# Patient Record
Sex: MALE | Race: BLACK OR AFRICAN AMERICAN | NOT HISPANIC OR LATINO | Employment: OTHER | URBAN - METROPOLITAN AREA
[De-identification: names, ages, dates, MRNs, and addresses within clinical notes are randomized per-mention and may not be internally consistent; named-entity substitution may affect disease eponyms.]

---

## 2017-01-18 DIAGNOSIS — E11.9 TYPE 2 DIABETES MELLITUS WITHOUT COMPLICATION: ICD-10-CM

## 2017-02-22 ENCOUNTER — TELEPHONE (OUTPATIENT)
Dept: PODIATRY | Facility: CLINIC | Age: 82
End: 2017-02-22

## 2017-02-22 NOTE — TELEPHONE ENCOUNTER
----- Message from Josh Garcia sent at 2/22/2017  9:05 AM CST -----  Contact: patient  Pt called to see if there is any way he can get in sooner with Dr. Frausto due to a toenail fungus.        Daughter Said that she will talk to her father (patient) and give us a call back to let us know if patient will take appointment  In [place].

## 2017-03-02 ENCOUNTER — OFFICE VISIT (OUTPATIENT)
Dept: PODIATRY | Facility: CLINIC | Age: 82
End: 2017-03-02
Payer: MEDICARE

## 2017-03-02 DIAGNOSIS — M20.10 HAV (HALLUX ABDUCTO VALGUS), UNSPECIFIED LATERALITY: ICD-10-CM

## 2017-03-02 DIAGNOSIS — B35.1 DERMATOPHYTOSIS OF NAIL: ICD-10-CM

## 2017-03-02 DIAGNOSIS — E11.49 TYPE II DIABETES MELLITUS WITH NEUROLOGICAL MANIFESTATIONS: Primary | ICD-10-CM

## 2017-03-02 PROCEDURE — 11721 DEBRIDE NAIL 6 OR MORE: CPT | Mod: Q9,S$GLB,, | Performed by: PODIATRIST

## 2017-03-02 PROCEDURE — 1159F MED LIST DOCD IN RCRD: CPT | Mod: S$GLB,,, | Performed by: PODIATRIST

## 2017-03-02 PROCEDURE — 99213 OFFICE O/P EST LOW 20 MIN: CPT | Mod: 25,S$GLB,, | Performed by: PODIATRIST

## 2017-03-02 PROCEDURE — 1160F RVW MEDS BY RX/DR IN RCRD: CPT | Mod: S$GLB,,, | Performed by: PODIATRIST

## 2017-03-02 PROCEDURE — 1157F ADVNC CARE PLAN IN RCRD: CPT | Mod: S$GLB,,, | Performed by: PODIATRIST

## 2017-03-02 PROCEDURE — 99999 PR PBB SHADOW E&M-EST. PATIENT-LVL II: CPT | Mod: PBBFAC,,, | Performed by: PODIATRIST

## 2017-03-02 PROCEDURE — 99499 UNLISTED E&M SERVICE: CPT | Mod: S$GLB,,, | Performed by: PODIATRIST

## 2017-03-02 NOTE — PROGRESS NOTES
Subjective:     Zbigniew Forman is a 89 y.o. male male who presents to the clinic for evaluation and treatment of high risk feet. Zbigniew has a past medical history of Hypertension associated with diabetes; Diabetes mellitus type II, uncontrolled; Glaucoma suspect, high risk (11/19/2013); and Senile cataract, unspecified (11/19/2013). The patient's chief complaint is long toenails. This patient has documented high risk feet requiring routine maintenance secondary to peripheral neuropathy. Experiences occasional numbness at the digits.No trauma reported.  He reports that he usually wears slippers         PCP: Blas Morgan Ii, MD  Date Last Seen by PCP:   10/11/16  HEMOGLOBIN A1C   Date Value Ref Range Status   07/07/2016 7.0 (H) 4.5 - 6.2 % Final     Comment:   05/28/2016 7.0 (H) 4.5 - 6.2 % Final   01/12/2016 7.1 (H) 4.5 - 6.2 % Final             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    Chronic kidney disease, stage III (moderate) 9/29/2015    Diabetes mellitus type II, uncontrolled     Glaucoma suspect, high risk 11/19/2013    Hypertension associated with diabetes     S/P evacuation of subdural hematoma 3/17/2015    Senile cataract, unspecified 11/19/2013             Current Outpatient Prescriptions on File Prior to Visit   Medication Sig Dispense Refill    amlodipine (NORVASC) 5 MG tablet Take 1 tablet (5 mg total) by mouth once daily. 90 tablet 3    latanoprost 0.005 % ophthalmic solution Place 1 drop into both eyes every evening. 2.5 mL 12    lisinopril (PRINIVIL,ZESTRIL) 40 MG tablet Take 1 tablet (40 mg total) by mouth once daily. 90 tablet 3     No current facility-administered medications on file prior to visit.          Review of patient's allergies indicates:   Allergen Reactions    Metformin Diarrhea    Sulfa (sulfonamide antibiotics)     Tradjenta [linagliptin]      Diarrhea               Review of Systems   Constitution: Positive for  weight gain. Negative for chills and fever.   Cardiovascular: Negative for chest pain, claudication and leg swelling.   Respiratory: Negative for cough and shortness of breath.    Skin: Positive for nail changes and dry skin.   Musculoskeletal: Positive for stiffness.   Gastrointestinal: Negative for nausea and vomiting.   Neurological: Positive for numbness. Negative for paresthesias.   Psychiatric/Behavioral: Negative for altered mental status.           Objective:     There were no vitals filed for this visit.        Physical Exam   Constitutional: He appears well-developed and well-nourished.   HENT:   Head: Normocephalic.   Cardiovascular: Intact distal pulses.    Pulses:       Dorsalis pedis pulses are 2+ on the right side, and 2+ on the left side.        Posterior tibial pulses are 1+ on the right side, and 1+ on the left side.   CRT < 3 sec to tips of toes. Mild 1+ edema noted to b/l LE. Mild spider veins and vericosities noted to b/l LEs.      Musculoskeletal:   Equinus noted b/l ankles with < 10 deg DF noted. MMT 5/5 in DF/PF/Inv/Ev resistance with no reproduction of pain in any direction. Passive range of motion of ankle and pedal joints is painless. Hammertoes noted to digits 2-4 b/l, semi reducible. HAV deformity noted b/l with non trackbound joint, hypermobile 1st ray b/l.      Neurological: He has normal strength. No sensory deficit.   Light touch, proprioception, and sharp/dull sensation are all intact bilaterally. Protective threshold with the [place]-Wienstein monofilament is intact bilaterally. Vibratory sensation diminished distal foot b/l.      Skin: Skin is warm, dry and intact.   No open lesions, lacerations or wounds noted. Nails are elongated by 8-10 mm, thickened by 3-4mm with yellow brown discoloration, subungual debris to toes of R 1-5 and L 1-5. Interdigital spaces clean, dry and intact b/l. No erythema noted to b/l foot.             Assessment / Plan :         I counseled the patient on  his conditions, their implications and medical management.    Type II diabetes mellitus with neurological manifestations  -     DIABETIC SHOES FOR HOME USE  -     Foot Care    HAV (hallux abducto valgus), unspecified laterality  -     DIABETIC SHOES FOR HOME USE    Dermatophytosis of nail  -     Foot Care          Return in about 3 months (around 6/2/2017) for foot care, or sooner if concerned.

## 2017-03-02 NOTE — MR AVS SNAPSHOT
[facility] [facility]   [facility]  [place] [address]  Phone: [phone]                  Zbigniew Forman   3/2/2017 1:45 PM   Office Visit    Description:  Male : 1919   Provider:  Greta Frausto DPM   Department:  [facility]           Reason for Visit     Diabetes Mellitus     Diabetic Foot Exam     Routine Foot Care           Diagnoses this Visit        Comments    Type II diabetes mellitus with neurological manifestations    -  Primary     HAV (hallux abducto valgus), unspecified laterality         Dermatophytosis of nail                To Do List           Future Appointments        Provider Department Dept Phone    3/20/2017 2:15 PM Lary Daly MD [facility]hthalmology [phone]    2017 1:30 PM Greta Frausto DPM [facility] [phone]      Goals (5 Years of Data)     None      Follow-Up and Disposition     Return in about 3 months (around 2017) for foot care, or sooner if concerned.    Follow-up and Disposition History      [facility]     [facility] On Call Nurse Care Line -  Assistance  Registered nurses in the [facility] On Call Center provide clinical advisement, health education, appointment booking, and other advisory services.  Call for this free service at [phone].             Medications           Message regarding Medications     Verify the changes and/or additions to your medication regime listed below are the same as discussed with your clinician today.  If any of these changes or additions are incorrect, please notify your healthcare provider.             Verify that the below list of medications is an accurate representation of the medications you are currently taking.  If none reported, the list may be blank. If incorrect, please contact your healthcare provider. Carry this list with you in case of emergency.           Current Medications     amlodipine (NORVASC) 5 MG tablet Take 1 tablet (5 mg total) by mouth  once daily.    latanoprost 0.005 % ophthalmic solution Place 1 drop into both eyes every evening.    lisinopril (PRINIVIL,ZESTRIL) 40 MG tablet Take 1 tablet (40 mg total) by mouth once daily.           Clinical Reference Information           Allergies as of 3/2/2017     Metformin    Sulfa (Sulfonamide Antibiotics)    Tradjenta [Linagliptin]      Immunizations Administered on Date of Encounter - 3/2/2017     None      Orders Placed During Today's Visit      Normal Orders This Visit    DIABETIC SHOES FOR HOME USE     Foot Care       [facility] Sign-Up     Activating your [facility] account is as easy as 1-2-3!     1) Visit [email or web address], select Sign Up Now, enter this activation code and your date of birth, then select Next.  [email or web address]  Expires: 4/16/2017  2:22 PM      2) Create a username and password to use when you visit [facility] in the future and select a security question in case you lose your password and select Next.    3) Enter your e-mail address and click Sign Up!    Additional Information  If you have questions, please e-mail [email or web address] or call [phone] to talk to our [facility] staff. Remember, [facility] is NOT to be used for urgent needs. For medical emergencies, dial 911.         Instructions    Your A1c:    Hemoglobin A1C   Date Value Ref Range Status   07/07/2016 7.0 (H) 4.5 - 6.2 % Final     Comment:     According to ADA guidelines, hemoglobin A1C <7.0% represents  optimal control in non-pregnant diabetic patients.  Different  metrics may apply to specific populations.   Standards of Medical Care in Diabetes - 2016.  For the purpose of screening for the presence of diabetes:  <5.7%     Consistent with the absence of diabetes  5.7-6.4%  Consistent with increasing risk for diabetes   (prediabetes)  >or=6.5%  Consistent with diabetes  Currently no consensus exists for use of hemoglobin A1C  for diagnosis of diabetes for children.     05/28/2016 7.0 (H) 4.5 - 6.2 % Final    01/12/2016 7.1 (H) 4.5 - 6.2 % Final       How to Check Your Feet    Below are tips to help you look for foot problems. Try to check your feet at the same time each day, such as when you get out of bed in the morning.    · Check the top of each foot. The tops of toes, back of the heel, and outer edge of the foot can get a lot of rubbing from poor-fitting shoes.    · Check the bottom of each foot. Daily wear and tear often leads to problems at pressure spots.    · Check the toes and nails. Fungal infections often occur between toes. Toenail problems can also be a sign of fungal infections or lead to breaks in the skin.    · Check your shoes, too. Loose objects inside a shoe can injure the foot. Use your hand to feel inside your shoes for things like yeison, loose stitching, or rough areas that could irritate your skin.        Diabetic Foot Care    Diabetes can lead to a number of different foot complications. Fortunately, most of these complications can be prevented with a little extra foot care. If diabetes is not well controlled, the high blood sugar can cause damage to blood vessels and result in poor circulation to the foot. When the skin does not get enough blood flow, it becomes prone to pressure sores and ulcers, which heal slowly.  High blood sugar can also damage nerves, interfering with the ability to feel pain and pressure. When you cant feel your foot normally, it is easy to injure your skin, bones and joints without knowing it. For these reasons diabetes increases the risk of fungal infections, bunions and ulcers. Deep ulcers can lead to bone infection. Gangrene is the most serious foot complication of diabetes. It usually occurs on the tips of the toes as blacked areas of skin. The black area is dead tissue. In severe cases, gangrene spreads to involve the entire toe, other toes and the entire foot. Foot or toe amputation may be required. Good foot care and blood sugar control can prevent  this.    Home Care  1. Wear comfortable, proper fitting shoes.  2. Wash your feet daily with warm water and mild soap.  3. After drying, apply a moisturizing cream or lotion.  4. Check your feet daily for skin breaks, blisters, swelling, or redness. Look between your toes also.  5. Wear cotton socks and change them every day.  6. Trim toe nails carefully and do not cut your cuticles.  7. Strive to keep your blood sugar under control with a combination of medicines, diet and activity.  8. If you smoke and have diabetes, it is very important that you stop. Smoking reduces blood flow to your foot.  9. Avoid activities that increase your risk of foot injury:  · Do not walk barefoot.  · Do not use heating pads or hot water bottles on your feet.  · Do not put your foot in a hot tub without first checking the temperature with your hand.  10) Schedule yearly foot exams.    Follow Up  with your doctor or as advised by our staff. Report any cut, puncture, scrape, other injury, blister, ingrown toenail or ulcer on your foot.    Get Prompt Medical Attention  if any of the following occur:  -- Open ulcer with pus draining from the wound  -- Increasing foot or leg pain  -- New areas of redness or swelling or tender areas of the foot    © 9231-7296 [employer]. [address] [place] [address]. All rights reserved. This information is not intended as a substitute for professional medical care. Always follow your healthcare professional's instructions.           Language Assistance Services     ATTENTION: Language assistance services are available, free of charge. Please call [phone].      ATENCIÓN: Si habla nicholasañol, tiene a santoyo disposición servicios gratuitos de asistencia lingüística. Llame al [phone].     [facility] Ý: N?u b?n nói Ti?ng Vi?t, có các d?ch v? h? tr? ngôn ng? mi?n phí dành cho b?n. G?i s? [phone].         [facility] complies with applicable Federal civil rights laws and  does not discriminate on the basis of race, color, national origin, age, disability, or sex.

## 2017-03-02 NOTE — PROCEDURES
Routine Foot Care  Date/Time: 3/2/2017 2:19 PM  Performed by: MAO RODRIGUEZ  Authorized by: MAO RODRIGUEZ     Consent Done?:  Yes (Verbal)    Nail Care Type:  Debride  Location(s): All  (Left 1st Toe, Left 3rd Toe, Left 2nd Toe, Left 4th Toe, Left 5th Toe, Right 1st Toe, Right 2nd Toe, Right 3rd Toe, Right 4th Toe and Right 5th Toe)  Patient tolerance:  Patient tolerated the procedure well with no immediate complications

## 2017-03-06 DIAGNOSIS — I10 ESSENTIAL HYPERTENSION: ICD-10-CM

## 2017-03-06 RX ORDER — AMLODIPINE BESYLATE 5 MG/1
5 TABLET ORAL DAILY
Qty: 90 TABLET | Refills: 3 | Status: SHIPPED | OUTPATIENT
Start: 2017-03-06 | End: 2017-08-03 | Stop reason: SDUPTHER

## 2017-03-06 NOTE — TELEPHONE ENCOUNTER
----- Message from Emily Richey sent at 3/6/2017  3:11 PM CST -----  Contact: Anne/ Daughter/ [phone]   Type: Rx     Name of medication(s): amlodipine (NORVASC) 5 MG tablet     Is this a refill? New rx? Refill      Who prescribed medication? Dr. Morgan      Pharmacy Name, Phone, & Location: [facility]      Comments: pt is calling to have a refill on the medication above,she states the pt is out of the medicine

## 2017-03-06 NOTE — TELEPHONE ENCOUNTER
----- Message from Ashley Arora sent at 3/6/2017  8:53 AM CST -----  Contact: Anne/ Daughter/ [phone]   Type: Rx    Name of medication(s): amlodipine (NORVASC) 5 MG tablet    Is this a refill? New rx? Refill     Who prescribed medication? Dr. Morgan     Pharmacy Name, Phone, & Location: [facility]     Comments: pt is calling to have a refill on the medication above. Please call and advise     Thank you

## 2017-06-27 ENCOUNTER — OFFICE VISIT (OUTPATIENT)
Dept: PODIATRY | Facility: CLINIC | Age: 82
End: 2017-06-27
Payer: MEDICARE

## 2017-06-27 VITALS
WEIGHT: 168 LBS | HEART RATE: 86 BPM | HEIGHT: 68 IN | DIASTOLIC BLOOD PRESSURE: 80 MMHG | SYSTOLIC BLOOD PRESSURE: 158 MMHG | BODY MASS INDEX: 25.46 KG/M2

## 2017-06-27 DIAGNOSIS — E11.49 TYPE II DIABETES MELLITUS WITH NEUROLOGICAL MANIFESTATIONS: ICD-10-CM

## 2017-06-27 DIAGNOSIS — E11.9 ENCOUNTER FOR DIABETIC FOOT EXAM: ICD-10-CM

## 2017-06-27 DIAGNOSIS — B35.1 DERMATOPHYTOSIS OF NAIL: Primary | ICD-10-CM

## 2017-06-27 PROCEDURE — 1126F AMNT PAIN NOTED NONE PRSNT: CPT | Mod: S$GLB,,, | Performed by: PODIATRIST

## 2017-06-27 PROCEDURE — 1159F MED LIST DOCD IN RCRD: CPT | Mod: S$GLB,,, | Performed by: PODIATRIST

## 2017-06-27 PROCEDURE — 11721 DEBRIDE NAIL 6 OR MORE: CPT | Mod: Q9,S$GLB,, | Performed by: PODIATRIST

## 2017-06-27 PROCEDURE — 99213 OFFICE O/P EST LOW 20 MIN: CPT | Mod: 25,S$GLB,, | Performed by: PODIATRIST

## 2017-06-27 PROCEDURE — 99499 UNLISTED E&M SERVICE: CPT | Mod: S$GLB,,, | Performed by: PODIATRIST

## 2017-06-27 PROCEDURE — 99999 PR PBB SHADOW E&M-EST. PATIENT-LVL III: CPT | Mod: PBBFAC,,, | Performed by: PODIATRIST

## 2017-06-27 NOTE — PROGRESS NOTES
Subjective:     Zbigniew Forman is a 89 y.o. male male who presents to the clinic for evaluation and treatment of high risk feet. Zbigniew has a past medical history of Hypertension associated with diabetes; Diabetes mellitus type II, uncontrolled; Glaucoma suspect, high risk (11/19/2013); and Senile cataract, unspecified (11/19/2013). The patient's chief complaint is long toenails. This patient has documented high risk feet requiring routine maintenance secondary to peripheral neuropathy. Experiences occasional numbness at the digits.No trauma reported.  He reports that he usually wears slippers         PCP: Blas Morgan Ii, MD  Date Last Seen by PCP:   10/11/16; upcoming appointment 8/3/2017            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    Chronic kidney disease, stage III (moderate) 9/29/2015    Diabetes mellitus type II, uncontrolled     Glaucoma suspect, high risk 11/19/2013    Hypertension associated with diabetes     S/P evacuation of subdural hematoma 3/17/2015    Senile cataract, unspecified 11/19/2013             Current Outpatient Prescriptions on File Prior to Visit   Medication Sig Dispense Refill    amlodipine (NORVASC) 5 MG tablet Take 1 tablet (5 mg total) by mouth once daily. 90 tablet 3    latanoprost 0.005 % ophthalmic solution Place 1 drop into both eyes every evening. 2.5 mL 12    lisinopril (PRINIVIL,ZESTRIL) 40 MG tablet Take 1 tablet (40 mg total) by mouth once daily. 90 tablet 3     No current facility-administered medications on file prior to visit.          Review of patient's allergies indicates:   Allergen Reactions    Metformin Diarrhea    Sulfa (sulfonamide antibiotics)     Tradjenta [linagliptin]      Diarrhea               Review of Systems   Constitution: Positive for weight gain. Negative for chills and fever.   Cardiovascular: Negative for chest pain, claudication and leg swelling.   Respiratory: Negative for  "cough and shortness of breath.    Skin: Positive for nail changes and dry skin.   Musculoskeletal: Positive for stiffness.   Gastrointestinal: Negative for nausea and vomiting.   Neurological: Positive for numbness. Negative for paresthesias.   Psychiatric/Behavioral: Negative for altered mental status.           Objective:     Vitals:    06/27/17 1420   BP: (!) 158/80   Pulse: 86   Weight: 76.2 kg (168 lb)   Height: 5' 8" (1.727 m)           Physical Exam   Constitutional: He appears well-developed and well-nourished.   HENT:   Head: Normocephalic.   Cardiovascular: Intact distal pulses.    Pulses:       Dorsalis pedis pulses are 2+ on the right side, and 2+ on the left side.        Posterior tibial pulses are 1+ on the right side, and 1+ on the left side.   CRT < 3 sec to tips of toes. Mild 1+ edema noted to b/l LE. Mild spider veins and vericosities noted to b/l LEs.      Musculoskeletal:   Equinus noted b/l ankles with < 10 deg DF noted. MMT 5/5 in DF/PF/Inv/Ev resistance with no reproduction of pain in any direction. Passive range of motion of ankle and pedal joints is painless. Hammertoes noted to digits 2-4 b/l, semi reducible. HAV deformity noted b/l with non trackbound joint, hypermobile 1st ray b/l.      Neurological: He has normal strength. No sensory deficit.   Light touch, proprioception, and sharp/dull sensation are all intact bilaterally. Protective threshold with the [place]-Wienstein monofilament is intact bilaterally. Vibratory sensation diminished distal foot b/l.      Skin: Skin is warm, dry and intact.   No open lesions, lacerations or wounds noted. Nails are elongated by 8-10 mm, thickened by 3-4mm with yellow brown discoloration, subungual debris to toes of R 1-5 and L 1-5. Interdigital spaces clean, dry and intact b/l. No erythema noted to b/l foot.       Hemoglobin A1C   Date Value Ref Range Status   07/07/2016 7.0 (H) 4.5 - 6.2 % Final     Comment:     According to ADA guidelines, hemoglobin " A1C <7.0% represents  optimal control in non-pregnant diabetic patients.  Different  metrics may apply to specific populations.   Standards of Medical Care in Diabetes - 2016.  For the purpose of screening for the presence of diabetes:  <5.7%     Consistent with the absence of diabetes  5.7-6.4%  Consistent with increasing risk for diabetes   (prediabetes)  >or=6.5%  Consistent with diabetes  Currently no consensus exists for use of hemoglobin A1C  for diagnosis of diabetes for children.     05/28/2016 7.0 (H) 4.5 - 6.2 % Final   01/12/2016 7.1 (H) 4.5 - 6.2 % Final             Assessment / Plan :         I counseled the patient on his conditions, their implications and medical management.    Dermatophytosis of nail    Type II diabetes mellitus with neurological manifestations  -     DIABETIC SHOES FOR HOME USE    Encounter for diabetic foot exam    Other orders  -     Foot Care      Routine Foot Care  Date/Time: 6/27/2017 2:38 PM  Performed by: MAO RODRIGUEZ  Authorized by: MAO RODRIGUEZ     Consent Done?:  Yes (Verbal)    Nail Care Type:  Debride  Location(s): All  (Left 1st Toe, Left 3rd Toe, Left 2nd Toe, Left 4th Toe, Left 5th Toe, Right 1st Toe, Right 2nd Toe, Right 3rd Toe, Right 4th Toe and Right 5th Toe)  Patient tolerance:  Patient tolerated the procedure well with no immediate complications     With patient's permission, the toenails mentioned above were aggressively reduced and debrided using a nail nipper, removing all offending nail and debris. The patient will continue to monitor the areas daily, inspect the feet, wear protective shoe gear when ambulatory, and moisturizer to maintain skin integrity.           Return in about 3 months (around 9/27/2017) for foot care, or sooner if concerned.

## 2017-08-03 ENCOUNTER — OFFICE VISIT (OUTPATIENT)
Dept: INTERNAL MEDICINE | Facility: CLINIC | Age: 82
End: 2017-08-03
Payer: MEDICARE

## 2017-08-03 ENCOUNTER — LAB VISIT (OUTPATIENT)
Dept: LAB | Facility: HOSPITAL | Age: 82
End: 2017-08-03
Attending: INTERNAL MEDICINE
Payer: MEDICARE

## 2017-08-03 VITALS
SYSTOLIC BLOOD PRESSURE: 154 MMHG | BODY MASS INDEX: 25.23 KG/M2 | DIASTOLIC BLOOD PRESSURE: 82 MMHG | WEIGHT: 166.44 LBS | HEIGHT: 68 IN | HEART RATE: 89 BPM

## 2017-08-03 DIAGNOSIS — I15.2 HYPERTENSION ASSOCIATED WITH DIABETES: ICD-10-CM

## 2017-08-03 DIAGNOSIS — E11.9 CONTROLLED TYPE 2 DIABETES MELLITUS WITHOUT COMPLICATION, WITHOUT LONG-TERM CURRENT USE OF INSULIN: Primary | ICD-10-CM

## 2017-08-03 DIAGNOSIS — R35.0 URINARY FREQUENCY: ICD-10-CM

## 2017-08-03 DIAGNOSIS — H61.20 IMPACTED CERUMEN, UNSPECIFIED LATERALITY: ICD-10-CM

## 2017-08-03 DIAGNOSIS — H93.19 TINNITUS, UNSPECIFIED LATERALITY: ICD-10-CM

## 2017-08-03 DIAGNOSIS — E11.59 HYPERTENSION ASSOCIATED WITH DIABETES: ICD-10-CM

## 2017-08-03 DIAGNOSIS — E11.9 CONTROLLED TYPE 2 DIABETES MELLITUS WITHOUT COMPLICATION, WITHOUT LONG-TERM CURRENT USE OF INSULIN: ICD-10-CM

## 2017-08-03 DIAGNOSIS — G47.9 SLEEP DISTURBANCE: ICD-10-CM

## 2017-08-03 DIAGNOSIS — R53.81 DEBILITY: ICD-10-CM

## 2017-08-03 LAB
ALBUMIN SERPL BCP-MCNC: 3.5 G/DL
ALP SERPL-CCNC: 59 U/L
ALT SERPL W/O P-5'-P-CCNC: 7 U/L
ANION GAP SERPL CALC-SCNC: 13 MMOL/L
AST SERPL-CCNC: 12 U/L
BILIRUB SERPL-MCNC: 0.4 MG/DL
BUN SERPL-MCNC: 23 MG/DL
CALCIUM SERPL-MCNC: 8.8 MG/DL
CHLORIDE SERPL-SCNC: 106 MMOL/L
CHOLEST/HDLC SERPL: 3.5 {RATIO}
CO2 SERPL-SCNC: 23 MMOL/L
CREAT SERPL-MCNC: 1.2 MG/DL
CREAT UR-MCNC: 157 MG/DL
EST. GFR  (AFRICAN AMERICAN): 58 ML/MIN/1.73 M^2
EST. GFR  (NON AFRICAN AMERICAN): 50 ML/MIN/1.73 M^2
GLUCOSE SERPL-MCNC: 105 MG/DL
HDL/CHOLESTEROL RATIO: 28.6 %
HDLC SERPL-MCNC: 210 MG/DL
HDLC SERPL-MCNC: 60 MG/DL
LDLC SERPL CALC-MCNC: 117.2 MG/DL
MICROALBUMIN UR DL<=1MG/L-MCNC: 15 UG/ML
MICROALBUMIN/CREATININE RATIO: 9.6 UG/MG
NONHDLC SERPL-MCNC: 150 MG/DL
POTASSIUM SERPL-SCNC: 4.7 MMOL/L
PROT SERPL-MCNC: 6.9 G/DL
SODIUM SERPL-SCNC: 142 MMOL/L
TRIGL SERPL-MCNC: 164 MG/DL

## 2017-08-03 PROCEDURE — 1159F MED LIST DOCD IN RCRD: CPT | Mod: S$GLB,,, | Performed by: INTERNAL MEDICINE

## 2017-08-03 PROCEDURE — 99499 UNLISTED E&M SERVICE: CPT | Mod: S$GLB,,, | Performed by: INTERNAL MEDICINE

## 2017-08-03 PROCEDURE — 99999 PR PBB SHADOW E&M-EST. PATIENT-LVL III: CPT | Mod: PBBFAC,,, | Performed by: INTERNAL MEDICINE

## 2017-08-03 PROCEDURE — 99214 OFFICE O/P EST MOD 30 MIN: CPT | Mod: S$GLB,,, | Performed by: INTERNAL MEDICINE

## 2017-08-03 PROCEDURE — 1126F AMNT PAIN NOTED NONE PRSNT: CPT | Mod: S$GLB,,, | Performed by: INTERNAL MEDICINE

## 2017-08-03 PROCEDURE — 83036 HEMOGLOBIN GLYCOSYLATED A1C: CPT

## 2017-08-03 PROCEDURE — 80061 LIPID PANEL: CPT

## 2017-08-03 PROCEDURE — 80053 COMPREHEN METABOLIC PANEL: CPT

## 2017-08-03 PROCEDURE — 3008F BODY MASS INDEX DOCD: CPT | Mod: S$GLB,,, | Performed by: INTERNAL MEDICINE

## 2017-08-03 PROCEDURE — 36415 COLL VENOUS BLD VENIPUNCTURE: CPT

## 2017-08-03 RX ORDER — AMLODIPINE BESYLATE 10 MG/1
10 TABLET ORAL DAILY
Qty: 90 TABLET | Refills: 3 | Status: SHIPPED | OUTPATIENT
Start: 2017-08-03 | End: 2017-10-31 | Stop reason: SDUPTHER

## 2017-08-03 RX ORDER — ALFUZOSIN HYDROCHLORIDE 10 MG/1
10 TABLET, EXTENDED RELEASE ORAL NIGHTLY
Qty: 90 TABLET | Refills: 3 | Status: SHIPPED | OUTPATIENT
Start: 2017-08-03 | End: 2018-02-08 | Stop reason: SDUPTHER

## 2017-08-03 NOTE — PROGRESS NOTES
Subjective:       Patient ID: Zbigniew Forman is a 89 y.o. male.    Chief Complaint: Hypertension and Diabetes    HPI - several issues today.  Mr. Forman lost his wife of 74 years in April, and the whole family seems to be grieving still.  He asked if his heart was healthy.  His hearing is muffled, and he hears a roaring sound; wonders does he need wax removed.    Has some itching across stomach and back where he has a few skin tags.  His circadian rhythm is off, going to sleep around 3am and not awakening until 1pm or later.  Frequently urinating throughout the day, including at least 3-4 when trying to sleep at night.  Due for a good bit of diabetic health maintenance.  He's not a smoker.  Accompanied by daughter and son today.    PMH:  HTN, elevated today  DM2, dietary management  S/p SDH evacuation  CKD3  Debility  Prostatism      Meds:  Reviewed and reconciled in EPIC with patient during visit today.    Review of Systems   Constitutional: Negative for fever.   HENT: Positive for hearing loss.    Respiratory: Negative for shortness of breath.    Cardiovascular: Negative for chest pain.   Gastrointestinal: Negative for abdominal pain.   Genitourinary: Positive for frequency.   Musculoskeletal: Positive for gait problem.   Skin: Negative for rash.   Neurological: Positive for weakness.   Psychiatric/Behavioral: Positive for dysphoric mood and sleep disturbance.       Objective:      Physical Exam   Constitutional: He is oriented to person, place, and time. He appears well-developed and well-nourished. No distress.   Frail, elderly man in a good mood today.   HENT:   Head: Normocephalic and atraumatic.   Cardiovascular: Normal rate, regular rhythm and normal heart sounds.  Exam reveals no gallop and no friction rub.    No murmur heard.  Pulmonary/Chest: No respiratory distress. He has no wheezes. He has no rales. He exhibits no tenderness.   Neurological: He is alert and oriented to person, place, and time.    Skin: Skin is warm. He is not diaphoretic. No erythema.   Skin tags in groin, lower back.   Psychiatric: He has a normal mood and affect. Thought content normal.   Nursing note and vitals reviewed.      Assessment:       1. Controlled type 2 diabetes mellitus without complication, without long-term current use of insulin    2. Hypertension associated with diabetes    3. Tinnitus, unspecified laterality    4. Sleep disturbance    5. Urinary frequency    6. Impacted cerumen, unspecified laterality    7. Essential hypertension    8. Debility        Plan:       Zbigniew was seen today for hypertension and diabetes.    Diagnoses and all orders for this visit:    Controlled type 2 diabetes mellitus without complication, without long-term current use of insulin - stable in the past, but no a1c in past year!  Will draw lab work today.  Plan to stay on diet treatment if at all possible given his age  -     Microalbumin/creatinine urine ratio  -     Lipid panel; Future  -     Hemoglobin A1c; Future  -     Comprehensive metabolic panel; Future    Hypertension associated with diabetes - not at goal, think he would benefit from higher dose amlodipine  -     amlodipine (NORVASC) 10 MG tablet; Take 1 tablet (10 mg total) by mouth once daily.  -     Comprehensive metabolic panel; Future    Tinnitus, unspecified laterality - cerumen impaction bilaterally; washed out by nursing staff.    Sleep disturbance - discussed slowing modifying awakening times to try to turn him around.  This can be difficult    Urinary frequency - worsening; trial of uroxatral  -     alfuzosin (UROXATRAL) 10 mg Tb24; Take 1 tablet (10 mg total) by mouth every evening.    Impacted cerumen, unspecified laterality    Debility - given age, he's not so bad; however, he would definitely benefit from physical therapy for strengthening and gait training  -     Ambulatory consult to Physical Therapy    rtc 6 months    JANELL Morgan MD MPH  Staff Internist

## 2017-08-04 LAB
ESTIMATED AVG GLUCOSE: 143 MG/DL
HBA1C MFR BLD HPLC: 6.6 %

## 2017-08-07 ENCOUNTER — TELEPHONE (OUTPATIENT)
Dept: INTERNAL MEDICINE | Facility: CLINIC | Age: 82
End: 2017-08-07

## 2017-08-07 NOTE — TELEPHONE ENCOUNTER
----- Message from Beni Ferrer sent at 8/7/2017  9:53 AM CDT -----  Contact: Anne [phone] or [phone]  Patient's sister would like a call back from the office to discuss test result . Please advise , Thanks !

## 2017-08-07 NOTE — TELEPHONE ENCOUNTER
Spoke to patients sister she would like to know patients lab results also that when he was here in the office he had an ear wash and hasn't been able to hear clearly. Wants to know if this is normal or what they should do. Please advise

## 2017-08-08 NOTE — TELEPHONE ENCOUNTER
Please call her back.  First, I sent a letter to the patient on 8/4 with all the lab results.  We cannot discuss his lab work with anyone but him; she should talk to him about the letter he received from us.    Second, hearing should have improved after the ear washing/wax removal.  If it's worse, he should come for an urgent care appointment to be evaluated again.

## 2017-09-24 DIAGNOSIS — I15.2 HYPERTENSION ASSOCIATED WITH DIABETES: ICD-10-CM

## 2017-09-24 DIAGNOSIS — E11.59 HYPERTENSION ASSOCIATED WITH DIABETES: ICD-10-CM

## 2017-09-25 RX ORDER — LATANOPROST 50 UG/ML
SOLUTION/ DROPS OPHTHALMIC
Qty: 2.5 ML | Refills: 12 | Status: SHIPPED | OUTPATIENT
Start: 2017-09-25 | End: 2019-11-11 | Stop reason: SDUPTHER

## 2017-09-25 RX ORDER — LATANOPROST 50 UG/ML
SOLUTION/ DROPS OPHTHALMIC
Qty: 2.5 ML | Refills: 12 | Status: SHIPPED | OUTPATIENT
Start: 2017-09-25 | End: 2017-10-27

## 2017-09-25 RX ORDER — LISINOPRIL 40 MG/1
TABLET ORAL
Qty: 90 TABLET | Refills: 3 | Status: SHIPPED | OUTPATIENT
Start: 2017-09-25 | End: 2017-10-30

## 2017-10-18 ENCOUNTER — TELEPHONE (OUTPATIENT)
Dept: INTERNAL MEDICINE | Facility: CLINIC | Age: 82
End: 2017-10-18

## 2017-10-18 NOTE — TELEPHONE ENCOUNTER
----- Message from Trish Crawford sent at 10/17/2017  4:30 PM CDT -----  Contact: Pt daughter Anne can be reached at [phone]  Anne is calling to get a sooner appt for her father who has a boil on the inner thigh and rash        Please contact daughter would like a  appt      Thank you!

## 2017-10-18 NOTE — TELEPHONE ENCOUNTER
Spoke to daughter they requests 11 am but sooner than the 27th of October. I advised Anne the daughter that Dr. Morgan doesn't have any 11 o'clock appointments open and that I could scheduled them with another provider. They declined and will keep already scheduled appointment.

## 2017-10-27 ENCOUNTER — OFFICE VISIT (OUTPATIENT)
Dept: INTERNAL MEDICINE | Facility: CLINIC | Age: 82
End: 2017-10-27
Payer: MEDICARE

## 2017-10-27 ENCOUNTER — IMMUNIZATION (OUTPATIENT)
Dept: INTERNAL MEDICINE | Facility: CLINIC | Age: 82
End: 2017-10-27
Payer: MEDICARE

## 2017-10-27 VITALS
HEART RATE: 88 BPM | DIASTOLIC BLOOD PRESSURE: 76 MMHG | WEIGHT: 164.69 LBS | SYSTOLIC BLOOD PRESSURE: 122 MMHG | BODY MASS INDEX: 24.96 KG/M2 | HEIGHT: 68 IN

## 2017-10-27 DIAGNOSIS — E11.59 HYPERTENSION ASSOCIATED WITH DIABETES: ICD-10-CM

## 2017-10-27 DIAGNOSIS — I15.2 HYPERTENSION ASSOCIATED WITH DIABETES: ICD-10-CM

## 2017-10-27 DIAGNOSIS — B02.9 HERPES ZOSTER WITHOUT COMPLICATION: Primary | ICD-10-CM

## 2017-10-27 DIAGNOSIS — E11.9 CONTROLLED TYPE 2 DIABETES MELLITUS WITHOUT COMPLICATION, WITHOUT LONG-TERM CURRENT USE OF INSULIN: ICD-10-CM

## 2017-10-27 PROCEDURE — 90662 IIV NO PRSV INCREASED AG IM: CPT | Mod: S$GLB,,, | Performed by: INTERNAL MEDICINE

## 2017-10-27 PROCEDURE — G0008 ADMIN INFLUENZA VIRUS VAC: HCPCS | Mod: S$GLB,,, | Performed by: INTERNAL MEDICINE

## 2017-10-27 PROCEDURE — 99499 UNLISTED E&M SERVICE: CPT | Mod: S$GLB,,, | Performed by: INTERNAL MEDICINE

## 2017-10-27 PROCEDURE — 99214 OFFICE O/P EST MOD 30 MIN: CPT | Mod: S$GLB,,, | Performed by: INTERNAL MEDICINE

## 2017-10-27 PROCEDURE — 99999 PR PBB SHADOW E&M-EST. PATIENT-LVL III: CPT | Mod: PBBFAC,,, | Performed by: INTERNAL MEDICINE

## 2017-10-27 NOTE — PROGRESS NOTES
Subjective:       Patient ID: Zbigniew Forman is a 89 y.o. male.    Chief Complaint: Recurrent Skin Infections; Abdominal Pain; and Hernia    HPI - Mr. Forman came with son and daughter to evaluate a rash on his left thigh.  Rash has been present for 3 weeks, getting better.  Also with left flank tenderness near the rash.  Taking all medications as prescribed.  Asked me to [facility] to see if he can get some help with home care d/t advanced age and debility.      PMH:  HTN, at goal  DM2, dietary management  S/p SDH evacuation  CKD3  Debility  Prostatism      Meds:  Reviewed and reconciled in EPIC with patient during visit today.     Review of Systems   Constitutional: Negative for fever.   HENT: Negative for congestion.    Respiratory: Negative for shortness of breath.    Cardiovascular: Negative for chest pain.   Gastrointestinal: Negative for abdominal pain.   Genitourinary: Positive for flank pain.   Musculoskeletal: Negative for arthralgias.   Skin: Positive for rash.   Neurological: Positive for weakness. Negative for dizziness.   Psychiatric/Behavioral: Negative for sleep disturbance.       Objective:      Physical Exam   Constitutional: He is oriented to person, place, and time. He appears well-developed and well-nourished. No distress.   HENT:   Head: Normocephalic and atraumatic.   Cardiovascular: Normal rate, regular rhythm and normal heart sounds.  Exam reveals no gallop and no friction rub.    No murmur heard.  Pulmonary/Chest: No respiratory distress. He has no wheezes. He has no rales. He exhibits no tenderness.   Neurological: He is alert and oriented to person, place, and time.   Skin: Skin is warm. He is not diaphoretic. No erythema.   Typical rash of shingles across left hip/flank.  Lesions are healing.  Most have lost crusting and are epithelialized.   Psychiatric: He has a normal mood and affect. Thought content normal.   Nursing note and vitals reviewed.       Assessment:       1. Herpes zoster without complication    2. Hypertension associated with diabetes    3. Controlled type 2 diabetes mellitus without complication, without long-term current use of insulin        Plan:       Zbigniew was seen today for recurrent skin infections, abdominal pain and hernia.    Diagnoses and all orders for this visit:    Herpes zoster without complication - new complaint.  Provided reassurance and support.  Medications will not help this late in his course (3 weeks in)    Hypertension associated with diabetes - at goal, stay the course    Controlled type 2 diabetes mellitus without complication, without long-term current use of insulin - at goal, stay the course    rtc prn, or 3 months    G Zack Morgan MD MPH  Staff Internist

## 2017-10-30 DIAGNOSIS — I10 ESSENTIAL HYPERTENSION: ICD-10-CM

## 2017-10-30 RX ORDER — LISINOPRIL 40 MG/1
TABLET ORAL
Qty: 90 TABLET | Refills: 3 | Status: SHIPPED | OUTPATIENT
Start: 2017-10-30 | End: 2018-02-08 | Stop reason: SDUPTHER

## 2017-10-31 DIAGNOSIS — I15.2 HYPERTENSION ASSOCIATED WITH DIABETES: ICD-10-CM

## 2017-10-31 DIAGNOSIS — E11.59 HYPERTENSION ASSOCIATED WITH DIABETES: ICD-10-CM

## 2017-10-31 RX ORDER — AMLODIPINE BESYLATE 10 MG/1
10 TABLET ORAL DAILY
Qty: 90 TABLET | Refills: 3 | Status: SHIPPED | OUTPATIENT
Start: 2017-10-31 | End: 2018-02-08 | Stop reason: SDUPTHER

## 2017-10-31 NOTE — TELEPHONE ENCOUNTER
----- Message from Geraldine Moffett sent at 10/31/2017 12:45 PM CDT -----  Contact: self [phone]  Type: Rx    Name of medication(s): amlodipine (NORVASC) 10 MG tablet    Is this a refill? New rx? refill    Who prescribed medication? [place]    Pharmacy Name, Phone, & Location: [facility] [address] [facility]     Comments: patient needs refills    thanks

## 2017-11-16 ENCOUNTER — TELEPHONE (OUTPATIENT)
Dept: INTERNAL MEDICINE | Facility: CLINIC | Age: 82
End: 2017-11-16

## 2017-11-16 NOTE — TELEPHONE ENCOUNTER
Yes, he can take multivitamins.  I don't think he needs one, but if they want to do it, they can.    Please inform the family.    D

## 2017-11-16 NOTE — TELEPHONE ENCOUNTER
----- Message from Elizabeth Stringer sent at 11/16/2017  9:45 AM CST -----  Contact: Anne Daughter [phone]  Requesting a call back in regards to centrum adult vitamins. Would like to know if its ok to take. Please advise

## 2018-02-05 ENCOUNTER — TELEPHONE (OUTPATIENT)
Dept: INTERNAL MEDICINE | Facility: CLINIC | Age: 83
End: 2018-02-05

## 2018-02-05 NOTE — TELEPHONE ENCOUNTER
Spoke with pt's daughter, she is asking for dr Morgan to see her father on Thursday. Dr Morgan, can I double book patient?

## 2018-02-05 NOTE — TELEPHONE ENCOUNTER
----- Message from Trish Crawford sent at 2/3/2018  2:16 PM CST -----  Contact: Pt daughter Anne can be reached at [phone]  Anne is calling to speak with the nurse, calling to schedule an appt this week for dizzness after Wednesday. Please      No appt available       Thank you!

## 2018-02-08 ENCOUNTER — OFFICE VISIT (OUTPATIENT)
Dept: PODIATRY | Facility: CLINIC | Age: 83
End: 2018-02-08
Payer: MEDICARE

## 2018-02-08 ENCOUNTER — OFFICE VISIT (OUTPATIENT)
Dept: INTERNAL MEDICINE | Facility: CLINIC | Age: 83
End: 2018-02-08
Payer: MEDICARE

## 2018-02-08 VITALS
HEART RATE: 85 BPM | HEIGHT: 68 IN | WEIGHT: 172.63 LBS | DIASTOLIC BLOOD PRESSURE: 66 MMHG | SYSTOLIC BLOOD PRESSURE: 132 MMHG | BODY MASS INDEX: 26.16 KG/M2

## 2018-02-08 VITALS
HEART RATE: 86 BPM | BODY MASS INDEX: 26.07 KG/M2 | DIASTOLIC BLOOD PRESSURE: 66 MMHG | WEIGHT: 172 LBS | SYSTOLIC BLOOD PRESSURE: 136 MMHG | HEIGHT: 68 IN

## 2018-02-08 DIAGNOSIS — E11.59 HYPERTENSION ASSOCIATED WITH DIABETES: ICD-10-CM

## 2018-02-08 DIAGNOSIS — R35.0 URINARY FREQUENCY: ICD-10-CM

## 2018-02-08 DIAGNOSIS — R42 DIZZINESS: ICD-10-CM

## 2018-02-08 DIAGNOSIS — E11.49 TYPE II DIABETES MELLITUS WITH NEUROLOGICAL MANIFESTATIONS: Primary | ICD-10-CM

## 2018-02-08 DIAGNOSIS — E11.9 CONTROLLED TYPE 2 DIABETES MELLITUS WITHOUT COMPLICATION, WITHOUT LONG-TERM CURRENT USE OF INSULIN: Primary | ICD-10-CM

## 2018-02-08 DIAGNOSIS — I15.2 HYPERTENSION ASSOCIATED WITH DIABETES: ICD-10-CM

## 2018-02-08 DIAGNOSIS — B35.1 DERMATOPHYTOSIS OF NAIL: ICD-10-CM

## 2018-02-08 PROCEDURE — 99213 OFFICE O/P EST LOW 20 MIN: CPT | Performed by: INTERNAL MEDICINE

## 2018-02-08 PROCEDURE — 1126F AMNT PAIN NOTED NONE PRSNT: CPT | Mod: S$GLB,,, | Performed by: INTERNAL MEDICINE

## 2018-02-08 PROCEDURE — 99499 UNLISTED E&M SERVICE: CPT | Mod: S$GLB,,, | Performed by: PODIATRIST

## 2018-02-08 PROCEDURE — 99214 OFFICE O/P EST MOD 30 MIN: CPT | Mod: S$GLB,,, | Performed by: INTERNAL MEDICINE

## 2018-02-08 PROCEDURE — 1159F MED LIST DOCD IN RCRD: CPT | Mod: S$GLB,,, | Performed by: INTERNAL MEDICINE

## 2018-02-08 PROCEDURE — 99213 OFFICE O/P EST LOW 20 MIN: CPT | Mod: 27,PO | Performed by: PODIATRIST

## 2018-02-08 PROCEDURE — 3008F BODY MASS INDEX DOCD: CPT | Mod: S$GLB,,, | Performed by: INTERNAL MEDICINE

## 2018-02-08 PROCEDURE — 99999 PR PBB SHADOW E&M-EST. PATIENT-LVL III: CPT | Mod: PBBFAC,,, | Performed by: INTERNAL MEDICINE

## 2018-02-08 PROCEDURE — 99999 PR PBB SHADOW E&M-EST. PATIENT-LVL III: CPT | Mod: PBBFAC,,, | Performed by: PODIATRIST

## 2018-02-08 RX ORDER — AMLODIPINE BESYLATE 10 MG/1
10 TABLET ORAL DAILY
Qty: 90 TABLET | Refills: 3 | Status: SHIPPED | OUTPATIENT
Start: 2018-02-08 | End: 2018-08-24 | Stop reason: SDUPTHER

## 2018-02-08 RX ORDER — LISINOPRIL 40 MG/1
TABLET ORAL
Qty: 90 TABLET | Refills: 3 | Status: SHIPPED | OUTPATIENT
Start: 2018-02-08 | End: 2018-08-24 | Stop reason: SDUPTHER

## 2018-02-08 RX ORDER — ALFUZOSIN HYDROCHLORIDE 10 MG/1
10 TABLET, EXTENDED RELEASE ORAL NIGHTLY
Qty: 90 TABLET | Refills: 3 | Status: SHIPPED | OUTPATIENT
Start: 2018-02-08 | End: 2018-08-24

## 2018-02-08 NOTE — PROGRESS NOTES
Subjective:       Patient ID: Zbigniew Forman is a 89 y.o. male.    Chief Complaint: Dizziness    HPI - Mr. Forman came in a wheelchair with his wife and son today.  He has been having dizziness (dysequilibrium-type) for the past month.  Worse when he rises from the bed in the am.  Not feeling faint.  No room spinning.  Just feels like he's going to fall.  Feels better when he uses the walker.  He's due for some diabetic health maintenance.  Taking all meds as prescribed.  He's urinating a little more frequently at night.  He doesn't smoke cigarettes.    PMH:  HTN, at goal  DM2, dietary management  S/p SDH evacuation  CKD3  Debility  Prostatism      Meds:  Reviewed and reconciled in EPIC with patient during visit today.     Review of Systems   Constitutional: Negative for fever.   HENT: Negative for congestion.    Respiratory: Negative for shortness of breath.    Cardiovascular: Negative for chest pain.   Gastrointestinal: Negative for abdominal pain.   Genitourinary: Positive for frequency.   Musculoskeletal: Negative for arthralgias.   Skin: Negative for rash.   Neurological: Positive for dizziness.   Psychiatric/Behavioral: Negative for sleep disturbance.       Objective:      Physical Exam   Constitutional: He is oriented to person, place, and time. He appears well-developed and well-nourished. No distress.   Frail, elderly man in NAD.  Examined in wheelchair   HENT:   Head: Normocephalic and atraumatic.   Cerumen impaction bilaterally, left >>right   Cardiovascular: Normal rate, regular rhythm and normal heart sounds.  Exam reveals no gallop and no friction rub.    No murmur heard.  Pulmonary/Chest: No respiratory distress. He has no wheezes. He has no rales. He exhibits no tenderness.   Neurological: He is alert and oriented to person, place, and time.   Skin: Skin is warm. He is not diaphoretic. No erythema.   Psychiatric: He has a normal mood and affect. Thought content normal.   Nursing note and vitals  reviewed.      Assessment:       1. Controlled type 2 diabetes mellitus without complication, without long-term current use of insulin    2. Hypertension associated with diabetes    3. Dizziness    4. Urinary frequency        Plan:       Zbigniew was seen today for dizziness.    Diagnoses and all orders for this visit:    Controlled type 2 diabetes mellitus without complication, without long-term current use of insulin - at goal on dietary therapy.  Repeat a1c; eye photo  -     Diabetic Eye Screening Photo; Future  -     Hemoglobin A1c; Future    Hypertension associated with diabetes - at goal, stay the course  -     amLODIPine (NORVASC) 10 MG tablet; Take 1 tablet (10 mg total) by mouth once daily.  -     lisinopril (PRINIVIL,ZESTRIL) 40 MG tablet; TAKE 1 TABLET(40 MG) BY MOUTH EVERY DAY  -     Comprehensive metabolic panel; Future    Dizziness - likely multifactorial.  Cleaned ear wax today.  CMP.  Use walker more frequently  -     Comprehensive metabolic panel; Future    Urinary frequency - a little worse.  Refilling uroxatral  -     alfuzosin (UROXATRAL) 10 mg Tb24; Take 1 tablet (10 mg total) by mouth every evening.    rtc prn, or 3 months    JANELL Morgan MD MPH  Staff Internist

## 2018-02-08 NOTE — PROGRESS NOTES
Subjective:     Zbigniew Forman is a 89 y.o. male male who presents to the clinic for evaluation and treatment of high risk feet. Zbigniew has a past medical history of Hypertension associated with diabetes; Diabetes mellitus type II, uncontrolled; Glaucoma suspect, high risk (11/19/2013); and Senile cataract, unspecified (11/19/2013). The patient's chief complaint is long toenails. This patient has documented high risk feet requiring routine maintenance secondary to peripheral neuropathy. Experiences occasional numbness at the digits.No trauma reported.  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 90 tablet 3    amLODIPine (NORVASC) 10 MG tablet Take 1 tablet (10 mg total) by mouth once daily. 90 tablet 3    latanoprost 0.005 % ophthalmic solution INSTILL 1 DROP IN BOTH EYES EVERY EVENING 2.5 mL 12    lisinopril (PRINIVIL,ZESTRIL) 40 MG tablet TAKE 1 TABLET(40 MG) BY MOUTH EVERY DAY 90 tablet 3     No current facility-administered medications on file prior to visit.          Review of patient's allergies indicates:   Allergen Reactions    Metformin Diarrhea    Sulfa (sulfonamide antibiotics)     Tradjenta [linagliptin]      Diarrhea           Social History     Social History     "Marital status:      Spouse name: N/A    Number of children: N/A    Years of education: N/A     Occupational History    Not on file.     Social History Main Topics    Smoking status: Never Smoker    Smokeless tobacco: Never Used    Alcohol use No    Drug use: Unknown    Sexual activity: Not on file     Other Topics Concern    Not on file     Social History Narrative    No narrative on file               Review of Systems   Constitution: Positive for weight gain. Negative for chills and fever.   Cardiovascular: Negative for chest pain, claudication and leg swelling.   Respiratory: Negative for cough and shortness of breath.    Skin: Positive for nail changes and dry skin.   Musculoskeletal: Positive for stiffness.   Gastrointestinal: Negative for nausea and vomiting.   Neurological: Positive for numbness. Negative for paresthesias.   Psychiatric/Behavioral: Negative for altered mental status.               Objective:     Vitals:    02/08/18 1522   BP: 136/66   Pulse: 86   Weight: 78 kg (172 lb)   Height: 5' 8" (1.727 m)           Physical Exam   Constitutional: He appears well-developed and well-nourished.   HENT:   Head: Normocephalic.   Cardiovascular: Intact distal pulses.    Pulses:       Dorsalis pedis pulses are 2+ on the right side, and 2+ on the left side.        Posterior tibial pulses are 1+ on the right side, and 1+ on the left side.   CRT < 3 sec to tips of toes. Mild 1+ edema noted to b/l LE. Mild spider veins and vericosities noted to b/l LEs.      Musculoskeletal:   Equinus noted b/l ankles with < 10 deg DF noted. MMT 5/5 in DF/PF/Inv/Ev resistance with no reproduction of pain in any direction. Passive range of motion of ankle and pedal joints is painless. Hammertoes noted to digits 2-4 b/l, semi reducible. HAV deformity noted b/l with non trackbound joint, hypermobile 1st ray b/l.      Neurological: He has normal strength. No sensory deficit.   Light touch, proprioception, and " sharp/dull sensation are all intact bilaterally. Protective threshold with the [place]ienstein monofilament is intact bilaterally. Vibratory sensation diminished distal foot b/l.      Skin: Skin is warm, dry and intact.   No open lesions, lacerations or wounds noted. Nails are elongated by 8-10 mm, thickened by 3-4mm with yellow brown discoloration, subungual debris to toes of R 1-5 and L 1-5. Interdigital spaces clean, dry and intact b/l. No erythema noted to b/l foot.                 Assessment / Plan :         I counseled the patient on his conditions, their implications and medical management.    Type II diabetes mellitus with neurological manifestations    Dermatophytosis of nail    Other orders  -     Foot Care      Routine Foot Care  Date/Time: 2/9/2018 9:31 AM  Performed by: MAO RODRIGUEZ  Authorized by: MAO RODRIGUEZ     Consent Done?:  Yes (Verbal)    Nail Care Type:  Debride  Location(s): All  (Left 1st Toe, Left 3rd Toe, Left 2nd Toe, Left 4th Toe, Left 5th Toe, Right 1st Toe, Right 2nd Toe, Right 3rd Toe, Right 4th Toe and Right 5th Toe)  Patient tolerance:  Patient tolerated the procedure well with no immediate complications     With patient's permission, the toenails mentioned above were aggressively reduced and debrided using a nail nipper, removing all offending nail and debris. The patient will continue to monitor the areas daily, inspect the feet, wear protective shoe gear when ambulatory, and moisturizer to maintain skin integrity.

## 2018-02-09 PROCEDURE — 11721 DEBRIDE NAIL 6 OR MORE: CPT | Mod: Q9,S$GLB,, | Performed by: PODIATRIST

## 2018-02-18 ENCOUNTER — NURSE TRIAGE (OUTPATIENT)
Dept: ADMINISTRATIVE | Facility: CLINIC | Age: 83
End: 2018-02-18

## 2018-02-18 NOTE — TELEPHONE ENCOUNTER
Daughter called re eye exam appt Javieres. Office message sent to have optometry office call family re details of eye exam.     Reason for Disposition   Question about upcoming scheduled test, no triage required and triager able to answer question    Protocols used: ST INFORMATION ONLY CALL-A-AH

## 2018-02-20 ENCOUNTER — CLINICAL SUPPORT (OUTPATIENT)
Dept: OPTOMETRY | Facility: CLINIC | Age: 83
End: 2018-02-20
Attending: INTERNAL MEDICINE
Payer: MEDICARE

## 2018-02-20 DIAGNOSIS — E11.9 CONTROLLED TYPE 2 DIABETES MELLITUS WITHOUT COMPLICATION, WITHOUT LONG-TERM CURRENT USE OF INSULIN: ICD-10-CM

## 2018-02-20 PROCEDURE — 99999 PR PBB SHADOW E&M-EST. PATIENT-LVL II: CPT | Mod: PBBFAC,,,

## 2018-02-20 PROCEDURE — 92250 FUNDUS PHOTOGRAPHY W/I&R: CPT | Mod: S$GLB,,, | Performed by: OPHTHALMOLOGY

## 2018-02-20 NOTE — PROGRESS NOTES
Assessment /Plan     For exam results, see Encounter Report.    Controlled type 2 diabetes mellitus without complication, without long-term current use of insulin  -     Diabetic Eye Screening Photo

## 2018-02-21 ENCOUNTER — TELEPHONE (OUTPATIENT)
Dept: OPHTHALMOLOGY | Facility: CLINIC | Age: 83
End: 2018-02-21

## 2018-05-19 ENCOUNTER — HOSPITAL ENCOUNTER (EMERGENCY)
Facility: HOSPITAL | Age: 83
Discharge: HOME | End: 2018-05-19
Attending: EMERGENCY MEDICINE
Payer: MEDICARE

## 2018-05-19 ENCOUNTER — NURSE TRIAGE (OUTPATIENT)
Dept: ADMINISTRATIVE | Facility: CLINIC | Age: 83
End: 2018-05-19

## 2018-05-19 VITALS
DIASTOLIC BLOOD PRESSURE: 65 MMHG | HEART RATE: 81 BPM | HEIGHT: 68 IN | RESPIRATION RATE: 20 BRPM | TEMPERATURE: 98 F | OXYGEN SATURATION: 96 % | SYSTOLIC BLOOD PRESSURE: 136 MMHG | WEIGHT: 160 LBS | BODY MASS INDEX: 24.25 KG/M2

## 2018-05-19 DIAGNOSIS — R00.2 PALPITATION: Primary | ICD-10-CM

## 2018-05-19 DIAGNOSIS — R79.89 ELEVATED TROPONIN: ICD-10-CM

## 2018-05-19 LAB
ALBUMIN SERPL BCP-MCNC: 3.6 G/DL
ALP SERPL-CCNC: 61 U/L
ALT SERPL W/O P-5'-P-CCNC: 8 U/L
ANION GAP SERPL CALC-SCNC: 12 MMOL/L
AST SERPL-CCNC: 15 U/L
BASOPHILS # BLD AUTO: 0.06 K/UL
BASOPHILS NFR BLD: 0.6 %
BILIRUB SERPL-MCNC: 0.4 MG/DL
BUN SERPL-MCNC: 28 MG/DL
CALCIUM SERPL-MCNC: 8.9 MG/DL
CHLORIDE SERPL-SCNC: 107 MMOL/L
CO2 SERPL-SCNC: 22 MMOL/L
CREAT SERPL-MCNC: 1.5 MG/DL
DIFFERENTIAL METHOD: ABNORMAL
EOSINOPHIL # BLD AUTO: 0.1 K/UL
EOSINOPHIL NFR BLD: 1 %
ERYTHROCYTE [DISTWIDTH] IN BLOOD BY AUTOMATED COUNT: 13.7 %
EST. GFR  (AFRICAN AMERICAN): 44.2 ML/MIN/1.73 M^2
EST. GFR  (NON AFRICAN AMERICAN): 38.2 ML/MIN/1.73 M^2
GLUCOSE SERPL-MCNC: 170 MG/DL
HCT VFR BLD AUTO: 40.9 %
HGB BLD-MCNC: 13.2 G/DL
IMM GRANULOCYTES # BLD AUTO: 0.03 K/UL
IMM GRANULOCYTES NFR BLD AUTO: 0.3 %
LYMPHOCYTES # BLD AUTO: 3.9 K/UL
LYMPHOCYTES NFR BLD: 36.8 %
MCH RBC QN AUTO: 27.6 PG
MCHC RBC AUTO-ENTMCNC: 32.3 G/DL
MCV RBC AUTO: 86 FL
MONOCYTES # BLD AUTO: 0.5 K/UL
MONOCYTES NFR BLD: 4.3 %
NEUTROPHILS # BLD AUTO: 6.1 K/UL
NEUTROPHILS NFR BLD: 57 %
NRBC BLD-RTO: 0 /100 WBC
PLATELET # BLD AUTO: 289 K/UL
PMV BLD AUTO: 9.9 FL
POTASSIUM SERPL-SCNC: 5 MMOL/L
PROT SERPL-MCNC: 6.9 G/DL
RBC # BLD AUTO: 4.78 M/UL
SODIUM SERPL-SCNC: 141 MMOL/L
TROPONIN I SERPL DL<=0.01 NG/ML-MCNC: 0.05 NG/ML
WBC # BLD AUTO: 10.63 K/UL

## 2018-05-19 PROCEDURE — 93005 ELECTROCARDIOGRAM TRACING: CPT

## 2018-05-19 PROCEDURE — 85025 COMPLETE CBC W/AUTO DIFF WBC: CPT

## 2018-05-19 PROCEDURE — 99284 EMERGENCY DEPT VISIT MOD MDM: CPT | Mod: 25

## 2018-05-19 PROCEDURE — 80053 COMPREHEN METABOLIC PANEL: CPT

## 2018-05-19 PROCEDURE — 93010 ELECTROCARDIOGRAM REPORT: CPT | Mod: ,,, | Performed by: INTERNAL MEDICINE

## 2018-05-19 PROCEDURE — 84484 ASSAY OF TROPONIN QUANT: CPT

## 2018-05-19 NOTE — ED PROVIDER NOTES
"Encounter Date: 5/19/2018    SCRIBE #1 NOTE: I, Elenita Orozco, am scribing for, and in the presence of,  Dr. Tesfaye. I have scribed the following portions of the note - the EKG reading and the APC attestation.       History     Chief Complaint   Patient presents with    Palpitations     Pt reporting a "vibrating" sensation in the left side of his chest x 4 days; denies pain or SOB.     89-year-old white male with history of hypertension, diabetes, glaucoma presents to the ED complaining of palpitations for the past 4 days.  He describes vibrations in his left upper chest that will occur intermittently and last for 1-2 minutes at a time.  This is nonexertional is not associated with chest pain, shortness of breath, diaphoresis, lightheadedness.  He has bilateral lower extremity edema which he reports is chronic and unchanged from baseline.  He denies fever, chills, abdominal pain, nausea, vomiting, URI symptoms, headache, numbness.      The history is provided by the patient.     Review of patient's allergies indicates:   Allergen Reactions    Metformin Diarrhea    Sulfa (sulfonamide antibiotics)     Tradjenta [linagliptin]      Diarrhea       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    Chronic kidney disease, stage III (moderate) 9/29/2015    Diabetes mellitus type II, uncontrolled     Glaucoma suspect, high risk 11/19/2013    Hypertension associated with diabetes     S/P evacuation of subdural hematoma 3/17/2015    Senile cataract, unspecified 11/19/2013     Past Surgical History:   Procedure Laterality Date    BRAIN SURGERY  8/19/14    Left canyd holes X 2 for evacuation of chronic SDH    HERNIA REPAIR       Family History   Problem Relation Age of Onset    Other Father         peritonitis    Cerebral aneurysm Mother     Kidney failure Sister 96    Amblyopia Neg Hx     Blindness Neg Hx     Cancer Neg Hx     Cataracts Neg Hx     Diabetes " Neg Hx     Glaucoma Neg Hx     Hypertension Neg Hx     Macular degeneration Neg Hx     Retinal detachment Neg Hx     Strabismus Neg Hx     Stroke Neg Hx     Thyroid disease Neg Hx      Social History   Substance Use Topics    Smoking status: Never Smoker    Smokeless tobacco: Never Used    Alcohol use No     Review of Systems   Constitutional: Negative for chills, diaphoresis, fatigue and fever.   HENT: Negative for congestion, rhinorrhea and sore throat.    Eyes: Negative for photophobia and visual disturbance.   Respiratory: Negative for cough and shortness of breath.    Cardiovascular: Positive for palpitations and leg swelling (chronic and unchanged). Negative for chest pain.   Gastrointestinal: Negative for abdominal pain, constipation, diarrhea, nausea and vomiting.   Genitourinary: Negative for dysuria and hematuria.   Musculoskeletal: Negative for neck pain and neck stiffness.   Skin: Negative for rash and wound.   Neurological: Positive for dizziness (intermittent, currently asymptomatic). Negative for weakness, light-headedness, numbness and headaches.   Psychiatric/Behavioral: Negative for confusion.       Physical Exam     Initial Vitals [05/19/18 1637]   BP Pulse Resp Temp SpO2   (!) 135/92 90 16 98 °F (36.7 °C) 97 %      MAP       106.33         Physical Exam    Nursing note and vitals reviewed.  Constitutional: He appears well-developed and well-nourished. He is not diaphoretic. No distress.   HENT:   Head: Normocephalic and atraumatic.   Neck: Normal range of motion. Neck supple.   Cardiovascular: Normal rate, regular rhythm, normal heart sounds and intact distal pulses. Exam reveals no gallop and no friction rub.    No murmur heard.  2+ bilateral LE edema (patient reports this is chronic and unchanged)   Pulmonary/Chest: Breath sounds normal. He has no wheezes. He has no rhonchi. He has no rales.   Abdominal: Soft. Bowel sounds are normal. There is no tenderness. There is no rebound and no  guarding.   Musculoskeletal: Normal range of motion.   Neurological: He is alert and oriented to person, place, and time.   Skin: Skin is warm and dry. No rash noted. No erythema.   Psychiatric: He has a normal mood and affect.         ED Course   Procedures  Labs Reviewed   CBC W/ AUTO DIFFERENTIAL - Abnormal; Notable for the following:        Result Value    Hemoglobin 13.2 (*)     All other components within normal limits   COMPREHENSIVE METABOLIC PANEL - Abnormal; Notable for the following:     CO2 22 (*)     Glucose 170 (*)     Creatinine 1.5 (*)     ALT 8 (*)     eGFR if  44.2 (*)     eGFR if non  38.2 (*)     All other components within normal limits   TROPONIN I - Abnormal; Notable for the following:     Troponin I 0.051 (*)     All other components within normal limits     Imaging Results          X-Ray Chest PA And Lateral (Final result)  Result time 05/19/18 17:54:39    Final result by Lion Holm MD (05/19/18 17:54:39)                 Impression:      1. No acute cardiopulmonary process.  2. Compression deformity of likely T12 anteriorly, no previous exams are available for comparison, correlation with any focal tenderness.      Electronically signed by: Lion Holm MD  Date:    05/19/2018  Time:    17:54             Narrative:    EXAMINATION:  XR CHEST PA AND LATERAL    CLINICAL HISTORY:  palpitations;    TECHNIQUE:  PA and lateral views of the chest were performed.    COMPARISON:  08/16/2014    FINDINGS:  The cardiomediastinal silhouette is mildly prominent, noting calcification of the aortic arch..  There is no pleural effusion.  The trachea is midline.  The lungs are symmetrically expanded bilaterally with mildly coarse interstitial attenuation, similar to the previous exam..  No large focal consolidation seen.  There is no pneumothorax.  The osseous structures are remarkable for degenerative changes.  There is compression deformity of a lower thoracic  vertebral body, possibly T12, correlation with any focal tenderness recommended, no previous exams are available for meaningful direct comparison..                                EKG Readings: (Independently Interpreted)   Heart rate 98. NSR. No STEMI.           Medical Decision Making:   History:   Old Medical Records: I decided to obtain old medical records.  Independently Interpreted Test(s):   I have ordered and independently interpreted EKG Reading(s) - see prior notes  Clinical Tests:   Lab Tests: Ordered and Reviewed  Radiological Study: Ordered and Reviewed  Medical Tests: Ordered and Reviewed       APC / Resident Notes:   89-year-old white male with history of hypertension, diabetes, glaucoma presents to the ED complaining of palpitations for the past 4 days.  Vital signs stable. Regular rate and rhythm. Lungs are clear.  Abdomen is soft and nontender.  No chest wall tenderness to palpation. 2+ bilateral lower extremity edema the patient reports is chronic and unchanged.  Differential diagnosis includes but is not limited to anxiety, ACS, pneumonia, anemia, cardiac arrhythmia.  Will obtain labs, chest x-ray, EKG.    H/H 13.2/40.9. CMP with no acute abnormalities. Troponin elevated at 0.051.    CXR shows compression deformity at T12 - patient denies any back pain.    Recommended observation for ACS rule out due to palpitations and elevated troponin. Patient does not wish to be admitted and would like to go home. All risks discussed with the patient and his family. He understands and wants to sign out AMA. He is alert and oriented and able to make all decisions.            Scribe Attestation:   Scribe #1: I performed the above scribed service and the documentation accurately describes the services I performed. I attest to the accuracy of the note.    Attending Attestation:     Physician Attestation Statement for NP/PA:   I discussed this assessment and plan of this patient with the NP/PA, but I did not  personally examine the patient. The face to face encounter was performed by the NP/PA.                     Clinical Impression:   The primary encounter diagnosis was Palpitation. A diagnosis of Elevated troponin was also pertinent to this visit.    Disposition:   Disposition: KEIRY Clark PA-C  05/19/18 0153       Jacinto Tesfaye MD  05/26/18 6246

## 2018-05-19 NOTE — TELEPHONE ENCOUNTER
Reason for Disposition   History of heart disease  (i.e., heart attack, bypass surgery, angina, angioplasty, CHF) (Exception: brief heart beat symptoms that went away and now feels well)    Protocols used: ST HEART RATE AND HEARTBEAT [id number]    Patient started to complain of vibrations/ flutters in his chest on yesterday. He ate breakfast this morning and appears to be well otherwise. I spoke to his daughter Anne and after a review of the symptoms and provided that no recent bp was taken at home, Anne was advised to bring her father to the ED for evaluation. She verbalized understanding.

## 2018-05-19 NOTE — ED NOTES
LOC: The patient is awake, alert and aware of environment with an appropriate affect, the patient is oriented x 3 and speaking appropriately. Pt has difficulty hearing.   APPEARANCE: Patient resting comfortably and in no acute distress, patient is clean and well groomed.  SKIN: The skin is warm and dry, patient has normal skin turgor and moist mucus membranes, skin intact, no breakdown or brusing noted.  MUSKULOSKELETAL: Patient moving all extremities well, no obvious swelling or deformities noted. Pt uses a cane to ambulate and arrived in a wheelchair. Pt reports an intermittent buzzing in his left upper chest. Pt denies chest pain at this time.   RESPIRATORY: Airway is open and patent, respirations are spontaneous, patient has a normal effort and rate. Breath sounds are clear and equal bilaterally.  CARDIAC: Normal heart sounds. Bilateral lower extremity peripheral edema noted.   ABDOMEN: Soft and non tender to palpation, no distention noted. Bowel sounds present.  NEURO: No neuro deficits, hand grasp equal, no drift noted, no facial droop noted. Speech is clear.

## 2018-05-19 NOTE — ED TRIAGE NOTES
"Pt arrived to ED with CC of "vibrating" sensation in the left side of his chest x 4 days, intermittent. Denies CP fever nausea or SOB. Denies any pain. Pt reports intermittent dizziness, denies at this time, denies HA or vision changes. No other complaints at this time.    Patient identifiers verified and correct for Zbigniew Forman.    LOC: The patient is awake, alert and oriented x 4. Pt is speaking appropriately, no slurred speech.  APPEARANCE: Patient resting and in no acute distress. Pt is clean and well groomed. No JVD visible. Pt reports pain level of 0.  SKIN: Skin is warm dry and intact, and color is consistent with ethnicity. No tenting observed and capillary refill <3 seconds. No clubbing noted to nail beds. No breakdown or brusing visible and mucus membranes moist and acyanotic.  MUSCULOSKELETAL: Full range of motion present in all extremities. Hand  equal and leg strength strong +5 bilaterally. Pt ambulatory with cane.  RESPIRATORY: Airway is open and patent. Respirations-unlabored, regular rate, equal bilaterally on inspiration and expiration. No accessory muscle use noted. Lungs clear to auscultation in all fields bilaterally anterior and posterior.   CARDIAC: Patient has regular heart rate and rhythm. Patient has no c/o chest pain. +2  Pitting edema noted to BLE, pedal pulses present equal and strong.  ABDOMEN: Soft and non-tender to palpation with no distention noted. Normoactive bowel sounds X4 quadrants. Pt has no complaints of abnormal bowel movements. Pt reports normal appetite.   NEUROLOGIC: Eyes open spontaneously and facial expression symmetrical. Pt behavior appropriate to situation, and pt follows commands.  Pt reports sensation present in all extremities when touched with a finger. PERRLA  : No complaints of frequency, burning, urgency or blood in the urine.     "

## 2018-05-21 ENCOUNTER — TELEPHONE (OUTPATIENT)
Dept: INTERNAL MEDICINE | Facility: CLINIC | Age: 83
End: 2018-05-21

## 2018-05-21 NOTE — TELEPHONE ENCOUNTER
"----- Message from Augustina Lyons sent at 5/19/2018 11:54 AM CDT -----  Contact: PT  PT is stating that he needs to be seen today.  PT has a strange "vibration feeling" above the heart area on his chest.     Callback: [phone]  "

## 2018-05-22 ENCOUNTER — OFFICE VISIT (OUTPATIENT)
Dept: INTERNAL MEDICINE | Facility: CLINIC | Age: 83
End: 2018-05-22
Payer: MEDICARE

## 2018-05-22 VITALS — SYSTOLIC BLOOD PRESSURE: 125 MMHG | DIASTOLIC BLOOD PRESSURE: 62 MMHG | OXYGEN SATURATION: 99 % | HEART RATE: 85 BPM

## 2018-05-22 DIAGNOSIS — I15.2 HYPERTENSION ASSOCIATED WITH DIABETES: ICD-10-CM

## 2018-05-22 DIAGNOSIS — E11.9 CONTROLLED TYPE 2 DIABETES MELLITUS WITHOUT COMPLICATION, WITHOUT LONG-TERM CURRENT USE OF INSULIN: ICD-10-CM

## 2018-05-22 DIAGNOSIS — R00.2 PALPITATIONS: Primary | ICD-10-CM

## 2018-05-22 DIAGNOSIS — E11.59 HYPERTENSION ASSOCIATED WITH DIABETES: ICD-10-CM

## 2018-05-22 PROCEDURE — 99999 PR PBB SHADOW E&M-EST. PATIENT-LVL III: CPT | Mod: PBBFAC,,, | Performed by: INTERNAL MEDICINE

## 2018-05-22 PROCEDURE — 99214 OFFICE O/P EST MOD 30 MIN: CPT | Mod: S$GLB,,, | Performed by: INTERNAL MEDICINE

## 2018-05-22 NOTE — PROGRESS NOTES
"Subjective:       Patient ID: Zbigniew Forman is a 89 y.o. male.    Chief Complaint: Hospital Follow Up    \[facility] [place] [facility]\"" - Mr. Forman is here with his daughter and son today.  He feels OK.  He was in the ER on the 19th for "vibrations" in his chest, interpreted as palpitations.  While troponin was slightly elevated, he left AMA before being observed for any length of time.  He's here for hospital follow up.  He has no chest discomfort or dyspnea with this, and it's not exertional in nature.  No other symptoms.  He's not a smoker, and his diabetes is diet controlled.  HTN well controlled historically and again today.    PMH:  HTN, at goal  DM2, dietary management  S/p SDH evacuation  CKD3  Debility  Prostatism      Meds:  Reviewed and reconciled in EPIC with patient during visit today.    Review of Systems   Constitutional: Negative for fever.   HENT: Negative for congestion.    Respiratory: Negative for shortness of breath.    Cardiovascular: Positive for palpitations. Negative for chest pain.   Gastrointestinal: Negative for abdominal pain.   Genitourinary: Negative for difficulty urinating.   Musculoskeletal: Negative for arthralgias.   Skin: Negative for rash.   Neurological: Negative for headaches.   Psychiatric/Behavioral: Negative for sleep disturbance.       Objective:      Physical Exam   Constitutional: He is oriented to person, place, and time. He appears well-developed and well-nourished. No distress.   Well-appearing elderly man seated in wheelchair   HENT:   Head: Normocephalic and atraumatic.   Cardiovascular: Normal rate, regular rhythm and normal heart sounds.  Exam reveals no gallop and no friction rub.    No murmur heard.  Pulmonary/Chest: No respiratory distress. He has no wheezes. He has no rales. He exhibits no tenderness.   Neurological: He is alert and oriented to person, place, and time.   Skin: Skin is warm. He is not diaphoretic. No erythema.   Psychiatric: He has a normal mood and affect. Thought " content normal.   Nursing note and vitals reviewed.      Assessment:       1. Palpitations    2. Hypertension associated with diabetes    3. Controlled type 2 diabetes mellitus without complication, without long-term current use of insulin        Plan:       Zbigniew was seen today for hospital follow up.    Diagnoses and all orders for this visit:    Palpitations - new complaint for me. Negative w/u per ER is reassuring.  Will ask cardiology opinion  -     Ambulatory consult to Cardiology    Hypertension associated with diabetes - stable at goal. s adolfo the course    Controlled type 2 diabetes mellitus without complication, without long-term current use of insulin - stable at goal, stay the course    rtc prn, or in 3 months    G Zack Morgan MD MPH  Staff Internist

## 2018-05-28 ENCOUNTER — TELEPHONE (OUTPATIENT)
Dept: CARDIOLOGY | Facility: CLINIC | Age: 83
End: 2018-05-28

## 2018-05-28 NOTE — TELEPHONE ENCOUNTER
----- Message from Clemente Grace sent at 5/26/2018  2:49 PM CDT -----  Contact: Pt's Daughter Anne Forman [phone]  Pt's Daughter Anne Forman would like to be called back regarding scheduling an earlier appt. Daughter would like to discuss further.    Daughter can be reached at [phone].    Thanks

## 2018-05-29 ENCOUNTER — OFFICE VISIT (OUTPATIENT)
Dept: PODIATRY | Facility: CLINIC | Age: 83
End: 2018-05-29
Payer: MEDICARE

## 2018-05-29 VITALS — BODY MASS INDEX: 24.25 KG/M2 | HEIGHT: 68 IN | WEIGHT: 160 LBS

## 2018-05-29 DIAGNOSIS — B35.1 DERMATOPHYTOSIS OF NAIL: ICD-10-CM

## 2018-05-29 DIAGNOSIS — L84 CORNS AND CALLUS: ICD-10-CM

## 2018-05-29 DIAGNOSIS — E11.49 TYPE II DIABETES MELLITUS WITH NEUROLOGICAL MANIFESTATIONS: Primary | ICD-10-CM

## 2018-05-29 PROCEDURE — 11056 PARNG/CUTG B9 HYPRKR LES 2-4: CPT | Mod: Q9,S$GLB,, | Performed by: PODIATRIST

## 2018-05-29 PROCEDURE — 11721 DEBRIDE NAIL 6 OR MORE: CPT | Mod: 59,Q9,S$GLB, | Performed by: PODIATRIST

## 2018-05-29 PROCEDURE — 99499 UNLISTED E&M SERVICE: CPT | Mod: S$GLB,,, | Performed by: PODIATRIST

## 2018-05-29 PROCEDURE — 99999 PR PBB SHADOW E&M-EST. PATIENT-LVL II: CPT | Mod: PBBFAC,,, | Performed by: PODIATRIST

## 2018-05-29 NOTE — PROGRESS NOTES
Subjective:     Zbigniew Forman is a 89 y.o. male male who presents to the clinic for evaluation and treatment of high risk feet. Zbigniew has a past medical history of Hypertension associated with diabetes; Diabetes mellitus type II, uncontrolled; Glaucoma suspect, high risk (11/19/2013); and Senile cataract, unspecified (11/19/2013). The patient's chief complaint is long toenails. This patient has documented high risk feet requiring routine maintenance secondary to peripheral neuropathy. Experiences occasional numbness at the digits.No trauma reported.  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 90 tablet 3    amLODIPine (NORVASC) 10 MG tablet Take 1 tablet (10 mg total) by mouth once daily. 90 tablet 3    latanoprost 0.005 % ophthalmic solution INSTILL 1 DROP IN BOTH EYES EVERY EVENING 2.5 mL 12    lisinopril (PRINIVIL,ZESTRIL) 40 MG tablet TAKE 1 TABLET(40 MG) BY MOUTH EVERY DAY 90 tablet 3     No current facility-administered medications on file prior to visit.          Review of patient's allergies indicates:   Allergen Reactions    Metformin Diarrhea    Sulfa (sulfonamide antibiotics)     Tradjenta [linagliptin]      Diarrhea           Social History     Social  "History    Marital status:      Spouse name: N/A    Number of children: N/A    Years of education: N/A     Occupational History    Not on file.     Social History Main Topics    Smoking status: Never Smoker    Smokeless tobacco: Never Used    Alcohol use No    Drug use: Unknown    Sexual activity: Not on file     Other Topics Concern    Not on file     Social History Narrative    No narrative on file               Review of Systems   Constitution: Positive for weight gain. Negative for chills and fever.   Cardiovascular: Negative for chest pain, claudication and leg swelling.   Respiratory: Negative for cough and shortness of breath.    Skin: Positive for nail changes and dry skin.   Musculoskeletal: Positive for stiffness.   Gastrointestinal: Negative for nausea and vomiting.   Neurological: Positive for numbness. Negative for paresthesias.   Psychiatric/Behavioral: Negative for altered mental status.               Objective:     Vitals:    05/29/18 1550   Weight: 72.6 kg (160 lb)   Height: 5' 8" (1.727 m)           Physical Exam   Constitutional: He appears well-developed and well-nourished.   HENT:   Head: Normocephalic.   Cardiovascular: Intact distal pulses.    Pulses:       Dorsalis pedis pulses are 2+ on the right side, and 2+ on the left side.        Posterior tibial pulses are 1+ on the right side, and 1+ on the left side.   CRT < 3 sec to tips of toes. Mild 1+ edema noted to b/l LE. Mild spider veins and vericosities noted to b/l LEs.      Musculoskeletal:   Equinus noted b/l ankles with < 10 deg DF noted. MMT 5/5 in DF/PF/Inv/Ev resistance with no reproduction of pain in any direction. Passive range of motion of ankle and pedal joints is painless. Hammertoes noted to digits 2-4 b/l, semi reducible. HAV deformity noted b/l with non trackbound joint, hypermobile 1st ray b/l.      Neurological: He has normal strength. No sensory deficit.   Light touch, proprioception, and sharp/dull " sensation are all intact bilaterally. Protective threshold with the [place]-Wienstein monofilament is intact bilaterally. Vibratory sensation diminished distal foot b/l.      Skin: Skin is warm, dry and intact.   No open lesions, lacerations or wounds noted. Nails are elongated by 8-10 mm, thickened by 3-4mm with yellow brown discoloration, subungual debris to toes of R 1-5 and L 1-5. Interdigital spaces clean, dry and intact b/l. No erythema noted to b/l foot.                 Assessment / Plan :         I counseled the patient on his conditions, their implications and medical management.    Type II diabetes mellitus with neurological manifestations  -     DIABETIC SHOES FOR HOME USE    Dermatophytosis of nail    Corns and callus  -     DIABETIC SHOES FOR HOME USE    Other orders  -     Foot Care      Routine Foot Care  Date/Time: 5/29/2018 4:14 PM  Performed by: MAO RODRIGUEZ  Authorized by: MAO RODRIGUEZ     Consent Done?:  Yes (Verbal)  Hyperkeratotic Skin Lesions?: Yes    Number of trimmed lesions:  2  Location(s):  Left 3rd Toe and Right 3rd Toe    Nail Care Type:  Debride  Location(s): All  (Left 1st Toe, Left 3rd Toe, Left 2nd Toe, Left 4th Toe, Left 5th Toe, Right 1st Toe, Right 2nd Toe, Right 3rd Toe, Right 4th Toe and Right 5th Toe)  Patient tolerance:  Patient tolerated the procedure well with no immediate complications     With patient's permission, the toenails mentioned above were aggressively reduced and debrided using a nail nipper, removing all offending nail and debris. Utilizing a #15 scalpel, I trimmed the corns and calluses at the above mentioned location.      The patient will continue to monitor the areas daily, inspect the feet, wear protective shoe gear when ambulatory, and moisturizer to maintain skin integrity.

## 2018-06-13 ENCOUNTER — TELEPHONE (OUTPATIENT)
Dept: PODIATRY | Facility: CLINIC | Age: 83
End: 2018-06-13

## 2018-08-24 ENCOUNTER — LAB VISIT (OUTPATIENT)
Dept: LAB | Facility: HOSPITAL | Age: 83
End: 2018-08-24
Attending: INTERNAL MEDICINE
Payer: MEDICARE

## 2018-08-24 ENCOUNTER — OFFICE VISIT (OUTPATIENT)
Dept: INTERNAL MEDICINE | Facility: CLINIC | Age: 83
End: 2018-08-24
Payer: MEDICARE

## 2018-08-24 VITALS
OXYGEN SATURATION: 96 % | SYSTOLIC BLOOD PRESSURE: 130 MMHG | HEART RATE: 86 BPM | DIASTOLIC BLOOD PRESSURE: 68 MMHG | WEIGHT: 169 LBS | HEIGHT: 68 IN | BODY MASS INDEX: 25.61 KG/M2

## 2018-08-24 DIAGNOSIS — L29.9 ITCH: ICD-10-CM

## 2018-08-24 DIAGNOSIS — E11.9 CONTROLLED TYPE 2 DIABETES MELLITUS WITHOUT COMPLICATION, WITHOUT LONG-TERM CURRENT USE OF INSULIN: ICD-10-CM

## 2018-08-24 DIAGNOSIS — I15.2 HYPERTENSION ASSOCIATED WITH DIABETES: ICD-10-CM

## 2018-08-24 DIAGNOSIS — I15.2 HYPERTENSION ASSOCIATED WITH DIABETES: Primary | ICD-10-CM

## 2018-08-24 DIAGNOSIS — E11.59 HYPERTENSION ASSOCIATED WITH DIABETES: Primary | ICD-10-CM

## 2018-08-24 DIAGNOSIS — R53.81 DEBILITY: ICD-10-CM

## 2018-08-24 DIAGNOSIS — E11.59 HYPERTENSION ASSOCIATED WITH DIABETES: ICD-10-CM

## 2018-08-24 LAB
ALBUMIN SERPL BCP-MCNC: 3.6 G/DL
ALP SERPL-CCNC: 58 U/L
ALT SERPL W/O P-5'-P-CCNC: 12 U/L
ANION GAP SERPL CALC-SCNC: 10 MMOL/L
AST SERPL-CCNC: 15 U/L
BILIRUB SERPL-MCNC: 0.4 MG/DL
BUN SERPL-MCNC: 31 MG/DL
CALCIUM SERPL-MCNC: 8.8 MG/DL
CHLORIDE SERPL-SCNC: 108 MMOL/L
CO2 SERPL-SCNC: 24 MMOL/L
CREAT SERPL-MCNC: 1.4 MG/DL
EST. GFR  (AFRICAN AMERICAN): 48 ML/MIN/1.73 M^2
EST. GFR  (NON AFRICAN AMERICAN): 41 ML/MIN/1.73 M^2
ESTIMATED AVG GLUCOSE: 140 MG/DL
GLUCOSE SERPL-MCNC: 113 MG/DL
HBA1C MFR BLD HPLC: 6.5 %
POTASSIUM SERPL-SCNC: 5 MMOL/L
PROT SERPL-MCNC: 6.7 G/DL
SODIUM SERPL-SCNC: 142 MMOL/L

## 2018-08-24 PROCEDURE — 83036 HEMOGLOBIN GLYCOSYLATED A1C: CPT

## 2018-08-24 PROCEDURE — 99999 PR PBB SHADOW E&M-EST. PATIENT-LVL III: CPT | Mod: PBBFAC,,, | Performed by: INTERNAL MEDICINE

## 2018-08-24 PROCEDURE — 99214 OFFICE O/P EST MOD 30 MIN: CPT | Mod: S$GLB,,, | Performed by: INTERNAL MEDICINE

## 2018-08-24 PROCEDURE — 36415 COLL VENOUS BLD VENIPUNCTURE: CPT

## 2018-08-24 PROCEDURE — 80053 COMPREHEN METABOLIC PANEL: CPT

## 2018-08-24 RX ORDER — LISINOPRIL 40 MG/1
TABLET ORAL
Qty: 90 TABLET | Refills: 3 | Status: ON HOLD | OUTPATIENT
Start: 2018-08-24 | End: 2018-11-19 | Stop reason: SDUPTHER

## 2018-08-24 RX ORDER — AMLODIPINE BESYLATE 10 MG/1
10 TABLET ORAL DAILY
Qty: 90 TABLET | Refills: 3 | Status: ON HOLD | OUTPATIENT
Start: 2018-08-24 | End: 2018-12-10 | Stop reason: SDUPTHER

## 2018-08-24 NOTE — PROGRESS NOTES
Subjective:       Patient ID: Zbigniew Forman is a 89 y.o. male.    Chief Complaint: Follow-up    HPI - Mr. Forman came with two daughters and his son today.  Other than some itching along his upper abdomen, he felt well.  No falls at home.  No other issues.  He's due for some diabetic health maintenance.  He does not smoke cigarettes.    PMH:  HTN, at goal  DM2, dietary management  S/p SDH evacuation  CKD3  Debility  Prostatism      Meds:  Reviewed and reconciled in EPIC with patient during visit today.    Review of Systems   Constitutional: Negative for fever.   HENT: Negative for congestion.    Respiratory: Negative for shortness of breath.    Cardiovascular: Negative for chest pain.   Gastrointestinal: Negative for abdominal pain.   Genitourinary: Negative for dysuria.   Musculoskeletal: Negative for arthralgias.   Skin: Negative for rash.   Neurological: Negative for headaches.   Psychiatric/Behavioral: Negative for sleep disturbance.       Objective:      Physical Exam   Constitutional: He is oriented to person, place, and time. He appears well-developed and well-nourished. No distress.   HENT:   Head: Normocephalic and atraumatic.   Cardiovascular: Normal rate, regular rhythm and normal heart sounds. Exam reveals no gallop and no friction rub.   No murmur heard.  Pulses:       Dorsalis pedis pulses are 1+ on the right side, and 2+ on the left side.   Pulmonary/Chest: No respiratory distress. He has no wheezes. He has no rales. He exhibits no tenderness.   Musculoskeletal:        Right foot: There is no deformity.   Feet:   Right Foot:   Protective Sensation: 4 sites tested. 1 site sensed.  Skin Integrity: Negative for ulcer, blister or skin breakdown.   Left Foot:   Protective Sensation: 4 sites tested. 1 site sensed.  Skin Integrity: Negative for ulcer, blister or skin breakdown.   Neurological: He is alert and oriented to person, place, and time.   Skin: Skin is warm. He is not diaphoretic. No erythema.    Psychiatric: He has a normal mood and affect. Thought content normal.   Nursing note and vitals reviewed.      Assessment:       1. Hypertension associated with diabetes    2. Controlled type 2 diabetes mellitus without complication, without long-term current use of insulin    3. Itch    4. Debility        Plan:       Zbigniew was seen today for follow-up.    Diagnoses and all orders for this visit:    Hypertension associated with diabetes - at goal, stay the course.    Controlled type 2 diabetes mellitus without complication, without long-term current use of insulin - at goal, updating labwork today  -     Hemoglobin A1c; Future  -     Comprehensive metabolic panel; Future    Itch - discussed use of cetaphil or other lotions OTC for itch    Debility    rtc prn, or in 6 months    JANELL Morgan MD MPH  Staff Internist

## 2018-09-10 ENCOUNTER — TELEPHONE (OUTPATIENT)
Dept: INTERNAL MEDICINE | Facility: CLINIC | Age: 83
End: 2018-09-10

## 2018-09-10 ENCOUNTER — NURSE TRIAGE (OUTPATIENT)
Dept: ADMINISTRATIVE | Facility: CLINIC | Age: 83
End: 2018-09-10

## 2018-09-10 NOTE — TELEPHONE ENCOUNTER
Reason for Disposition   Patient wants to be seen    Protocols used:  DIZZINESS-A-OH  Spoke to Mr. Forman daughter Anne. She states Mr. Forman has been experiencing dizziness for the past 2 days. She states he fell once. Patient is requesting to see Dr. Morgan today or tomorrow.

## 2018-09-10 NOTE — TELEPHONE ENCOUNTER
PT daughter stated that her father has felt and been complaining of dizziness for the past 3 days. She expressed her urgencies for her farther to be seen by you and only you. I gave the first available which is Thursday 9/13/2018 at 11:20 am . She want to know is there anything she can do for him in till then?

## 2018-09-10 NOTE — TELEPHONE ENCOUNTER
PT daughter say that her dad is doing fine and he can wait till Thursday , he feels dizzy when he is laying down so they set him up.

## 2018-09-13 ENCOUNTER — HOSPITAL ENCOUNTER (OUTPATIENT)
Dept: RADIOLOGY | Facility: HOSPITAL | Age: 83
Discharge: HOME | End: 2018-09-13
Attending: INTERNAL MEDICINE
Payer: MEDICARE

## 2018-09-13 ENCOUNTER — OFFICE VISIT (OUTPATIENT)
Dept: INTERNAL MEDICINE | Facility: CLINIC | Age: 83
End: 2018-09-13
Payer: MEDICARE

## 2018-09-13 VITALS
OXYGEN SATURATION: 90 % | HEIGHT: 68 IN | DIASTOLIC BLOOD PRESSURE: 70 MMHG | BODY MASS INDEX: 25.7 KG/M2 | HEART RATE: 95 BPM | SYSTOLIC BLOOD PRESSURE: 134 MMHG

## 2018-09-13 DIAGNOSIS — R42 DIZZINESS: ICD-10-CM

## 2018-09-13 DIAGNOSIS — E11.59 HYPERTENSION ASSOCIATED WITH DIABETES: ICD-10-CM

## 2018-09-13 DIAGNOSIS — R06.2 WHEEZE: ICD-10-CM

## 2018-09-13 DIAGNOSIS — I15.2 HYPERTENSION ASSOCIATED WITH DIABETES: ICD-10-CM
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
## 2018-09-13 DIAGNOSIS — S50.11XA CONTUSION OF RIGHT FOREARM, INITIAL ENCOUNTER: ICD-10-CM

## 2018-09-13 DIAGNOSIS — S01.01XA LACERATION OF SCALP, INITIAL ENCOUNTER: ICD-10-CM
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
## 2018-09-13 LAB
BILIRUB UR QL STRIP: NEGATIVE
CLARITY UR REFRACT.AUTO: CLEAR
COLOR UR AUTO: YELLOW
GLUCOSE UR QL STRIP: NEGATIVE
HGB UR QL STRIP: NEGATIVE
KETONES UR QL STRIP: NEGATIVE
LEUKOCYTE ESTERASE UR QL STRIP: NEGATIVE
MICROSCOPIC COMMENT: NORMAL
NITRITE UR QL STRIP: NEGATIVE
PH UR STRIP: 6 [PH] (ref 5–8)
PROT UR QL STRIP: NEGATIVE
RBC #/AREA URNS AUTO: 0 /HPF (ref 0–4)
SP GR UR STRIP: 1.02 (ref 1–1.03)
URN SPEC COLLECT METH UR: NORMAL
UROBILINOGEN UR STRIP-ACNC: NEGATIVE EU/DL
WBC #/AREA URNS AUTO: 0 /HPF (ref 0–5)

## 2018-09-13 PROCEDURE — 71046 X-RAY EXAM CHEST 2 VIEWS: CPT | Mod: TC

## 2018-09-13 PROCEDURE — 99213 OFFICE O/P EST LOW 20 MIN: CPT | Mod: PBBFAC,25 | Performed by: INTERNAL MEDICINE

## 2018-09-13 PROCEDURE — 1101F PT FALLS ASSESS-DOCD LE1/YR: CPT | Mod: CPTII,,, | Performed by: INTERNAL MEDICINE

## 2018-09-13 PROCEDURE — 70450 CT HEAD/BRAIN W/O DYE: CPT | Mod: TC

## 2018-09-13 PROCEDURE — 99999 PR PBB SHADOW E&M-EST. PATIENT-LVL III: CPT | Mod: PBBFAC,,, | Performed by: INTERNAL MEDICINE

## 2018-09-13 PROCEDURE — 81001 URINALYSIS AUTO W/SCOPE: CPT

## 2018-09-13 PROCEDURE — 71046 X-RAY EXAM CHEST 2 VIEWS: CPT | Mod: 26,,, | Performed by: RADIOLOGY

## 2018-09-13 PROCEDURE — 70450 CT HEAD/BRAIN W/O DYE: CPT | Mod: 26,,, | Performed by: RADIOLOGY

## 2018-09-13 PROCEDURE — 99214 OFFICE O/P EST MOD 30 MIN: CPT | Mod: S$PBB,,, | Performed by: INTERNAL MEDICINE

## 2018-09-13 NOTE — PROGRESS NOTES
Subjective:       Patient ID: Zbigniew Forman is a 89 y.o. male.    Chief Complaint: Dizziness    HPI - Mr. Forman has fallen twice in the past six days.  Last night's fall was worse, as he hit his head, bruised hs left forearm and had pain to his right elbow.  He has long been frail, but falls are a new thing for him.  He's taking all meds as prescribed.  He has been feeling somewhat dizzy (for him, this means dysequilibrium).  No pointing symptoms    PMH:  HTN, at goal  DM2, dietary management  S/p SDH evacuation  CKD3  Debility  Prostatism      Meds:  Reviewed and reconciled in EPIC with patient during visit today    Review of Systems   Constitutional: Negative for fever.   HENT: Negative for congestion.    Respiratory: Negative for shortness of breath.    Cardiovascular: Negative for chest pain.   Gastrointestinal: Negative for abdominal pain, constipation and diarrhea.   Genitourinary: Negative for difficulty urinating.   Musculoskeletal: Positive for arthralgias.   Skin: Positive for wound. Negative for rash.   Neurological: Negative for headaches.   Psychiatric/Behavioral: Negative for sleep disturbance.       Objective:      Physical Exam   Constitutional: He is oriented to person, place, and time. He appears well-developed and well-nourished. No distress.   HENT:   Head: Normocephalic and atraumatic.   Cardiovascular: Normal rate, regular rhythm and normal heart sounds. Exam reveals no gallop and no friction rub.   No murmur heard.  Pulmonary/Chest: No respiratory distress. He has wheezes (left lung fields; partially clears with cough). He has no rales. He exhibits no tenderness.   Neurological: He is alert and oriented to person, place, and time.   Skin: Skin is warm. He is not diaphoretic. No erythema.   Abrasion right forehead, bruising left forearm   Psychiatric: He has a normal mood and affect. Thought content normal.   Nursing note and vitals reviewed.      Assessment:       1. Fall, initial  encounter    2. Contusion of right forearm, initial encounter    3. Laceration of scalp, initial encounter    4. Hypertension associated with diabetes    5. Dizziness    6. Wheeze        Plan:       Zbigniew was seen today for dizziness.    Diagnoses and all orders for this visit:    Fall, initial encounter - unstable.  Considering this a geriatric syndrome, ddx is broad.  Favor UTI.  Will look at CT Head, though.  With wheezing, consider pulmonary process  -     Comprehensive metabolic panel; Future  -     CBC auto differential; Future  -     Urinalysis  -     CT Head Without Contrast; Future  -     X-Ray Chest PA And Lateral; Future    Contusion of right forearm, initial encounter - stable, just a bruise    Laceration of scalp, initial encounter - more of an abrasion.  Discussed washing with soap/water and covering if it oozes  -     CT Head Without Contrast; Future    Hypertension associated with diabetes - stable at goal    Dizziness  -     CT Head Without Contrast; Future    Wheeze  -     X-Ray Chest PA And Lateral; Future    rtc prn, or in 3 months    JANELL Morgan MD MPH  Staff Internist

## 2018-09-14 ENCOUNTER — TELEPHONE (OUTPATIENT)
Dept: INTERNAL MEDICINE | Facility: CLINIC | Age: 83
End: 2018-09-14

## 2018-09-14 NOTE — TELEPHONE ENCOUNTER
Please call Mr Little's family.  His blood work, urine analysis, chest x-ray and head CT were all within expected limits.    There's nothing to explain his falls and he has done no serious damage.  I recommend encouraging him to use his walker every time he gets out of the chair and for someone to be with him all the time.    Thanks.  D

## 2018-09-17 NOTE — TELEPHONE ENCOUNTER
What do you recommend for Mr. Forman dizziness? Mr. Pinzon gets dizzy when he first wake up in the morning.

## 2018-09-26 ENCOUNTER — TELEPHONE (OUTPATIENT)
Dept: PODIATRY | Facility: CLINIC | Age: 83
End: 2018-09-26

## 2018-09-26 NOTE — TELEPHONE ENCOUNTER
----- Message from Sheba Gonzales sent at 9/26/2018 10:05 AM CDT -----  Contact: Daughter  Patient [place] [facility] [place]  Requesting to be seen Wednesday 10/3 at 1:00 or later.  Anne can be reached at [phone]

## 2018-09-27 ENCOUNTER — TELEPHONE (OUTPATIENT)
Dept: INTERNAL MEDICINE | Facility: CLINIC | Age: 83
End: 2018-09-27

## 2018-09-27 NOTE — TELEPHONE ENCOUNTER
" is weak and dizzy upon standing. Daughter will like to know what should be don"t about it? She also wanted to know if you seen something in his labs that will help out with understanding this ?  "

## 2018-09-27 NOTE — TELEPHONE ENCOUNTER
----- Message from Vivien Richards sent at 9/27/2018 10:30 AM CDT -----  Contact: Anne daughter [phone]  Patient's daughter called with a c/o patient feeling weak and dizzy . He had an episode last night with no other symptoms. Daughter would like to speak with you for advice .

## 2018-10-04 ENCOUNTER — TELEPHONE (OUTPATIENT)
Dept: INTERNAL MEDICINE | Facility: CLINIC | Age: 83
End: 2018-10-04

## 2018-10-04 DIAGNOSIS — R53.81 DEBILITY: Primary | ICD-10-CM

## 2018-10-04 NOTE — TELEPHONE ENCOUNTER
----- Message from Palak Garcia sent at 10/4/2018 10:14 AM CDT -----  Contact: Anne Pt's Daughter [phone]   Pt's daughter is calling to speak with someone in regards to the pt getting a request sent over to [facility] for the pt to have Physical Therapy. Please call back and advise.          Thanks

## 2018-10-10 ENCOUNTER — OFFICE VISIT (OUTPATIENT)
Dept: PODIATRY | Facility: CLINIC | Age: 83
End: 2018-10-10
Payer: MEDICARE

## 2018-10-10 VITALS
HEART RATE: 90 BPM | BODY MASS INDEX: 25.61 KG/M2 | DIASTOLIC BLOOD PRESSURE: 79 MMHG | SYSTOLIC BLOOD PRESSURE: 146 MMHG | HEIGHT: 68 IN | WEIGHT: 169 LBS

## 2018-10-10 DIAGNOSIS — B35.1 ONYCHOMYCOSIS DUE TO DERMATOPHYTE: Primary | ICD-10-CM

## 2018-10-10 DIAGNOSIS — E11.42 DIABETIC POLYNEUROPATHY ASSOCIATED WITH TYPE 2 DIABETES MELLITUS: ICD-10-CM

## 2018-10-10 DIAGNOSIS — L84 CORN OR CALLUS: ICD-10-CM

## 2018-10-10 PROCEDURE — 11721 DEBRIDE NAIL 6 OR MORE: CPT | Mod: Q9,PBBFAC,PO | Performed by: PODIATRIST

## 2018-10-10 PROCEDURE — 3288F FALL RISK ASSESSMENT DOCD: CPT | Mod: CPTII,,, | Performed by: PODIATRIST

## 2018-10-10 PROCEDURE — 99213 OFFICE O/P EST LOW 20 MIN: CPT | Mod: PBBFAC,PO | Performed by: PODIATRIST

## 2018-10-10 PROCEDURE — 99999 PR PBB SHADOW E&M-EST. PATIENT-LVL III: CPT | Mod: PBBFAC,,, | Performed by: PODIATRIST

## 2018-10-10 PROCEDURE — 99213 OFFICE O/P EST LOW 20 MIN: CPT | Mod: S$PBB,25,, | Performed by: PODIATRIST

## 2018-10-10 PROCEDURE — 11056 PARNG/CUTG B9 HYPRKR LES 2-4: CPT | Mod: Q9,S$PBB,, | Performed by: PODIATRIST

## 2018-10-10 PROCEDURE — 1100F PTFALLS ASSESS-DOCD GE2>/YR: CPT | Mod: CPTII,,, | Performed by: PODIATRIST

## 2018-10-10 PROCEDURE — 11056 PARNG/CUTG B9 HYPRKR LES 2-4: CPT | Performed by: PODIATRIST

## 2018-10-10 PROCEDURE — 11721 DEBRIDE NAIL 6 OR MORE: CPT | Mod: 59,Q9,S$PBB, | Performed by: PODIATRIST

## 2018-10-11 ENCOUNTER — TELEPHONE (OUTPATIENT)
Dept: INTERNAL MEDICINE | Facility: CLINIC | Age: 83
End: 2018-10-11

## 2018-10-11 DIAGNOSIS — R54 FRAILTY: Primary | ICD-10-CM

## 2018-10-11 NOTE — TELEPHONE ENCOUNTER
----- Message from Vivien Luciano sent at 10/11/2018  8:50 AM CDT -----  Contact: daughter/venecia/[phone]  Pt daughter called in regard to getting a sooner appointment then oct 30. She wants the pt to come on  oct 19.      Please advise

## 2018-10-16 NOTE — PROGRESS NOTES
Subjective:     Zbigniew Forman is a 89 y.o. male male who presents to the clinic for evaluation and treatment of high risk feet. Zbigniew has a past medical history of Hypertension associated with diabetes; Diabetes mellitus type II, uncontrolled; Glaucoma suspect, high risk (11/19/2013); and Senile cataract, unspecified (11/19/2013). The patient's chief complaint is long toenails. This patient has documented high risk feet requiring routine maintenance secondary to peripheral neuropathy. Experiences occasional numbness at the digits.No trauma reported.          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 90 tablet 3    latanoprost 0.005 % ophthalmic solution INSTILL 1 DROP IN BOTH EYES EVERY EVENING 2.5 mL 12    lisinopril (PRINIVIL,ZESTRIL) 40 MG tablet TAKE 1 TABLET(40 MG) BY MOUTH EVERY DAY 90 tablet 3     No current facility-administered medications on file prior to visit.          Review of patient's allergies indicates:   Allergen Reactions    Metformin Diarrhea    Sulfa (sulfonamide antibiotics)     Tradjenta [linagliptin]      Diarrhea           Social History     Socioeconomic History    Marital status:      Spouse name: Not on file    Number of children: Not on file    Years of  "education: Not on file    Highest education level: Not on file   Social Needs    Financial resource strain: Not on file    Food insecurity - worry: Not on file    Food insecurity - inability: Not on file    Transportation needs - medical: Not on file    Transportation needs - non-medical: Not on file   Occupational History    Not on file   Tobacco Use    Smoking status: Never Smoker    Smokeless tobacco: Never Used   Substance and Sexual Activity    Alcohol use: No    Drug use: Not on file    Sexual activity: Not on file   Other Topics Concern    Not on file   Social History Narrative    Not on file               Review of Systems   Constitution: Positive for weight gain. Negative for chills and fever.   Cardiovascular: Negative for chest pain, claudication and leg swelling.   Respiratory: Negative for cough and shortness of breath.    Skin: Positive for nail changes and dry skin.   Musculoskeletal: Positive for stiffness.   Gastrointestinal: Negative for nausea and vomiting.   Neurological: Positive for numbness. Negative for paresthesias.   Psychiatric/Behavioral: Negative for altered mental status.               Objective:     Vitals:    10/10/18 1307   BP: (!) 146/79   Pulse: 90   Weight: 76.7 kg (169 lb)   Height: 5' 8" (1.727 m)           Physical Exam   Constitutional: He appears well-developed and well-nourished.   HENT:   Head: Normocephalic.   Cardiovascular: Intact distal pulses.    Pulses:       Dorsalis pedis pulses are 2+ on the right side, and 2+ on the left side.        Posterior tibial pulses are 1+ on the right side, and 1+ on the left side.   CRT < 3 sec to tips of toes. Mild 1+ edema noted to b/l LE. Mild spider veins and vericosities noted to b/l LEs.      Musculoskeletal:   Equinus noted b/l ankles with < 10 deg DF noted. MMT 5/5 in DF/PF/Inv/Ev resistance with no reproduction of pain in any direction. Passive range of motion of ankle and pedal joints is painless. Hammertoes noted " to digits 2-4 b/l, semi reducible. HAV deformity noted b/l with non trackbound joint, hypermobile 1st ray b/l.      Neurological: He has normal strength. No sensory deficit.   Light touch, proprioception, and sharp/dull sensation are all intact bilaterally. Protective threshold with the [employer] [place] monofilament is intact bilaterally. Vibratory sensation diminished distal foot b/l.      Skin: Skin is warm, dry and intact.   No open lesions, lacerations or wounds noted. Nails are elongated by 8-10 mm, thickened by 3-4mm with yellow brown discoloration, subungual debris to toes of R 1-5 and L 1-5. Interdigital spaces clean, dry and intact b/l. No erythema noted to b/l foot.   HKL's noted to b/L HIPJ              Assessment / Plan :         I counseled the patient on his conditions, their implications and medical management.    Onychomycosis due to dermatophyte    Corn or callus    Diabetic polyneuropathy associated with type 2 diabetes mellitus      Diabetic foot exam performed on pt. Pt advised on daily monitoring and moisturizing of the feet and keeping the webspaces clean and dry Patient advised to contact the clinic if any new pedal problems should arise.   After cleansing with an alcohol prep pad, the about mentioned hyperkeratotic lesions were sharply debrided X 2 utilizing a #15 blade to a smooth base without incident. Pt tolerated the procedure well and reported comfort to the debarment sites. Pt will continue to use padding and moisture the callused areas.   With the patient's permission, nails were aggressively reduced and debrided X 10 to their soft tissue attachment mechanically and with an electric  removing all offending nail and debris. Pt relates relief following the procedure. he will continue to monitor the areas daily, inspect his feet, wear protective shoe gear when ambulating, moisturized daily to maintain skin integrity and follow up in the office in 3 months.

## 2018-10-19 ENCOUNTER — OFFICE VISIT (OUTPATIENT)
Dept: INTERNAL MEDICINE | Facility: CLINIC | Age: 83
End: 2018-10-19
Payer: MEDICARE

## 2018-10-19 ENCOUNTER — IMMUNIZATION (OUTPATIENT)
Dept: INTERNAL MEDICINE | Facility: CLINIC | Age: 83
End: 2018-10-19
Payer: MEDICARE

## 2018-10-19 VITALS
DIASTOLIC BLOOD PRESSURE: 60 MMHG | HEART RATE: 91 BPM | OXYGEN SATURATION: 91 % | BODY MASS INDEX: 25.7 KG/M2 | HEIGHT: 68 IN | SYSTOLIC BLOOD PRESSURE: 124 MMHG

## 2018-10-19 DIAGNOSIS — R35.0 URINARY FREQUENCY: Primary | ICD-10-CM

## 2018-10-19 DIAGNOSIS — M25.512 CHRONIC LEFT SHOULDER PAIN: ICD-10-CM

## 2018-10-19 DIAGNOSIS — R42 DIZZINESS: ICD-10-CM

## 2018-10-19 DIAGNOSIS — G89.29 CHRONIC LEFT SHOULDER PAIN: ICD-10-CM

## 2018-10-19 DIAGNOSIS — R82.90 ABNORMAL FINDING IN URINE: ICD-10-CM

## 2018-10-19 PROCEDURE — 1101F PT FALLS ASSESS-DOCD LE1/YR: CPT | Mod: CPTII,,, | Performed by: INTERNAL MEDICINE

## 2018-10-19 PROCEDURE — 99213 OFFICE O/P EST LOW 20 MIN: CPT | Mod: PBBFAC,25 | Performed by: INTERNAL MEDICINE

## 2018-10-19 PROCEDURE — 87086 URINE CULTURE/COLONY COUNT: CPT

## 2018-10-19 PROCEDURE — 99999 PR PBB SHADOW E&M-EST. PATIENT-LVL III: CPT | Mod: PBBFAC,,, | Performed by: INTERNAL MEDICINE

## 2018-10-19 PROCEDURE — 99214 OFFICE O/P EST MOD 30 MIN: CPT | Mod: S$PBB,,, | Performed by: INTERNAL MEDICINE

## 2018-10-19 PROCEDURE — 90662 IIV NO PRSV INCREASED AG IM: CPT | Mod: PBBFAC

## 2018-10-19 RX ORDER — ALFUZOSIN HYDROCHLORIDE 10 MG/1
10 TABLET, EXTENDED RELEASE ORAL
Qty: 90 TABLET | Refills: 3 | Status: ON HOLD | OUTPATIENT
Start: 2018-10-19 | End: 2018-12-10 | Stop reason: SDUPTHER

## 2018-10-19 NOTE — PROGRESS NOTES
Subjective:       Patient ID: Zbigniew Forman is a 89 y.o. male.    Chief Complaint: Dizziness; Urinary Frequency; and Shoulder Pain (left )    HPI - Mr. Forman is here with two daughters and his son.  Still c/o dizziness, which has become a chronic problem. Workup has been negative, and we have recommended a walker.  He uses this at home.  He has fallen a few times, though.  Nothing since last visit.    His chronic urinary frequency has worsened.  No burning or fever. Last UA showed elevated WBC, but no culture was done. He has longstanding left shoulder pain    PMH:  HTN, at goal  DM2, dietary management  S/p SDH evacuation  CKD3  Debility  Prostatism   Dizziness, most c/w dysequilibrium     Meds:  Reviewed and reconciled in EPIC with patient during visit today    Review of Systems   Constitutional: Negative for fever.   HENT: Negative for congestion.    Respiratory: Negative for shortness of breath.    Cardiovascular: Negative for chest pain.   Gastrointestinal: Negative for abdominal pain.   Genitourinary: Positive for frequency.   Musculoskeletal: Positive for arthralgias.   Skin: Negative for rash.   Neurological: Positive for dizziness.   Psychiatric/Behavioral: Negative for sleep disturbance.       Objective:      Physical Exam   Constitutional: He is oriented to person, place, and time. He appears well-developed and well-nourished. No distress.   Frail, elderly man examined in wheelchair.   HENT:   Head: Normocephalic and atraumatic.   Cardiovascular: Normal rate, regular rhythm and normal heart sounds. Exam reveals no gallop and no friction rub.   No murmur heard.  Pulmonary/Chest: No respiratory distress. He has no wheezes. He has no rales. He exhibits no tenderness.   Musculoskeletal:   FROM bilateral shoulders.  No TTP noted.     Neurological: He is alert and oriented to person, place, and time.   Skin: Skin is warm. He is not diaphoretic. No erythema.   Psychiatric: He has a normal mood and affect.  Thought content normal.   Nursing note and vitals reviewed.      Assessment:       1. Urinary frequency    2. Dizziness    3. Chronic left shoulder pain    4. Abnormal finding in urine         Plan:       Zbigniew was seen today for dizziness, urinary frequency and shoulder pain.    Diagnoses and all orders for this visit:    Urinary frequency - unstable.  Restarting uroxatral and sending for culture  -     alfuzosin (UROXATRAL) 10 mg Tb24; Take 1 tablet (10 mg total) by mouth daily with breakfast.    Dizziness - chronic.  Use walker.  Will look for UTI  -     Urine culture    Chronic left shoulder pain - worsening.  Keep exercising    Abnormal finding in urine   -     Urine culture    rtc prn, or in 3 months    JANELL Morgan MD MPH  Staff Internist

## 2018-10-20 LAB — BACTERIA UR CULT: NO GROWTH

## 2018-10-22 ENCOUNTER — TELEPHONE (OUTPATIENT)
Dept: INTERNAL MEDICINE | Facility: CLINIC | Age: 83
End: 2018-10-22

## 2018-10-22 NOTE — TELEPHONE ENCOUNTER
Please call Mr. Little or his family.  There was no urinary infection to explain is dizziness or urinary symptoms.

## 2018-11-02 RX ORDER — LATANOPROST 50 UG/ML
SOLUTION/ DROPS OPHTHALMIC
Qty: 2.5 ML | Refills: 0 | Status: SHIPPED | OUTPATIENT
Start: 2018-11-02 | End: 2018-11-02 | Stop reason: SDUPTHER

## 2018-11-05 RX ORDER — LATANOPROST 50 UG/ML
SOLUTION/ DROPS OPHTHALMIC
Qty: 10 ML | Refills: 0 | Status: ON HOLD | OUTPATIENT
Start: 2018-11-05 | End: 2018-11-19 | Stop reason: HOSPADM

## 2018-11-12 ENCOUNTER — TELEPHONE (OUTPATIENT)
Dept: INTERNAL MEDICINE | Facility: CLINIC | Age: 83
End: 2018-11-12

## 2018-11-12 NOTE — TELEPHONE ENCOUNTER
----- Message from Lambreto Kathleen sent at 11/12/2018 10:27 AM CST -----  Contact: Daughter Anne [phone]  Daughter of patient stating would like to speak with Dr regarding the Wheezing of chest and shortness of breath when moving around or trying to get up, wants to know what Dr suggest?    Please call an advise  Thank you

## 2018-11-12 NOTE — TELEPHONE ENCOUNTER
I spoke to two of the daughters. He's wheezing and has a runny nose.  Not sure if he's more edematous or not.  Recommended urgent care visit.  They wanted to try claritin and will come to UC in the am if he's no better.

## 2018-11-15 ENCOUNTER — NURSE TRIAGE (OUTPATIENT)
Dept: ADMINISTRATIVE | Facility: CLINIC | Age: 83
End: 2018-11-15

## 2018-11-15 NOTE — TELEPHONE ENCOUNTER
"    Reason for Disposition   Health Information question, no triage required and triager able to answer question    Answer Assessment - Initial Assessment Questions  1. REASON FOR CALL or QUESTION: "What is your reason for calling today?" or "How can I best help you?" or "What question do you have that I can help answer?"      Received call from appt desk- stated pt's daughter calling for pt with sob, wheezing, leg swelling.  Daughter reported pt was seen by pcp a couple of weeks ago and was advised ok to give claritin for his wheezing. They were to f/u with  appt if wheezing, sob worsened. She reported since taking the claritin the wheezing seems to be improved. She thinks he had phlegm in his throat which he doesn't cough out which caused the problem. Sob occurs when getting into bed and/or turning over. No acute sob noted at this time. Also reported he always has feet/leg swelling but seemed to be worse today with more swelling in feet and about half way up legs. Denied pitting.  She stated he doesn't like to elevate feet which is why she thinks he has swelling. She made him elevate earlier and when checking them now stated the swelling is improving.    Protocols used: ST INFORMATION ONLY CALL-A-AH      "

## 2018-11-16 ENCOUNTER — TELEPHONE (OUTPATIENT)
Dept: INTERNAL MEDICINE | Facility: CLINIC | Age: 83
End: 2018-11-16

## 2018-11-16 ENCOUNTER — HOSPITAL ENCOUNTER (INPATIENT)
Facility: HOSPITAL | Age: 83
LOS: 10 days | Discharge: NURSING, REHAB OR LONG-TERM CARE | DRG: 291 | End: 2018-11-26
Attending: EMERGENCY MEDICINE | Admitting: EMERGENCY MEDICINE
Payer: MEDICARE

## 2018-11-16 ENCOUNTER — NURSE TRIAGE (OUTPATIENT)
Dept: ADMINISTRATIVE | Facility: CLINIC | Age: 83
End: 2018-11-16

## 2018-11-16 ENCOUNTER — OFFICE VISIT (OUTPATIENT)
Dept: INTERNAL MEDICINE | Facility: CLINIC | Age: 83
End: 2018-11-16
Payer: MEDICARE

## 2018-11-16 VITALS
HEART RATE: 103 BPM | OXYGEN SATURATION: 93 % | TEMPERATURE: 99 F | DIASTOLIC BLOOD PRESSURE: 62 MMHG | SYSTOLIC BLOOD PRESSURE: 118 MMHG

## 2018-11-16 DIAGNOSIS — R06.02 SHORTNESS OF BREATH: ICD-10-CM

## 2018-11-16 DIAGNOSIS — R09.02 HYPOXIA: ICD-10-CM

## 2018-11-16 DIAGNOSIS — R09.02 HYPOXIA: Primary | ICD-10-CM

## 2018-11-16 DIAGNOSIS — I15.2 HYPERTENSION ASSOCIATED WITH DIABETES: ICD-10-CM

## 2018-11-16 DIAGNOSIS — R06.00 DYSPNEA, UNSPECIFIED TYPE: ICD-10-CM

## 2018-11-16 DIAGNOSIS — N18.30 CHRONIC KIDNEY DISEASE, STAGE III (MODERATE): ICD-10-CM

## 2018-11-16 DIAGNOSIS — R79.89 ELEVATED TROPONIN: ICD-10-CM

## 2018-11-16 DIAGNOSIS — N17.9 AKI (ACUTE KIDNEY INJURY): ICD-10-CM

## 2018-11-16 DIAGNOSIS — R60.0 PEDAL EDEMA: ICD-10-CM

## 2018-11-16 DIAGNOSIS — E11.59 HYPERTENSION ASSOCIATED WITH DIABETES: ICD-10-CM

## 2018-11-16 DIAGNOSIS — J96.01 ACUTE RESPIRATORY FAILURE WITH HYPOXIA: Primary | ICD-10-CM

## 2018-11-16 LAB
ALBUMIN SERPL BCP-MCNC: 3.4 G/DL
ALP SERPL-CCNC: 69 U/L
ALT SERPL W/O P-5'-P-CCNC: 13 U/L
ANION GAP SERPL CALC-SCNC: 9 MMOL/L
AST SERPL-CCNC: 17 U/L
BASOPHILS # BLD AUTO: 0.07 K/UL
BASOPHILS NFR BLD: 0.6 %
BILIRUB SERPL-MCNC: 0.4 MG/DL
BNP SERPL-MCNC: 210 PG/ML
BUN SERPL-MCNC: 29 MG/DL
CALCIUM SERPL-MCNC: 9.1 MG/DL
CHLORIDE SERPL-SCNC: 106 MMOL/L
CO2 SERPL-SCNC: 26 MMOL/L
CREAT SERPL-MCNC: 1.3 MG/DL
D DIMER PPP IA.FEU-MCNC: 0.71 MG/L FEU
DIFFERENTIAL METHOD: ABNORMAL
EOSINOPHIL # BLD AUTO: 0.2 K/UL
EOSINOPHIL NFR BLD: 1.7 %
ERYTHROCYTE [DISTWIDTH] IN BLOOD BY AUTOMATED COUNT: 13.8 %
EST. GFR  (AFRICAN AMERICAN): 52.1 ML/MIN/1.73 M^2
EST. GFR  (NON AFRICAN AMERICAN): 45.1 ML/MIN/1.73 M^2
GLUCOSE SERPL-MCNC: 183 MG/DL
HCT VFR BLD AUTO: 42 %
HGB BLD-MCNC: 12.8 G/DL
IMM GRANULOCYTES # BLD AUTO: 0.04 K/UL
IMM GRANULOCYTES NFR BLD AUTO: 0.4 %
INR PPP: 1.1
LYMPHOCYTES # BLD AUTO: 4 K/UL
LYMPHOCYTES NFR BLD: 36.6 %
MCH RBC QN AUTO: 26.4 PG
MCHC RBC AUTO-ENTMCNC: 30.5 G/DL
MCV RBC AUTO: 87 FL
MONOCYTES # BLD AUTO: 0.6 K/UL
MONOCYTES NFR BLD: 5.1 %
NEUTROPHILS # BLD AUTO: 6.1 K/UL
NEUTROPHILS NFR BLD: 55.6 %
NRBC BLD-RTO: 0 /100 WBC
PLATELET # BLD AUTO: 357 K/UL
PMV BLD AUTO: 9.5 FL
POTASSIUM SERPL-SCNC: 4.8 MMOL/L
PROT SERPL-MCNC: 7.5 G/DL
PROTHROMBIN TIME: 11.4 SEC
RBC # BLD AUTO: 4.85 M/UL
SODIUM SERPL-SCNC: 141 MMOL/L
TROPONIN I SERPL DL<=0.01 NG/ML-MCNC: 0.08 NG/ML
WBC # BLD AUTO: 11.04 K/UL

## 2018-11-16 PROCEDURE — 85610 PROTHROMBIN TIME: CPT

## 2018-11-16 PROCEDURE — 83880 ASSAY OF NATRIURETIC PEPTIDE: CPT

## 2018-11-16 PROCEDURE — 96374 THER/PROPH/DIAG INJ IV PUSH: CPT

## 2018-11-16 PROCEDURE — 85379 FIBRIN DEGRADATION QUANT: CPT

## 2018-11-16 PROCEDURE — 80053 COMPREHEN METABOLIC PANEL: CPT

## 2018-11-16 PROCEDURE — 99999 PR PBB SHADOW E&M-EST. PATIENT-LVL III: CPT | Mod: PBBFAC,,, | Performed by: INTERNAL MEDICINE

## 2018-11-16 PROCEDURE — 99285 EMERGENCY DEPT VISIT HI MDM: CPT | Mod: ,,, | Performed by: PHYSICIAN ASSISTANT

## 2018-11-16 PROCEDURE — 85025 COMPLETE CBC W/AUTO DIFF WBC: CPT

## 2018-11-16 PROCEDURE — 99214 OFFICE O/P EST MOD 30 MIN: CPT | Mod: S$GLB,,, | Performed by: INTERNAL MEDICINE

## 2018-11-16 PROCEDURE — 1101F PT FALLS ASSESS-DOCD LE1/YR: CPT | Mod: CPTII,S$GLB,, | Performed by: INTERNAL MEDICINE

## 2018-11-16 PROCEDURE — 25000242 PHARM REV CODE 250 ALT 637 W/ HCPCS: Performed by: PHYSICIAN ASSISTANT

## 2018-11-16 PROCEDURE — 84484 ASSAY OF TROPONIN QUANT: CPT

## 2018-11-16 PROCEDURE — 99285 EMERGENCY DEPT VISIT HI MDM: CPT | Mod: 25

## 2018-11-16 PROCEDURE — 93005 ELECTROCARDIOGRAM TRACING: CPT

## 2018-11-16 PROCEDURE — 94640 AIRWAY INHALATION TREATMENT: CPT

## 2018-11-16 PROCEDURE — 12000002 HC ACUTE/MED SURGE SEMI-PRIVATE ROOM

## 2018-11-16 PROCEDURE — 93010 ELECTROCARDIOGRAM REPORT: CPT | Mod: ,,, | Performed by: INTERNAL MEDICINE

## 2018-11-16 RX ORDER — IPRATROPIUM BROMIDE AND ALBUTEROL SULFATE 2.5; .5 MG/3ML; MG/3ML
3 SOLUTION RESPIRATORY (INHALATION)
Status: COMPLETED | OUTPATIENT
Start: 2018-11-16 | End: 2018-11-16

## 2018-11-16 RX ADMIN — IPRATROPIUM BROMIDE AND ALBUTEROL SULFATE 3 ML: .5; 3 SOLUTION RESPIRATORY (INHALATION) at 11:11

## 2018-11-16 NOTE — TELEPHONE ENCOUNTER
Reason for Disposition   [1] MODERATE difficulty breathing (e.g., speaks in phrases, SOB even at rest, pulse 100-120) AND [2] NEW-onset or WORSE than normal    Protocols used: ST BREATHING DIFFICULTY-A-AH    Daughter called to report the following:     -shortness of breath, wheezing, labored breathing  -he's short of breath while sitting, taking short breaths   -I feel like he's not breathing too well  -swelling to feet and legs on both side   -acting normal, napping   -advised to report to ED now

## 2018-11-16 NOTE — TELEPHONE ENCOUNTER
"----- Message from Eleanor Garcia sent at 11/16/2018  3:52 PM CST -----  Contact: lian/Anne/ [phone]  Patient would like to get medical advice.    Symptoms (please be specific):  Wheezing, SOB, feet and legs are swollen.    How long has patient had these symptoms:      Pharmacy name and phone # (DON'T enter "on file" or "in chart"):      Any drug allergies:      Would you prefer a response via [employer]?:      Comments:  Transferred caller to triage nurse.  "

## 2018-11-17 PROBLEM — J96.01 ACUTE RESPIRATORY FAILURE WITH HYPOXIA: Status: ACTIVE | Noted: 2018-11-17

## 2018-11-17 PROBLEM — Z66 DNR (DO NOT RESUSCITATE): Status: ACTIVE | Noted: 2018-11-17

## 2018-11-17 PROBLEM — R79.89 ELEVATED TROPONIN: Status: ACTIVE | Noted: 2018-11-17

## 2018-11-17 PROBLEM — R09.02 HYPOXIA: Status: ACTIVE | Noted: 2018-11-17

## 2018-11-17 LAB
ALLENS TEST: ABNORMAL
ANION GAP SERPL CALC-SCNC: 6 MMOL/L
BASOPHILS # BLD AUTO: 0.06 K/UL
BASOPHILS NFR BLD: 0.6 %
BUN SERPL-MCNC: 26 MG/DL
CALCIUM SERPL-MCNC: 8.3 MG/DL
CHLORIDE SERPL-SCNC: 106 MMOL/L
CHOLEST SERPL-MCNC: 157 MG/DL
CHOLEST/HDLC SERPL: 2.9 {RATIO}
CO2 SERPL-SCNC: 27 MMOL/L
CREAT SERPL-MCNC: 1.1 MG/DL
DELSYS: ABNORMAL
DIFFERENTIAL METHOD: ABNORMAL
EOSINOPHIL # BLD AUTO: 0.1 K/UL
EOSINOPHIL NFR BLD: 0.7 %
ERYTHROCYTE [DISTWIDTH] IN BLOOD BY AUTOMATED COUNT: 13.7 %
ERYTHROCYTE [SEDIMENTATION RATE] IN BLOOD BY WESTERGREN METHOD: 20 MM/H
EST. GFR  (AFRICAN AMERICAN): >60 ML/MIN/1.73 M^2
EST. GFR  (NON AFRICAN AMERICAN): 55.2 ML/MIN/1.73 M^2
ESTIMATED AVG GLUCOSE: 140 MG/DL
FLOW: 2
FLUAV AG SPEC QL IA: NEGATIVE
FLUBV AG SPEC QL IA: NEGATIVE
GLUCOSE SERPL-MCNC: 172 MG/DL
HBA1C MFR BLD HPLC: 6.5 %
HCO3 UR-SCNC: 24.2 MMOL/L (ref 24–28)
HCT VFR BLD AUTO: 35.4 %
HDLC SERPL-MCNC: 55 MG/DL
HDLC SERPL: 35 %
HGB BLD-MCNC: 11 G/DL
IMM GRANULOCYTES # BLD AUTO: 0.05 K/UL
IMM GRANULOCYTES NFR BLD AUTO: 0.5 %
LDLC SERPL CALC-MCNC: 93 MG/DL
LYMPHOCYTES # BLD AUTO: 1.9 K/UL
LYMPHOCYTES NFR BLD: 19.4 %
MAGNESIUM SERPL-MCNC: 2.1 MG/DL
MCH RBC QN AUTO: 26.9 PG
MCHC RBC AUTO-ENTMCNC: 31.1 G/DL
MCV RBC AUTO: 87 FL
MODE: ABNORMAL
MONOCYTES # BLD AUTO: 0.5 K/UL
MONOCYTES NFR BLD: 5.5 %
NEUTROPHILS # BLD AUTO: 7 K/UL
NEUTROPHILS NFR BLD: 73.3 %
NONHDLC SERPL-MCNC: 102 MG/DL
NRBC BLD-RTO: 0 /100 WBC
PCO2 BLDA: 36.2 MMHG (ref 35–45)
PH SMN: 7.43 [PH] (ref 7.35–7.45)
PHOSPHATE SERPL-MCNC: 3 MG/DL
PLATELET # BLD AUTO: 288 K/UL
PMV BLD AUTO: 9.5 FL
PO2 BLDA: 60 MMHG (ref 80–100)
POC BE: 0 MMOL/L
POC SATURATED O2: 91 % (ref 95–100)
POC TCO2: 25 MMOL/L (ref 23–27)
POCT GLUCOSE: 127 MG/DL (ref 70–110)
POCT GLUCOSE: 151 MG/DL (ref 70–110)
POCT GLUCOSE: 164 MG/DL (ref 70–110)
POCT GLUCOSE: 182 MG/DL (ref 70–110)
POTASSIUM SERPL-SCNC: 4.4 MMOL/L
RBC # BLD AUTO: 4.09 M/UL
SAMPLE: ABNORMAL
SITE: ABNORMAL
SODIUM SERPL-SCNC: 139 MMOL/L
SP02: 94
SPECIMEN SOURCE: NORMAL
TRIGL SERPL-MCNC: 45 MG/DL
TROPONIN I SERPL DL<=0.01 NG/ML-MCNC: 0.08 NG/ML
TROPONIN I SERPL DL<=0.01 NG/ML-MCNC: 0.17 NG/ML
WBC # BLD AUTO: 9.54 K/UL

## 2018-11-17 PROCEDURE — 25000003 PHARM REV CODE 250: Performed by: HOSPITALIST

## 2018-11-17 PROCEDURE — 83036 HEMOGLOBIN GLYCOSYLATED A1C: CPT

## 2018-11-17 PROCEDURE — 11000001 HC ACUTE MED/SURG PRIVATE ROOM

## 2018-11-17 PROCEDURE — 25500020 PHARM REV CODE 255: Performed by: EMERGENCY MEDICINE

## 2018-11-17 PROCEDURE — 84484 ASSAY OF TROPONIN QUANT: CPT

## 2018-11-17 PROCEDURE — 99223 1ST HOSP IP/OBS HIGH 75: CPT | Mod: ,,, | Performed by: PHYSICIAN ASSISTANT

## 2018-11-17 PROCEDURE — 94761 N-INVAS EAR/PLS OXIMETRY MLT: CPT

## 2018-11-17 PROCEDURE — 80061 LIPID PANEL: CPT

## 2018-11-17 PROCEDURE — 80048 BASIC METABOLIC PNL TOTAL CA: CPT

## 2018-11-17 PROCEDURE — 83735 ASSAY OF MAGNESIUM: CPT

## 2018-11-17 PROCEDURE — 94640 AIRWAY INHALATION TREATMENT: CPT

## 2018-11-17 PROCEDURE — 36415 COLL VENOUS BLD VENIPUNCTURE: CPT

## 2018-11-17 PROCEDURE — 63600175 PHARM REV CODE 636 W HCPCS: Performed by: PHYSICIAN ASSISTANT

## 2018-11-17 PROCEDURE — 25000003 PHARM REV CODE 250: Performed by: PHYSICIAN ASSISTANT

## 2018-11-17 PROCEDURE — 87400 INFLUENZA A/B EACH AG IA: CPT | Mod: 59

## 2018-11-17 PROCEDURE — 82803 BLOOD GASES ANY COMBINATION: CPT

## 2018-11-17 PROCEDURE — 27000221 HC OXYGEN, UP TO 24 HOURS

## 2018-11-17 PROCEDURE — 84484 ASSAY OF TROPONIN QUANT: CPT | Mod: 91

## 2018-11-17 PROCEDURE — 85025 COMPLETE CBC W/AUTO DIFF WBC: CPT

## 2018-11-17 PROCEDURE — 99900035 HC TECH TIME PER 15 MIN (STAT)

## 2018-11-17 PROCEDURE — 36600 WITHDRAWAL OF ARTERIAL BLOOD: CPT

## 2018-11-17 PROCEDURE — 25000242 PHARM REV CODE 250 ALT 637 W/ HCPCS: Performed by: PHYSICIAN ASSISTANT

## 2018-11-17 PROCEDURE — 84100 ASSAY OF PHOSPHORUS: CPT

## 2018-11-17 RX ORDER — ONDANSETRON 8 MG/1
8 TABLET, ORALLY DISINTEGRATING ORAL EVERY 8 HOURS PRN
Status: DISCONTINUED | OUTPATIENT
Start: 2018-11-17 | End: 2018-11-26 | Stop reason: HOSPADM

## 2018-11-17 RX ORDER — LIDOCAINE HYDROCHLORIDE 20 MG/ML
JELLY TOPICAL
Status: COMPLETED | OUTPATIENT
Start: 2018-11-17 | End: 2018-11-17

## 2018-11-17 RX ORDER — IBUPROFEN 200 MG
24 TABLET ORAL
Status: DISCONTINUED | OUTPATIENT
Start: 2018-11-17 | End: 2018-11-26 | Stop reason: HOSPADM

## 2018-11-17 RX ORDER — ENOXAPARIN SODIUM 100 MG/ML
40 INJECTION SUBCUTANEOUS EVERY 24 HOURS
Status: DISCONTINUED | OUTPATIENT
Start: 2018-11-17 | End: 2018-11-26 | Stop reason: HOSPADM

## 2018-11-17 RX ORDER — IBUPROFEN 200 MG
16 TABLET ORAL
Status: DISCONTINUED | OUTPATIENT
Start: 2018-11-17 | End: 2018-11-26 | Stop reason: HOSPADM

## 2018-11-17 RX ORDER — ACETAMINOPHEN 325 MG/1
650 TABLET ORAL EVERY 8 HOURS PRN
Status: DISCONTINUED | OUTPATIENT
Start: 2018-11-17 | End: 2018-11-24

## 2018-11-17 RX ORDER — ACETAMINOPHEN 325 MG/1
650 TABLET ORAL EVERY 4 HOURS PRN
Status: DISCONTINUED | OUTPATIENT
Start: 2018-11-17 | End: 2018-11-24

## 2018-11-17 RX ORDER — ONDANSETRON 2 MG/ML
4 INJECTION INTRAMUSCULAR; INTRAVENOUS EVERY 8 HOURS PRN
Status: DISCONTINUED | OUTPATIENT
Start: 2018-11-17 | End: 2018-11-26 | Stop reason: HOSPADM

## 2018-11-17 RX ORDER — LISINOPRIL 20 MG/1
40 TABLET ORAL DAILY
Status: DISCONTINUED | OUTPATIENT
Start: 2018-11-17 | End: 2018-11-20

## 2018-11-17 RX ORDER — LEVALBUTEROL 1.25 MG/.5ML
1.25 SOLUTION, CONCENTRATE RESPIRATORY (INHALATION) EVERY 8 HOURS
Status: DISCONTINUED | OUTPATIENT
Start: 2018-11-17 | End: 2018-11-20

## 2018-11-17 RX ORDER — INSULIN ASPART 100 [IU]/ML
0-5 INJECTION, SOLUTION INTRAVENOUS; SUBCUTANEOUS
Status: DISCONTINUED | OUTPATIENT
Start: 2018-11-17 | End: 2018-11-21

## 2018-11-17 RX ORDER — IPRATROPIUM BROMIDE AND ALBUTEROL SULFATE 2.5; .5 MG/3ML; MG/3ML
3 SOLUTION RESPIRATORY (INHALATION) EVERY 4 HOURS PRN
Status: DISCONTINUED | OUTPATIENT
Start: 2018-11-17 | End: 2018-11-17

## 2018-11-17 RX ORDER — LEVOFLOXACIN 500 MG/1
500 TABLET, FILM COATED ORAL DAILY
Status: DISCONTINUED | OUTPATIENT
Start: 2018-11-17 | End: 2018-11-18

## 2018-11-17 RX ORDER — FUROSEMIDE 10 MG/ML
40 INJECTION INTRAMUSCULAR; INTRAVENOUS 2 TIMES DAILY
Status: DISCONTINUED | OUTPATIENT
Start: 2018-11-17 | End: 2018-11-18

## 2018-11-17 RX ORDER — AMLODIPINE BESYLATE 10 MG/1
10 TABLET ORAL DAILY
Status: DISCONTINUED | OUTPATIENT
Start: 2018-11-17 | End: 2018-11-20

## 2018-11-17 RX ORDER — GLUCAGON 1 MG
1 KIT INJECTION
Status: DISCONTINUED | OUTPATIENT
Start: 2018-11-17 | End: 2018-11-26 | Stop reason: HOSPADM

## 2018-11-17 RX ORDER — ACETAMINOPHEN 500 MG
1000 TABLET ORAL EVERY 8 HOURS PRN
Status: DISCONTINUED | OUTPATIENT
Start: 2018-11-17 | End: 2018-11-24

## 2018-11-17 RX ORDER — POLYETHYLENE GLYCOL 3350 17 G/17G
17 POWDER, FOR SOLUTION ORAL DAILY
Status: DISCONTINUED | OUTPATIENT
Start: 2018-11-17 | End: 2018-11-26 | Stop reason: HOSPADM

## 2018-11-17 RX ORDER — FUROSEMIDE 10 MG/ML
40 INJECTION INTRAMUSCULAR; INTRAVENOUS
Status: COMPLETED | OUTPATIENT
Start: 2018-11-17 | End: 2018-11-17

## 2018-11-17 RX ORDER — SODIUM CHLORIDE 0.9 % (FLUSH) 0.9 %
5 SYRINGE (ML) INJECTION
Status: DISCONTINUED | OUTPATIENT
Start: 2018-11-17 | End: 2018-11-26 | Stop reason: HOSPADM

## 2018-11-17 RX ORDER — RAMELTEON 8 MG/1
8 TABLET ORAL NIGHTLY PRN
Status: DISCONTINUED | OUTPATIENT
Start: 2018-11-17 | End: 2018-11-26 | Stop reason: HOSPADM

## 2018-11-17 RX ORDER — BISACODYL 10 MG
10 SUPPOSITORY, RECTAL RECTAL DAILY PRN
Status: DISCONTINUED | OUTPATIENT
Start: 2018-11-17 | End: 2018-11-26 | Stop reason: HOSPADM

## 2018-11-17 RX ADMIN — ENOXAPARIN SODIUM 40 MG: 100 INJECTION SUBCUTANEOUS at 04:11

## 2018-11-17 RX ADMIN — ACETAMINOPHEN 650 MG: 325 TABLET, FILM COATED ORAL at 04:11

## 2018-11-17 RX ADMIN — POLYETHYLENE GLYCOL 3350 17 G: 17 POWDER, FOR SOLUTION ORAL at 09:11

## 2018-11-17 RX ADMIN — FUROSEMIDE 40 MG: 10 INJECTION, SOLUTION INTRAMUSCULAR; INTRAVENOUS at 04:11

## 2018-11-17 RX ADMIN — LEVOFLOXACIN 500 MG: 500 TABLET, FILM COATED ORAL at 12:11

## 2018-11-17 RX ADMIN — LEVALBUTEROL 1.25 MG: 1.25 SOLUTION, CONCENTRATE RESPIRATORY (INHALATION) at 09:11

## 2018-11-17 RX ADMIN — LIDOCAINE HYDROCHLORIDE 10 ML: 20 JELLY TOPICAL at 03:11

## 2018-11-17 RX ADMIN — LEVALBUTEROL 1.25 MG: 1.25 SOLUTION, CONCENTRATE RESPIRATORY (INHALATION) at 03:11

## 2018-11-17 RX ADMIN — FUROSEMIDE 40 MG: 10 INJECTION, SOLUTION INTRAMUSCULAR; INTRAVENOUS at 05:11

## 2018-11-17 RX ADMIN — IOHEXOL 75 ML: 350 INJECTION, SOLUTION INTRAVENOUS at 12:11

## 2018-11-17 RX ADMIN — FUROSEMIDE 40 MG: 10 INJECTION, SOLUTION INTRAMUSCULAR; INTRAVENOUS at 09:11

## 2018-11-17 RX ADMIN — AMLODIPINE BESYLATE 10 MG: 10 TABLET ORAL at 09:11

## 2018-11-17 RX ADMIN — LISINOPRIL 40 MG: 20 TABLET ORAL at 09:11

## 2018-11-17 NOTE — ED NOTES
pts condom cath came off. Pt wet the bed and gown. Pt cleaned and changed into new gown and clean linen placed on bed. New condom cath applied to pt.

## 2018-11-17 NOTE — HPI
Zbigniew Forman is a 89M II  with HTN who presents for evaluation of SOB and worsening LE edema. He states that his SOB started 2-3 weeks ago, and acutely worsened over the last few days prompting him to present to the ED. Family and patient report orthopnea, but patient insists of sleeping flat. He does report some cough and wheeze, but no fever or recent sick contacts. He does not use oxygen at home and lives with his daughter. He denies any chest pain, no syncope,  He does report some dizziness when he moves around too much. No recent sick contacts. He denies every being told that he has issues with HF. He lost his wife of 74 years in May. 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 He denies history of COPD. He did have relief with breathing treatment.     ED: CBC WNL, EKG w/o ischemic changes, troponin 0.08>0.079, , CTA negative for PE, CXR with edema, on 3L NC.

## 2018-11-17 NOTE — PROGRESS NOTES
Subjective:       Patient ID: Zbigniew Forman is a 89 y.o. male.    Chief Complaint: Shortness of Breath (whezzing, feet and legs swollen)    89 year old man brought in by family with increasing SOB and wheezing and increasing lower extremity edema.  Has history of HTN, DM and CKD stage 3      Review of Systems   Constitutional: Negative for activity change, appetite change and fever.   HENT: Negative for congestion, postnasal drip and sore throat.    Respiratory: Negative for cough, shortness of breath and wheezing.    Cardiovascular: Negative for chest pain and palpitations.   Gastrointestinal: Negative for abdominal pain, blood in stool, constipation, diarrhea, nausea and vomiting.   Genitourinary: Negative for decreased urine volume, difficulty urinating, flank pain and frequency.   Musculoskeletal: Negative for arthralgias.   Neurological: Negative for dizziness, weakness and headaches.       Objective:      Physical Exam   Constitutional: He is oriented to person, place, and time. He appears well-developed and well-nourished. He appears distressed.       HENT:   Head: Normocephalic and atraumatic.   Right Ear: External ear normal.   Left Ear: External ear normal.   Eyes: Conjunctivae and EOM are normal. Pupils are equal, round, and reactive to light.   Neck: Normal range of motion. Neck supple. No thyromegaly present.   Cardiovascular: Regular rhythm. Tachycardia present.   3-4+ pitting edema from toes to above knees   Pulmonary/Chest: Effort normal. He has decreased breath sounds in the right upper field, the right middle field, the right lower field, the left upper field, the left middle field and the left lower field. He has rales in the right lower field and the left lower field.   Abdominal: Soft. Bowel sounds are normal. He exhibits no mass. There is no tenderness. There is no rebound and no guarding.   Musculoskeletal: Normal range of motion.   Lymphadenopathy:     He has no cervical adenopathy.    Neurological: He is alert and oriented to person, place, and time. He has normal reflexes. He displays normal reflexes. No cranial nerve deficit. He exhibits normal muscle tone. Coordination normal.   Skin: Skin is warm and dry.       Assessment:       1. Hypoxia    2. Dyspnea, unspecified type    3. Pedal edema        Plan:   Zbigniew was seen today for shortness of breath.    Diagnoses and all orders for this visit:    Hypoxia    Dyspnea, unspecified type    Pedal edema

## 2018-11-17 NOTE — H&P
[facility] Hospital Medicine  History & Physical    Patient Name: Zbigniew Forman  MRN: [id number]  Admission Date: 11/16/2018  Attending Physician: Taurus Gray MD   Primary Care Provider: Blas Morgan Ii, MD    [facility] Medicine Team: Networked reference to record PCT  Reid Pathak PA-C     Patient information was obtained from patient, caregiver / friend, past medical records and ER records.     Subjective:     Principal Problem:Acute respiratory failure with hypoxia    Chief Complaint:   Chief Complaint   Patient presents with    Shortness of Breath     pt has increased sob and bilateral lower over the past week. pts family took pt to primary today who recommended him come to the ed for evaluation. pt denies nausea chest pain numbness or headache and complains of sob. 97% nasal cannula 2 liters          HPI: Zbigniew Forman is a 89M [place]  with HTN who presents for evaluation of SOB and worsening LE edema. He states that his SOB started 2-3 weeks ago, and acutely worsened over the last few days prompting him to present to the ED. Family and patient report orthopnea, but patient insists of sleeping flat. He does report some cough and wheeze, but no fever or recent sick contacts. He does not use oxygen at home and lives with his daughter. He denies any chest pain, no syncope,  He does report some dizziness when he moves around too much. No recent sick contacts. He denies every being told that he has issues with HF. He lost his wife of 74 years in May. 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 He denies history of COPD. He did have relief with breathing treatment.     ED: CBC WNL, EKG w/o ischemic changes, troponin 0.08>0.079, , CTA negative for PE, CXR with edema, on 3L NC.     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      Current Outpatient Medications on File Prior to Encounter   Medication Sig    amLODIPine (NORVASC) 10 MG tablet Take 1 tablet (10 mg total) by mouth once daily.    latanoprost 0.005 % ophthalmic solution INSTILL 1 DROP IN BOTH EYES EVERY EVENING    latanoprost 0.005 % ophthalmic solution INSTILL 1 DROP IN BOTH EYES EVERY EVENING    lisinopril (PRINIVIL,ZESTRIL) 40 MG tablet TAKE 1 TABLET(40 MG) BY MOUTH EVERY DAY    alfuzosin (UROXATRAL) 10 mg Tb24 Take 1 tablet (10 mg total) by mouth daily with breakfast.     Family History     Problem Relation (Age of Onset)    Cerebral aneurysm Mother    Kidney failure Sister (96)    Other Father        Tobacco Use    Smoking status: Never Smoker    Smokeless tobacco: Never Used   Substance and Sexual Activity    Alcohol use: No    Drug use: Not on file    Sexual activity: Not on file     Review of Systems   Constitutional: Negative for chills, fatigue and fever.   Respiratory: Positive for cough, shortness of breath and wheezing.    Cardiovascular: Positive for leg swelling. Negative for chest pain and palpitations.   Gastrointestinal: Negative for abdominal distention, abdominal pain, nausea and vomiting.   Genitourinary: Positive for difficulty urinating.  Negative for flank pain.   Musculoskeletal: Positive for gait problem. Negative for arthralgias and back pain.   Skin: Negative for rash and wound.   Neurological: Positive for weakness and light-headedness. Negative for dizziness and syncope.   Psychiatric/Behavioral: Negative for agitation and confusion.     Objective:     Vital Signs (Most Recent):  Temp: 97.3 °F (36.3 °C) (11/16/18 2223)  Pulse: (!) 112 (11/17/18 0332)  Resp: (!) 35 (11/17/18 0332)  BP: 131/65 (11/17/18 0332)  SpO2: 95 % (11/17/18 0332) Vital Signs (24h Range):  Temp:  [97.3 °F (36.3 °C)-98.6 °F (37 °C)] 97.3 °F (36.3 °C)  Pulse:  [] 112  Resp:  [20-64] 35  SpO2:  [82 %-100 %] 95 %  BP: (118-157)/(60-81) 131/65        There is no height or weight on file to calculate BMI.    Physical Exam   Constitutional: He is oriented to person, place, and time. He appears well-developed and well-nourished. No distress.   HENT:   Head: Normocephalic and atraumatic.   Neck: Normal range of motion. Neck supple. JVD present.   Cardiovascular: Regular rhythm. Tachycardia present.   No murmur heard.  Pulmonary/Chest: No stridor. Tachypnea noted. No respiratory distress. He has wheezes (expiratory). He has rales.   3L NC   Abdominal: Soft. Bowel sounds are normal. He exhibits no distension. There is no tenderness.   Musculoskeletal: Normal range of motion. He exhibits edema (2+ BLE to knee).   Neurological: He is alert and oriented to person, place, and time.   Skin: Skin is warm and dry. He is not diaphoretic.   Nursing note and vitals reviewed.          Significant Labs:   BMP:   Recent Labs   Lab 11/16/18 2229   *      K 4.8      CO2 26   BUN 29   CREATININE 1.3   CALCIUM 9.1     CBC:   Recent Labs   Lab 11/16/18 2229   WBC 11.04   HGB 12.8*   HCT 42.0   *     CMP:   Recent Labs   Lab 11/16/18  2229      K 4.8      CO2 26   *   BUN 29   CREATININE 1.3   CALCIUM 9.1   PROT 7.5   ALBUMIN 3.4*   BILITOT 0.4    ALKPHOS 69   AST 17   ALT 13   ANIONGAP 9   EGFRNONAA 45.1*     Cardiac Markers:   Recent Labs   Lab 11/16/18 2229   *     Coagulation:   Recent Labs   Lab 11/16/18 2229   INR 1.1     Troponin:   Recent Labs   Lab 11/16/18 2229 11/17/18  0232   TROPONINI 0.080* 0.079*     Urine Culture: No results for input(s): LABURIN in the last 48 hours.  Urine Studies: No results for input(s): COLORU, APPEARANCEUA, PHUR, SPECGRAV, PROTEINUA, GLUCUA, KETONESU, BILIRUBINUA, OCCULTUA, NITRITE, UROBILINOGEN, LEUKOCYTESUR, RBCUA, WBCUA, BACTERIA, SQUAMEPITHEL, HYALINECASTS in the last 48 hours.    Invalid input(s): WRIGHTSUR  All pertinent labs within the past 24 hours have been reviewed.    Significant Imaging: CT: I have reviewed all pertinent results/findings within the past 24 hours and my personal findings are:  no PE  CXR: I have reviewed all pertinent results/findings within the past 24 hours and my personal findings are:  edema  EKG: I have reviewed all pertinent results/findings within the past 24 hours and my personal findings are: no ischemic changes, tachy    Assessment/Plan:     * Acute respiratory failure with hypoxia    - clinically consistent presentation of decompensated HF, but no echo on file  - BNP elevated, CXR with edema, overloaded on exam  - on 3L NC, wean  - lasix 40 mg IVP BID  - hold BB until euvolemic, continue lisinopril   - strict I/Os, daily weights, fluid restriction, tele  - possibly some component of bronchitis, scheduled xopenex     Elevated troponin    - chronically elevated  - trending flat, no EKG change, no CP  - echo in AM     DNR (do not resuscitate)    - discussed with family and patient     Hypertension associated with diabetes    - continue norvasc and lisinopril      Chronic kidney disease, stage III (moderate)    - chronic and stable     Controlled type 2 diabetes mellitus without complication, without long-term current use of insulin    - diet controlled, A1c 6.5 08/2018  -  low dose SSI for now       VTE Risk Mitigation (From admission, onward)        Ordered     enoxaparin injection 40 mg  Daily      11/17/18 0343     IP VTE HIGH RISK PATIENT  Once      11/17/18 0343             Reid Pathak PA-C  Department of Hospital Medicine   [facility]

## 2018-11-17 NOTE — CARE UPDATE
11:02 AM    Patient seen and examined during am rounds. HPI (see BREE Pathak' HPI from earlier today for history). Hemodynamically stable, comfortable appearing and in no acute distress. No chest pain, SOB improving. Trop trending down. 2D Echo pending this am. Continue with diuresis as tolerated. Blood-tinged urine likely 2/2 traumatic cath attempts in ED. Will monitor. Bladder scan prn.     Given reports of productive cough, SOB, and RUL nonspecific infiltrate with tachycardia, will order procalcitonin, lactic acid, blood cultures and begin treatment for presumptive CAP.     Cristina Morgan MD

## 2018-11-17 NOTE — SUBJECTIVE & OBJECTIVE
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      Current Outpatient Medications on File Prior to Encounter   Medication Sig    amLODIPine (NORVASC) 10 MG tablet Take 1 tablet (10 mg total) by mouth once daily.    latanoprost 0.005 % ophthalmic solution INSTILL 1 DROP IN BOTH EYES EVERY EVENING    latanoprost 0.005 % ophthalmic solution INSTILL 1 DROP IN BOTH EYES EVERY EVENING    lisinopril (PRINIVIL,ZESTRIL) 40 MG tablet TAKE 1 TABLET(40 MG) BY MOUTH EVERY DAY    alfuzosin (UROXATRAL) 10 mg Tb24 Take 1 tablet (10 mg total) by mouth daily with breakfast.     Family History     Problem Relation (Age of Onset)    Cerebral aneurysm Mother    Kidney failure Sister (96)    Other Father        Tobacco Use    Smoking status: Never Smoker    Smokeless tobacco: Never Used   Substance and Sexual Activity    Alcohol use: No    Drug use: Not on file    Sexual activity: Not on file     Review of Systems   Constitutional: Negative for chills, fatigue and fever.   Respiratory: Positive for cough, shortness of breath and wheezing.    Cardiovascular: Positive for leg swelling. Negative for  chest pain and palpitations.   Gastrointestinal: Negative for abdominal distention, abdominal pain, nausea and vomiting.   Genitourinary: Positive for difficulty urinating. Negative for flank pain.   Musculoskeletal: Positive for gait problem. Negative for arthralgias and back pain.   Skin: Negative for rash and wound.   Neurological: Positive for weakness and light-headedness. Negative for dizziness and syncope.   Psychiatric/Behavioral: Negative for agitation and confusion.     Objective:     Vital Signs (Most Recent):  Temp: 97.3 °F (36.3 °C) (11/16/18 2223)  Pulse: (!) 112 (11/17/18 0332)  Resp: (!) 35 (11/17/18 0332)  BP: 131/65 (11/17/18 0332)  SpO2: 95 % (11/17/18 0332) Vital Signs (24h Range):  Temp:  [97.3 °F (36.3 °C)-98.6 °F (37 °C)] 97.3 °F (36.3 °C)  Pulse:  [] 112  Resp:  [20-64] 35  SpO2:  [82 %-100 %] 95 %  BP: (118-157)/(60-81) 131/65        There is no height or weight on file to calculate BMI.    Physical Exam   Constitutional: He is oriented to person, place, and time. He appears well-developed and well-nourished. No distress.   HENT:   Head: Normocephalic and atraumatic.   Neck: Normal range of motion. Neck supple. JVD present.   Cardiovascular: Regular rhythm. Tachycardia present.   No murmur heard.  Pulmonary/Chest: No stridor. Tachypnea noted. No respiratory distress. He has wheezes (expiratory). He has rales.   3L NC   Abdominal: Soft. Bowel sounds are normal. He exhibits no distension. There is no tenderness.   Musculoskeletal: Normal range of motion. He exhibits edema (2+ BLE to knee).   Neurological: He is alert and oriented to person, place, and time.   Skin: Skin is warm and dry. He is not diaphoretic.   Nursing note and vitals reviewed.          Significant Labs:   BMP:   Recent Labs   Lab 11/16/18 2229   *      K 4.8      CO2 26   BUN 29   CREATININE 1.3   CALCIUM 9.1     CBC:   Recent Labs   Lab 11/16/18 2225   WBC 11.04   HGB 12.8*   HCT 42.0   *      CMP:   Recent Labs   Lab 11/16/18 2229      K 4.8      CO2 26   *   BUN 29   CREATININE 1.3   CALCIUM 9.1   PROT 7.5   ALBUMIN 3.4*   BILITOT 0.4   ALKPHOS 69   AST 17   ALT 13   ANIONGAP 9   EGFRNONAA 45.1*     Cardiac Markers:   Recent Labs   Lab 11/16/18 2229   *     Coagulation:   Recent Labs   Lab 11/16/18 2229   INR 1.1     Troponin:   Recent Labs   Lab 11/16/18 2229 11/17/18  0232   TROPONINI 0.080* 0.079*     Urine Culture: No results for input(s): LABURIN in the last 48 hours.  Urine Studies: No results for input(s): COLORU, APPEARANCEUA, PHUR, SPECGRAV, PROTEINUA, GLUCUA, KETONESU, BILIRUBINUA, OCCULTUA, NITRITE, UROBILINOGEN, LEUKOCYTESUR, RBCUA, WBCUA, BACTERIA, SQUAMEPITHEL, HYALINECASTS in the last 48 hours.    Invalid input(s): WRIGHTSUR  All pertinent labs within the past 24 hours have been reviewed.    Significant Imaging: CT: I have reviewed all pertinent results/findings within the past 24 hours and my personal findings are:  no PE  CXR: I have reviewed all pertinent results/findings within the past 24 hours and my personal findings are:  edema  EKG: I have reviewed all pertinent results/findings within the past 24 hours and my personal findings are: no ischemic changes, tachy

## 2018-11-17 NOTE — ED PROVIDER NOTES
Encounter Date: 11/16/2018    SCRIBE #1 NOTE: I, Isreal Sarmiento, am scribing for, and in the presence of,  Kayley Espinoza MD. I have scribed the following portions of the note - the EKG reading.       History     Chief Complaint   Patient presents with    Shortness of Breath     pt has increased sob and bilateral lower over the past week. pts family took pt to primary today who recommended him come to the ed for evaluation. pt denies nausea chest pain numbness or headache and complains of sob. 97% nasal cannula 2 liters       Patient is a 89 year old male with PMHX of HTN, DM2, CKD stage 3, and hx of SDH in 2015. He presents to the ED for SOB. Patient seen at urgent care today for similar presentation and referred to ED for higher level of care. He reports having increased SOB for approximately two weeks. Reports associated productive cough with tan tinged sputum, wheezing, and lower extremity edema. Denies home oxygen use. Denies hx of PE or DVT or cancer. He denies fever,chills, nausea, vomiting, chest pain, abd pain, dysuria, diarrhea, or constipation. He is a non smoker and denies alcohol use.           Review of patient's allergies indicates:   Allergen Reactions    Metformin Diarrhea    Sulfa (sulfonamide antibiotics)     Tradjenta [linagliptin]      Diarrhea       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 OR [address] [place] [address]    HERNIA REPAIR       Family  History   Problem Relation Age of Onset    Other Father         peritonitis    Cerebral aneurysm Mother     Kidney failure Sister 96    Amblyopia Neg Hx     Blindness Neg Hx     Cancer Neg Hx     Cataracts Neg Hx     Diabetes Neg Hx     Glaucoma Neg Hx     Hypertension Neg Hx     Macular degeneration Neg Hx     Retinal detachment Neg Hx     Strabismus Neg Hx     Stroke Neg Hx     Thyroid disease Neg Hx      Social History     Tobacco Use    Smoking status: Never Smoker    Smokeless tobacco: Never Used   Substance Use Topics    Alcohol use: No    Drug use: Not on file     Review of Systems   Constitutional: Negative for fever.   HENT: Negative for sore throat.    Respiratory: Positive for cough, shortness of breath and wheezing.    Cardiovascular: Positive for leg swelling. Negative for chest pain.   Gastrointestinal: Negative for abdominal pain, nausea and vomiting.   Genitourinary: Negative for dysuria.   Musculoskeletal: Negative for back pain.   Skin: Negative for rash.   Neurological: Negative for weakness.   Hematological: Does not bruise/bleed easily.       Physical Exam     Initial Vitals [11/16/18 2012]   BP Pulse Resp Temp SpO2   132/63 101 20 98.1 °F (36.7 °C) 96 %      MAP       --         Physical Exam    Vitals reviewed.  Constitutional: He appears well-developed and well-nourished. No distress.   Patient resting comfortably on 2 L via NC in NAD.    HENT:   Head: Normocephalic.   Eyes: Conjunctivae and EOM are normal. Pupils are equal, round, and reactive to light.   Neck: Normal range of motion. JVD present.   Cardiovascular: Normal rate and regular rhythm.   No murmur heard.  Pulmonary/Chest: No respiratory distress. He has wheezes. He has no rales.   Abdominal: Soft. Bowel sounds are normal. He exhibits no distension. There is no tenderness.   Musculoskeletal: Normal range of motion. He exhibits edema (3+ pitting edema of b/l lower extremities).   Neurological: He is alert and  oriented to person, place, and time.   Skin: Skin is warm and dry. No erythema.         ED Course   Procedures  Labs Reviewed   CBC W/ AUTO DIFFERENTIAL - Abnormal; Notable for the following components:       Result Value    Hemoglobin 12.8 (*)     MCH 26.4 (*)     MCHC 30.5 (*)     Platelets 357 (*)     All other components within normal limits   COMPREHENSIVE METABOLIC PANEL - Abnormal; Notable for the following components:    Glucose 183 (*)     Albumin 3.4 (*)     eGFR if  52.1 (*)     eGFR if non  45.1 (*)     All other components within normal limits   TROPONIN I - Abnormal; Notable for the following components:    Troponin I 0.080 (*)     All other components within normal limits   B-TYPE NATRIURETIC PEPTIDE - Abnormal; Notable for the following components:     (*)     All other components within normal limits   D DIMER, QUANTITATIVE - Abnormal; Notable for the following components:    D-Dimer 0.71 (*)     All other components within normal limits   TROPONIN I - Abnormal; Notable for the following components:    Troponin I 0.079 (*)     All other components within normal limits   ISTAT PROCEDURE - Abnormal; Notable for the following components:    POC PO2 60 (*)     POC SATURATED O2 91 (*)     All other components within normal limits   PROTIME-INR   BASIC METABOLIC PANEL   MAGNESIUM   PHOSPHORUS   HEMOGLOBIN A1C   CBC W/ AUTO DIFFERENTIAL   LIPID PANEL   POCT GLUCOSE MONITORING CONTINUOUS     EKG Readings: (Independently Interpreted)   Initial Reading: No STEMI. Rhythm: Sinus Tachycardia. Heart Rate: 106 BPM.   Q-Waves noted in anterior leads in V1-V3. Occasional PVC noted.        Imaging Results          CTA Chest Non-Coronary - PE Study (Final result)  Result time 11/17/18 01:06:26    Final result by Flako Erazo MD (11/17/18 01:06:26)                 Impression:      No evidence of acute pulmonary embolus to the proximal segmental level.    Limited pulmonary  parenchymal evaluation secondary to extensive respiratory motion, but there is suggestion of mild edema or infectious/noninfectious inflammatory change most pronounced in the right upper lobe.    Moderate-sized hiatal hernia.      Electronically signed by: Flako Erazo MD  Date:    11/17/2018  Time:    01:06             Narrative:    EXAMINATION:  CTA CHEST NON CORONARY    CLINICAL HISTORY:  Dyspnea, cardiac origin suspected;    TECHNIQUE:  Low dose axial images, sagittal and coronal reformations were obtained from the thoracic inlet to the lung bases following the IV administration of 100 mL of Omnipaque 350.  Contrast timing was optimized to evaluate the pulmonary arteries.  MIP images were performed.    COMPARISON:  Chest radiograph, 11/16/2018 and 09/13/2018.    FINDINGS:  Examination of the soft tissue and vascular structures at the base of the neck is unremarkable.    The thoracic aorta maintains normal caliber, contour, and course without significant atherosclerotic calcification.  There is no evidence of aneurysmal dilation or dissection.    The pulmonary arteries distribute normally without acute pulmonary embolus identified to the proximal segmental level.  Evaluation for smaller subsegmental pulmonary emboli is limited by motion..    The trachea and proximal airways are patent.    Detailed evaluation of the pulmonary parenchyma is limited by extensive respiratory motion.  No large focal area of consolidation.  There are prominent interstitial markings and mild diffuse ground-glass attenuation.  This finding is most pronounced in the right upper lobe.    The heart is not enlarged.  No pericardial effusion.    There is no axillary, mediastinal, or hilar lymph node enlargement.    There is a moderate-sized hiatal hernia with fluid noted in the lower esophageal lumen.    Limited images of the upper abdomen obtained during the course of this dedicated thoracic CT is negative for acute findings.    The  "osseous structures are negative for acute finding or aggressive osseous lesion.  There are multilevel degenerative changes in the spine.                               X-Ray Chest AP Portable (Final result)  Result time 11/16/18 23:45:31    Final result by Flako Erazo MD (11/16/18 23:45:31)                 Impression:      No detrimental change.      Electronically signed by: Flako Erazo MD  Date:    11/16/2018  Time:    23:45             Narrative:    EXAMINATION:  XR CHEST AP PORTABLE    CLINICAL HISTORY:  Provided history is "CHF;  ".    TECHNIQUE:  One view of the chest.    COMPARISON:  09/13/2018.    FINDINGS:  Cardiac wires overlie the chest.  Cardiac silhouette is magnified by technique and appears stable.  Atherosclerotic calcifications overlie the aortic arch.  There are mildly coarsened interstitial lung markings.  No large focal consolidation or detrimental change in lung aeration.                                       APC / Resident Notes:   Patient is a 89 year old male presents to the ED for emergent evaluation of SOB.     Will order labs and imaging. Will order nebulizer treatments. Will continue to monitor.     Differential diagnoses include, but are not limited to: pulmonary embolism, CHF, pneumonia, renal insufficiency, or electrolyte imbalance.     No leukocytosis. Hemodynamically stable. Thrombocytosis. Elevated glucose 183. Elevated initial troponin 0.080. Minimally elevated . Will order 40 mg lasix IV, while in ED. Elevated d-dimer 0.71. Will evaluate further with CTA chest (PE study). CXR found to have no acute process. Will continue to monitor.     CTA chest (PE study) found to have No evidence of acute pulmonary embolus.     Will admit to medicine.     I have discussed and reviewed with my supervising physician.         Attending Attestation:     Physician Attestation Statement for NP/PA:   I discussed this assessment and plan of this patient with the NP/PA, but I did not " personally examine the patient. The face to face encounter was performed by the NP/PA.            Clinical Impression:   The primary encounter diagnosis was Acute respiratory failure with hypoxia. Diagnoses of Shortness of breath, Hypoxia, and Elevated troponin were also pertinent to this visit.      Disposition:   Disposition: Placed in Observation  Condition: Stable                        Mikki Espinoza PA-C  11/17/18 [id number]       Kayley Espinoza MD  11/28/18 0060

## 2018-11-17 NOTE — ED TRIAGE NOTES
Pt presented to ED room 7 via person transport for cc of sob and increased swelling to bilat lower extremities. Provider at bedside.

## 2018-11-17 NOTE — ED NOTES
Patient stated he had to stand up to urinate. Patient son and RN assisted and held patient up. Patient oxygen saturation dropping and patient having difficulty standing on his own.

## 2018-11-17 NOTE — PLAN OF CARE
Problem: Patient Care Overview  Goal: Plan of Care Review  Outcome: Ongoing (interventions implemented as appropriate)   11/17/18 4724   Coping/Psychosocial   Plan Of Care Reviewed With patient;daughter   Patient is alert and oriented, able to verbalized needs and wants. POC explained with the patient and family and verbalized understanding. Questions and concerns addressed. Needs attended. Denies pain, no acute distress noted. Safety maintained,  Daughter at bedside.

## 2018-11-17 NOTE — ASSESSMENT & PLAN NOTE
- clinically consistent presentation of decompensated HF, but no echo on file  - BNP elevated, CXR with edema, overloaded on exam  - on 3L NC, wean  - lasix 40 mg IVP BID  - hold BB until euvolemic, continue lisinopril   - strict I/Os, daily weights, fluid restriction, tele  - possibly some component of bronchitis, scheduled xopenex

## 2018-11-17 NOTE — ED NOTES
Patient refused assigned room due to the fact that is not private. Charge nurse informed and bed to be reassigned. Patient placed back on ED monitoring.

## 2018-11-17 NOTE — ED NOTES
Patient states he has to stand to urinate. After standing patient up a second time to urinate patient becomes short of breath and oxygen saturation drops. Order for whiteside catheter requested from physician.

## 2018-11-17 NOTE — ED NOTES
PT resting comfortably in bed, vs as dcoumented, in NAD. PT has bilat diminshed lung sounds, S1S2 noted, PEARLA. PT states he has had trouble sleeping supine and sleeps upright. Bilat lower extremities are swollen w palpable pedal pulses +1. Pt positioned to comfort and protet airway. Rails up x2, bed in lowest pos, and call light within reach. Pt updated on plan of care.

## 2018-11-17 NOTE — ED NOTES
Attempted to catheterize patient twice and with coude cath tip. Unsuccessful. Hospital admit team notified of problem. Ordered to place a condom cath and give the Lasix. If patient unable to void then Hospital Med to be informed again and a consult to urology to be placed. Reid GIRON.

## 2018-11-18 LAB
ANION GAP SERPL CALC-SCNC: 12 MMOL/L
ASCENDING AORTA: 3.68 CM
AV MEAN GRADIENT: 6.06 MMHG
AV PEAK GRADIENT: 9.36 MMHG
AV VALVE AREA: 2.22 CM2
BASOPHILS # BLD AUTO: 0.04 K/UL
BASOPHILS NFR BLD: 0.5 %
BSA FOR ECHO PROCEDURE: 1.94 M2
BUN SERPL-MCNC: 29 MG/DL
CALCIUM SERPL-MCNC: 8.7 MG/DL
CHLORIDE SERPL-SCNC: 105 MMOL/L
CO2 SERPL-SCNC: 25 MMOL/L
CREAT SERPL-MCNC: 1.3 MG/DL
CV ECHO LV RWT: 0.49 CM
DIFFERENTIAL METHOD: ABNORMAL
DOP CALC AO PEAK VEL: 1.53 M/S
DOP CALC AO VTI: 29.03 CM
DOP CALC LVOT AREA: 3.83 CM2
DOP CALC LVOT DIAMETER: 2.21 CM
DOP CALC LVOT STROKE VOLUME: 64.33 CM3
DOP CALCLVOT PEAK VEL VTI: 16.78 CM
E WAVE DECELERATION TIME: 161.61 MSEC
E/A RATIO: 0.93
E/E' RATIO: 17.33
ECHO LV POSTERIOR WALL: 1.02 CM (ref 0.6–1.1)
EOSINOPHIL # BLD AUTO: 0.2 K/UL
EOSINOPHIL NFR BLD: 2.2 %
ERYTHROCYTE [DISTWIDTH] IN BLOOD BY AUTOMATED COUNT: 13.7 %
EST. GFR  (AFRICAN AMERICAN): 52.1 ML/MIN/1.73 M^2
EST. GFR  (NON AFRICAN AMERICAN): 45.1 ML/MIN/1.73 M^2
FRACTIONAL SHORTENING: 28 % (ref 28–44)
GLUCOSE SERPL-MCNC: 91 MG/DL
HCT VFR BLD AUTO: 36.1 %
HGB BLD-MCNC: 11.5 G/DL
IMM GRANULOCYTES # BLD AUTO: 0.02 K/UL
IMM GRANULOCYTES NFR BLD AUTO: 0.2 %
INTERVENTRICULAR SEPTUM: 1.1 CM (ref 0.6–1.1)
IVRT: 0.06 MSEC
LA MAJOR: 6.01 CM
LA MINOR: 6.02 CM
LA WIDTH: 4.68 CM
LACTATE SERPL-SCNC: 0.7 MMOL/L
LEFT ATRIUM SIZE: 3.82 CM
LEFT ATRIUM VOLUME INDEX: 47.1 ML/M2
LEFT ATRIUM VOLUME: 91.4 CM3
LEFT INTERNAL DIMENSION IN SYSTOLE: 2.97 CM (ref 2.1–4)
LEFT VENTRICLE DIASTOLIC VOLUME INDEX: 39.06 ML/M2
LEFT VENTRICLE DIASTOLIC VOLUME: 75.77 ML
LEFT VENTRICLE MASS INDEX: 75.1 G/M2
LEFT VENTRICLE SYSTOLIC VOLUME INDEX: 17.5 ML/M2
LEFT VENTRICLE SYSTOLIC VOLUME: 34.03 ML
LEFT VENTRICULAR INTERNAL DIMENSION IN DIASTOLE: 4.14 CM (ref 3.5–6)
LEFT VENTRICULAR MASS: 145.66 G
LV LATERAL E/E' RATIO: 14.86
LV SEPTAL E/E' RATIO: 20.8
LYMPHOCYTES # BLD AUTO: 2.5 K/UL
LYMPHOCYTES NFR BLD: 30.8 %
MAGNESIUM SERPL-MCNC: 2.1 MG/DL
MCH RBC QN AUTO: 27.4 PG
MCHC RBC AUTO-ENTMCNC: 31.9 G/DL
MCV RBC AUTO: 86 FL
MONOCYTES # BLD AUTO: 0.5 K/UL
MONOCYTES NFR BLD: 6.7 %
MV PEAK A VEL: 1.12 M/S
MV PEAK E VEL: 1.04 M/S
NEUTROPHILS # BLD AUTO: 4.8 K/UL
NEUTROPHILS NFR BLD: 59.6 %
NRBC BLD-RTO: 0 /100 WBC
PHOSPHATE SERPL-MCNC: 3.8 MG/DL
PISA TR MAX VEL: 2.68 M/S
PLATELET # BLD AUTO: 295 K/UL
PMV BLD AUTO: 9.5 FL
POCT GLUCOSE: 154 MG/DL (ref 70–110)
POCT GLUCOSE: 156 MG/DL (ref 70–110)
POTASSIUM SERPL-SCNC: 4.5 MMOL/L
PROCALCITONIN SERPL IA-MCNC: 0.12 NG/ML
PULM VEIN S/D RATIO: 1.29
PV PEAK D VEL: 0.35 M/S
PV PEAK S VEL: 0.45 M/S
RA MAJOR: 5 CM
RA PRESSURE: 3 MMHG
RA WIDTH: 4.2 CM
RBC # BLD AUTO: 4.19 M/UL
RIGHT VENTRICULAR END-DIASTOLIC DIMENSION: 3.7 CM
RV TISSUE DOPPLER FREE WALL SYSTOLIC VELOCITY 1 (APICAL 4 CHAMBER VIEW): 13.55 M/S
SINUS: 3.42 CM
SODIUM SERPL-SCNC: 142 MMOL/L
STJ: 2.88 CM
TDI LATERAL: 0.07
TDI SEPTAL: 0.05
TDI: 0.06
TR MAX PG: 28.73 MMHG
TRICUSPID ANNULAR PLANE SYSTOLIC EXCURSION: 2.09 CM
TROPONIN I SERPL DL<=0.01 NG/ML-MCNC: 0.18 NG/ML
TV REST PULMONARY ARTERY PRESSURE: 31.73 MMHG
WBC # BLD AUTO: 8.12 K/UL

## 2018-11-18 PROCEDURE — 85025 COMPLETE CBC W/AUTO DIFF WBC: CPT

## 2018-11-18 PROCEDURE — 27000221 HC OXYGEN, UP TO 24 HOURS

## 2018-11-18 PROCEDURE — G8978 MOBILITY CURRENT STATUS: HCPCS | Mod: CK

## 2018-11-18 PROCEDURE — 11000001 HC ACUTE MED/SURG PRIVATE ROOM

## 2018-11-18 PROCEDURE — G8979 MOBILITY GOAL STATUS: HCPCS | Mod: CJ

## 2018-11-18 PROCEDURE — 84484 ASSAY OF TROPONIN QUANT: CPT

## 2018-11-18 PROCEDURE — 80048 BASIC METABOLIC PNL TOTAL CA: CPT

## 2018-11-18 PROCEDURE — 63600175 PHARM REV CODE 636 W HCPCS: Performed by: PHYSICIAN ASSISTANT

## 2018-11-18 PROCEDURE — 84100 ASSAY OF PHOSPHORUS: CPT

## 2018-11-18 PROCEDURE — 97116 GAIT TRAINING THERAPY: CPT

## 2018-11-18 PROCEDURE — 99232 SBSQ HOSP IP/OBS MODERATE 35: CPT | Mod: ,,, | Performed by: HOSPITALIST

## 2018-11-18 PROCEDURE — 97161 PT EVAL LOW COMPLEX 20 MIN: CPT

## 2018-11-18 PROCEDURE — 87040 BLOOD CULTURE FOR BACTERIA: CPT

## 2018-11-18 PROCEDURE — 94761 N-INVAS EAR/PLS OXIMETRY MLT: CPT

## 2018-11-18 PROCEDURE — 83605 ASSAY OF LACTIC ACID: CPT

## 2018-11-18 PROCEDURE — 36415 COLL VENOUS BLD VENIPUNCTURE: CPT

## 2018-11-18 PROCEDURE — 83735 ASSAY OF MAGNESIUM: CPT

## 2018-11-18 PROCEDURE — 25000003 PHARM REV CODE 250: Performed by: PHYSICIAN ASSISTANT

## 2018-11-18 PROCEDURE — 25000242 PHARM REV CODE 250 ALT 637 W/ HCPCS: Performed by: PHYSICIAN ASSISTANT

## 2018-11-18 PROCEDURE — 25000003 PHARM REV CODE 250: Performed by: HOSPITALIST

## 2018-11-18 PROCEDURE — 94640 AIRWAY INHALATION TREATMENT: CPT

## 2018-11-18 PROCEDURE — 97530 THERAPEUTIC ACTIVITIES: CPT

## 2018-11-18 PROCEDURE — 84145 PROCALCITONIN (PCT): CPT

## 2018-11-18 RX ORDER — LEVOFLOXACIN 250 MG/1
250 TABLET ORAL DAILY
Status: COMPLETED | OUTPATIENT
Start: 2018-11-19 | End: 2018-11-21

## 2018-11-18 RX ADMIN — AMLODIPINE BESYLATE 10 MG: 10 TABLET ORAL at 08:11

## 2018-11-18 RX ADMIN — LISINOPRIL 40 MG: 20 TABLET ORAL at 08:11

## 2018-11-18 RX ADMIN — LEVALBUTEROL 1.25 MG: 1.25 SOLUTION, CONCENTRATE RESPIRATORY (INHALATION) at 03:11

## 2018-11-18 RX ADMIN — FUROSEMIDE 40 MG: 10 INJECTION, SOLUTION INTRAMUSCULAR; INTRAVENOUS at 08:11

## 2018-11-18 RX ADMIN — LEVOFLOXACIN 500 MG: 500 TABLET, FILM COATED ORAL at 08:11

## 2018-11-18 RX ADMIN — FUROSEMIDE 40 MG: 10 INJECTION, SOLUTION INTRAMUSCULAR; INTRAVENOUS at 05:11

## 2018-11-18 RX ADMIN — POLYETHYLENE GLYCOL 3350 17 G: 17 POWDER, FOR SOLUTION ORAL at 08:11

## 2018-11-18 RX ADMIN — ACETAMINOPHEN 650 MG: 325 TABLET, FILM COATED ORAL at 10:11

## 2018-11-18 RX ADMIN — ENOXAPARIN SODIUM 40 MG: 100 INJECTION SUBCUTANEOUS at 05:11

## 2018-11-18 RX ADMIN — LEVALBUTEROL 1.25 MG: 1.25 SOLUTION, CONCENTRATE RESPIRATORY (INHALATION) at 07:11

## 2018-11-18 NOTE — PROGRESS NOTES
[facility] Hospital Medicine  Progress Note    Patient Name: Zbigniew Forman  MRN: [id number]  Patient Class: IP- Inpatient   Admission Date: 11/16/2018  Length of Stay: 1 days  Attending Physician: Cristina Morgan*  Primary Care Provider: Blas Morgan Ii, MD    [facility] Medicine Team: [facility] Cristina Morgan MD    Subjective:     Principal Problem:Acute respiratory failure with hypoxia    HPI:  Zbigniew Forman is a 89M [place]  with HTN who presents for evaluation of SOB and worsening LE edema. He states that his SOB started 2-3 weeks ago, and acutely worsened over the last few days prompting him to present to the ED. Family and patient report orthopnea, but patient insists of sleeping flat. He does report some cough and wheeze, but no fever or recent sick contacts. He does not use oxygen at home and lives with his daughter. He denies any chest pain, no syncope,  He does report some dizziness when he moves around too much. No recent sick contacts. He denies every being told that he has issues with HF. He lost his wife of 74 years in May. 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 He denies history of COPD. He did have relief with breathing treatment.     ED: CBC WNL, EKG w/o ischemic changes, troponin 0.08>0.079, , CTA negative for PE, CXR with edema, on 3L NC.     Hospital Course:  Mr. Forman was admitted to [facility] medicine on 11/17 for ADCHF. He was diuresed on IV lasix and transitioned to oral lasix on 11/18. Due to concern for new infiltrate on CXR and productive cough, antibiotics were started for presumptive PNA. Procal, LA and WBC resulted wnl, but patient felt subjectively improved and decision was made to complete 5d course (Levaquin). PT/OT were ordered for post-discharge needs.     Interval History: No acute events overnight. Feels breathing has improved since admission.     Review of Systems   Constitutional: Negative for  chills, fatigue and fever.   Respiratory: Positive for cough (chronic) and shortness of breath. Negative for wheezing.    Cardiovascular: Positive for leg swelling. Negative for chest pain and palpitations.   Gastrointestinal: Negative for abdominal distention, abdominal pain, nausea and vomiting.   Genitourinary: Negative for difficulty urinating and flank pain.   Musculoskeletal: Positive for gait problem. Negative for arthralgias and back pain.   Skin: Negative for rash and wound.   Neurological: Positive for weakness and light-headedness. Negative for dizziness and syncope.   Psychiatric/Behavioral: Negative for agitation and confusion.     Objective:     Vital Signs (Most Recent):  Temp: 98.1 °F (36.7 °C) (11/18/18 1133)  Pulse: 88 (11/18/18 1133)  Resp: 18 (11/18/18 1133)  BP: (!) 106/56 (11/18/18 1133)  SpO2: 95 % (11/18/18 1133) Vital Signs (24h Range):  Temp:  [96.4 °F (35.8 °C)-99.1 °F (37.3 °C)] 98.1 °F (36.7 °C)  Pulse:  [] 88  Resp:  [16-22] 18  SpO2:  [91 %-98 %] 95 %  BP: (106-142)/(56-79) 106/56     Weight: 78.8 kg (173 lb 11.6 oz)  Body mass index is 26.41 kg/m².    Intake/Output Summary (Last 24 hours) at 11/18/2018 1146  Last data filed at 11/18/2018 1052  Gross per 24 hour   Intake 800 ml   Output 3275 ml   Net -2475 ml      Physical Exam   Constitutional: He is oriented to person, place, and time. He appears well-developed and well-nourished. No distress.   HENT:   Head: Normocephalic and atraumatic.   Eyes: EOM are normal. Pupils are equal, round, and reactive to light. No scleral icterus.   Neck: Normal range of motion. Neck supple. JVD present.   Cardiovascular: Regular rhythm. Tachycardia present.   No murmur heard.  Pulmonary/Chest: Effort normal. No stridor. Tachypnea noted. No respiratory distress. He has no wheezes (expiratory). He has rales.   2L NC   Abdominal: Soft. Bowel sounds are normal. He exhibits no distension. There is no tenderness.   Musculoskeletal: Normal range of  motion. He exhibits edema (2+ BLE to knee).   Neurological: He is alert and oriented to person, place, and time.   Skin: Skin is warm and dry. He is not diaphoretic.   Nursing note and vitals reviewed.      Significant Labs:   CBC:   Recent Labs   Lab 11/16/18 2229 11/17/18 0546 11/18/18  0738   WBC 11.04 9.54 8.12   HGB 12.8* 11.0* 11.5*   HCT 42.0 35.4* 36.1*   * 288 295     CMP:   Recent Labs   Lab 11/16/18 2229 11/17/18  0546 11/18/18  0738    139 142   K 4.8 4.4 4.5    106 105   CO2 26 27 25   * 172* 91   BUN 29 26 29   CREATININE 1.3 1.1 1.3   CALCIUM 9.1 8.3* 8.7   PROT 7.5  --   --    ALBUMIN 3.4*  --   --    BILITOT 0.4  --   --    ALKPHOS 69  --   --    AST 17  --   --    ALT 13  --   --    ANIONGAP 9 6* 12   EGFRNONAA 45.1* 55.2* 45.1*     Cardiac Markers:   Recent Labs   Lab 11/16/18 2229   *     Coagulation:   Recent Labs   Lab 11/16/18 2229   INR 1.1     Magnesium:   Recent Labs   Lab 11/17/18 0546 11/18/18  0738   MG 2.1 2.1     Troponin:   Recent Labs   Lab 11/16/18 2229 11/17/18  0232 11/17/18  0941   TROPONINI 0.080* 0.079* 0.170*       Significant Imaging: I have reviewed and interpreted all pertinent imaging results/findings within the past 24 hours.    Assessment/Plan:      * Acute respiratory failure with hypoxia    - presentation clinically consistent with decompensated HF; BNP elevated, CXR with edema, overloaded on exam  - on 3L NC, weaning  - lasix 40 mg IVP BID, transitioning to oral lasix 40 mg daily   - Continue lisinopril, amlodipine  - strict I/Os, daily weights, fluid restriction, tele  - concerned about possible CAP, will continue treatment with Levaquin.     DNR (do not resuscitate)    - discussed with family and patient     Elevated troponin    - chronically elevated  - trending flat, no EKG change, no CP  - echo in AM     Hypertension associated with diabetes    - continue norvasc and lisinopril     Chronic kidney disease, stage III  (moderate)    - chronic and stable     Controlled type 2 diabetes mellitus without complication, without long-term current use of insulin    - diet controlled, A1c 6.5 08/2018  - low dose SSI for now       VTE Risk Mitigation (From admission, onward)        Ordered     enoxaparin injection 40 mg  Daily      11/17/18 0343     IP VTE HIGH RISK PATIENT  Once      11/17/18 0343              Cristina Morgan MD  Department of Hospital Medicine   [facility]

## 2018-11-18 NOTE — PLAN OF CARE
Problem: Fall Risk (Adult)  Goal: Identify Related Risk Factors and Signs and Symptoms  Related risk factors and signs and symptoms are identified upon initiation of Human Response Clinical Practice Guideline (CPG)  Outcome: Ongoing (interventions implemented as appropriate)   11/18/18 1701   Fall Risk   Related Risk Factors (Fall Risk) age-related changes;fatigue/slow reaction;gait/mobility problems;homeostatic imbalance;polypharmacy;environment unfamiliar   Signs and Symptoms (Fall Risk) presence of risk factors     Goal: Absence of Falls  Patient will demonstrate the desired outcomes by discharge/transition of care.  Outcome: Ongoing (interventions implemented as appropriate)   11/18/18 1701   Fall Risk (Adult)   Absence of Falls making progress toward outcome

## 2018-11-18 NOTE — ASSESSMENT & PLAN NOTE
- presentation clinically consistent with decompensated HF; BNP elevated, CXR with edema, overloaded on exam. 2D echo showed pEF with indeterminate diastolic dysfunction.   - on 3L NC, weaning  - lasix 40 mg IVP BID, transitioned to oral lasix 40 mg daily.  - Continue CCB. Holding lisinopril until PCP follow up later this week.   - strict I/Os, daily weights, fluid restriction, tele  - concerned about possible CAP, will continue treatment with Levaquin.  - will discharge on 20 mg Lasix prn for weight gain/edema

## 2018-11-18 NOTE — ASSESSMENT & PLAN NOTE
- presentation clinically consistent with decompensated HF; BNP elevated, CXR with edema, overloaded on exam  - on 3L NC, weaning  - lasix 40 mg IVP BID, transitioning to oral lasix 40 mg daily   - BB initially held on admit, will continue now. Continue lisinopril   - strict I/Os, daily weights, fluid restriction, tele  - concerned about possible CAP, will continue treatment with Levaquin.

## 2018-11-18 NOTE — HOSPITAL COURSE
Mr. Forman was admitted to hospital medicine on 11/17 for ADCHF. He was diuresed on IV lasix and transitioned to oral lasix on 11/18. Due to concern for new infiltrate on CXR and productive cough, antibiotics were started for presumptive PNA. Procal, LA and WBC resulted wnl, but patient felt subjectively improved and decision was made to complete 5d course (Levaquin). PT/OT were ordered for post-discharge needs. 6MWT showed 88% O2 sats at rest. Patient continued to improve and was discharged with home O2 and home health on 11/19.     Of note, patient's blood pressure was somewhat labile, ranging from 100s to 140s. Creatinine began trending upwards prompting discontinuation of patient's Lisinopril until follow up. Arrangements were made for expedited appointment with patient's PCP for post discharge review, including evaluation of renal function and blood pressure check. At discharge, Cr 1.4 (baseline around 1.2), and BP was normotensive.

## 2018-11-18 NOTE — PT/OT/SLP EVAL
Physical Therapy Evaluation    Patient Name:  Zbigniew Forman   MRN:  [id number]    Recommendations:     Discharge Recommendations:  home health PT   Discharge Equipment Recommendations: none   Barriers to discharge: None    Assessment:     Zbigniew Forman is a 89 y.o. male admitted with a medical diagnosis of Acute respiratory failure with hypoxia.  He presents with the following impairments/functional limitations:  weakness, impaired endurance, gait instability, decreased upper extremity function, decreased lower extremity function, impaired cardiopulmonary response to activity, impaired functional mobilty, impaired balance .Patient tolerated evaluaiton  fairly well. Patient limited at this time by increased fatigue and post lean during gait. Pt req min A during gait with RW x 15-20 feet. Pt at baseline was able to navigate home with supervision to little A.  Patient will continue to require skilled PT services to address the above impairments to return to prior level of function as independent as possible. Discharge recommendation home with home health PT in order to maximize mobility, decrease caregiver burden and increase  functional independence while in the home setting.        Rehab Prognosis:  good; patient would benefit from acute skilled PT services to address these deficits and reach maximum level of function.      Recent Surgery: * No surgery found *      Plan:     During this hospitalization, patient to be seen 3 x/week to address the above listed problems via gait training, therapeutic activities, therapeutic exercises, neuromuscular re-education  · Plan of Care Expires:  12/18/18   Plan of Care Reviewed with: patient, daughter    Subjective     Communicated with RN  prior to session.  Patient found supine upon PT entry to room, agreeable to evaluation.      Chief Complaint: pt states he is a little cold  Patient comments/goals: to return home; daughter present and initial not agreeable to activity,  use of gait belt or mobility. Pt and caregiver edu on benefits of activity to prevent decondition nd promote return to PLOF.  Pain/Comfort:  · Pain Rating 1: 0/10  · Pain Rating Post-Intervention 1: 0/10    Patients cultural, spiritual, Restorationism conflicts given the current situation: none stated    Living Environment:  Pt lives in a [facility] with lift device and no KARINA.   Prior to admission, patients level of function was req SBA/supervsion with bathing dressing and ambulating with RW.  Patient has the following equipment: walker, rolling, shower chair, wheelchair, grab bar.  DME owned (not currently used): none.  Upon discharge, patient will have assistance from family/children live with home.    Objective:     Patient found with: peripheral IV, Condom Catheter     General Precautions: Standard, fall   Orthopedic Precautions:N/A   Braces: N/A     Exams:  · Cognitive Exam:  Patient is oriented to Person, Place, Time and Situation  · Fine Motor Coordination: -       Intact  · Gross Motor Coordination:  impaired; denoted in increasd post lean during standing/gait  · Postural Exam:  Patient presented with the following abnormalities: -       Rounded shoulders  · -       Forward head  · Sensation: -       Intact  · RLE ROM: WFL  · RLE Strength: WFL  · LLE ROM: WFL  · LLE Strength: WFL    Functional Mobility:  · Bed Mobility:  Rolling Right: contact guard assistance  · Supine to Sit: minimum assistance  · Sit to Supine: minimum assistance  · Transfers:  Sit to Stand:  minimum assistance with rolling walker  · Gait: x 15-18 feet in room with RW and noted post lean; pt req mgmt for RW and sequencing of steps   · Balance: min A for dyanmic gait; CGA for dynamic sitting, SBA for static standing    AM-PAC 6 CLICK MOBILITY  Total Score:17       Therapeutic Activities and Exercises:     Patient education  · Patient educated on the role of PT and POC  · Patient educated on importance  activity while in the hosptial per tolerance  for improved endurance and to limit deconditioning   · Patient educated on safe transfers with nursing as appropriate  · Patient educated on energy conservation, pursed lip breathing  · Patient educated on proper transfer mechanics and safety  · All of patients questions were answered within the scope of PT        Patient left HOB elevated with all lines intact, call button in reach, RN notified and daughter present.    GOALS:   Multidisciplinary Problems     Physical Therapy Goals        Problem: Physical Therapy Goal    Goal Priority Disciplines Outcome Goal Variances Interventions   Physical Therapy Goal     PT, PT/OT Ongoing (interventions implemented as appropriate)     Description:  Goals to be met by: 18     Patient will increase functional independence with mobility by performin. Supine to sit with Contact Guard Assistance  2. Sit to supine with Contact Guard Assistance  3. Sit to stand transfer with Contact Guard Assistance  4. Bed to chair transfer with Minimal Assistance using Rolling Walker  5. Gait  x 50 feet with Contact Guard Assistance using Rolling Walker.   6. 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    [id number] HOLES Left 2014    Performed by Kameron Soto MD at [facility] OR 2ND FLR    HERNIA REPAIR         Clinical Decision Making:     History  Co-morbidities and personal factors that may impact  the plan of care Examination  Body Structures and Functions, activity limitations and participation restrictions that may impact the plan of care Clinical Presentation   Decision Making/ Complexity Score   Co-morbidities:   [] Time since onset of injury / illness / exacerbation  [] Status of current condition  []Patient's cognitive status and safety concerns    [] Multiple Medical Problems (see med hx)  Personal Factors:   [x] Patient's age  [x] Prior Level of function   [] Patient's home situation (environment and family support)  [] Patient's level of motivation  [] Expected progression of patient      HISTORY:(criteria)    [] 98634 - no personal factors/history    [x] 51753 - has 1-2 personal factor/comorbidity     [] 15489 - has >3 personal factor/comorbidity     Body Regions:  [] Objective examination findings  [] Head     []  Neck  [] Trunk   [] Upper Extremity  [] Lower Extremity    Body Systems:  [] For communication ability, affect, cognition, language, and learning style: the assessment of the ability to make needs known, consciousness, orientation (person, place, and time), expected emotional /behavioral responses, and learning preferences (eg, learning barriers, education  needs)  [] For the neuromuscular system: a general assessment of gross coordinated movement (eg, balance, gait, locomotion, transfers, and transitions) and motor function  (motor control and motor learning)  [x] For the musculoskeletal system: the assessment of gross symmetry, gross range of motion, gross strength, height, and weight  [] For the integumentary system: the assessment of pliability(texture), presence of scar formation, skin color, and skin integrity  [] For cardiovascular/pulmonary system: the assessment of heart rate, respiratory rate, blood pressure, and edema     Activity limitations:    [] Patient's cognitive status and saf ety concerns          [] Status of current condition      [] Weight bearing restriction  []  Cardiopulmunary Restriction    Participation Restrictions:   [] Goals and goal agreement with the patient     [] Rehab potential (prognosis) and probable outcome      Examination of Body System: (criteria)    [] 27910 - addressing 1-2 elements    [x] 01304 - addressing a total of 3 or more elements     [] 10306 -  Addressing a total of 4 or more elements         Clinical Presentation: (criteria)  Stable - 46519     On examination of body system using standardized tests and measures patient presents with 1-2 elements from any of the following: body structures and functions, activity limitations, and/or participation restrictions.  Leading to a clinical presentation that is considered stable and/or uncomplicated                              Clinical Decision Making  (Eval Complexity):  Low- 13314     Time Tracking:     PT Received On: 11/18/18  PT Start Time: 0800     PT Stop Time: 0823  PT Total Time (min): 23 min     Billable Minutes: Evaluation 15 min and Gait Training 8 min      Lauro Bailey, PT  11/18/2018

## 2018-11-18 NOTE — PLAN OF CARE
Problem: Physical Therapy Goal  Goal: Physical Therapy Goal  Goals to be met by: 18     Patient will increase functional independence with mobility by performin. Supine to sit with Contact Guard Assistance  2. Sit to supine with Contact Guard Assistance  3. Sit to stand transfer with Contact Guard Assistance  4. Bed to chair transfer with Minimal Assistance using Rolling Walker  5. Gait  x 50 feet with Contact Guard Assistance using Rolling Walker.   6. Lower extremity exercise program x20 reps per handout, with independence    Outcome: Ongoing (interventions implemented as appropriate)  Patient evaluated today. All goals established are appropriate for patient progression at this time.   Lauro Bailey PT, DPT  2018  Pager: [phone]

## 2018-11-18 NOTE — PROGRESS NOTES
Pharmacist Renal Dose Adjustment Note    Zbigniew Forman is a 89 y.o. male being treated with the medication levofloxacin    Patient Data:    Vital Signs (Most Recent):  Temp: 97.5 °F (36.4 °C) (11/18/18 0746)  Pulse: 91 (11/18/18 0746)  Resp: 18 (11/18/18 0746)  BP: 131/71 (11/18/18 0746)  SpO2: 98 % (11/18/18 0746) Vital Signs (72h Range):  Temp:  [96.4 °F (35.8 °C)-99.1 °F (37.3 °C)]   Pulse:  []   Resp:  [16-64]   BP: (117-157)/(56-81)   SpO2:  [82 %-100 %]      Recent Labs   Lab 11/16/18 2229 11/17/18 0546   CREATININE 1.3 1.1     Serum creatinine: 1.1 mg/dL 11/17/18 0546  Estimated creatinine clearance: 35.4 mL/min    Levofloxacin 500 mg po daily will be changed to levofloxacin 250 mg po daily    Pharmacist's Name: Aissatou Askew  Pharmacist's Extension: [id number]

## 2018-11-18 NOTE — SUBJECTIVE & OBJECTIVE
Interval History: No acute events overnight. Feels breathing has improved since admission.     Review of Systems   Constitutional: Negative for chills, fatigue and fever.   Respiratory: Positive for cough (chronic) and shortness of breath. Negative for wheezing.    Cardiovascular: Positive for leg swelling. Negative for chest pain and palpitations.   Gastrointestinal: Negative for abdominal distention, abdominal pain, nausea and vomiting.   Genitourinary: Negative for difficulty urinating and flank pain.   Musculoskeletal: Positive for gait problem. Negative for arthralgias and back pain.   Skin: Negative for rash and wound.   Neurological: Positive for weakness and light-headedness. Negative for dizziness and syncope.   Psychiatric/Behavioral: Negative for agitation and confusion.     Objective:     Vital Signs (Most Recent):  Temp: 98.1 °F (36.7 °C) (11/18/18 1133)  Pulse: 88 (11/18/18 1133)  Resp: 18 (11/18/18 1133)  BP: (!) 106/56 (11/18/18 1133)  SpO2: 95 % (11/18/18 1133) Vital Signs (24h Range):  Temp:  [96.4 °F (35.8 °C)-99.1 °F (37.3 °C)] 98.1 °F (36.7 °C)  Pulse:  [] 88  Resp:  [16-22] 18  SpO2:  [91 %-98 %] 95 %  BP: (106-142)/(56-79) 106/56     Weight: 78.8 kg (173 lb 11.6 oz)  Body mass index is 26.41 kg/m².    Intake/Output Summary (Last 24 hours) at 11/18/2018 1146  Last data filed at 11/18/2018 1052  Gross per 24 hour   Intake 800 ml   Output 3275 ml   Net -2475 ml      Physical Exam   Constitutional: He is oriented to person, place, and time. He appears well-developed and well-nourished. No distress.   HENT:   Head: Normocephalic and atraumatic.   Eyes: EOM are normal. Pupils are equal, round, and reactive to light. No scleral icterus.   Neck: Normal range of motion. Neck supple. JVD present.   Cardiovascular: Regular rhythm. Tachycardia present.   No murmur heard.  Pulmonary/Chest: Effort normal. No stridor. Tachypnea noted. No respiratory distress. He has no wheezes (expiratory). He has  rales.   2L NC   Abdominal: Soft. Bowel sounds are normal. He exhibits no distension. There is no tenderness.   Musculoskeletal: Normal range of motion. He exhibits edema (2+ BLE to knee).   Neurological: He is alert and oriented to person, place, and time.   Skin: Skin is warm and dry. He is not diaphoretic.   Nursing note and vitals reviewed.      Significant Labs:   CBC:   Recent Labs   Lab 11/16/18 2229 11/17/18 0546 11/18/18  0738   WBC 11.04 9.54 8.12   HGB 12.8* 11.0* 11.5*   HCT 42.0 35.4* 36.1*   * 288 295     CMP:   Recent Labs   Lab 11/16/18 2229 11/17/18 0546 11/18/18  0738    139 142   K 4.8 4.4 4.5    106 105   CO2 26 27 25   * 172* 91   BUN 29 26 29   CREATININE 1.3 1.1 1.3   CALCIUM 9.1 8.3* 8.7   PROT 7.5  --   --    ALBUMIN 3.4*  --   --    BILITOT 0.4  --   --    ALKPHOS 69  --   --    AST 17  --   --    ALT 13  --   --    ANIONGAP 9 6* 12   EGFRNONAA 45.1* 55.2* 45.1*     Cardiac Markers:   Recent Labs   Lab 11/16/18 2229   *     Coagulation:   Recent Labs   Lab 11/16/18 2229   INR 1.1     Magnesium:   Recent Labs   Lab 11/17/18 0546 11/18/18  0738   MG 2.1 2.1     Troponin:   Recent Labs   Lab 11/16/18 2229 11/17/18  0232 11/17/18  0941   TROPONINI 0.080* 0.079* 0.170*       Significant Imaging: I have reviewed and interpreted all pertinent imaging results/findings within the past 24 hours.

## 2018-11-19 ENCOUNTER — TELEPHONE (OUTPATIENT)
Dept: INTERNAL MEDICINE | Facility: CLINIC | Age: 83
End: 2018-11-19

## 2018-11-19 LAB
ANION GAP SERPL CALC-SCNC: 9 MMOL/L
BUN SERPL-MCNC: 31 MG/DL
CALCIUM SERPL-MCNC: 8.6 MG/DL
CHLORIDE SERPL-SCNC: 104 MMOL/L
CO2 SERPL-SCNC: 29 MMOL/L
CREAT SERPL-MCNC: 1.4 MG/DL
EST. GFR  (AFRICAN AMERICAN): 47.7 ML/MIN/1.73 M^2
EST. GFR  (NON AFRICAN AMERICAN): 41.2 ML/MIN/1.73 M^2
GLUCOSE SERPL-MCNC: 128 MG/DL
POCT GLUCOSE: 135 MG/DL (ref 70–110)
POCT GLUCOSE: 162 MG/DL (ref 70–110)
POCT GLUCOSE: 216 MG/DL (ref 70–110)
POTASSIUM SERPL-SCNC: 4.3 MMOL/L
SODIUM SERPL-SCNC: 142 MMOL/L
TROPONIN I SERPL DL<=0.01 NG/ML-MCNC: 0.12 NG/ML

## 2018-11-19 PROCEDURE — 99239 HOSP IP/OBS DSCHRG MGMT >30: CPT | Mod: ,,, | Performed by: HOSPITALIST

## 2018-11-19 PROCEDURE — 27000221 HC OXYGEN, UP TO 24 HOURS

## 2018-11-19 PROCEDURE — 36415 COLL VENOUS BLD VENIPUNCTURE: CPT

## 2018-11-19 PROCEDURE — 93005 ELECTROCARDIOGRAM TRACING: CPT

## 2018-11-19 PROCEDURE — 25000003 PHARM REV CODE 250: Performed by: PHYSICIAN ASSISTANT

## 2018-11-19 PROCEDURE — 84484 ASSAY OF TROPONIN QUANT: CPT

## 2018-11-19 PROCEDURE — 80048 BASIC METABOLIC PNL TOTAL CA: CPT

## 2018-11-19 PROCEDURE — 93010 ELECTROCARDIOGRAM REPORT: CPT | Mod: ,,, | Performed by: INTERNAL MEDICINE

## 2018-11-19 PROCEDURE — 11000001 HC ACUTE MED/SURG PRIVATE ROOM

## 2018-11-19 PROCEDURE — 63600175 PHARM REV CODE 636 W HCPCS: Performed by: PHYSICIAN ASSISTANT

## 2018-11-19 PROCEDURE — 94640 AIRWAY INHALATION TREATMENT: CPT

## 2018-11-19 PROCEDURE — 97165 OT EVAL LOW COMPLEX 30 MIN: CPT

## 2018-11-19 PROCEDURE — 25000242 PHARM REV CODE 250 ALT 637 W/ HCPCS: Performed by: PHYSICIAN ASSISTANT

## 2018-11-19 PROCEDURE — 94761 N-INVAS EAR/PLS OXIMETRY MLT: CPT

## 2018-11-19 PROCEDURE — 25000003 PHARM REV CODE 250: Performed by: HOSPITALIST

## 2018-11-19 RX ORDER — FUROSEMIDE 20 MG/1
20 TABLET ORAL DAILY PRN
Qty: 30 TABLET | Refills: 2 | Status: SHIPPED | OUTPATIENT
Start: 2018-11-19 | End: 2019-01-25 | Stop reason: SDUPTHER

## 2018-11-19 RX ORDER — ALBUTEROL SULFATE 90 UG/1
2 AEROSOL, METERED RESPIRATORY (INHALATION) EVERY 6 HOURS PRN
Qty: 1 EACH | Refills: 6 | Status: ON HOLD | OUTPATIENT
Start: 2018-11-19 | End: 2020-07-04

## 2018-11-19 RX ORDER — IPRATROPIUM BROMIDE AND ALBUTEROL SULFATE 2.5; .5 MG/3ML; MG/3ML
3 SOLUTION RESPIRATORY (INHALATION) EVERY 6 HOURS PRN
Qty: 1 BOX | Refills: 6 | Status: SHIPPED | OUTPATIENT
Start: 2018-11-19 | End: 2021-01-05 | Stop reason: SDUPTHER

## 2018-11-19 RX ORDER — LEVOFLOXACIN 250 MG/1
250 TABLET ORAL DAILY
Qty: 2 TABLET | Refills: 0 | Status: ON HOLD | OUTPATIENT
Start: 2018-11-20 | End: 2018-12-10 | Stop reason: HOSPADM

## 2018-11-19 RX ORDER — LISINOPRIL 40 MG/1
TABLET ORAL
Qty: 90 TABLET | Refills: 3 | Status: ON HOLD | OUTPATIENT
Start: 2018-11-19 | End: 2018-12-10 | Stop reason: SDUPTHER

## 2018-11-19 RX ADMIN — ACETAMINOPHEN 1000 MG: 500 TABLET, FILM COATED ORAL at 07:11

## 2018-11-19 RX ADMIN — ENOXAPARIN SODIUM 40 MG: 100 INJECTION SUBCUTANEOUS at 05:11

## 2018-11-19 RX ADMIN — AMLODIPINE BESYLATE 10 MG: 10 TABLET ORAL at 08:11

## 2018-11-19 RX ADMIN — LEVALBUTEROL 1.25 MG: 1.25 SOLUTION, CONCENTRATE RESPIRATORY (INHALATION) at 03:11

## 2018-11-19 RX ADMIN — LEVALBUTEROL 1.25 MG: 1.25 SOLUTION, CONCENTRATE RESPIRATORY (INHALATION) at 01:11

## 2018-11-19 RX ADMIN — LEVALBUTEROL 1.25 MG: 1.25 SOLUTION, CONCENTRATE RESPIRATORY (INHALATION) at 09:11

## 2018-11-19 RX ADMIN — POLYETHYLENE GLYCOL 3350 17 G: 17 POWDER, FOR SOLUTION ORAL at 08:11

## 2018-11-19 RX ADMIN — LISINOPRIL 40 MG: 20 TABLET ORAL at 08:11

## 2018-11-19 RX ADMIN — LEVOFLOXACIN 250 MG: 250 TABLET, FILM COATED ORAL at 08:11

## 2018-11-19 NOTE — PROGRESS NOTES
Home Oxygen Evaluation    Date Performed: 11/19/2018    1) Patient's Home O2 Sat on room air, while at rest: 88%        If O2 sats on room air at rest are 88% or below, patient qualifies. No additional testing needed. Document N/A in steps 2 and 3. If 89% or above, complete steps 2.      2) Patient's O2 Sat on room air while exercising:N/A        If O2 sats on room air while exercising remain 89% or above patient does not qualify, no further testing needed Document N/A in step 3. If O2 sats on room air while exercising are 88% or below, continue to step 3.      3) Patient's O2 Sat while exercising on O2: N/A          (Must show improvement from #2 for patients to qualify)    If O2 sats improve on oxygen, patient qualifies for portable oxygen. If not, the patient does not qualify.

## 2018-11-19 NOTE — DISCHARGE SUMMARY
[facility] Hospital Medicine  Discharge Summary      Patient Name: Zbigniew Forman  MRN: [id number]  Admission Date: 11/16/2018  Hospital Length of Stay: 2 days  Discharge Date and Time:  11/19/2018 2:09 PM  Attending Physician: Cristina Morgan*   Discharging Provider: Cristina Morgan MD  Primary Care Provider: Blas Morgan Ii, MD  [facility] Medicine Team: [facility] Cristina Morgan MD    HPI:   Zbigniew Forman is a 89M [employer]  with HTN who presents for evaluation of SOB and worsening LE edema. He states that his SOB started 2-3 weeks ago, and acutely worsened over the last few days prompting him to present to the ED. Family and patient report orthopnea, but patient insists of sleeping flat. He does report some cough and wheeze, but no fever or recent sick contacts. He does not use oxygen at home and lives with his daughter. He denies any chest pain, no syncope,  He does report some dizziness when he moves around too much. No recent sick contacts. He denies every being told that he has issues with HF. He lost his wife of 74 years in May. 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 He denies history of COPD. He did have relief with breathing treatment.     ED: CBC WNL, EKG w/o ischemic changes, troponin 0.08>0.079, , CTA negative for PE, CXR with edema, on 3L NC.     * No surgery found *      Hospital Course:   Mr. Forman was admitted to hospital medicine on 11/17 for ADCHF. He was diuresed on IV lasix and transitioned to oral lasix on 11/18. Due to concern for new infiltrate on CXR and productive cough, antibiotics were started for presumptive PNA. Procal, LA and WBC resulted wnl, but patient felt subjectively improved and decision was made to complete 5d course (Levaquin). PT/OT were ordered for post-discharge needs. 6MWT showed 88% O2 sats at rest. Patient continued to improve and was discharged with home O2 and home health on 11/19.  "    Of note, patient's blood pressure was somewhat labile, ranging from 100s to 140s. Creatinine began trending upwards prompting discontinuation of patient's Lisinopril until follow up. Arrangements were made for expedited appointment with patient's PCP for post discharge review, including evaluation of renal function and blood pressure check. At discharge, Cr 1.4 (baseline around 1.2), and BP was normotensive.      Consults:     No new Assessment & Plan notes have been filed under this hospital service since the last note was generated.  Service: Hospital Medicine    Final Active Diagnoses:    Diagnosis Date Noted POA    PRINCIPAL PROBLEM:  Acute respiratory failure with hypoxia [J96.01] 11/17/2018 Yes    Elevated troponin [R74.8] 11/17/2018 Yes    DNR (do not resuscitate) [Z66] 11/17/2018 Yes    Hypertension associated with diabetes [E11.59, I10] 01/12/2016 Yes    Chronic kidney disease, stage III (moderate) [N18.3] 09/29/2015 Yes    Controlled type 2 diabetes mellitus without complication, without long-term current use of insulin [E11.9]  Yes      Problems Resolved During this Admission:       Discharged Condition: stable    Disposition:     Follow Up:    Patient Instructions:      OXYGEN FOR HOME USE     Order Specific Question Answer Comments   Liter Flow 2    Duration Continuous    Qualifying SpO2: 88%    Testing done at: Rest    Route nasal cannula    Portable mode: continuous    Device home concentrator with portable unit    Length of need (in months): 99 mos    Patient condition with qualifying saturation CHF    Height: 5' 8" (1.727 m)    Weight: 78.5 kg (173 lb)    Does patient have medical equipment at home? cane, straight    Does patient have medical equipment at home? walker, rolling    Does patient have medical equipment at home? shower chair    Does patient have medical equipment at home? wheelchair    Does patient have medical equipment at home? bedside commode    Alternative treatment " measures have been tried or considered and deemed clinically ineffective. Yes      Ambulatory Referral to [facility]   Referral Priority: Routine Referral Type: Consultation   Referral Reason: Specialty Services Required   Number of Visits Requested: 1       Significant Diagnostic Studies: Labs:   CMP   Recent Labs   Lab 11/18/18  0738 11/19/18  0836    142   K 4.5 4.3    104   CO2 25 29   GLU 91 128*   BUN 29 31*   CREATININE 1.3 1.4   CALCIUM 8.7 8.6*   ANIONGAP 12 9   ESTGFRAFRICA 52.1* 47.7*   EGFRNONAA 45.1* 41.2*   , CBC   Recent Labs   Lab 11/18/18  0738   WBC 8.12   HGB 11.5*   HCT 36.1*       and Troponin   Recent Labs   Lab 11/19/18  0836   TROPONINI 0.125*       Pending Diagnostic Studies:     None         Medications:  Reconciled Home Medications:      Medication List      START taking these medications    furosemide 20 MG tablet  Commonly known as:  LASIX  Take 1 tablet (20 mg total) by mouth daily as needed (Leg swelling, weight gain >2-3 lbs in one day or >5lbs in 1 week.).     levoFLOXacin 250 MG tablet  Commonly known as:  LEVAQUIN  Take 1 tablet (250 mg total) by mouth once daily.  Start taking on:  11/20/2018        CHANGE how you take these medications    latanoprost 0.005 % ophthalmic solution  INSTILL 1 DROP IN BOTH EYES EVERY EVENING  What changed:  Another medication with the same name was removed. Continue taking this medication, and follow the directions you see here.     lisinopril 40 MG tablet  Commonly known as:  PRINIVIL,ZESTRIL  Hold this medication until you see your primary care doctor.  What changed:  additional instructions        CONTINUE taking these medications    alfuzosin 10 mg Tb24  Commonly known as:  UROXATRAL  Take 1 tablet (10 mg total) by mouth daily with breakfast.     amLODIPine 10 MG tablet  Commonly known as:  NORVASC  Take 1 tablet (10 mg total) by mouth once daily.            Indwelling Lines/Drains at time of discharge:    Lines/Drains/Airways     Drain            Male External Urinary Catheter 11/17/18 0410 Large 2 days                Time spent on the discharge of patient: 45 minutes  Patient was seen and examined on the date of discharge and determined to be suitable for discharge.         Cristina Morgan MD  Department of Hospital Medicine  [facility]

## 2018-11-19 NOTE — PT/OT/SLP EVAL
Occupational Therapy   Evaluation    Name: Zbigniew Forman  MRN: [id number]  Admitting Diagnosis:  Acute respiratory failure with hypoxia      Recommendations:     Discharge Recommendations: home with home health  Discharge Equipment Recommendations:  none  Barriers to discharge:  None    History:     Occupational Profile:  Living Environment: Pt lives with daughter in 1 story house with steps to enter   Previous level of function: pt son provides assistance for all self care and daughter provides most assistance with IADL tasks   Equipment Used at Home:  cane, straight, walker, rolling, wheelchair, bedside commode  Assistance upon Discharge: 3 adult children available to continue all care     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  Patient found with: all lines intact and son present and peripheral IV, Condom Catheter upon OT entry to room.    General Precautions: Standard, fall   Orthopedic Precautions:N/A   Braces: N/A     Occupational Performance:    Bed Mobility:    · Pt with mod A for safe bed mobility with supine to sitting edge of  "bed     Activities of Daily Living:  · Pt with mod A for safe self care completion     Cognitive/Visual Perceptual:  Cognitive/Psychosocial Skills:     -       Oriented to: Person, Place, Time and Situation   -       Follows Commands/attention:Follows two-step commands  -       Communication: clear/fluent  -       Memory: No Deficits noted  -       Safety awareness/insight to disability: intact   -       Mood/Affect/Coping skills/emotional control: Appropriate to situation    Physical Exam:  Upper Extremity Range of Motion:     -       Right Upper Extremity: WFL  -       Left Upper Extremity: WFL  Upper Extremity Strength:    -       Right Upper Extremity: WFL  -       Left Upper Extremity: WFL    AMPAC 6 Click ADL:  AMPAC Total Score: 16    Treatment & Education:  Evaluation complete and goals set.  Pt educated on safety, role of OT, importance of increased participation in self care for gains , expectations for participation, expectations for gains, POC, energy conservation, caregiver strain. White board updated.   Education:    Patient left supine with all lines intact and son present    Assessment:     Zbigniew Forman is a 89 y.o. male with a medical diagnosis of Acute respiratory failure with hypoxia. Pt presented supine in bed with son present.  Pt sleeping but easily arousable and AOx4.  Pt with min A for slef care completion at baseline and with good support at home.   He presents with the following performance deficits affecting function: weakness, impaired endurance, impaired self care skills, impaired functional mobilty, impaired balance, impaired cardiopulmonary response to activity.      Rehab Prognosis: Good; patient would benefit from acute skilled OT services to address these deficits and reach maximum level of function.         Clinical Decision Makin.  OT Low:  "Pt evaluation falls under low complexity for evaluation coding due to performance deficits noted in 1-3 areas as stated above and " "0 co-morbities affecting current functional status. Data obtained from problem focused assessments. No modifications or assistance was required for completion of evaluation. Only brief occupational profile and history review completed."     Plan:     Patient to be seen 3 x/week to address the above listed problems via self-care/home management, therapeutic activities, therapeutic exercises  · Plan of Care Expires:    · Plan of Care Reviewed with: patient, son    This Plan of care has been discussed with the patient who was involved in its development and understands and is in agreement with the identified goals and treatment plan    GOALS:   Multidisciplinary Problems     Occupational Therapy Goals        Problem: Occupational Therapy Goal    Goal Priority Disciplines Outcome Interventions   Occupational Therapy Goal     OT, PT/OT Ongoing (interventions implemented as appropriate)    Description:  Goals to be met by: 12/2     Patient will increase functional independence with ADLs by performing:    UE Dressing with Contact Guard Assistance.  LE Dressing with Minimal Assistance.  Grooming while seated with Contact Guard Assistance.  Toileting from toilet with Minimal Assistance for hygiene and clothing management.   Bathing from  shower chair/bench with Minimal Assistance.                      Time Tracking:     OT Date of Treatment: 11/19/18  OT Start Time: 1052  OT Stop Time: 1102  OT Total Time (min): 10 min    Billable Minutes:Evaluation 10    Nancy Lees, OT  11/19/2018    "

## 2018-11-19 NOTE — PLAN OF CARE
Problem: Occupational Therapy Goal  Goal: Occupational Therapy Goal  Goals to be met by: 12/2     Patient will increase functional independence with ADLs by performing:    UE Dressing with Contact Guard Assistance.  LE Dressing with Minimal Assistance.  Grooming while seated with Contact Guard Assistance.  Toileting from toilet with Minimal Assistance for hygiene and clothing management.   Bathing from  shower chair/bench with Minimal Assistance.    Outcome: Ongoing (interventions implemented as appropriate)  Evaluation complete and goals set.  Cont with POC  Nancy Lees OT  11/19/2018

## 2018-11-19 NOTE — PLAN OF CARE
[facility]    HOME HEALTH ORDERS  FACE TO FACE ENCOUNTER    Patient Name: Zbigniew Forman  YOB: 1919    PCP: Blas Morgan Ii, MD   PCP Address: [address] / [place] [address]  PCP Phone Number: [phone]  PCP Fax: [phone]    Encounter Date: 11/19/2018    Admit to Home Health    Diagnoses:  Active Hospital Problems    Diagnosis  POA    *Acute respiratory failure with hypoxia [J96.01]  Yes    Elevated troponin [R74.8]  Yes    DNR (do not resuscitate) [Z66]  Yes    Hypertension associated with diabetes [E11.59, I10]  Yes    Chronic kidney disease, stage III (moderate) [N18.3]  Yes    Controlled type 2 diabetes mellitus without complication, without long-term current use of insulin [E11.9]  Yes      Resolved Hospital Problems   No resolved problems to display.       Future Appointments   Date Time Provider Department Center   1/16/2019  1:00 PM Chantelle Richey DPM [facility] [place]           I have seen and examined this patient face to face today. My clinical findings that support the need for the home health skilled services and home bound status are the following:  Weakness/numbness causing balance and gait disturbance due to Heart Failure, Infection and Weakness/Debility making it taxing to leave home.    Allergies:  Review of patient's allergies indicates:   Allergen Reactions    Metformin Diarrhea    Sulfa (sulfonamide antibiotics)     Tradjenta [linagliptin]      Diarrhea         Diet: 2 gram sodium diet    Activities: activity as tolerated    Nursing:   SN to complete comprehensive assessment including routine vital signs. Instruct on disease process and s/s of complications to report to MD. Review/verify medication list sent home with the patient at time of discharge  and instruct patient/caregiver as needed. Frequency may be adjusted depending on start of care date.    Notify MD if SBP > 160 or < 90; DBP > 90 or < 50; HR > 120 or < 50; Temp  > 101; Other:        CONSULTS:    Physical Therapy to evaluate and treat. Evaluate for home safety and equipment needs; Establish/upgrade home exercise program. Perform / instruct on therapeutic exercises, gait training, transfer training, and Range of Motion.  Occupational Therapy to evaluate and treat. Evaluate home environment for safety and equipment needs. Perform/Instruct on transfers, ADL training, ROM, and therapeutic exercises.   to evaluate for community resources/long-range planning.  Aide to provide assistance with personal care, ADLs, and vital signs.    MISCELLANEOUS CARE:  11/19/2018      Zbigniew Forman  [address] [address]          [facility] Medicine Dept.  [facility] [address] [address] [phone] after hours  [phone] fax Principal Diagnosis:  Acute respiratory failure with hypoxia  Oxygen Requiring Condition: Pneumonitis                         Portable Oxygen / Compressor    Pulse Oximetry:   88% on 11/19 at rest    Prescribe:  2 Liter/min Nasal Canula continuous      __________________________  Cristina Morgan MD  11/19/2018       Heart Failure Home Health Instructions:     SN to instruct on the following:    Instruct on the definition of CHF.   Instruct on the signs/sympoms of CHF to be reported.   Instruct on and monitor daily weights.   Instruct on factors that cause exacerbation.   Instruct on action, dose, schedule, and side effects of medications.   Instruct on diet as prescribed.   Instruct on activity allowed.   Instruct on life-style modifications for life long management of CHF   SN to assess compliance with daily weights, diet, medications, fluid retention,    safety precautions, activities permitted and life-style modifications.   Additional 1-2 SN visits per week as needed for signs and symptoms     of CHF exacerbation.    Oxygen: Instruct on safety precautions in use of oxygen as  follows:             Oxygen at  2 L/min nasal canula to be used:  Continuously        As needed for SOB    Assess oxygen saturation via pulse oximeter as needed for increase in SOB.   Notify physician if Oxygen saturation less than 88%    For Weight Gain > 2-3 lbs in 1 day or 4-6 lbs over 1 week:       Begin 20 mg Lasix (oral diuretic) for 5 days temporarily     Obtain BMP lab test in 3 days      If weight does not decrease by 3 lbs after 5 days of increased diuretic usage:   Notify Dr. Morgan      WOUND CARE ORDERS  n/a    Medications: Review discharge medications with patient and family and provide education.      Current Discharge Medication List      START taking these medications    Details   levoFLOXacin (LEVAQUIN) 250 MG tablet Take 1 tablet (250 mg total) by mouth once daily.  Qty: 2 tablet, Refills: 0         CONTINUE these medications which have CHANGED    Details   lisinopril (PRINIVIL,ZESTRIL) 40 MG tablet Hold this medication until you see your primary care doctor.  Qty: 90 tablet, Refills: 3    Associated Diagnoses: Hypertension associated with diabetes         CONTINUE these medications which have NOT CHANGED    Details   amLODIPine (NORVASC) 10 MG tablet Take 1 tablet (10 mg total) by mouth once daily.  Qty: 90 tablet, Refills: 3    Associated Diagnoses: Hypertension associated with diabetes      latanoprost 0.005 % ophthalmic solution INSTILL 1 DROP IN BOTH EYES EVERY EVENING  Qty: 2.5 mL, Refills: 12      alfuzosin (UROXATRAL) 10 mg Tb24 Take 1 tablet (10 mg total) by mouth daily with breakfast.  Qty: 90 tablet, Refills: 3    Associated Diagnoses: Urinary frequency             I certify that this patient is confined to his home and needs intermittent skilled nursing care, physical therapy and occupational therapy.

## 2018-11-19 NOTE — PLAN OF CARE
received home health orders today.  Pt has [facility] insurance.   made referral via [facility] care to [facility].  They will arrange home health.     11/19/18 1433   Post-Acute Status   Post-Acute Authorization Home Health/Hospice   Home Health/Hospice Status Referrals Sent

## 2018-11-19 NOTE — PLAN OF CARE
Problem: Patient Care Overview  Goal: Plan of Care Review  Outcome: Ongoing (interventions implemented as appropriate)  POC reviewed with pt & family. AAO x4. accu-checks, telemetry monitoring.   Pt remained free from falls. Questions and concerns addressed. Pt progressing towards goals. Will continue to monitor. See flow sheets for full assessment and VS

## 2018-11-19 NOTE — PLAN OF CARE
11/19/18 1112   Discharge Assessment   Assessment Type Discharge Planning Assessment   Confirmed/corrected address and phone number on facesheet? Yes   Assessment information obtained from? Patient;Medical Record   Expected Length of Stay (days) 3   Communicated expected length of stay with patient/caregiver yes   Prior to hospitilization cognitive status: Alert/Oriented   Prior to hospitalization functional status: Assistive Equipment;Needs Assistance   Current cognitive status: Alert/Oriented   Current Functional Status: Assistive Equipment;Needs Assistance   Lives With sibling(s)   Able to Return to Prior Arrangements yes   Is patient able to care for self after discharge? Yes   Patient's perception of discharge disposition home or selfcare;home health   Readmission Within The Last 30 Days no previous admission in last 30 days   Patient currently being followed by outpatient case management? No   Patient currently receives any other outside agency services? No   Equipment Currently Used at Home cane, straight;walker, rolling;shower chair;wheelchair;bedside commode   Do you have any problems affording any of your prescribed medications? No   Is the patient taking medications as prescribed? yes   Does the patient have transportation home? Yes   Transportation Available family or friend will provide   Does the patient receive services at the [facility]? No   Discharge Plan A Home with family;Home Health   Patient/Family In Agreement With Plan yes   Admitted with CHF and hypoxia. Lives with his sister and requires assistance in his ADLs. Plan is to DC home with [facility].

## 2018-11-19 NOTE — TELEPHONE ENCOUNTER
----- Message from Calli Grover sent at 11/19/2018  9:57 AM CST -----  Contact: [phone]/ Anne/ daughter  Patient's daughter is requesting a joan from the doctor concerning the patient, who is now in the hospital.    Please advise, thank you

## 2018-11-20 LAB
ANION GAP SERPL CALC-SCNC: 10 MMOL/L
BUN SERPL-MCNC: 32 MG/DL
CALCIUM SERPL-MCNC: 8.4 MG/DL
CHLORIDE SERPL-SCNC: 103 MMOL/L
CO2 SERPL-SCNC: 28 MMOL/L
CREAT SERPL-MCNC: 1.3 MG/DL
EST. GFR  (AFRICAN AMERICAN): 52.1 ML/MIN/1.73 M^2
EST. GFR  (NON AFRICAN AMERICAN): 45.1 ML/MIN/1.73 M^2
GLUCOSE SERPL-MCNC: 143 MG/DL
POTASSIUM SERPL-SCNC: 4.2 MMOL/L
SODIUM SERPL-SCNC: 141 MMOL/L

## 2018-11-20 PROCEDURE — 80048 BASIC METABOLIC PNL TOTAL CA: CPT

## 2018-11-20 PROCEDURE — 25000242 PHARM REV CODE 250 ALT 637 W/ HCPCS: Performed by: PHYSICIAN ASSISTANT

## 2018-11-20 PROCEDURE — 97530 THERAPEUTIC ACTIVITIES: CPT

## 2018-11-20 PROCEDURE — 36415 COLL VENOUS BLD VENIPUNCTURE: CPT

## 2018-11-20 PROCEDURE — 94640 AIRWAY INHALATION TREATMENT: CPT

## 2018-11-20 PROCEDURE — 94761 N-INVAS EAR/PLS OXIMETRY MLT: CPT

## 2018-11-20 PROCEDURE — 27000221 HC OXYGEN, UP TO 24 HOURS

## 2018-11-20 PROCEDURE — 25000003 PHARM REV CODE 250: Performed by: HOSPITALIST

## 2018-11-20 PROCEDURE — 25000003 PHARM REV CODE 250: Performed by: PHYSICIAN ASSISTANT

## 2018-11-20 PROCEDURE — 99232 SBSQ HOSP IP/OBS MODERATE 35: CPT | Mod: ,,, | Performed by: HOSPITALIST

## 2018-11-20 PROCEDURE — 11000001 HC ACUTE MED/SURG PRIVATE ROOM

## 2018-11-20 PROCEDURE — 63600175 PHARM REV CODE 636 W HCPCS: Performed by: PHYSICIAN ASSISTANT

## 2018-11-20 RX ORDER — LISINOPRIL 20 MG/1
20 TABLET ORAL DAILY
Status: DISCONTINUED | OUTPATIENT
Start: 2018-11-21 | End: 2018-11-26

## 2018-11-20 RX ORDER — AMLODIPINE BESYLATE 5 MG/1
5 TABLET ORAL DAILY
Status: DISCONTINUED | OUTPATIENT
Start: 2018-11-21 | End: 2018-11-26 | Stop reason: HOSPADM

## 2018-11-20 RX ADMIN — LEVALBUTEROL 1.25 MG: 1.25 SOLUTION, CONCENTRATE RESPIRATORY (INHALATION) at 03:11

## 2018-11-20 RX ADMIN — LEVALBUTEROL 1.25 MG: 1.25 SOLUTION, CONCENTRATE RESPIRATORY (INHALATION) at 07:11

## 2018-11-20 RX ADMIN — ACETAMINOPHEN 1000 MG: 500 TABLET, FILM COATED ORAL at 05:11

## 2018-11-20 RX ADMIN — LEVOFLOXACIN 250 MG: 250 TABLET, FILM COATED ORAL at 08:11

## 2018-11-20 RX ADMIN — ENOXAPARIN SODIUM 40 MG: 100 INJECTION SUBCUTANEOUS at 05:11

## 2018-11-20 NOTE — PLAN OF CARE
CM received call from PT stating pt requiring max assist today. They now recommend SNF. CHELSEA spoke to Shai CHOUDHURY, who will contact the pt's daughter with PHN list.

## 2018-11-20 NOTE — PROGRESS NOTES
Progress Note  [facility] Medicine    Primary Team: [facility] HOSP MED C  Admit Date: 11/16/2018   Length of Stay:  LOS: 3 days   SUBJECTIVE:   Reason for Admission:  Acute respiratory failure with hypoxia    HPI:  Zbigniew Forman is a 89M [employer]  with HTN who presents for evaluation of SOB and worsening LE edema. He states that his SOB started 2-3 weeks ago, and acutely worsened over the last few days prompting him to present to the ED. Family and patient report orthopnea, but patient insists of sleeping flat. He does report some cough and wheeze, but no fever or recent sick contacts. He does not use oxygen at home and lives with his daughter. He denies any chest pain, no syncope,  He does report some dizziness when he moves around too much. No recent sick contacts. He denies every being told that he has issues with HF. He lost his wife of 74 years in May. 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 He denies history of COPD. He did have relief with breathing treatment.     Hospital Course:  Mr. Forman was admitted to [facility] medicine on 11/17 for ADCHF. He was diuresed on IV lasix and transitioned to oral lasix on 11/18. Due to concern for new infiltrate on CXR and productive cough, antibiotics were started for presumptive PNA. Procal, LA and WBC resulted wnl, but patient felt subjectively improved and decision was made to complete 5d course (Levaquin). PT/OT were ordered for post-discharge needs.     Interval history:    No acute events overnight.  Pt feels he is breathing comfortably and agreeable to plan for SNF.    Review of Systems:  Constitutional: no fever or chills  Respiratory: no cough or shortness of breath  Cardiovascular: no chest pain or palpitations  Gastrointestinal: no nausea or vomiting, no abdominal pain or change in bowel habits  Musculoskeletal: no arthralgias or myalgias     OBJECTIVE:     Temp:  [97.6 °F (36.4 °C)-99 °F (37.2 °C)]   Pulse:  [80-93]   Resp:  [17-20]   BP:  (104-144)/(54-79)   SpO2:  [94 %-99 %]  Body mass index is 26.3 kg/m².  Intake/Outake:  This Shift:  I/O this shift:  In: -   Out: 400 [Urine:400]    Net I/O past 24h:     Intake/Output Summary (Last 24 hours) at 11/20/2018 1651  Last data filed at 11/20/2018 1300  Gross per 24 hour   Intake --   Output 1150 ml   Net -1150 ml             Physical Exam:  Gen- well-developed, well-nourished, NAD  CVS- S1 and S2 present, RRR  Resp- CTA b/l, no work of breathing  Abd- BS+, soft, NT, ND  Ext- no clubbing, cyanosis, or edema    Laboratory:  CBC/Anemia Labs: Coags:    Recent Labs   Lab 11/16/18 2229 11/17/18  0546 11/18/18  0738   WBC 11.04 9.54 8.12   HGB 12.8* 11.0* 11.5*   HCT 42.0 35.4* 36.1*   * 288 295   MCV 87 87 86   RDW 13.8 13.7 13.7    Recent Labs   Lab 11/16/18 2229   INR 1.1        Chemistries:   Recent Labs   Lab 11/16/18 2229 11/17/18  0546 11/18/18  0738 11/19/18  0836 11/20/18  0437    139 142 142 141   K 4.8 4.4 4.5 4.3 4.2    106 105 104 103   CO2 26 27 25 29 28   BUN 29 26 29 31* 32*   CREATININE 1.3 1.1 1.3 1.4 1.3   CALCIUM 9.1 8.3* 8.7 8.6* 8.4*   PROT 7.5  --   --   --   --    BILITOT 0.4  --   --   --   --    ALKPHOS 69  --   --   --   --    ALT 13  --   --   --   --    AST 17  --   --   --   --    MG  --  2.1 2.1  --   --    PHOS  --  3.0 3.8  --   --         Medications:  Scheduled Meds:   amLODIPine  10 mg Oral Daily    enoxaparin  40 mg Subcutaneous Daily    levalbuterol  1.25 mg Nebulization Q8H    levoFLOXacin  250 mg Oral Daily    lisinopril  40 mg Oral Daily    polyethylene glycol  17 g Oral Daily                             Continuous Infusions:  PRN Meds:.acetaminophen, acetaminophen, acetaminophen, bisacodyl, dextrose 50%, dextrose 50%, glucagon (human recombinant), glucose, glucose, insulin aspart U-100, ondansetron, ondansetron, ramelteon, sodium chloride 0.9%     ASSESSMENT/PLAN:     * Acute respiratory failure with hypoxia     - presentation clinically  consistent with decompensated HF; BNP elevated, CXR with edema, overloaded on exam  - on 3L NC, weaning  - lasix 40 mg IVP BID, transitioning to oral lasix 40 mg daily   - Continue lisinopril, amlodipine  - strict I/Os, daily weights, fluid restriction, tele  - concerned about possible CAP, will continue treatment with Levaquin.      DNR (do not resuscitate)     - discussed with family and patient      Elevated troponin     - chronically elevated  - trending flat, no EKG change, no CP  - echo in AM      Hypertension associated with diabetes     - labile, and lower this morning  - hold Amlodipine and Lisinopril for now  -change Amlodipine to 5mg and Lisinopril to 20mg      Chronic kidney disease, stage III (moderate)     - chronic and stable      Controlled type 2 diabetes mellitus without complication, without long-term current use of insulin     - diet controlled, A1c 6.5 08/2018  - low dose SSI for now     DVT ppx- Lovenox  CODE Status- FULL    Dispo- pending SNF placement    Nathalie Briceño MD  Hospital Medicine Staff

## 2018-11-20 NOTE — PLAN OF CARE
MACEY following for DC needs. MACEY in communication with CM.    SW spoke to patient's daughter, Anne ([phone]). MACEY and Anne discussed that therapy is now recommending that the patient go to a SNF before returjnong home. Anne stated that she would like for the patient to go to OSNF. MACEY asked if Anne is agreeable to SW bringing a list by the room tomorrow when Anne returns to the hospital and Anne stated that she does not want to look at the list because she does not want him going to a nursing home at this time.     MACEY sent to OS via [employer].     La Flynn, ADRIAN  [facility]  [id number]

## 2018-11-20 NOTE — PT/OT/SLP PROGRESS
"Physical Therapy Treatment    Patient Name:  Zbigniew Forman   MRN:  [id number]    Recommendations:     Discharge Recommendations:  nursing facility, skilled (changed per discussion with cosigning PT)  Discharge Equipment Recommendations: none   Barriers to discharge: increased assistance needed for functional mobility    Assessment:     Zbigniew Forman is a 89 y.o. male admitted with a medical diagnosis of Acute respiratory failure with hypoxia.  He presents with the following impairments/functional limitations:  weakness, impaired endurance, impaired self care skills, impaired functional mobilty, gait instability, impaired balance, impaired cardiopulmonary response to activity. Pt tolerated session well with focus on bed mobility, transfers, and standing balance. Pt with increased assistance required this day for OOB mobility and pt unable to ambulate d/t poor standing balance, raising concern for pt safety and caregiver burden for d/c to home with home health and family assistance. Cosigning PT notified and is okay with d/c recommendation change to SNF for post acute services. Pt will continue to benefit from therapy services to improve impairments listed above.     Rehab Prognosis:  Good ; patient would benefit from acute skilled PT services to address these deficits and reach maximum level of function.      Recent Surgery: * No surgery found *      Plan:     During this hospitalization, patient to be seen 3 x/week to address the above listed problems via gait training, therapeutic activities, therapeutic exercises, neuromuscular re-education  · Plan of Care Expires:  12/18/18   Plan of Care Reviewed with: patient, daughter    Subjective     Communicated with NSG prior to session.  Patient found seated on bedside commode upon PTA entry to room, agreeable to treatment.      Chief Complaint: no c/o  Patient comments/goals: "I feel better than I did 2 days ago, I'll tell you that."   Pain/Comfort:  · Pain Rating 1: " 0/10  · Pain Rating Post-Intervention 1: 0/10    Patients cultural, spiritual, Buddhist conflicts given the current situation: none stated    Objective:     Patient found with: peripheral IV, Condom Catheter     General Precautions: Standard, fall   Orthopedic Precautions:N/A   Braces: N/A     Functional Mobility:  · Bed Mobility:     · Scooting: minimum assistance  · Sit to Supine: minimum assistance  · Transfers:     · Sit to Stand:  maximal assistance with rolling walker  · Bed to Chair: maximal assistance with  rolling walker  using  Stand Pivot  · Gait: Pt unable to ambulate this day d/t poor standing balance, requiring Max A to maintain stand and balance.  · Balance: Pt sits with SBA/CGA. Pt requires Max to elevate to stand and to maintain balance.       AM-PAC 6 CLICK MOBILITY  Turning over in bed (including adjusting bedclothes, sheets and blankets)?: 3  Sitting down on and standing up from a chair with arms (e.g., wheelchair, bedside commode, etc.): 2  Moving from lying on back to sitting on the side of the bed?: 3  Moving to and from a bed to a chair (including a wheelchair)?: 2  Need to walk in hospital room?: 1  Climbing 3-5 steps with a railing?: 1  Basic Mobility Total Score: 12       Therapeutic Activities and Exercises:  Pt assisted with functional mobility as noted above.   Pt and family educated extensively on observed decline in functional mobility from pts evaluation status with scope of PTA.   Pt, family, and NSG informed of change in d/c recommendation pending discussion with cosigning PT to inform of decline in functional ability.   Pt assisted with standing from bedside commode and from EOB with RW and Max A to elevate and to maintain stand with pt leaning heavily posteriorly, with WB through B heels with toes elevating from floor at times.   Pt returned to supine after condom catheter dislodges requiring pt to be cleaned and changed into fresh gown. Pt remains supine with session ended.    PTA notifies NSG, PT, and  of pt status and rec change to SNF.     Patient left supine with call button in reach, NSG notified and family present.    GOALS:   Multidisciplinary Problems     Physical Therapy Goals        Problem: Physical Therapy Goal    Goal Priority Disciplines Outcome Goal Variances Interventions   Physical Therapy Goal     PT, PT/OT Ongoing (interventions implemented as appropriate)     Description:  Goals to be met by: 18     Patient will increase functional independence with mobility by performin. Supine to sit with Contact Guard Assistance  2. Sit to supine with Contact Guard Assistance  3. Sit to stand transfer with Contact Guard Assistance  4. Bed to chair transfer with Minimal Assistance using Rolling Walker  5. Gait  x 50 feet with Contact Guard Assistance using Rolling Walker.   6. Lower extremity exercise program x20 reps per handout, with independence                      Time Tracking:     PT Received On: 18  PT Start Time: 1151     PT Stop Time: 1230  PT Total Time (min): 39 min     Billable Minutes: Therapeutic Activity 39    Treatment Type: Treatment  PT/PTA: PTA     PTA Visit Number: 1     Fredy Soares PTA  2018

## 2018-11-20 NOTE — PROGRESS NOTES
Notified Dr. Briceño with [facility] pts /55. Pt asymptomatic.  Orders to hold amlodipine 10mg and lisinopril 40mg. Will continue to monitor pt.

## 2018-11-20 NOTE — PLAN OF CARE
Problem: Physical Therapy Goal  Goal: Physical Therapy Goal  Goals to be met by: 18     Patient will increase functional independence with mobility by performin. Supine to sit with Contact Guard Assistance  2. Sit to supine with Contact Guard Assistance  3. Sit to stand transfer with Contact Guard Assistance  4. Bed to chair transfer with Minimal Assistance using Rolling Walker  5. Gait  x 50 feet with Contact Guard Assistance using Rolling Walker.   6. Lower extremity exercise program x20 reps per handout, with independence     Outcome: Ongoing (interventions implemented as appropriate)  Goals remain appropriate.

## 2018-11-20 NOTE — PLAN OF CARE
Problem: Patient Care Overview  Goal: Plan of Care Review  Outcome: Ongoing (interventions implemented as appropriate)  Pt AAOx4. VSS at this time. Pt with complaints of penile pain. PRN tylenol administered once on this shift. Pt had BM this shift. Weight shift assistance provided every two hours. POC reviewed with pt and daughter who verbalized understanding.     Problem: Fall Risk (Adult)  Goal: Identify Related Risk Factors and Signs and Symptoms  Related risk factors and signs and symptoms are identified upon initiation of Human Response Clinical Practice Guideline (CPG)  Outcome: Ongoing (interventions implemented as appropriate)  Pt remained free of falls throughout the shift. Verbalized understanding to call for assistance wit OOB activities. Safety precautions maintained.

## 2018-11-20 NOTE — PLAN OF CARE
Problem: Patient Care Overview  Goal: Plan of Care Review  Outcome: Ongoing (interventions implemented as appropriate)  POC reviewed with pt. AAO x4.   Pt remained free from falls. Telemetry monitoring. Questions and concerns addressed. Pt progressing towards goals. Will continue to monitor. See flow sheets for full assessment and VS

## 2018-11-20 NOTE — PLAN OF CARE
MACEY assigned to case today 11/20/2018. SW will assist team with DC needs. MACEY in communication with CM.    MACEY followed up with Hannah at [facility] on the O2 order. Hannah stated that the O2 is still pending PHN approval.     La Flynn, MACEY  [facility]  [id number]

## 2018-11-21 LAB
ANION GAP SERPL CALC-SCNC: 8 MMOL/L
BUN SERPL-MCNC: 31 MG/DL
CALCIUM SERPL-MCNC: 8.3 MG/DL
CHLORIDE SERPL-SCNC: 107 MMOL/L
CO2 SERPL-SCNC: 28 MMOL/L
CREAT SERPL-MCNC: 1.2 MG/DL
EST. GFR  (AFRICAN AMERICAN): 57.4 ML/MIN/1.73 M^2
EST. GFR  (NON AFRICAN AMERICAN): 49.7 ML/MIN/1.73 M^2
GLUCOSE SERPL-MCNC: 121 MG/DL
POTASSIUM SERPL-SCNC: 4.6 MMOL/L
SODIUM SERPL-SCNC: 143 MMOL/L

## 2018-11-21 PROCEDURE — 25000003 PHARM REV CODE 250: Performed by: PHYSICIAN ASSISTANT

## 2018-11-21 PROCEDURE — 99232 SBSQ HOSP IP/OBS MODERATE 35: CPT | Mod: ,,, | Performed by: HOSPITALIST

## 2018-11-21 PROCEDURE — 25000242 PHARM REV CODE 250 ALT 637 W/ HCPCS: Performed by: HOSPITALIST

## 2018-11-21 PROCEDURE — 94761 N-INVAS EAR/PLS OXIMETRY MLT: CPT

## 2018-11-21 PROCEDURE — 36415 COLL VENOUS BLD VENIPUNCTURE: CPT

## 2018-11-21 PROCEDURE — 27000221 HC OXYGEN, UP TO 24 HOURS

## 2018-11-21 PROCEDURE — 25000003 PHARM REV CODE 250: Performed by: HOSPITALIST

## 2018-11-21 PROCEDURE — 94640 AIRWAY INHALATION TREATMENT: CPT

## 2018-11-21 PROCEDURE — 97535 SELF CARE MNGMENT TRAINING: CPT

## 2018-11-21 PROCEDURE — 11000001 HC ACUTE MED/SURG PRIVATE ROOM

## 2018-11-21 PROCEDURE — 63600175 PHARM REV CODE 636 W HCPCS: Performed by: PHYSICIAN ASSISTANT

## 2018-11-21 PROCEDURE — 80048 BASIC METABOLIC PNL TOTAL CA: CPT

## 2018-11-21 RX ORDER — LEVALBUTEROL INHALATION SOLUTION 0.63 MG/3ML
0.63 SOLUTION RESPIRATORY (INHALATION) EVERY 8 HOURS
Status: DISCONTINUED | OUTPATIENT
Start: 2018-11-21 | End: 2018-11-26 | Stop reason: HOSPADM

## 2018-11-21 RX ADMIN — AMLODIPINE BESYLATE 5 MG: 5 TABLET ORAL at 10:11

## 2018-11-21 RX ADMIN — ENOXAPARIN SODIUM 40 MG: 100 INJECTION SUBCUTANEOUS at 05:11

## 2018-11-21 RX ADMIN — LEVOFLOXACIN 250 MG: 250 TABLET, FILM COATED ORAL at 10:11

## 2018-11-21 RX ADMIN — LEVALBUTEROL HYDROCHLORIDE 0.63 MG: 0.63 SOLUTION RESPIRATORY (INHALATION) at 11:11

## 2018-11-21 RX ADMIN — ACETAMINOPHEN 1000 MG: 500 TABLET, FILM COATED ORAL at 11:11

## 2018-11-21 RX ADMIN — LISINOPRIL 20 MG: 20 TABLET ORAL at 10:11

## 2018-11-21 RX ADMIN — LEVALBUTEROL HYDROCHLORIDE 0.63 MG: 0.63 SOLUTION RESPIRATORY (INHALATION) at 05:11

## 2018-11-21 NOTE — PLAN OF CARE
Problem: Occupational Therapy Goal  Goal: Occupational Therapy Goal  Goals to be met by: 12/2     Patient will increase functional independence with ADLs by performing:    UE Dressing with Contact Guard Assistance.  LE Dressing with Minimal Assistance.  Grooming while seated with Contact Guard Assistance.  Toileting from toilet with Minimal Assistance for hygiene and clothing management.   Bathing from  shower chair/bench with Minimal Assistance.     Outcome: Ongoing (interventions implemented as appropriate)  Goals addressed and unmet.  Cont with POC  Nancy Lees OT  11/21/2018

## 2018-11-21 NOTE — PROGRESS NOTES
Progress Note  Hospital Medicine    Primary Team: [facility]OS[facility] MED C  Admit Date: 11/16/2018   Length of Stay:  LOS: 4 days   SUBJECTIVE:   Reason for Admission:  Acute respiratory failure with hypoxia    HPI:  Zbigniew Forman is a 89M [employer]  with HTN who presents for evaluation of SOB and worsening LE edema. He states that his SOB started 2-3 weeks ago, and acutely worsened over the last few days prompting him to present to the ED. Family and patient report orthopnea, but patient insists of sleeping flat. He does report some cough and wheeze, but no fever or recent sick contacts. He does not use oxygen at home and lives with his daughter. He denies any chest pain, no syncope,  He does report some dizziness when he moves around too much. No recent sick contacts. He denies every being told that he has issues with HF. He lost his wife of 74 years in May. 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 He denies history of COPD. He did have relief with breathing treatment.     Hospital Course:  Mr. Forman was admitted to hospital medicine on 11/17 for ADCHF. He was diuresed on IV lasix and transitioned to oral lasix on 11/18. Due to concern for new infiltrate on CXR and productive cough, antibiotics were started for presumptive PNA. Procal, LA and WBC resulted wnl, but patient felt subjectively improved and decision was made to complete 5d course (Levaquin). PT/OT were ordered for post-discharge needs, now recommending SNF.     Interval history:    No acute events overnight.  Pt still without complaints except for FS, as he has thin skin from years of working in [employer].    Review of Systems:  Constitutional: no fever or chills  Respiratory: no cough or shortness of breath  Cardiovascular: no chest pain or palpitations  Gastrointestinal: no nausea or vomiting, no abdominal pain or change in bowel habits  Musculoskeletal: no arthralgias or myalgias     OBJECTIVE:     Temp:  [96.5 °F (35.8  °C)-98.7 °F (37.1 °C)]   Pulse:  []   Resp:  [18-20]   BP: (111-141)/(57-68)   SpO2:  [87 %-99 %]  Body mass index is 26.15 kg/m².  Intake/Outake:  This Shift:  No intake/output data recorded.    Net I/O past 24h:     Intake/Output Summary (Last 24 hours) at 11/21/2018 1419  Last data filed at 11/20/2018 1800  Gross per 24 hour   Intake --   Output 450 ml   Net -450 ml             Physical Exam:  Gen- well-developed, well-nourished, NAD  CVS- S1 and S2 present, RRR  Resp- expiratory wheezes b/l, no work of breathing  Abd- BS+, soft, NT, ND  Ext- no clubbing, cyanosis.  Trace edema on RLE    Laboratory:  CBC/Anemia Labs: Coags:    Recent Labs   Lab 11/16/18 2229 11/17/18  0546 11/18/18  0738   WBC 11.04 9.54 8.12   HGB 12.8* 11.0* 11.5*   HCT 42.0 35.4* 36.1*   * 288 295   MCV 87 87 86   RDW 13.8 13.7 13.7    Recent Labs   Lab 11/16/18 2229   INR 1.1        Chemistries:   Recent Labs   Lab 11/16/18 2229 11/17/18  0546 11/18/18  0738 11/19/18  0836 11/20/18  0437 11/21/18  0551    139 142 142 141 143   K 4.8 4.4 4.5 4.3 4.2 4.6    106 105 104 103 107   CO2 26 27 25 29 28 28   BUN 29 26 29 31* 32* 31*   CREATININE 1.3 1.1 1.3 1.4 1.3 1.2   CALCIUM 9.1 8.3* 8.7 8.6* 8.4* 8.3*   PROT 7.5  --   --   --   --   --    BILITOT 0.4  --   --   --   --   --    ALKPHOS 69  --   --   --   --   --    ALT 13  --   --   --   --   --    AST 17  --   --   --   --   --    MG  --  2.1 2.1  --   --   --    PHOS  --  3.0 3.8  --   --   --         Medications:  Scheduled Meds:   amLODIPine  5 mg Oral Daily    enoxaparin  40 mg Subcutaneous Daily    levalbuterol  0.63 mg Nebulization Q8H    lisinopril  20 mg Oral Daily    polyethylene glycol  17 g Oral Daily                             Continuous Infusions:  PRN Meds:.acetaminophen, acetaminophen, acetaminophen, bisacodyl, dextrose 50%, dextrose 50%, glucagon (human recombinant), glucose, glucose, ondansetron, ondansetron, ramelteon, sodium chloride 0.9%      ASSESSMENT/PLAN:     * Acute respiratory failure with hypoxia     - presentation clinically consistent with decompensated HF; BNP elevated, CXR with edema, overloaded on exam  - on 3L NC, weaning  - lasix 40 mg IVP BID, transitioned to oral lasix 40 mg daily  - Continue lisinopril, amlodipine  - strict I/Os, daily weights, fluid restriction, tele  - concerned about possible CAP, completed treatment with Levaquin.      DNR (do not resuscitate)     - discussed with family and patient      Elevated troponin     - chronically elevated  - trending flat, no EKG change, no CP      Hypertension associated with diabetes     - labile, and lower yesterday morning  -changed Amlodipine to 5mg and Lisinopril to 20mg; tolerating this well      Chronic kidney disease, stage III (moderate)     - chronic and stable      Controlled type 2 diabetes mellitus without complication, without long-term current use of insulin     - diet controlled, A1c 6.5 08/2018  - low dose SSI for now     DVT ppx- Lovenox  CODE Status- FULL    Dispo- pending SNF placement; daughter prefers [facility] SNF but will look at list of other facilities vs taking pt home pending progress    Nathalie Briceño MD  Hospital Medicine Staff

## 2018-11-21 NOTE — PLAN OF CARE
MACEY following for DC needs. MACEY in communication with CM.    MACEY met with the patient's daughter, Anne, at the bedside. MACEY and Anne discussed SNF options. Anne stated that she really wants the patient to go to OSNF because she does not want him in a nursing home. MACEY provided Anne with a list of SNF's in their area so she can review the list as a plan B.     La Flynn, ADRIAN  [facility]  [id number]

## 2018-11-21 NOTE — PT/OT/SLP PROGRESS
Occupational Therapy   Treatment    Name: Zbigniew Forman  MRN: [id number]  Admitting Diagnosis:  Acute respiratory failure with hypoxia       Recommendations:     Discharge Recommendations: nursing facility, skilled  Discharge Equipment Recommendations:  none  Barriers to discharge:  None    Subjective     Pain/Comfort:  · Pain Rating 1: 0/10    Objective:     Communicated with: Rn prior to session.  Patient found with all lines intact and daughter  present and peripheral IV upon OT entry to room.    General Precautions: Standard, fall   Orthopedic Precautions:N/A   Braces: N/A     Occupational Performance:    Bed Mobility:     · Min/mod A with bed mobility with complaints of fatigue     Activities of Daily Living:  · Toielting with min A for hygiene       [facility] 6 Click ADL: 19    Treatment & Education:  Pt educated on safety, role of OT, importance of increased participation in self care for gains , expectations for participation, expectations for gains, POC, energy conservation, caregiver strain. White board updated.   -  toileting with Min A for hygiene in bed - unable to tolerate transfer to [facility] due to complaints of fatigue     Patient left supine with all lines intact  Education:    Assessment:     Zbigniew Forman is a 89 y.o. male with a medical diagnosis of Acute respiratory failure with hypoxia.  He presents with the following performance deficits affecting function are weakness, impaired endurance, impaired self care skills, impaired functional mobilty, impaired balance.     Rehab Prognosis:  Good; patient would benefit from acute skilled OT services to address these deficits and reach maximum level of function.       Plan:     Patient to be seen 3 x/week to address the above listed problems via self-care/home management, therapeutic activities, therapeutic exercises  · Plan of Care Expires:    · Plan of Care Reviewed with: patient, daughter    This Plan of care has been discussed with the patient who was  involved in its development and understands and is in agreement with the identified goals and treatment plan    GOALS:   Multidisciplinary Problems     Occupational Therapy Goals        Problem: Occupational Therapy Goal    Goal Priority Disciplines Outcome Interventions   Occupational Therapy Goal     OT, PT/OT Ongoing (interventions implemented as appropriate)    Description:  Goals to be met by: 12/2     Patient will increase functional independence with ADLs by performing:    UE Dressing with Contact Guard Assistance.  LE Dressing with Minimal Assistance.  Grooming while seated with Contact Guard Assistance.  Toileting from toilet with Minimal Assistance for hygiene and clothing management.   Bathing from  shower chair/bench with Minimal Assistance.                      Time Tracking:     OT Date of Treatment: 11/21/18  OT Start Time: 1020  OT Stop Time: 1035  OT Total Time (min): 15 min    Billable Minutes:Self Care/Home Management 15    Nancy Lees OT  11/21/2018

## 2018-11-21 NOTE — PLAN OF CARE
Problem: Patient Care Overview  Goal: Plan of Care Review  Outcome: Ongoing (interventions implemented as appropriate)  Plan of care reviewed with patient and son. No signs of acute distress noted. Voiced no complaints of pain or discomfort during the shift. No falls, safety precautions maintained, bed low, call light within reach, wheels locked and side rails up x 2. Will continue to monitor.

## 2018-11-22 LAB
ANION GAP SERPL CALC-SCNC: 7 MMOL/L
BUN SERPL-MCNC: 31 MG/DL
CALCIUM SERPL-MCNC: 8 MG/DL
CHLORIDE SERPL-SCNC: 107 MMOL/L
CO2 SERPL-SCNC: 27 MMOL/L
CREAT SERPL-MCNC: 1.1 MG/DL
EST. GFR  (AFRICAN AMERICAN): >60 ML/MIN/1.73 M^2
EST. GFR  (NON AFRICAN AMERICAN): 55.2 ML/MIN/1.73 M^2
GLUCOSE SERPL-MCNC: 119 MG/DL
POTASSIUM SERPL-SCNC: 3.9 MMOL/L
SODIUM SERPL-SCNC: 141 MMOL/L

## 2018-11-22 PROCEDURE — 27000221 HC OXYGEN, UP TO 24 HOURS

## 2018-11-22 PROCEDURE — 25000242 PHARM REV CODE 250 ALT 637 W/ HCPCS: Performed by: HOSPITALIST

## 2018-11-22 PROCEDURE — 94761 N-INVAS EAR/PLS OXIMETRY MLT: CPT

## 2018-11-22 PROCEDURE — 11000001 HC ACUTE MED/SURG PRIVATE ROOM

## 2018-11-22 PROCEDURE — 25000003 PHARM REV CODE 250: Performed by: HOSPITALIST

## 2018-11-22 PROCEDURE — 63600175 PHARM REV CODE 636 W HCPCS: Performed by: PHYSICIAN ASSISTANT

## 2018-11-22 PROCEDURE — 80048 BASIC METABOLIC PNL TOTAL CA: CPT

## 2018-11-22 PROCEDURE — 94640 AIRWAY INHALATION TREATMENT: CPT

## 2018-11-22 PROCEDURE — 36415 COLL VENOUS BLD VENIPUNCTURE: CPT

## 2018-11-22 PROCEDURE — 99231 SBSQ HOSP IP/OBS SF/LOW 25: CPT | Mod: ,,, | Performed by: HOSPITALIST

## 2018-11-22 PROCEDURE — 25000003 PHARM REV CODE 250: Performed by: PHYSICIAN ASSISTANT

## 2018-11-22 RX ADMIN — LEVALBUTEROL HYDROCHLORIDE 0.63 MG: 0.63 SOLUTION RESPIRATORY (INHALATION) at 12:11

## 2018-11-22 RX ADMIN — LISINOPRIL 20 MG: 20 TABLET ORAL at 10:11

## 2018-11-22 RX ADMIN — AMLODIPINE BESYLATE 5 MG: 5 TABLET ORAL at 10:11

## 2018-11-22 RX ADMIN — LEVALBUTEROL HYDROCHLORIDE 0.63 MG: 0.63 SOLUTION RESPIRATORY (INHALATION) at 04:11

## 2018-11-22 RX ADMIN — LEVALBUTEROL HYDROCHLORIDE 0.63 MG: 0.63 SOLUTION RESPIRATORY (INHALATION) at 07:11

## 2018-11-22 RX ADMIN — ACETAMINOPHEN 1000 MG: 500 TABLET, FILM COATED ORAL at 10:11

## 2018-11-22 RX ADMIN — ENOXAPARIN SODIUM 40 MG: 100 INJECTION SUBCUTANEOUS at 05:11

## 2018-11-22 RX ADMIN — LEVALBUTEROL HYDROCHLORIDE 0.63 MG: 0.63 SOLUTION RESPIRATORY (INHALATION) at 11:11

## 2018-11-22 NOTE — PROGRESS NOTES
Progress Note  [facility] Medicine    Primary Team: [facility] [place] [facility] MED C  Admit Date: 11/16/2018   Length of Stay:  LOS: 5 days   SUBJECTIVE:   Reason for Admission:  Acute respiratory failure with hypoxia    HPI:  Zbigniew Forman is a 89M [employer]  with HTN who presents for evaluation of SOB and worsening LE edema. He states that his SOB started 2-3 weeks ago, and acutely worsened over the last few days prompting him to present to the ED. Family and patient report orthopnea, but patient insists of sleeping flat. He does report some cough and wheeze, but no fever or recent sick contacts. He does not use oxygen at home and lives with his daughter. He denies any chest pain, no syncope,  He does report some dizziness when he moves around too much. No recent sick contacts. He denies every being told that he has issues with HF. He lost his wife of 74 years in May. 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 He denies history of COPD. He did have relief with breathing treatment.     Hospital Course:  Mr. Forman was admitted to [facility] medicine on 11/17 for ADCHF. He was diuresed on IV lasix and transitioned to oral lasix on 11/18. Due to concern for new infiltrate on CXR and productive cough, antibiotics were started for presumptive PNA. Procal, LA and WBC resulted wnl, but patient felt subjectively improved and decision was made to complete 5d course (Levaquin). PT/OT were ordered for post-discharge needs, now recommending SNF.     Interval history:    No acute events overnight.      Review of Systems:  Constitutional: no fever or chills  Respiratory: no cough or shortness of breath  Cardiovascular: no chest pain or palpitations  Gastrointestinal: no nausea or vomiting, no abdominal pain or change in bowel habits  Musculoskeletal: no arthralgias or myalgias     OBJECTIVE:     Temp:  [96.7 °F (35.9 °C)-98.6 °F (37 °C)]   Pulse:  [78-89]   Resp:  [16-20]   BP: (106-135)/(53-66)   SpO2:  [93 %-97 %]  Body  mass index is 26.15 kg/m².  Intake/Outake:  This Shift:  No intake/output data recorded.    Net I/O past 24h:     Intake/Output Summary (Last 24 hours) at 11/22/2018 1248  Last data filed at 11/21/2018 1851  Gross per 24 hour   Intake --   Output 900 ml   Net -900 ml             Physical Exam:  Gen- well-developed, well-nourished, NAD  CVS- S1 and S2 present, RRR  Resp- expiratory wheezes b/l, no work of breathing  Abd- BS+, soft, NT, ND  Ext- no clubbing, cyanosis.  Trace edema on RLE    Laboratory:  CBC/Anemia Labs: Coags:    Recent Labs   Lab 11/16/18 2229 11/17/18  0546 11/18/18  0738   WBC 11.04 9.54 8.12   HGB 12.8* 11.0* 11.5*   HCT 42.0 35.4* 36.1*   * 288 295   MCV 87 87 86   RDW 13.8 13.7 13.7    Recent Labs   Lab 11/16/18 2229   INR 1.1        Chemistries:   Recent Labs   Lab 11/16/18 2229 11/17/18  0546 11/18/18  0738  11/20/18  0437 11/21/18  0551 11/22/18  0441    139 142   < > 141 143 141   K 4.8 4.4 4.5   < > 4.2 4.6 3.9    106 105   < > 103 107 107   CO2 26 27 25   < > 28 28 27   BUN 29 26 29   < > 32* 31* 31*   CREATININE 1.3 1.1 1.3   < > 1.3 1.2 1.1   CALCIUM 9.1 8.3* 8.7   < > 8.4* 8.3* 8.0*   PROT 7.5  --   --   --   --   --   --    BILITOT 0.4  --   --   --   --   --   --    ALKPHOS 69  --   --   --   --   --   --    ALT 13  --   --   --   --   --   --    AST 17  --   --   --   --   --   --    MG  --  2.1 2.1  --   --   --   --    PHOS  --  3.0 3.8  --   --   --   --     < > = values in this interval not displayed.        Medications:  Scheduled Meds:   amLODIPine  5 mg Oral Daily    enoxaparin  40 mg Subcutaneous Daily    levalbuterol  0.63 mg Nebulization Q8H    lisinopril  20 mg Oral Daily    polyethylene glycol  17 g Oral Daily                             Continuous Infusions:  PRN Meds:.acetaminophen, acetaminophen, acetaminophen, bisacodyl, dextrose 50%, dextrose 50%, glucagon (human recombinant), glucose, glucose, ondansetron, ondansetron, ramelteon, sodium  chloride 0.9%     ASSESSMENT/PLAN:     * Acute respiratory failure with hypoxia     - presentation clinically consistent with decompensated HF; BNP elevated, CXR with edema, overloaded on exam  - on 3L NC, weaning  - lasix 40 mg IVP BID, transitioned to oral lasix 40 mg daily  - Continue lisinopril, amlodipine  - strict I/Os, daily weights, fluid restriction, tele  - concerned about possible CAP, completed treatment with Levaquin.      DNR (do not resuscitate)     - discussed with family and patient      Elevated troponin     - chronically elevated  - trending flat, no EKG change, no CP      Hypertension associated with diabetes     - labile, and lower yesterday morning  -changed Amlodipine to 5mg and Lisinopril to 20mg; tolerating this well      Chronic kidney disease, stage III (moderate)     - chronic and stable      Controlled type 2 diabetes mellitus without complication, without long-term current use of insulin     - diet controlled, A1c 6.5 08/2018  - low dose SSI for now     DVT ppx- Lovenox  CODE Status- FULL    Dispo- pending SNF placement; daughter prefers [facility] but will look at list of other facilities vs taking pt home pending progress    Nathalie Briceño MD  Hospital Medicine Staff

## 2018-11-23 LAB
ANION GAP SERPL CALC-SCNC: 8 MMOL/L
BACTERIA BLD CULT: NORMAL
BILIRUB UR QL STRIP: NEGATIVE
BUN SERPL-MCNC: 31 MG/DL
CALCIUM SERPL-MCNC: 8.6 MG/DL
CHLORIDE SERPL-SCNC: 105 MMOL/L
CLARITY UR REFRACT.AUTO: CLEAR
CO2 SERPL-SCNC: 27 MMOL/L
COLOR UR AUTO: YELLOW
CREAT SERPL-MCNC: 1 MG/DL
EST. GFR  (AFRICAN AMERICAN): >60 ML/MIN/1.73 M^2
EST. GFR  (NON AFRICAN AMERICAN): >60 ML/MIN/1.73 M^2
GLUCOSE SERPL-MCNC: 136 MG/DL
GLUCOSE UR QL STRIP: NEGATIVE
HGB UR QL STRIP: NEGATIVE
KETONES UR QL STRIP: NEGATIVE
LEUKOCYTE ESTERASE UR QL STRIP: NEGATIVE
MICROSCOPIC COMMENT: NORMAL
NITRITE UR QL STRIP: NEGATIVE
PH UR STRIP: 5 [PH] (ref 5–8)
POTASSIUM SERPL-SCNC: 4.2 MMOL/L
PROT UR QL STRIP: NEGATIVE
RBC #/AREA URNS AUTO: 4 /HPF (ref 0–4)
SODIUM SERPL-SCNC: 140 MMOL/L
SP GR UR STRIP: 1.02 (ref 1–1.03)
SQUAMOUS #/AREA URNS AUTO: 0 /HPF
URN SPEC COLLECT METH UR: NORMAL
WBC #/AREA URNS AUTO: 3 /HPF (ref 0–5)

## 2018-11-23 PROCEDURE — 94761 N-INVAS EAR/PLS OXIMETRY MLT: CPT

## 2018-11-23 PROCEDURE — 99232 SBSQ HOSP IP/OBS MODERATE 35: CPT | Mod: ,,, | Performed by: HOSPITALIST

## 2018-11-23 PROCEDURE — 11000001 HC ACUTE MED/SURG PRIVATE ROOM

## 2018-11-23 PROCEDURE — 25000003 PHARM REV CODE 250: Performed by: PHYSICIAN ASSISTANT

## 2018-11-23 PROCEDURE — 80048 BASIC METABOLIC PNL TOTAL CA: CPT

## 2018-11-23 PROCEDURE — 94640 AIRWAY INHALATION TREATMENT: CPT

## 2018-11-23 PROCEDURE — 97535 SELF CARE MNGMENT TRAINING: CPT

## 2018-11-23 PROCEDURE — 63600175 PHARM REV CODE 636 W HCPCS: Performed by: PHYSICIAN ASSISTANT

## 2018-11-23 PROCEDURE — 27000221 HC OXYGEN, UP TO 24 HOURS

## 2018-11-23 PROCEDURE — 81001 URINALYSIS AUTO W/SCOPE: CPT

## 2018-11-23 PROCEDURE — 97530 THERAPEUTIC ACTIVITIES: CPT

## 2018-11-23 PROCEDURE — 97116 GAIT TRAINING THERAPY: CPT

## 2018-11-23 PROCEDURE — 25000242 PHARM REV CODE 250 ALT 637 W/ HCPCS: Performed by: HOSPITALIST

## 2018-11-23 PROCEDURE — 25000003 PHARM REV CODE 250: Performed by: HOSPITALIST

## 2018-11-23 PROCEDURE — 36415 COLL VENOUS BLD VENIPUNCTURE: CPT

## 2018-11-23 RX ADMIN — AMLODIPINE BESYLATE 5 MG: 5 TABLET ORAL at 09:11

## 2018-11-23 RX ADMIN — LEVALBUTEROL HYDROCHLORIDE 0.63 MG: 0.63 SOLUTION RESPIRATORY (INHALATION) at 03:11

## 2018-11-23 RX ADMIN — LEVALBUTEROL HYDROCHLORIDE 0.63 MG: 0.63 SOLUTION RESPIRATORY (INHALATION) at 08:11

## 2018-11-23 RX ADMIN — ACETAMINOPHEN 1000 MG: 500 TABLET, FILM COATED ORAL at 08:11

## 2018-11-23 RX ADMIN — LISINOPRIL 20 MG: 20 TABLET ORAL at 09:11

## 2018-11-23 RX ADMIN — ACETAMINOPHEN 1000 MG: 500 TABLET, FILM COATED ORAL at 09:11

## 2018-11-23 RX ADMIN — LEVALBUTEROL HYDROCHLORIDE 0.63 MG: 0.63 SOLUTION RESPIRATORY (INHALATION) at 11:11

## 2018-11-23 RX ADMIN — ENOXAPARIN SODIUM 40 MG: 100 INJECTION SUBCUTANEOUS at 06:11

## 2018-11-23 NOTE — PT/OT/SLP PROGRESS
"Physical Therapy Treatment - cotx with OT    Patient Name:  Zbigniew Forman   MRN:  [id number]    Recommendations:     Discharge Recommendations:  nursing facility, skilled   Discharge Equipment Recommendations: none   Barriers to discharge: increased assistance required for functional mobility    Assessment:     Zbigniew Forman is a 89 y.o. male admitted with a medical diagnosis of Acute respiratory failure with hypoxia.  He presents with the following impairments/functional limitations:  weakness, impaired endurance, impaired self care skills, impaired functional mobilty, gait instability, impaired balance, decreased coordination, decreased safety awareness, decreased ROM. Pt tolerated session well with focus on gait training, transfers, and bed mobility. Pt progressing well with improved endurance, strength, and balance noted this session. Pt remains limited by posterior lean requiring Min A to assist with forward weight shift periodically during gait training. PTA/OT initially intended to cotx d/t pts decreased performance over previous session, however pt with improvement this day to assist of 1. Time split between PT/OT tasks during session. Pt will continue to benefit from therapy services to improve impairments listed above.     Rehab Prognosis:  Good ; patient would benefit from acute skilled PT services to address these deficits and reach maximum level of function.      Recent Surgery: * No surgery found *      Plan:     During this hospitalization, patient to be seen 3 x/week to address the above listed problems via gait training, therapeutic activities, therapeutic exercises, neuromuscular re-education  · Plan of Care Expires:  12/18/18   Plan of Care Reviewed with: patient    Subjective     Communicated with NSG prior to session.  Patient found supine upon PTA entry to room, agreeable to treatment.      Chief Complaint: no c/o   Patient comments/goals: "I am better sir, I think rest and eating have " "helped me."   Pain/Comfort:  · Pain Rating 1: 0/10  · Pain Rating Post-Intervention 1: 0/10    Patients cultural, spiritual, Hoahaoism conflicts given the current situation: none stated    Objective:     Patient found with: Condom Catheter, oxygen     General Precautions: Standard, fall   Orthopedic Precautions:N/A   Braces: N/A     Functional Mobility:  · Bed Mobility:     · Supine to Sit: contact guard assistance  · Transfers:     · Sit to Stand:  minimum assistance with rolling walker  · Bed to Chair: minimum assistance with  rolling walker  using  Step Transfer  · Gait: Pt ambulates 68 ft with RW and Min A, progressing to CGA as trial progresses. Pt requires assistance to maintain balance d/t posterior lean with it decreasing as trial progresses requiring less assistance to steady pt. Extremely FFP, slow marilyn, narrow ABE, and assistance needed for RW mgmt.   · Balance: Pt sits with SBA at EOB. Pt stands with Min A.       AM-PAC 6 CLICK MOBILITY  Turning over in bed (including adjusting bedclothes, sheets and blankets)?: 3  Sitting down on and standing up from a chair with arms (e.g., wheelchair, bedside commode, etc.): 3  Moving from lying on back to sitting on the side of the bed?: 3  Moving to and from a bed to a chair (including a wheelchair)?: 3  Need to walk in hospital room?: 3  Climbing 3-5 steps with a railing?: 2  Basic Mobility Total Score: 17       Therapeutic Activities and Exercises:   Pt assisted with functional mobility as noted above.   Pt performs BLE seated therex: AP, LAQ, Seated Marching x 15 reps.   Pt and son educated on safety with all mobility, pts functional status, assistance needed, and PT POC.     Patient left up in chair with all lines intact, call button in reach and OT and son present..    GOALS:   Multidisciplinary Problems     Physical Therapy Goals        Problem: Physical Therapy Goal    Goal Priority Disciplines Outcome Goal Variances Interventions   Physical Therapy Goal "     PT, PT/OT Ongoing (interventions implemented as appropriate)     Description:  Goals to be met by: 18     Patient will increase functional independence with mobility by performin. Supine to sit with Contact Guard Assistance - met  2. Sit to supine with Contact Guard Assistance - met  3. Sit to stand transfer with Contact Guard Assistance  4. Bed to chair transfer with Minimal Assistance using Rolling Walker  5. Gait  x 50 feet with Contact Guard Assistance using Rolling Walker.   6. Lower extremity exercise program x20 reps per handout, with independence                       Time Tracking:     PT Received On: 18  PT Start Time: 855     PT Stop Time: 925  PT Total Time (min): 30 min     Billable Minutes: Gait Training 14 and Therapeutic Activity 16    Treatment Type: Treatment  PT/PTA: PTA     PTA Visit Number: 2     Fredy Soares, AUREA  2018

## 2018-11-23 NOTE — PHYSICIAN QUERY
"PT Name: Zbigniew Forman  MR #: [id number]    Physician Query Form - Heart  Condition Clarification     CDS/: Olivia Mims RN CDI            Contact information: [email or web address]  This form is a permanent document in the medical record.     Query Date: November 23, 2018    By submitting this query, we are merely seeking further clarification of documentation. Please utilize your independent clinical judgment when addressing the question(s) below.    The medical record contains the following   Indicators     Supporting Clinical Findings Location in Medical Record   X  11/ 16   X EF 60% 11/18 TTE echo   X Radiology findings Cardiac wires overlie the chest.  Cardiac silhouette is magnified by technique and appears stable.  Atherosclerotic calcifications overlie the aortic arch.  There are mildly coarsened interstitial lung markings.  No large focal consolidation or detrimental change in lung aeration.   11/16   X Echo Results TRANSTHORACIC ECHO (TTE) COMPLETE  Conclusion     · Normal left ventricular systolic function. The estimated ejection fraction is 60%  · Concentric left ventricular remodeling.  · Severe left atrial enlargement.  · Indeterminate left ventricular diastolic function.  · No wall motion abnormalities.  · Mild right atrial enlargement.  · Normal right ventricular systolic function.  · The ascending aorta is dilated.  · Mild mitral regurgitation.  · Mild tricuspid regurgitation.  · The estimated PA systolic pressure is 32mm Hg  · Normal central venous pressure (3 mm Hg).   11/18    "Ascites" documented     X "SOB" or "CHAHAL" documented Shortness of Breath       pt has increased sob and bilateral lower over the past week. pts family took pt to primary today who recommended him come to the ed for evaluation. pt denies nausea chest pain numbness or headache and complains of sob. 97% nasal cannula 2 liters     Hospital medicine H&P    X "Hypoxia" documented The primary encounter diagnosis was " "Acute respiratory failure with hypoxia. Diagnoses of Shortness of breath, Hypoxia ER MD Notes   X Heart Failure documented admitted to hospital medicine on 11/17 for ADCHF. He was diuresed on IV lasix and transitioned to oral lasix on 11/18 11/18 1152  Hospital medicine note   X "Edema" documented He exhibits edema (2+ pitting edema of b/l lower extremities). 11/18 1152  Hospital medicine note   X Diuretics/Meds Furosemide 40 mg IV 2 times daily                                     In ED 11/17 2785     Treatment:      Other:      Heart failure (HF) can be acute, chronic or both. It is generally further specificed as systolic, diastolic, or combined. Lastly, it is important to identify an underlying etiology if known or suspected.     Common clues to acute exacerbation:  Rapidly progressive symptoms (w/in 2 weeks of presentation), using IV diuretics to treat, using supplemental O2, pulmonary edema on Xray, MI w/in 4 weeks, and/or BNP >500    Systolic Heart Failure: is defined as chart documentation of a left ventricular ejection fraction (LVEF) less than 40%     Diastolic Heart Failure: is defined as a left ventricular ejection fraction (LVEF) greater than 40%   +      Evidence of diastolic dysfunction on echocardiography OR    Right heart catheterization wedge pressure above 12 mm Hg OR    Left heart catheterization left ventricular end diastolic pressure 18 mm Hg or above.    References: *American Heart Association    The clinical guidelines noted below are only system guidelines, and do not replace the providers clinical judgment.     Provider, please specify the diagnosis associated with above clinical findings    [   ] Acute Diastolic Heart Failure - New diagnosis.  EF > 40%  and acute HF symptoms documented    [ X  ] Acute on Chronic Diastolic Heart Failure -    Pre-existing diastoic HF diagnosis.  EF > 40%  and acute HF symptoms documented                                   [   ] Chronic Diastolic Heart " Failure - Pre-existing diastolic HF diagnosis.  EF > 40%  without  acute HF symptoms documented    [   ] Other (please specify): ___________________________________    [  ] Clinically Undetermined                          Please document in your progress notes daily for the duration of treatment until resolved and include in your discharge summary.

## 2018-11-23 NOTE — PLAN OF CARE
Problem: Physical Therapy Goal  Goal: Physical Therapy Goal  Goals to be met by: 18     Patient will increase functional independence with mobility by performin. Supine to sit with Contact Guard Assistance - met  2. Sit to supine with Contact Guard Assistance - met  3. Sit to stand transfer with Contact Guard Assistance  4. Bed to chair transfer with Minimal Assistance using Rolling Walker  5. Gait  x 50 feet with Contact Guard Assistance using Rolling Walker.   6. Lower extremity exercise program x20 reps per handout, with independence     Outcome: Ongoing (interventions implemented as appropriate)  Goals remain appropriate.

## 2018-11-23 NOTE — PT/OT/SLP PROGRESS
Occupational Therapy   Treatment    Name: Zbigniew Forman  MRN: [id number]  Admitting Diagnosis:  Acute respiratory failure with hypoxia       Recommendations:     Discharge Recommendations: nursing facility, skilled  Discharge Equipment Recommendations:  none  Barriers to discharge:  None    Subjective     Pain/Comfort:  · Pain Rating 1: 0/10  · Pain Rating Post-Intervention 1: 0/10    Objective:     Communicated with: patient prior to session.      General Precautions: Standard, fall   Orthopedic Precautions:N/A   Braces: N/A     Occupational Performance:    Bed Mobility:    · Patient completed Supine to Sit with contact guard assistance     Functional Mobility/Transfers:  · Patient completed Sit <> Stand Transfer with minimum assistance  with  rolling walker   · Patient completed Bed > bedside chair Transfer /c functional ambulation technique with minimum assistance with rolling walker    Activities of Daily Living:  · Feeding: Setup  · Upper Body Dressing: minimum assistance [place] and donning front gown. Donning back gown  · Lower Body Dressing: moderate assistance [place] and donning socks and slippers  · Toileting: total assistance        AMPAC 6 Click ADL: 19    Patient left up in chair with call button in reach, son present and all needs met.   Education:    Assessment:     Zbigniew Forman is a 89 y.o. male with a medical diagnosis of Acute respiratory failure with hypoxia.  He presents with the following performance deficits affecting function are weakness, impaired self care skills, impaired functional mobilty, gait instability, impaired endurance, impaired balance, decreased safety awareness, decreased coordination, decreased ROM. Patient tolerated treatment session and was motivated to complete tasks. Patient and son were reinforced with the benefits of the patient performing his own tasks (for example, LE dressing and feeding) in order to promote maintaining strength and functional independence  (instead of the son jumping in and doing things for him). Patient continues to benefit from therapy in order to improve his functional impairments, reduce burden of care, and ensure safety prior to discharge.     Rehab Prognosis:  Good; patient would benefit from acute skilled OT services to address these deficits and reach maximum level of function.       Plan:     Patient to be seen 3 x/week to address the above listed problems via self-care/home management, therapeutic activities, therapeutic exercises  · Plan of Care Reviewed with: patient, son    This Plan of care has been discussed with the patient who was involved in its development and understands and is in agreement with the identified goals and treatment plan    GOALS:   Multidisciplinary Problems     Occupational Therapy Goals        Problem: Occupational Therapy Goal    Goal Priority Disciplines Outcome Interventions   Occupational Therapy Goal     OT, PT/OT Ongoing (interventions implemented as appropriate)    Description:  Goals to be met by: 12/2     Patient will increase functional independence with ADLs by performing:    UE Dressing with Contact Guard Assistance.  LE Dressing with Minimal Assistance.  Grooming while seated with Contact Guard Assistance.  Toileting from toilet with Minimal Assistance for hygiene and clothing management.   Bathing from  shower chair/bench with Minimal Assistance.                      Time Tracking:     OT Date of Treatment: 11/23/18  OT Start Time: 0902  OT Stop Time: 0931  OT Total Time (min): 29 min    Billable Minutes:Self Care/Home Management 29    SPENCER Yanez  11/23/2018

## 2018-11-23 NOTE — PLAN OF CARE
Problem: Patient Care Overview  Goal: Plan of Care Review  Outcome: Ongoing (interventions implemented as appropriate)  Pt worked with PT/OT today.  Pt tolerated being up in chair well.  Family remains at bedside and assists patient with needs.  UA waiting to be collected.  Explained to family about the collection of the urine.  Pt's c/o urinary/bladder pain was minimal this shift, tylenol PRN administered.  Pt remains on 2L of oxygen via nasal cannula, pt o2 sats remain above 93%.  No distress noted at this time.

## 2018-11-23 NOTE — PROGRESS NOTES
Progress Note  [facility] Medicine    Primary Team: [facility]  Admit Date: 11/16/2018   Length of Stay:  LOS: 6 days   SUBJECTIVE:   Reason for Admission:  Acute respiratory failure with hypoxia    HPI:  Zbigniew Forman is a 89M [employer]  with HTN who presents for evaluation of SOB and worsening LE edema. He states that his SOB started 2-3 weeks ago, and acutely worsened over the last few days prompting him to present to the ED. Family and patient report orthopnea, but patient insists of sleeping flat. He does report some cough and wheeze, but no fever or recent sick contacts. He does not use oxygen at home and lives with his daughter. He denies any chest pain, no syncope,  He does report some dizziness when he moves around too much. No recent sick contacts. He denies every being told that he has issues with HF. He lost his wife of 74 years in May. 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 He denies history of COPD. He did have relief with breathing treatment.     Hospital Course:  Mr. Forman was admitted to [facility] medicine on 11/17 for ADCHF. He was diuresed on IV lasix and transitioned to oral lasix on 11/18. Due to concern for new infiltrate on CXR and productive cough, antibiotics were started for presumptive PNA. Procal, LA and WBC resulted wnl, but patient felt subjectively improved and decision was made to complete 5d course (Levaquin). PT/OT were ordered for post-discharge needs, now recommending SNF.     Interval history:    No acute events overnight.  He still has urethral irritation from prior Catherine trauma.  No dysuria or difficulty initiating streaming; condom cath in place.    Review of Systems:  Constitutional: no fever or chills  Respiratory: no cough or shortness of breath  Cardiovascular: no chest pain or palpitations  Gastrointestinal: no nausea or vomiting, no abdominal pain or change in bowel habits  Musculoskeletal: no arthralgias or myalgias     OBJECTIVE:      Temp:  [96.7 °F (35.9 °C)-99.3 °F (37.4 °C)]   Pulse:  [79-98]   Resp:  [16-18]   BP: (106-139)/(55-74)   SpO2:  [91 %-97 %]  Body mass index is 26.15 kg/m².  Intake/Outake:  This Shift:  I/O this shift:  In: -   Out: 525 [Urine:525]    Net I/O past 24h:     Intake/Output Summary (Last 24 hours) at 11/23/2018 1104  Last data filed at 11/23/2018 0900  Gross per 24 hour   Intake --   Output 850 ml   Net -850 ml             Physical Exam:  Gen- well-developed, well-nourished, NAD  CVS- S1 and S2 present, RRR  Resp- expiratory wheezes b/l, no work of breathing  Abd- BS+, soft, NT, ND  Ext- no clubbing, cyanosis, or edema    Laboratory:  CBC/Anemia Labs: Coags:    Recent Labs   Lab 11/16/18 2229 11/17/18  0546 11/18/18  0738   WBC 11.04 9.54 8.12   HGB 12.8* 11.0* 11.5*   HCT 42.0 35.4* 36.1*   * 288 295   MCV 87 87 86   RDW 13.8 13.7 13.7    Recent Labs   Lab 11/16/18 2229   INR 1.1        Chemistries:   Recent Labs   Lab 11/16/18 2229 11/17/18  0546 11/18/18  0738  11/21/18  0551 11/22/18  0441 11/23/18  0357    139 142   < > 143 141 140   K 4.8 4.4 4.5   < > 4.6 3.9 4.2    106 105   < > 107 107 105   CO2 26 27 25   < > 28 27 27   BUN 29 26 29   < > 31* 31* 31*   CREATININE 1.3 1.1 1.3   < > 1.2 1.1 1.0   CALCIUM 9.1 8.3* 8.7   < > 8.3* 8.0* 8.6*   PROT 7.5  --   --   --   --   --   --    BILITOT 0.4  --   --   --   --   --   --    ALKPHOS 69  --   --   --   --   --   --    ALT 13  --   --   --   --   --   --    AST 17  --   --   --   --   --   --    MG  --  2.1 2.1  --   --   --   --    PHOS  --  3.0 3.8  --   --   --   --     < > = values in this interval not displayed.        Medications:  Scheduled Meds:   amLODIPine  5 mg Oral Daily    enoxaparin  40 mg Subcutaneous Daily    levalbuterol  0.63 mg Nebulization Q8H    lisinopril  20 mg Oral Daily    polyethylene glycol  17 g Oral Daily                             Continuous Infusions:  PRN Meds:.acetaminophen, acetaminophen,  acetaminophen, bisacodyl, dextrose 50%, dextrose 50%, glucagon (human recombinant), glucose, glucose, ondansetron, ondansetron, ramelteon, sodium chloride 0.9%     ASSESSMENT/PLAN:     * Acute respiratory failure with hypoxia     - presentation clinically consistent with decompensated HF; BNP elevated, CXR with edema, overloaded on exam  - on 3L NC, weaning as tolerated  - lasix 40 mg IVP BID, transitioned to oral lasix 40 mg daily  - Continue lisinopril, amlodipine  - strict I/Os, daily weights, fluid restriction, tele  - concerned about possible CAP, completed treatment with Levaquin.      DNR (do not resuscitate)     - discussed with family and patient      Elevated troponin     - chronically elevated  - trending flat, no EKG change, no CP      Hypertension associated with diabetes     - labile, and lower yesterday morning  -changed Amlodipine to 5mg and Lisinopril to 20mg; tolerating this well      Chronic kidney disease, stage III (moderate)     - chronic and stable      Controlled type 2 diabetes mellitus without complication, without long-term current use of insulin     - diet controlled, A1c 6.5 08/2018  - low dose SSI for now     Urethral/Penile pain  -2/2 whiteside trauma  -treating with PRN Tylenol  -check UA to ensure no UTI    DVT ppx- Lovenox  CODE Status- FULL    Dispo- pending SNF placement; daughter prefers [facility] but will look at list of other facilities vs taking pt home pending progress    Nathalie Briceño MD  Hospital Medicine Staff

## 2018-11-23 NOTE — PLAN OF CARE
Problem: Occupational Therapy Goal  Goal: Occupational Therapy Goal  Goals to be met by: 12/2     Patient will increase functional independence with ADLs by performing:    UE Dressing with Contact Guard Assistance.  LE Dressing with Minimal Assistance.  Grooming while seated with Contact Guard Assistance.  Toileting from toilet with Minimal Assistance for hygiene and clothing management.   Bathing from  shower chair/bench with Minimal Assistance.     Outcome: Ongoing (interventions implemented as appropriate)  Patient's goals are appropriate.   SPENCER Yanez  11/23/2018

## 2018-11-23 NOTE — PLAN OF CARE
11/23/18 1444   Discharge Reassessment   Assessment Type Discharge Planning Reassessment   Do you have any problems affording any of your prescribed medications? No   Discharge Plan A Skilled Nursing Facility   Discharge Plan B Home with family;Home Health   Anticipated Discharge Disposition SNF   Can the patient answer the patient profile reliably? Yes, cognitively intact   How does the patient rate their overall health at the present time? Good   Describe the patient's ability to walk at the present time. Major restrictions/daily assistance from another person   How often would a person be available to care for the patient? Whenever needed   Number of comorbid conditions (as recorded on the chart) Five or more   During the past month, has the patient often been bothered by feeling down, depressed or hopeless? No   During the past month, has the patient often been bothered by little interest or pleasure in doing things? No   Post-Acute Status   Post-Acute Authorization Placement   Post-Acute Placement Status Referrals Sent

## 2018-11-24 LAB
ANION GAP SERPL CALC-SCNC: 10 MMOL/L
BUN SERPL-MCNC: 33 MG/DL
CALCIUM SERPL-MCNC: 8.5 MG/DL
CHLORIDE SERPL-SCNC: 108 MMOL/L
CO2 SERPL-SCNC: 23 MMOL/L
CREAT SERPL-MCNC: 0.9 MG/DL
EST. GFR  (AFRICAN AMERICAN): >60 ML/MIN/1.73 M^2
EST. GFR  (NON AFRICAN AMERICAN): >60 ML/MIN/1.73 M^2
GLUCOSE SERPL-MCNC: 126 MG/DL
POTASSIUM SERPL-SCNC: 4.7 MMOL/L
SODIUM SERPL-SCNC: 141 MMOL/L

## 2018-11-24 PROCEDURE — 11000001 HC ACUTE MED/SURG PRIVATE ROOM

## 2018-11-24 PROCEDURE — 80048 BASIC METABOLIC PNL TOTAL CA: CPT

## 2018-11-24 PROCEDURE — 25000242 PHARM REV CODE 250 ALT 637 W/ HCPCS: Performed by: HOSPITALIST

## 2018-11-24 PROCEDURE — 94761 N-INVAS EAR/PLS OXIMETRY MLT: CPT

## 2018-11-24 PROCEDURE — 63600175 PHARM REV CODE 636 W HCPCS: Performed by: PHYSICIAN ASSISTANT

## 2018-11-24 PROCEDURE — 99231 SBSQ HOSP IP/OBS SF/LOW 25: CPT | Mod: ,,, | Performed by: HOSPITALIST

## 2018-11-24 PROCEDURE — 25000003 PHARM REV CODE 250: Performed by: HOSPITALIST

## 2018-11-24 PROCEDURE — 27000221 HC OXYGEN, UP TO 24 HOURS

## 2018-11-24 PROCEDURE — 94640 AIRWAY INHALATION TREATMENT: CPT

## 2018-11-24 PROCEDURE — 36415 COLL VENOUS BLD VENIPUNCTURE: CPT

## 2018-11-24 RX ORDER — IBUPROFEN 400 MG/1
400 TABLET ORAL EVERY 6 HOURS PRN
Status: DISCONTINUED | OUTPATIENT
Start: 2018-11-24 | End: 2018-11-26 | Stop reason: HOSPADM

## 2018-11-24 RX ORDER — ACETAMINOPHEN 325 MG/1
650 TABLET ORAL EVERY 4 HOURS PRN
Status: DISCONTINUED | OUTPATIENT
Start: 2018-11-24 | End: 2018-11-26 | Stop reason: HOSPADM

## 2018-11-24 RX ORDER — ACETAMINOPHEN 325 MG/1
650 TABLET ORAL EVERY 4 HOURS PRN
Status: DISCONTINUED | OUTPATIENT
Start: 2018-11-24 | End: 2018-11-24

## 2018-11-24 RX ADMIN — AMLODIPINE BESYLATE 5 MG: 5 TABLET ORAL at 09:11

## 2018-11-24 RX ADMIN — LISINOPRIL 20 MG: 20 TABLET ORAL at 09:11

## 2018-11-24 RX ADMIN — LEVALBUTEROL HYDROCHLORIDE 0.63 MG: 0.63 SOLUTION RESPIRATORY (INHALATION) at 04:11

## 2018-11-24 RX ADMIN — LEVALBUTEROL HYDROCHLORIDE 0.63 MG: 0.63 SOLUTION RESPIRATORY (INHALATION) at 08:11

## 2018-11-24 NOTE — PLAN OF CARE
Problem: Patient Care Overview  Goal: Plan of Care Review  Pt in bed with dtr at bedside no acute distresss no                                                                                                                                                                                                                                                                                                                                                                                                                                                                                                                                                                                                                                                                                                                                                                                                                                                                                                                                                                                                                                                                                            [id number] ---------------------------------------------------------------------------------------------                                                                                                                                                                                                                                                                        Pt in bed awake and alert no acute distress noted pt c/o pain 8/10 medicated with 1gram of tylenol per md order and family request pt/dtr educated on fall risk and pain medication. Pt and dtr verbalized full understanding.

## 2018-11-24 NOTE — PLAN OF CARE
Problem: Fall Risk (Adult)  Goal: Identify Related Risk Factors and Signs and Symptoms  Related risk factors and signs and symptoms are identified upon initiation of Human Response Clinical Practice Guideline (CPG)  Outcome: Ongoing (interventions implemented as appropriate)  Pt. AAOx4. No complaints of pain. No signs or symptoms of distress/discomfort noted. Meds given as ordered. 2 person assist. Condom cath intact.

## 2018-11-24 NOTE — PROGRESS NOTES
Progress Note  Hospital Medicine    [place] [facility]  Admit Date: 11/16/2018   Length of Stay:  LOS: 7 days   SUBJECTIVE:   Reason for Admission:  Acute respiratory failure with hypoxia    HPI:  Zbigniew Forman is a 89M [employer]  with HTN who presents for evaluation of SOB and worsening LE edema. He states that his SOB started 2-3 weeks ago, and acutely worsened over the last few days prompting him to present to the ED. Family and patient report orthopnea, but patient insists of sleeping flat. He does report some cough and wheeze, but no fever or recent sick contacts. He does not use oxygen at home and lives with his daughter. He denies any chest pain, no syncope,  He does report some dizziness when he moves around too much. No recent sick contacts. He denies every being told that he has issues with HF. He lost his wife of 74 years in May. 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 He denies history of COPD. He did have relief with breathing treatment.     Hospital Course:  Mr. Forman was admitted to hospital medicine on 11/17 for ADCHF. He was diuresed on IV lasix and transitioned to oral lasix on 11/18. Due to concern for new infiltrate on CXR and productive cough, antibiotics were started for presumptive PNA. Procal, LA and WBC resulted wnl, but patient felt subjectively improved and decision was made to complete 5d course (Levaquin). PT/OT were ordered for post-discharge needs, now recommending SNF.     Interval history:    No acute events overnight.  Discussed that UA clear, will try Ibuprofen for urethral irritation.  Feels wheezing is improving.    Review of Systems:  Constitutional: no fever or chills  Respiratory: no cough or shortness of breath  Cardiovascular: no chest pain or palpitations  Gastrointestinal: no nausea or vomiting, no abdominal pain or change in bowel habits  Musculoskeletal: no arthralgias or myalgias     OBJECTIVE:     Temp:  [96.1 °F (35.6 °C)-98.8 °F  (37.1 °C)]   Pulse:  [76-95]   Resp:  [10-22]   BP: (116-144)/(55-68)   SpO2:  [92 %-99 %]  Body mass index is 24.67 kg/m².  Intake/Outake:  This Shift:  No intake/output data recorded.    Net I/O past 24h:     Intake/Output Summary (Last 24 hours) at 11/24/2018 1142  Last data filed at 11/24/2018 0700  Gross per 24 hour   Intake 120 ml   Output 400 ml   Net -280 ml             Physical Exam:  Gen- well-developed, well-nourished, NAD  CVS- S1 and S2 present, RRR  Resp- expiratory wheezes b/l, no work of breathing  Abd- BS+, soft, NT, ND  Ext- no clubbing, cyanosis, or edema    Laboratory:  CBC/Anemia Labs: Coags:    Recent Labs   Lab 11/18/18  0738   WBC 8.12   HGB 11.5*   HCT 36.1*      MCV 86   RDW 13.7    No results for input(s): PT, INR, APTT in the last 168 hours.     Chemistries:   Recent Labs   Lab 11/18/18  0738  11/22/18  0441 11/23/18  0357 11/24/18  0406      < > 141 140 141   K 4.5   < > 3.9 4.2 4.7      < > 107 105 108   CO2 25   < > 27 27 23   BUN 29   < > 31* 31* 33*   CREATININE 1.3   < > 1.1 1.0 0.9   CALCIUM 8.7   < > 8.0* 8.6* 8.5*   MG 2.1  --   --   --   --    PHOS 3.8  --   --   --   --     < > = values in this interval not displayed.        Medications:  Scheduled Meds:   amLODIPine  5 mg Oral Daily    enoxaparin  40 mg Subcutaneous Daily    levalbuterol  0.63 mg Nebulization Q8H    lisinopril  20 mg Oral Daily    polyethylene glycol  17 g Oral Daily                             Continuous Infusions:  PRN Meds:.acetaminophen, bisacodyl, dextrose 50%, dextrose 50%, glucagon (human recombinant), glucose, glucose, ibuprofen, ondansetron, ondansetron, ramelteon, sodium chloride 0.9%     ASSESSMENT/PLAN:     * Acute respiratory failure with hypoxia     - presentation clinically consistent with decompensated HF; BNP elevated, CXR with edema, overloaded on exam  - on 3L NC, weaning as tolerated  - lasix 40 mg IVP BID, transitioned to oral lasix 40 mg daily  - Continue  lisinopril, amlodipine  - strict I/Os, daily weights, fluid restriction, tele  - concerned about possible CAP, completed treatment with Levaquin.      DNR (do not resuscitate)     - discussed with family and patient      Elevated troponin     - chronically elevated  - trending flat, no EKG change, no CP      Hypertension associated with diabetes     - labile  -changed Amlodipine to 5mg and Lisinopril to 20mg; tolerating this well      Chronic kidney disease, stage III (moderate)     - chronic and stable      Controlled type 2 diabetes mellitus without complication, without long-term current use of insulin     - diet controlled, A1c 6.5 08/2018  - low dose SSI for now     Urethral/Penile pain  -2/2 whiteside trauma  -treating with PRN Tylenol, add Ibuprofen  -UA clear    DVT ppx- Lovenox  CODE Status- FULL    Dispo- pending SNF placement; daughter prefers [facility] SN[facility] but will look at list of other facilities vs taking pt home pending progress    Nathalie Briceño MD  Hospital Medicine Staff

## 2018-11-25 LAB
ANION GAP SERPL CALC-SCNC: 9 MMOL/L
BUN SERPL-MCNC: 30 MG/DL
CALCIUM SERPL-MCNC: 8.4 MG/DL
CHLORIDE SERPL-SCNC: 106 MMOL/L
CO2 SERPL-SCNC: 25 MMOL/L
CREAT SERPL-MCNC: 0.8 MG/DL
EST. GFR  (AFRICAN AMERICAN): >60 ML/MIN/1.73 M^2
EST. GFR  (NON AFRICAN AMERICAN): >60 ML/MIN/1.73 M^2
GLUCOSE SERPL-MCNC: 115 MG/DL
POTASSIUM SERPL-SCNC: 4.5 MMOL/L
SODIUM SERPL-SCNC: 140 MMOL/L

## 2018-11-25 PROCEDURE — 25000003 PHARM REV CODE 250: Performed by: HOSPITALIST

## 2018-11-25 PROCEDURE — 63600175 PHARM REV CODE 636 W HCPCS: Performed by: PHYSICIAN ASSISTANT

## 2018-11-25 PROCEDURE — 36415 COLL VENOUS BLD VENIPUNCTURE: CPT

## 2018-11-25 PROCEDURE — 27000221 HC OXYGEN, UP TO 24 HOURS

## 2018-11-25 PROCEDURE — 94640 AIRWAY INHALATION TREATMENT: CPT

## 2018-11-25 PROCEDURE — 25000242 PHARM REV CODE 250 ALT 637 W/ HCPCS: Performed by: HOSPITALIST

## 2018-11-25 PROCEDURE — 25000003 PHARM REV CODE 250: Performed by: PHYSICIAN ASSISTANT

## 2018-11-25 PROCEDURE — 80048 BASIC METABOLIC PNL TOTAL CA: CPT

## 2018-11-25 PROCEDURE — 99231 SBSQ HOSP IP/OBS SF/LOW 25: CPT | Mod: ,,, | Performed by: HOSPITALIST

## 2018-11-25 PROCEDURE — 11000001 HC ACUTE MED/SURG PRIVATE ROOM

## 2018-11-25 PROCEDURE — 94761 N-INVAS EAR/PLS OXIMETRY MLT: CPT

## 2018-11-25 RX ADMIN — AMLODIPINE BESYLATE 5 MG: 5 TABLET ORAL at 09:11

## 2018-11-25 RX ADMIN — LEVALBUTEROL HYDROCHLORIDE 0.63 MG: 0.63 SOLUTION RESPIRATORY (INHALATION) at 12:11

## 2018-11-25 RX ADMIN — LEVALBUTEROL HYDROCHLORIDE 0.63 MG: 0.63 SOLUTION RESPIRATORY (INHALATION) at 07:11

## 2018-11-25 RX ADMIN — LISINOPRIL 20 MG: 20 TABLET ORAL at 09:11

## 2018-11-25 RX ADMIN — ACETAMINOPHEN 650 MG: 325 TABLET ORAL at 04:11

## 2018-11-25 RX ADMIN — LEVALBUTEROL HYDROCHLORIDE 0.63 MG: 0.63 SOLUTION RESPIRATORY (INHALATION) at 04:11

## 2018-11-25 RX ADMIN — LEVALBUTEROL HYDROCHLORIDE 0.63 MG: 0.63 SOLUTION RESPIRATORY (INHALATION) at 08:11

## 2018-11-25 RX ADMIN — ENOXAPARIN SODIUM 40 MG: 100 INJECTION SUBCUTANEOUS at 05:11

## 2018-11-25 RX ADMIN — POLYETHYLENE GLYCOL 3350 17 G: 17 POWDER, FOR SOLUTION ORAL at 09:11

## 2018-11-25 NOTE — NURSING
When RN entered room, pt very confused.  O2 out of nose.  RN replaced and got vitals.  By the time RN finished, pt more oriented.  RN reasked orientation questions and pt able to answer appropriately.

## 2018-11-26 ENCOUNTER — HOSPITAL ENCOUNTER (INPATIENT)
Facility: HOSPITAL | Age: 83
LOS: 14 days | Discharge: HOME HEALTH | DRG: 189 | End: 2018-12-10
Attending: INTERNAL MEDICINE | Admitting: INTERNAL MEDICINE
Payer: MEDICARE

## 2018-11-26 VITALS
OXYGEN SATURATION: 98 % | RESPIRATION RATE: 16 BRPM | WEIGHT: 162.25 LBS | HEART RATE: 71 BPM | BODY MASS INDEX: 24.59 KG/M2 | HEIGHT: 68 IN | DIASTOLIC BLOOD PRESSURE: 61 MMHG | SYSTOLIC BLOOD PRESSURE: 117 MMHG | TEMPERATURE: 97 F

## 2018-11-26 DIAGNOSIS — R52 PAIN: ICD-10-CM

## 2018-11-26 DIAGNOSIS — I15.2 HYPERTENSION ASSOCIATED WITH DIABETES: ICD-10-CM

## 2018-11-26 DIAGNOSIS — I50.30 HEART FAILURE WITH PRESERVED EJECTION FRACTION: Primary | ICD-10-CM

## 2018-11-26 DIAGNOSIS — G47.00 INSOMNIA, UNSPECIFIED TYPE: ICD-10-CM

## 2018-11-26 DIAGNOSIS — R35.0 URINARY FREQUENCY: ICD-10-CM

## 2018-11-26 DIAGNOSIS — Z78.9 TAKES DIETARY SUPPLEMENTS: ICD-10-CM

## 2018-11-26 DIAGNOSIS — J96.01 ACUTE RESPIRATORY FAILURE WITH HYPOXIA: ICD-10-CM

## 2018-11-26 DIAGNOSIS — N18.30 CHRONIC KIDNEY DISEASE, STAGE III (MODERATE): ICD-10-CM

## 2018-11-26 DIAGNOSIS — E11.59 HYPERTENSION ASSOCIATED WITH DIABETES: ICD-10-CM

## 2018-11-26 DIAGNOSIS — E11.9 CONTROLLED TYPE 2 DIABETES MELLITUS WITHOUT COMPLICATION, WITHOUT LONG-TERM CURRENT USE OF INSULIN: ICD-10-CM

## 2018-11-26 DIAGNOSIS — R11.0 NAUSEA: ICD-10-CM

## 2018-11-26 DIAGNOSIS — K59.00 CONSTIPATION, UNSPECIFIED CONSTIPATION TYPE: ICD-10-CM

## 2018-11-26 DIAGNOSIS — H40.029 OPEN ANGLE WITH BORDERLINE FINDINGS, HIGH RISK, UNSPECIFIED LATERALITY: ICD-10-CM

## 2018-11-26 DIAGNOSIS — N39.46 MIXED STRESS AND URGE URINARY INCONTINENCE: ICD-10-CM

## 2018-11-26 DIAGNOSIS — Z79.01 PROPHYLACTIC USE OF LOW MOLECULAR WEIGHT HEPARIN FOR VENOUS THROMBOEMBOLISM (VTE): ICD-10-CM

## 2018-11-26 LAB
ANION GAP SERPL CALC-SCNC: 7 MMOL/L
ANION GAP SERPL CALC-SCNC: 7 MMOL/L
BUN SERPL-MCNC: 30 MG/DL
BUN SERPL-MCNC: 31 MG/DL
CALCIUM SERPL-MCNC: 8.4 MG/DL
CALCIUM SERPL-MCNC: 8.5 MG/DL
CHLORIDE SERPL-SCNC: 105 MMOL/L
CHLORIDE SERPL-SCNC: 107 MMOL/L
CO2 SERPL-SCNC: 28 MMOL/L
CO2 SERPL-SCNC: 28 MMOL/L
CREAT SERPL-MCNC: 0.8 MG/DL
CREAT SERPL-MCNC: 1.1 MG/DL
EST. GFR  (AFRICAN AMERICAN): >60 ML/MIN/1.73 M^2
EST. GFR  (AFRICAN AMERICAN): >60 ML/MIN/1.73 M^2
EST. GFR  (NON AFRICAN AMERICAN): 55.2 ML/MIN/1.73 M^2
EST. GFR  (NON AFRICAN AMERICAN): >60 ML/MIN/1.73 M^2
GLUCOSE SERPL-MCNC: 118 MG/DL
GLUCOSE SERPL-MCNC: 184 MG/DL
POTASSIUM SERPL-SCNC: 4.9 MMOL/L
POTASSIUM SERPL-SCNC: 5.4 MMOL/L
SODIUM SERPL-SCNC: 140 MMOL/L
SODIUM SERPL-SCNC: 142 MMOL/L

## 2018-11-26 PROCEDURE — 80048 BASIC METABOLIC PNL TOTAL CA: CPT

## 2018-11-26 PROCEDURE — 36415 COLL VENOUS BLD VENIPUNCTURE: CPT

## 2018-11-26 PROCEDURE — 99238 HOSP IP/OBS DSCHRG MGMT 30/<: CPT | Mod: ,,, | Performed by: HOSPITALIST

## 2018-11-26 PROCEDURE — 94640 AIRWAY INHALATION TREATMENT: CPT

## 2018-11-26 PROCEDURE — 25000003 PHARM REV CODE 250: Performed by: PHYSICIAN ASSISTANT

## 2018-11-26 PROCEDURE — 27000221 HC OXYGEN, UP TO 24 HOURS

## 2018-11-26 PROCEDURE — 94761 N-INVAS EAR/PLS OXIMETRY MLT: CPT

## 2018-11-26 PROCEDURE — 25000242 PHARM REV CODE 250 ALT 637 W/ HCPCS: Performed by: HOSPITALIST

## 2018-11-26 PROCEDURE — 97116 GAIT TRAINING THERAPY: CPT

## 2018-11-26 PROCEDURE — 80048 BASIC METABOLIC PNL TOTAL CA: CPT | Mod: 91

## 2018-11-26 PROCEDURE — 97110 THERAPEUTIC EXERCISES: CPT

## 2018-11-26 PROCEDURE — 63600175 PHARM REV CODE 636 W HCPCS: Performed by: PHYSICIAN ASSISTANT

## 2018-11-26 PROCEDURE — 11000004 HC SNF PRIVATE

## 2018-11-26 PROCEDURE — 97530 THERAPEUTIC ACTIVITIES: CPT

## 2018-11-26 PROCEDURE — 25000003 PHARM REV CODE 250: Performed by: HOSPITALIST

## 2018-11-26 RX ORDER — POLYETHYLENE GLYCOL 3350 17 G/17G
17 POWDER, FOR SOLUTION ORAL DAILY
Status: DISCONTINUED | OUTPATIENT
Start: 2018-11-27 | End: 2018-12-10 | Stop reason: HOSPADM

## 2018-11-26 RX ORDER — SODIUM CHLORIDE 0.9 % (FLUSH) 0.9 %
5 SYRINGE (ML) INJECTION
Status: CANCELLED | OUTPATIENT
Start: 2018-11-26

## 2018-11-26 RX ORDER — CALCIUM CARBONATE 200(500)MG
500 TABLET,CHEWABLE ORAL 2 TIMES DAILY PRN
Status: DISCONTINUED | OUTPATIENT
Start: 2018-11-26 | End: 2018-12-10 | Stop reason: HOSPADM

## 2018-11-26 RX ORDER — LEVALBUTEROL INHALATION SOLUTION 0.63 MG/3ML
0.63 SOLUTION RESPIRATORY (INHALATION) EVERY 8 HOURS
Status: CANCELLED | OUTPATIENT
Start: 2018-11-26

## 2018-11-26 RX ORDER — AMLODIPINE BESYLATE 5 MG/1
5 TABLET ORAL DAILY
Status: CANCELLED | OUTPATIENT
Start: 2018-11-27

## 2018-11-26 RX ORDER — GLUCAGON 1 MG
1 KIT INJECTION
Status: CANCELLED | OUTPATIENT
Start: 2018-11-26

## 2018-11-26 RX ORDER — AMOXICILLIN 250 MG
1 CAPSULE ORAL 2 TIMES DAILY
Status: DISCONTINUED | OUTPATIENT
Start: 2018-11-26 | End: 2018-12-10 | Stop reason: HOSPADM

## 2018-11-26 RX ORDER — GLUCAGON 1 MG
1 KIT INJECTION
Status: DISCONTINUED | OUTPATIENT
Start: 2018-11-26 | End: 2018-11-27

## 2018-11-26 RX ORDER — POLYETHYLENE GLYCOL 3350 17 G/17G
17 POWDER, FOR SOLUTION ORAL DAILY
Status: CANCELLED | OUTPATIENT
Start: 2018-11-27

## 2018-11-26 RX ORDER — BISACODYL 10 MG
10 SUPPOSITORY, RECTAL RECTAL DAILY PRN
Status: CANCELLED | OUTPATIENT
Start: 2018-11-26

## 2018-11-26 RX ORDER — ACETAMINOPHEN 325 MG/1
650 TABLET ORAL EVERY 4 HOURS PRN
Status: DISCONTINUED | OUTPATIENT
Start: 2018-11-26 | End: 2018-12-10 | Stop reason: HOSPADM

## 2018-11-26 RX ORDER — BISACODYL 10 MG
10 SUPPOSITORY, RECTAL RECTAL DAILY PRN
Status: DISCONTINUED | OUTPATIENT
Start: 2018-11-26 | End: 2018-12-10 | Stop reason: HOSPADM

## 2018-11-26 RX ORDER — AMOXICILLIN 250 MG
1 CAPSULE ORAL 2 TIMES DAILY
Status: CANCELLED | OUTPATIENT
Start: 2018-11-26

## 2018-11-26 RX ORDER — IBUPROFEN 400 MG/1
400 TABLET ORAL EVERY 6 HOURS PRN
Status: CANCELLED | OUTPATIENT
Start: 2018-11-26

## 2018-11-26 RX ORDER — IBUPROFEN 200 MG
16 TABLET ORAL
Status: CANCELLED | OUTPATIENT
Start: 2018-11-26

## 2018-11-26 RX ORDER — ENOXAPARIN SODIUM 100 MG/ML
40 INJECTION SUBCUTANEOUS EVERY 24 HOURS
Status: CANCELLED | OUTPATIENT
Start: 2018-11-26

## 2018-11-26 RX ORDER — RAMELTEON 8 MG/1
8 TABLET ORAL NIGHTLY PRN
Status: CANCELLED | OUTPATIENT
Start: 2018-11-26

## 2018-11-26 RX ORDER — ONDANSETRON 8 MG/1
8 TABLET, ORALLY DISINTEGRATING ORAL EVERY 8 HOURS PRN
Status: DISCONTINUED | OUTPATIENT
Start: 2018-11-26 | End: 2018-12-10 | Stop reason: HOSPADM

## 2018-11-26 RX ORDER — ONDANSETRON 8 MG/1
8 TABLET, ORALLY DISINTEGRATING ORAL EVERY 8 HOURS PRN
Status: CANCELLED | OUTPATIENT
Start: 2018-11-26

## 2018-11-26 RX ORDER — AMLODIPINE BESYLATE 5 MG/1
5 TABLET ORAL DAILY
Status: DISCONTINUED | OUTPATIENT
Start: 2018-11-27 | End: 2018-12-10 | Stop reason: HOSPADM

## 2018-11-26 RX ORDER — IBUPROFEN 400 MG/1
400 TABLET ORAL EVERY 6 HOURS PRN
Status: DISCONTINUED | OUTPATIENT
Start: 2018-11-26 | End: 2018-11-27

## 2018-11-26 RX ORDER — IBUPROFEN 200 MG
16 TABLET ORAL
Status: DISCONTINUED | OUTPATIENT
Start: 2018-11-26 | End: 2018-11-27

## 2018-11-26 RX ORDER — LEVALBUTEROL INHALATION SOLUTION 0.63 MG/3ML
0.63 SOLUTION RESPIRATORY (INHALATION) EVERY 8 HOURS
Status: DISCONTINUED | OUTPATIENT
Start: 2018-11-27 | End: 2018-12-04

## 2018-11-26 RX ORDER — RAMELTEON 8 MG/1
8 TABLET ORAL NIGHTLY PRN
Status: DISCONTINUED | OUTPATIENT
Start: 2018-11-26 | End: 2018-12-10 | Stop reason: HOSPADM

## 2018-11-26 RX ORDER — ACETAMINOPHEN 325 MG/1
650 TABLET ORAL EVERY 4 HOURS PRN
Status: CANCELLED | OUTPATIENT
Start: 2018-11-26

## 2018-11-26 RX ORDER — ONDANSETRON 2 MG/ML
4 INJECTION INTRAMUSCULAR; INTRAVENOUS EVERY 8 HOURS PRN
Status: CANCELLED | OUTPATIENT
Start: 2018-11-26

## 2018-11-26 RX ORDER — ENOXAPARIN SODIUM 100 MG/ML
40 INJECTION SUBCUTANEOUS EVERY 24 HOURS
Status: DISCONTINUED | OUTPATIENT
Start: 2018-11-27 | End: 2018-12-10 | Stop reason: HOSPADM

## 2018-11-26 RX ORDER — IBUPROFEN 200 MG
24 TABLET ORAL
Status: CANCELLED | OUTPATIENT
Start: 2018-11-26

## 2018-11-26 RX ORDER — CALCIUM CARBONATE 200(500)MG
500 TABLET,CHEWABLE ORAL 2 TIMES DAILY PRN
Status: CANCELLED | OUTPATIENT
Start: 2018-11-26

## 2018-11-26 RX ORDER — IBUPROFEN 200 MG
24 TABLET ORAL
Status: DISCONTINUED | OUTPATIENT
Start: 2018-11-26 | End: 2018-11-27

## 2018-11-26 RX ADMIN — AMLODIPINE BESYLATE 5 MG: 5 TABLET ORAL at 08:11

## 2018-11-26 RX ADMIN — LEVALBUTEROL HYDROCHLORIDE 0.63 MG: 0.63 SOLUTION RESPIRATORY (INHALATION) at 08:11

## 2018-11-26 RX ADMIN — SODIUM POLYSTYRENE SULFONATE 30 G: 15 SUSPENSION ORAL; RECTAL at 09:11

## 2018-11-26 RX ADMIN — LEVALBUTEROL HYDROCHLORIDE 0.63 MG: 0.63 SOLUTION RESPIRATORY (INHALATION) at 10:11

## 2018-11-26 RX ADMIN — ENOXAPARIN SODIUM 40 MG: 100 INJECTION SUBCUTANEOUS at 04:11

## 2018-11-26 RX ADMIN — LEVALBUTEROL HYDROCHLORIDE 0.63 MG: 0.63 SOLUTION RESPIRATORY (INHALATION) at 04:11

## 2018-11-26 RX ADMIN — LISINOPRIL 20 MG: 20 TABLET ORAL at 08:11

## 2018-11-26 NOTE — PT/OT/SLP PROGRESS
Occupational Therapy   Treatment    Name: Zbigniew Forman  MRN: [id number]  Admitting Diagnosis:  Acute respiratory failure with hypoxia       Recommendations:     Discharge Recommendations: home with home health  Discharge Equipment Recommendations:  none  Barriers to discharge:  None    Subjective     Pain/Comfort:  · Pain Rating 1: 0/10  · Pain Rating Post-Intervention 1: 0/10    Objective:     Communicated with: RN prior to session.  Patient found with sitting up in chair and Condom Catheter upon OT entry to room.    General Precautions: Standard, fall   Orthopedic Precautions:N/A   Braces: N/A     Treatment & Education:  OT presented to patient's room for ADL training to address set goals. Pt's daughter was present. She helped him with all ADLs already, completing them all for him. When OT explained how we can address these to maximize activity tolerance and pt's level of independence/mobility they were not onboard. OT asked specifically what level of assist was needed at home for each ADL. Goals were revised accordingly.  Pt's main goal is to improve walking. Though he was not agreeable to participate in activity he was collaborative in goal revision process. Pt's daughter expressed if he has to dress himself to go to SNF he will but when he gets home we will be helping him with that. Explained that was not the case and that we could work towards new goals stated to improve functional mobility within the home for ADLs.  Based on medical chart review and assist levels needed and pt PLOF from an OT standpoint he would be best suited in home health setting however I do understand from a PT standpoint he has skilled needs.     [facility] 6 Click ADL: 18    Patient left up in chair with all lines intact and call button in reach  Education:    Assessment:     Zbigniew Forman is a 89 y.o. male with a medical diagnosis of Acute respiratory failure with hypoxia.  He presents with the following performance deficits affecting  function are weakness, impaired self care skills, impaired functional mobilty, impaired endurance, impaired balance, decreased upper extremity function, decreased lower extremity function, decreased safety awareness.     Rehab Prognosis:  Good; patient would benefit from acute skilled OT services to address these deficits and reach maximum level of function.       Plan:     Patient to be seen 3 x/week to address the above listed problems via self-care/home management, therapeutic activities, therapeutic exercises, neuromuscular re-education  · Plan of Care Expires:  12/19/2018  · Plan of Care Reviewed with: patient, daughter    This Plan of care has been discussed with the patient who was involved in its development and understands and is in agreement with the identified goals and treatment plan    GOALS:   Multidisciplinary Problems     Occupational Therapy Goals        Problem: Occupational Therapy Goal    Goal Priority Disciplines Outcome Interventions   Occupational Therapy Goal     OT, PT/OT Revised    Description:  Goals to be met by: 12/2     Patient will increase functional independence with ADLs by performing:    UE Dressing with Contact Guard Assistance. Discontinue (pt does not do this at baseline)  LE Dressing with Minimal Assistance. Discontinue (pt's son does this for him at home)  Grooming while seated with Contact Guard Assistance. Upgrade   Grooming while standing with CGA.   Toileting from toilet with Minimal Assistance for hygiene and clothing management.   Bathing from  shower chair/bench with Minimal Assistance. Discontinue (son assists him at home)                       Time Tracking:     OT Date of Treatment: 11/26/18  OT Start Time: 1115  OT Stop Time: 1125  OT Total Time (min): 10 min    Billable Minutes:Therapeutic Activity 10    SPENCER Ochoa  11/26/2018

## 2018-11-26 NOTE — PLAN OF CARE
Zahida from [facility] called stating they have accepted the pt but K+ needs to be addressed. Per Nicola Oneill given this AM. She will have chemistry rechecked. Pending PHN approval. Erin CHOUDHURY updated.

## 2018-11-26 NOTE — PLAN OF CARE
Pt has been accepted to [facility] skilled unit and can be transferred today.  On-call  arranged wheelchair van with [employer] transportation to  pt for 6:45 today.  pts daughter in agreement with arrangements.     11/26/18 9154   Post-Acute Status   Post-Acute Authorization Placement   Post-Acute Placement Status Referrals Sent

## 2018-11-26 NOTE — PLAN OF CARE
Problem: Physical Therapy Goal  Goal: Physical Therapy Goal  Goals to be met by: 18     Patient will increase functional independence with mobility by performin. Supine to sit with Contact Guard Assistance - met  2. Sit to supine with Contact Guard Assistance - met  3. Sit to stand transfer with Contact Guard Assistance - met   Revised: sit to stand transfer with supervision assistance  4. Bed to chair transfer with Minimal Assistance using Rolling Walker  5. Gait  x 50 feet with Contact Guard Assistance using Rolling Walker.   6. Lower extremity exercise program x20 reps per handout, with independence       Outcome: Ongoing (interventions implemented as appropriate)  POC remains appropriate    Thuy Camacho, SPT  18

## 2018-11-26 NOTE — PT/OT/SLP PROGRESS
"Physical Therapy Treatment    Patient Name:  Zbigniew Forman   MRN:  [id number]    Recommendations:     Discharge Recommendations:  nursing facility, skilled   Discharge Equipment Recommendations: none   Barriers to discharge: None    Assessment:     Zbigniew Forman is a 89 y.o. male admitted with a medical diagnosis of Acute respiratory failure with hypoxia.  He presents with the following impairments/functional limitations:  weakness, impaired endurance, impaired functional mobilty, gait instability, impaired balance, decreased lower extremity function, impaired cardiopulmonary response to activity. Pt continues to show impaired endurance and weakness, requiring seated rest break following short ambulation bout. He is motivated to participate in therapy.    Rehab Prognosis:  good; patient would benefit from acute skilled PT services to address these deficits and reach maximum level of function.      Recent Surgery: * No surgery found *      Plan:     During this hospitalization, patient to be seen 3 x/week to address the above listed problems via gait training, therapeutic activities, therapeutic exercises, neuromuscular re-education  · Plan of Care Expires:  12/18/18   Plan of Care Reviewed with: patient, daughter    Subjective     Communicated with nsg prior to session.  Patient found supine upon PT entry to room, agreeable to treatment.      Chief Complaint: no c/o  Patient comments/goals: "I didn't expect to see you today, I'll do better next time"  Pain/Comfort:  · Pain Rating 1: 0/10    Patients cultural, spiritual, Buddhist conflicts given the current situation: none stated    Objective:     Patient found with: oxygen, telemetry, Condom Catheter     General Precautions: Standard, fall   Orthopedic Precautions:N/A   Braces: N/A     Functional Mobility:  · Bed Mobility:     · Supine to Sit: stand by assistance  · Transfers:     · Sit to Stand:  contact guard assistance with rolling walker  · Gait: pt " ambulated in hallway c/RW, O2, and CGA; 20' x1 trial, 35' x1 trial; He demonstrated FFP, L leading step-to gait pattern, decreased marilyn, decreased step length, repeated VC for upright posture and appropriate RW placement with minimal response.      AM-PAC 6 CLICK MOBILITY  Turning over in bed (including adjusting bedclothes, sheets and blankets)?: 3  Sitting down on and standing up from a chair with arms (e.g., wheelchair, bedside commode, etc.): 3  Moving from lying on back to sitting on the side of the bed?: 3  Moving to and from a bed to a chair (including a wheelchair)?: 3  Need to walk in hospital room?: 3  Climbing 3-5 steps with a railing?: 2  Basic Mobility Total Score: 17       Therapeutic Activities and Exercises:   pt completed seated exercises 15x:   -marches   -B LAQ   -B calf/toe raises  Pt was educated on HEP and encouraged to complete seated exercises in chair independently    Patient left up in chair with all lines intact, call button in reach and daughter present..    GOALS:   Multidisciplinary Problems     Physical Therapy Goals        Problem: Physical Therapy Goal    Goal Priority Disciplines Outcome Goal Variances Interventions   Physical Therapy Goal     PT, PT/OT Ongoing (interventions implemented as appropriate)     Description:  Goals to be met by: 18     Patient will increase functional independence with mobility by performin. Supine to sit with Contact Guard Assistance - met  2. Sit to supine with Contact Guard Assistance - met  3. Sit to stand transfer with Contact Guard Assistance - met   Revised: sit to stand transfer with supervision assistance  4. Bed to chair transfer with Minimal Assistance using Rolling Walker  5. Gait  x 50 feet with Contact Guard Assistance using Rolling Walker.   6. Lower extremity exercise program x20 reps per handout, with independence                        Time Tracking:     PT Received On: 18  PT Start Time: 1027     PT Stop Time:  1058  PT Total Time (min): 31 min     Billable Minutes: Gait Training 16 min and Therapeutic Exercise 15 min    Treatment Type: Treatment  PT/PTA: PT     PTA Visit Number: 2     Thuy Camacho, [facility]  11/26/2018

## 2018-11-26 NOTE — PLAN OF CARE
contacted pts daughter today, Anne Pinzon to discuss skilled placement.  She is only interested in [facility] skilled unit.  Pt lives at home with his daughter and son.  He will benefit from hh if snf is not appropriate.  Will follow.

## 2018-11-26 NOTE — PLAN OF CARE
Problem: Patient Care Overview  Goal: Plan of Care Review  Outcome: Ongoing (interventions implemented as appropriate)  VSS. Safety maintained. No c/o pain.

## 2018-11-26 NOTE — PLAN OF CARE
Problem: Occupational Therapy Goal  Goal: Occupational Therapy Goal  Goals to be met by: 12/2     Patient will increase functional independence with ADLs by performing:    UE Dressing with Contact Guard Assistance. Discontinue (pt does not do this at baseline)  LE Dressing with Minimal Assistance. Discontinue (pt's son does this for him at home)  Grooming while seated with Contact Guard Assistance. Upgrade   Grooming while standing with CGA.   Toileting from toilet with Minimal Assistance for hygiene and clothing management.   Bathing from  shower chair/bench with Minimal Assistance. Discontinue (son assists him at home)     Outcome: Revised  Goals revised based on through discussion with pt and family.     Recommend HHOT services.    SPENCER Caal  11/26/2018  Rehab Services

## 2018-11-27 ENCOUNTER — PATIENT OUTREACH (OUTPATIENT)
Dept: ADMINISTRATIVE | Facility: CLINIC | Age: 83
End: 2018-11-27

## 2018-11-27 PROBLEM — I50.30 DIASTOLIC HEART FAILURE: Status: ACTIVE | Noted: 2018-11-27

## 2018-11-27 PROCEDURE — 94761 N-INVAS EAR/PLS OXIMETRY MLT: CPT

## 2018-11-27 PROCEDURE — 97535 SELF CARE MNGMENT TRAINING: CPT

## 2018-11-27 PROCEDURE — 97116 GAIT TRAINING THERAPY: CPT

## 2018-11-27 PROCEDURE — 97802 MEDICAL NUTRITION INDIV IN: CPT

## 2018-11-27 PROCEDURE — 63600175 PHARM REV CODE 636 W HCPCS: Performed by: HOSPITALIST

## 2018-11-27 PROCEDURE — 27000221 HC OXYGEN, UP TO 24 HOURS

## 2018-11-27 PROCEDURE — 25000003 PHARM REV CODE 250: Performed by: HOSPITALIST

## 2018-11-27 PROCEDURE — 99309 SBSQ NF CARE MODERATE MDM 30: CPT | Mod: ,,, | Performed by: NURSE PRACTITIONER

## 2018-11-27 PROCEDURE — 94640 AIRWAY INHALATION TREATMENT: CPT

## 2018-11-27 PROCEDURE — 99305 1ST NF CARE MODERATE MDM 35: CPT | Mod: ,,, | Performed by: INTERNAL MEDICINE

## 2018-11-27 PROCEDURE — 11000004 HC SNF PRIVATE

## 2018-11-27 PROCEDURE — 25000003 PHARM REV CODE 250: Performed by: NURSE PRACTITIONER

## 2018-11-27 PROCEDURE — 97165 OT EVAL LOW COMPLEX 30 MIN: CPT

## 2018-11-27 PROCEDURE — 97110 THERAPEUTIC EXERCISES: CPT

## 2018-11-27 PROCEDURE — 25000242 PHARM REV CODE 250 ALT 637 W/ HCPCS: Performed by: HOSPITALIST

## 2018-11-27 PROCEDURE — 97530 THERAPEUTIC ACTIVITIES: CPT

## 2018-11-27 PROCEDURE — 97162 PT EVAL MOD COMPLEX 30 MIN: CPT

## 2018-11-27 PROCEDURE — 25000003 PHARM REV CODE 250: Performed by: INTERNAL MEDICINE

## 2018-11-27 RX ORDER — LATANOPROST 50 UG/ML
SOLUTION/ DROPS OPHTHALMIC
Qty: 2.5 ML | Refills: 0 | Status: SHIPPED | OUTPATIENT
Start: 2018-11-27 | End: 2018-12-10 | Stop reason: HOSPADM

## 2018-11-27 RX ORDER — LISINOPRIL 10 MG/1
10 TABLET ORAL DAILY
Status: DISCONTINUED | OUTPATIENT
Start: 2018-11-27 | End: 2018-12-10 | Stop reason: HOSPADM

## 2018-11-27 RX ORDER — IPRATROPIUM BROMIDE AND ALBUTEROL SULFATE 2.5; .5 MG/3ML; MG/3ML
3 SOLUTION RESPIRATORY (INHALATION) EVERY 6 HOURS PRN
Status: DISCONTINUED | OUTPATIENT
Start: 2018-11-27 | End: 2018-12-10 | Stop reason: HOSPADM

## 2018-11-27 RX ORDER — LATANOPROST 50 UG/ML
1 SOLUTION/ DROPS OPHTHALMIC NIGHTLY
Status: DISCONTINUED | OUTPATIENT
Start: 2018-11-27 | End: 2018-12-10 | Stop reason: HOSPADM

## 2018-11-27 RX ORDER — FUROSEMIDE 20 MG/1
20 TABLET ORAL DAILY PRN
Status: DISCONTINUED | OUTPATIENT
Start: 2018-11-27 | End: 2018-12-10 | Stop reason: HOSPADM

## 2018-11-27 RX ADMIN — STANDARDIZED SENNA CONCENTRATE AND DOCUSATE SODIUM 1 TABLET: 8.6; 5 TABLET, FILM COATED ORAL at 08:11

## 2018-11-27 RX ADMIN — AMLODIPINE BESYLATE 5 MG: 5 TABLET ORAL at 08:11

## 2018-11-27 RX ADMIN — LISINOPRIL 10 MG: 10 TABLET ORAL at 03:11

## 2018-11-27 RX ADMIN — LEVALBUTEROL HYDROCHLORIDE 0.63 MG: 0.63 SOLUTION RESPIRATORY (INHALATION) at 08:11

## 2018-11-27 RX ADMIN — ENOXAPARIN SODIUM 40 MG: 100 INJECTION SUBCUTANEOUS at 05:11

## 2018-11-27 RX ADMIN — LEVALBUTEROL HYDROCHLORIDE 0.63 MG: 0.63 SOLUTION RESPIRATORY (INHALATION) at 05:11

## 2018-11-27 RX ADMIN — LATANOPROST 1 DROP: 50 SOLUTION OPHTHALMIC at 09:11

## 2018-11-27 NOTE — HOSPITAL COURSE
11/27/18  Patient seen at bedside, daughter is present.  He currently denies pain or SOB.  He is currently wearing supplemental oxygen which he reports he did not wear at home.  Reviewed home medication, hospital medication and current medications with him and his daughter.  Daughter reports he was taking Lasix and Lisinopril at the hospital and did not understand why it was not continued here.  Will restart Lisinopril with holding parameters and add Lasix PRN for weight gain > 4 lbs in 24 hours.  Advised daughter will try to wean oxygen while admitted.  Daughter also requesting no accuchecks as he was not doing at home and his glucose has been fine.  A1c is 6.5 and recent fasting glucoses are stable.  Lastly, discussed EOL care and code status.  Daughter requested this topic not be discussed and patient has not stated his code intentions therefore will update records to indicate full code at this time.    12/3/18  Patient seen at bedside with son.  Patient's son inquires about using uroxatral to prevent bladder leakage.  States was told about this medication in the hospital and prescribed by provider there but has not taken it yet.  Advised son, medications were discussed with his sister upon admit and was told he did not use and therefore did not ordered.  Also explained potential side effects including syncope and orthostatic hypotension.  He said he did not know these effects and agrees to avoid use at this time.  Patient has no other complaints.    12/10: Patient seen at bedside, doing well, has no complaints, discharging home with home health service, answered all questions from the family.

## 2018-11-27 NOTE — DISCHARGE SUMMARY
DISCHARGE SUMMARY  Hospital Medicine    Team: [facility]    Patient Name: Zbigniew Forman  YOB: 1919    Admit Date: 11/16/2018    Discharge Date: 11/26/2018    Discharge Attending Physician: Nathalie Briceño MD     Principal Diagnoses:  Active Hospital Problems    Diagnosis  POA    *Acute respiratory failure with hypoxia [J96.01]  Yes    Elevated troponin [R74.8]  Yes    DNR (do not resuscitate) [Z66]  Yes    Hypertension associated with diabetes [E11.59, I10]  Yes    Chronic kidney disease, stage III (moderate) [N18.3]  Yes    Controlled type 2 diabetes mellitus without complication, without long-term current use of insulin [E11.9]  Yes      Resolved Hospital Problems   No resolved problems to display.       Discharged Condition: stable    HOSPITAL COURSE:      Initial Presentation:  Zbigniew Forman is a 89M II  with HTN who presents for evaluation of SOB and worsening LE edema. He states that his SOB started 2-3 weeks ago, and acutely worsened over the last few days prompting him to present to the ED. Family and patient report orthopnea, but patient insists of sleeping flat. He does report some cough and wheeze, but no fever or recent sick contacts. He does not use oxygen at home and lives with his daughter. He denies any chest pain, no syncope,  He does report some dizziness when he moves around too much. No recent sick contacts. He denies every being told that he has issues with HF. He lost his wife of 74 years in May. 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 He denies history of COPD. He did have relief with breathing treatment.       Course of Principle Problem for Admission:    Mr. Forman was admitted to hospital medicine on 11/17 for ADCHF. He was diuresed on IV lasix and transitioned to oral lasix on 11/18. Due to concern for new infiltrate on CXR and productive cough, antibiotics were started for presumptive PNA. Procal, LA and WBC resulted wnl, but  patient felt subjectively improved and decision was made to complete 5d course (Levaquin). PT/OT were ordered for post-discharge needs, now recommending SNF. Pt was accepted to [facility] and discharged in stable condition.     Other Medical Problems Addressed in the Hospital:    Acute respiratory failure with hypoxia     - presentation clinically consistent with decompensated HF; BNP elevated, CXR with edema, overloaded on exam  - on 3L NC, weaning as tolerated  - lasix 40 mg IVP BID, transitioned to oral lasix 40 mg daily  - Continue lisinopril, amlodipine  - strict I/Os, daily weights, fluid restriction, tele  - concerned about possible CAP, completed treatment with Levaquin.      DNR (do not resuscitate)     - discussed with family and patient      Elevated troponin     - chronically elevated  - trending flat, no EKG change, no CP      Hypertension associated with diabetes     - labile  -changed Amlodipine to 5mg and Lisinopril to 20mg; tolerating this well      Chronic kidney disease, stage III (moderate)     - chronic and stable      Controlled type 2 diabetes mellitus without complication, without long-term current use of insulin     - diet controlled, A1c 6.5 08/2018  - low dose SSI for now      Urethral/Penile pain  -2/2 whiteside trauma  -treating with PRN Tylenol, Ibuprofen  -UA clear    Consults: None    Temp:  [97.3 °F (36.3 °C)-98.8 °F (37.1 °C)] 97.3 °F (36.3 °C)  Pulse:  [71-85] 71  Resp:  [16-20] 16  SpO2:  [94 %-98 %] 98 %  BP: ()/(51-76) 117/61   Gen- well-developed, well-nourished, NAD  CVS- S1 and S2 present, RRR  Resp- minimal expiratory wheezes, no work of breathing  Abd- BS+, soft, NT, ND  Ext- no clubbing, cyanosis, or edema      Last CBC/BMP:    CBC/Anemia Labs: Coags:    No results for input(s): WBC, HGB, HCT, PLT, MCV, RDW, IRON, FERRITIN, RETIC, FOLATE, [id number], OCCULTBLOOD in the last 168 hours.    Invalid input(s): IRONSATURATED No results for input(s): PT, INR, APTT in the last  168 hours.     Chemistries:   Recent Labs   Lab 11/25/18  0516 11/26/18  0457 11/26/18  1344    142 140   K 4.5 5.4* 4.9    107 105   CO2 25 28 28   BUN 30 30 31*   CREATININE 0.8 0.8 1.1   CALCIUM 8.4* 8.5* 8.4*          Significant Diagnostic Studies: as above    Special Treatments/Procedures:   * No surgery found *     Disposition: Skilled Nursing Facility      Future Scheduled Appointments:  Future Appointments   Date Time Provider Department Center   11/29/2018  3:30 PM SUYAPA Stark [facility] IMPRICL [place] PCW   12/3/2018 10:40 AM Wilda Campos MD [facility] CARDIO [place] [facility]   1/16/2019  1:00 PM Chantelle Richey DPM [facility] POD [place]       Discharge Medication List:       Zbigniew Forman   Home Medication Instructions SEVERO:[id number]    Printed on:11/26/18 1900   Medication Information                      albuterol (VENTOLIN HFA) 90 mcg/actuation inhaler  Inhale 2 puffs into the lungs every 6 (six) hours as needed for Wheezing. Rescue             albuterol-ipratropium (DUO-NEB) 2.5 mg-0.5 mg/3 mL nebulizer solution  Take 3 mLs by nebulization every 6 (six) hours as needed for Wheezing. Rescue             alfuzosin (UROXATRAL) 10 mg Tb24  Take 1 tablet (10 mg total) by mouth daily with breakfast.             amLODIPine (NORVASC) 10 MG tablet  Take 1 tablet (10 mg total) by mouth once daily.             furosemide (LASIX) 20 MG tablet  Take 1 tablet (20 mg total) by mouth daily as needed (Leg swelling, weight gain >2-3 lbs in one day or >5lbs in 1 week.).             latanoprost 0.005 % ophthalmic solution  INSTILL 1 DROP IN BOTH EYES EVERY EVENING             levoFLOXacin (LEVAQUIN) 250 MG tablet  Take 1 tablet (250 mg total) by mouth once daily.             lisinopril (PRINIVIL,ZESTRIL) 40 MG tablet  Hold this medication until you see your primary care doctor.                 Patient Instructions:  Discharge Procedure Orders   OXYGEN FOR HOME USE     Order Specific Question Answer  "Comments   Liter Flow 2    Duration Continuous    Qualifying SpO2: 88%    Testing done at: Rest    Route nasal cannula    Portable mode: continuous    Device home concentrator with portable unit    Length of need (in months): 99 mos    Patient condition with qualifying saturation CHF    Height: 5' 8" (1.727 m)    Weight: 78.5 kg (173 lb)    Does patient have medical equipment at home? cane, straight    Does patient have medical equipment at home? walker, rolling    Does patient have medical equipment at home? shower chair    Does patient have medical equipment at home? wheelchair    Does patient have medical equipment at home? bedside commode    Alternative treatment measures have been tried or considered and deemed clinically ineffective. Yes    Vendor: [facility]    Expected Date of Delivery: 11/20/2018      NEBULIZER FOR HOME USE     Order Specific Question Answer Comments   Height: 5' 8" (1.727 m)    Weight: 78.5 kg (173 lb)    Does patient have medical equipment at home? cane, straight    Does patient have medical equipment at home? walker, rolling    Does patient have medical equipment at home? wheelchair    Does patient have medical equipment at home? bedside commode    Length of need (1-99 months): 99    Vendor: [facility]    Expected Date of Delivery: 11/20/2018      [facility]ulatory Referral to  [facility]   Referral Priority: Routine Referral Type: Consultation   Referral Reason: Specialty Services Required   Number of Visits Requested: 1       At the time of discharge patient was told to take all medications as prescribed, to keep all followup appointments, and to call their primary care physician or return to the emergency room if they have any worsening or concerning symptoms.    Signing Physician:  Nathalie Briceño MD  "

## 2018-11-27 NOTE — PROGRESS NOTES
[facility] [place] [facility] - Skilled Nursing Care  Department of [facility] Medicine  Progress Note    Patient Name: Zbigniew Forman  MRN: [id number]  Code Status: Full Code  Admission Date: 11/26/2018  Length of Stay: 1 days  Attending Physician: Juliette Michel MD  Primary Care Provider: Blas Morgan Ii, MD    Subjective:     Principal Problem:Acute respiratory failure with hypoxia    HPI:  Zbigniew Forman is a 89M WWII  with HTN who presented with SOB and worsening LE edema. Upon admission he reported his SOB started 2-3 weeks ago, and acutely worsened over the last few days prompting him to present to the ED. Family and patient report orthopnea, but patient insists of sleeping flat. He reported some cough and wheeze, but no fever or recent sick contacts. He does not use oxygen at home and lives with his daughter. He reported no chest pain and had no syncope.  He does report some dizziness when he moves around too much. No recent sick contacts. He denies every being told that he has issues with HF. He lost his wife of 74 years in May. 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 He denies history of COPD.       Mr. Forman was admitted to [facility] medicine on 11/17 for ADCHF. He was diuresed on IV lasix and transitioned to oral lasix on 11/18. Due to concern for new infiltrate on CXR and productive cough, antibiotics were started for presumptive PNA. Per discharge summary, Procal, LA and WBC resulted wnl, but patient felt subjectively improved and decision was made to complete 5d course (Levaquin). PT/OT consulted and recommended SNF.         Interval History:   11/27/18  Patient seen at bedside, daughter is present.  He currently denies pain or SOB.  He is currently wearing supplemental oxygen which he reports he did not wear at home.  Reviewed home medication, hospital medication and current medications with him and his daughter.  Daughter reports he was taking Lasix and Lisinopril at the hospital and did  not understand why it was not continued here.  Will restart Lisinopril with holding parameters and add Lasix PRN for weight gain > 4 lbs in 24 hours.  Advised daughter will try to wean oxygen while admitted.  Daughter also requesting no accuchecks as he was not doing at home and his glucose has been fine.  A1c is 6.5 and recent fasting glucoses are stable.  Lastly, discussed EOL care and code status.  Daughter requested this topic not be discussed and patient has not stated his code intentions therefore will update records to indicate full code at this time.        Review of Systems   Constitutional: Negative for appetite change, chills, fatigue and fever.   Respiratory: Negative for cough and shortness of breath.    Cardiovascular: Negative for chest pain, palpitations and leg swelling.   Gastrointestinal: Negative for abdominal pain, constipation, diarrhea, nausea and vomiting.   Endocrine: Negative for cold intolerance and heat intolerance.   Genitourinary:        Occasional urinary burning due to repeated whiteside insertions during admission.   Musculoskeletal: Negative for arthralgias and myalgias.   Skin: Negative for rash.   Psychiatric/Behavioral: Negative for confusion.     Objective:     Vital Signs (Most Recent):  Temp: 97.9 °F (36.6 °C) (11/27/18 0729)  Pulse: 80 (11/27/18 0810)  Resp: 18 (11/27/18 0810)  BP: 139/65 (11/27/18 0729)  SpO2: 96 % (11/27/18 0810) Vital Signs (24h Range):  Temp:  [97.9 °F (36.6 °C)] 97.9 °F (36.6 °C)  Pulse:  [71-90] 80  Resp:  [16-22] 18  SpO2:  [96 %-98 %] 96 %  BP: (109-139)/(58-65) 139/65     Weight: 73 kg (160 lb 15 oz)  Body mass index is 24.47 kg/m².    Intake/Output Summary (Last 24 hours) at 11/27/2018 1515  Last data filed at 11/27/2018 1230  Gross per 24 hour   Intake 160 ml   Output --   Net 160 ml      Physical Exam   Constitutional: He is oriented to person, place, and time. He appears well-developed and well-nourished.   Cardiovascular: Normal rate, regular  rhythm, normal heart sounds and intact distal pulses.   No murmur heard.  Pulmonary/Chest: Effort normal and breath sounds normal. No respiratory distress.   Diminished BS to right posterior lobe.  Supplemental oxygen at 2.4 lpm, sats 96%   Abdominal: Soft. Bowel sounds are normal. He exhibits no distension. There is no tenderness.   Musculoskeletal: Normal range of motion. He exhibits no edema or tenderness.   Neurological: He is alert and oriented to person, place, and time.   Skin: Skin is warm and dry. Capillary refill takes less than 2 seconds. No erythema.   Psychiatric: He has a normal mood and affect.       Significant Labs:   BMP:   Recent Labs   Lab 11/26/18  1344   *      K 4.9      CO2 28   BUN 31*   CREATININE 1.1   CALCIUM 8.4*     CBC: No results for input(s): WBC, HGB, HCT, PLT in the last 48 hours.    Significant Imaging: n/a    Assessment/Plan:      * Acute respiratory failure with hypoxia    11/27/18  Currently with supplemental oxygen at 2.5 LPM with sats at 96%.  No reported SOB.  Will add IS with neb treatments and continue Xopenex tid as ordered.  Add Duoneb PRN and monitor.  Will attempt to wean oxygen while admitted to SNF.  Echo completed during recent hospitalization.  EF ~60% with no diastolic dysfunction.  Mild right atrial enlargement   Monitor daily weights and treat with Lasix PRN for weight gain as stated above.     Hypertension associated with diabetes    11/27/18  BP Readings from Last 3 Encounters:   11/27/18 139/65   11/26/18 117/61   11/16/18 118/62     BP is stable, currently on Norvasc 5 mg daily.  Will restart Lisinopril as above and set holding parameters for administration of BP medications.     Chronic kidney disease, stage III (moderate)    11/27/18  Resume ACE for renal protection in light of CHF.  Monitor renal function biweekly.  Last Creatinine is 1.1.  Motrin discontinued as he is no longer using and want to avoid any insult to renal  function.  Avoid nephrotoxins.     Glaucoma suspect, high risk    11/27/18  Will resume home Xalatan gtts.  Patient to follow up with PCP.     Controlled type 2 diabetes mellitus without complication, without long-term current use of insulin    11/27/18  Lab Results   Component Value Date    HGBA1C 6.5 (H) 11/17/2018     No need to do accuchecks as fasting glucose is stable and patient and family are requesting no finger sticks.  Monitor fasting glucose and treat if indicated.         Tuan Rocha NP  Department of Hospital Medicine  [facility] [place] [facility] - Skilled Nursing Care

## 2018-11-27 NOTE — PROGRESS NOTES
" [facility] - Skilled Nursing Care  Adult Nutrition  Progress Note    SUMMARY       Recommendations    Recommendation/Intervention: Recommend 2000 kcal diabetic diet; add boost glucose control daily and protein smoothie daily. RD to follow.   Goals: po >85% to meet EEN/EPN and ONS acceptance  Nutrition Goal Status: new  Communication of RD Recs: other (comment)(POC)    Reason for Assessment    Reason for Assessment: consult  Diagnosis: (acute respiratory failure with hypoxia; debility)  Relevant Medical History: HTN; DM2; CKD3;   Interdisciplinary Rounds: did not attend  General Information Comments: Spoke with pt and daughter, reported fair appetite, PO 50-75% of meals. Intake has been decreased for past 3-4 months. Pt requesting Boost plus daily and protein smoothie at lunch. Reported UBW ~167 lbs; wt loss over past 2 months. NFPE completed on , results below. No N/V/C/D reported at this time, LBM 18. RD to follow up.   Nutrition Discharge Planning: d/c regular diet    Nutrition Risk Screen    Nutrition Risk Screen: no indicators present    Nutrition/Diet History    Patient Reported Diet/Restrictions/Preferences: general  Typical Food/Fluid Intake: eats large breakfast; smaller lunch and dinner  Food Preferences: eggs; grits; oatmeal; sweets  Do you have any cultural, spiritual, Sikhism conflicts, given your current situation?: none reported  Supplemental Drinks or Food Habits: Ensure Enlive  Factors Affecting Nutritional Intake: decreased appetite    Anthropometrics    Temp: 97.9 °F (36.6 °C)  Height Method: Stated  Height: 5' 8" (172.7 cm)  Height (inches): 68 in  Weight Method: Standard Scale  Weight: 73 kg (160 lb 15 oz)  Weight (lb): 160.94 lb  Ideal Body Weight (IBW), Male: 154 lb  % Ideal Body Weight, Male (lb): 104.51 lb  BMI (Calculated): 24.5  BMI Grade: 18.5-24.9 - normal  Weight Loss: unintentional  Usual Body Weight (UBW), k.36 kg  % Usual Body Weight: 95.8   "     Lab/Procedures/Meds    Pertinent Labs Reviewed: reviewed  Pertinent Labs Comments: BUN 31; Glucose 184; Ca 8.4  Pertinent Medications Reviewed: reviewed  Pertinent Medications Comments: amlodipine; enoxaparin; lisinopril; polyethylene glycol; senna-docusate     Physical Findings/Assessment  NFPE completed on 11/27/18  Mild fat loss in triceps area; mild muscle loss in hand area- no malnutrition dx at this time  Overall Physical Appearance: advanced age, loss of subcutaneous fat  Tubes: (-)  Oral/Mouth Cavity: WDL  Skin: intact    Estimated/Assessed Needs    Weight Used For Calorie Calculations: 73 kg (160 lb 15 oz)  Energy Calorie Requirements (kcal): 1650 kcal/d  Energy Need Method: [place]-St Jeor((x1.25 stress factor))  Protein Requirements:  g/d(1.2-1.4 gm/kg)  Weight Used For Protein Calculations: 73 kg (160 lb 15 oz)  Fluid Requirements (mL): 1 mL/kcal or per MD   RDA Method (mL): 1650  CHO Requirement: -      Nutrition Prescription Ordered    Current Diet Order: None-   Nutrition Order Comments: Recommend 2000 kcal diabetic diet   Oral Nutrition Supplement: Boost glucose control and protein smoothie daily    Evaluation of Received Nutrient/Fluid Intake    Energy Calories Required: meeting needs  Protein Required: not meeting needs  Fluid Required: meeting needs  Tolerance: tolerating  % Intake of Estimated Energy Needs: 50 - 75 %  % Meal Intake: 50 - 75 %    Nutrition Risk    Level of Risk/Frequency of Follow-up: low     Assessment and Plan    Inadequate oral intake related to acute respiratory failure as evidence by poor po and reliance on ONS and recent wt loss of ~7lbs.     Monitor and Evaluation    Food and Nutrient Intake: energy intake  Food and Nutrient Adminstration: diet order  Physical Activity and Function: nutrition-related ADLs and IADLs  Anthropometric Measurements: weight, weight change  Biochemical Data, Medical Tests and Procedures: electrolyte and renal panel, lipid profile,  gastrointestinal profile, glucose/endocrine profile, inflammatory profile  Nutrition-Focused Physical Findings: overall appearance     Nutrition Follow-Up    RD Follow-up?: Yes

## 2018-11-27 NOTE — PLAN OF CARE
Problem: Occupational Therapy Goal  Goal: Occupational Therapy Goal  OT evaluation completed on this date

## 2018-11-27 NOTE — PLAN OF CARE
Problem: Physical Therapy Goal  Goal: Physical Therapy Goal  Goals to be met by: 21 days    Patient will increase functional independence with mobility by performin. Supine to sit with Modified [place]  2. Sit to supine with Modified [place]  3. Sit to stand transfer with Supervision  4. Bed to chair transfer with Supervision using Rolling Walker  5. Gait  x 150 feet with Supervision using Rolling Walker.   6. Wheelchair propulsion x150 feet with Supervision using bilateral uppper extremities  7. Ascend/descend 4 stairs with bilateral Handrails Stand-by Assistance using Rolling Walker.   8. Stand for 3 minutes with Supervision using Rolling Walker     Outcome: Ongoing (interventions implemented as appropriate)  LTGs remain appropriate. Pt will continue PT POC.  Sandie Segal, LILLY  2018

## 2018-11-27 NOTE — PLAN OF CARE
Problem: Pressure Ulcer Risk (Bubba Scale) (Adult,Obstetrics,Pediatric)  Intervention: Prevent/Minimize Sheer/Friction Injuries   11/27/18 8585   Positioning   Positioning/Transfer Devices pillows;in use   Skin Interventions   Pressure Reduction Devices Feet on footrest/footstool;Heel offloading device utilized   Pressure Reduction Techniques frequent weight shift encouraged;rest period provided between sit times

## 2018-11-27 NOTE — PLAN OF CARE
11/27/18 0801   Final Note   Assessment Type Final Discharge Note   Anticipated Discharge Disposition SNF   Hospital Follow Up  Appt(s) scheduled? Yes

## 2018-11-27 NOTE — SUBJECTIVE & OBJECTIVE
Interval History:   11/27/18  Patient seen at bedside, daughter is present.  He currently denies pain or SOB.  He is currently wearing supplemental oxygen which he reports he did not wear at home.  Reviewed home medication, hospital medication and current medications with him and his daughter.  Daughter reports he was taking Lasix and Lisinopril at the hospital and did not understand why it was not continued here.  Will restart Lisinopril with holding parameters and add Lasix PRN for weight gain > 4 lbs in 24 hours.  Advised daughter will try to wean oxygen while admitted.  Daughter also requesting no accuchecks as he was not doing at home and his glucose has been fine.  A1c is 6.5 and recent fasting glucoses are stable.  Lastly, discussed EOL care and code status.  Daughter requested this topic not be discussed and patient has not stated his code intentions therefore will update records to indicate full code at this time.        Review of Systems   Constitutional: Negative for appetite change, chills, fatigue and fever.   Respiratory: Negative for cough and shortness of breath.    Cardiovascular: Negative for chest pain, palpitations and leg swelling.   Gastrointestinal: Negative for abdominal pain, constipation, diarrhea, nausea and vomiting.   Endocrine: Negative for cold intolerance and heat intolerance.   Genitourinary:        Occasional urinary burning due to repeated whiteside insertions during admission.   Musculoskeletal: Negative for arthralgias and myalgias.   Skin: Negative for rash.   Psychiatric/Behavioral: Negative for confusion.     Objective:     Vital Signs (Most Recent):  Temp: 97.9 °F (36.6 °C) (11/27/18 0729)  Pulse: 80 (11/27/18 0810)  Resp: 18 (11/27/18 0810)  BP: 139/65 (11/27/18 0729)  SpO2: 96 % (11/27/18 0810) Vital Signs (24h Range):  Temp:  [97.9 °F (36.6 °C)] 97.9 °F (36.6 °C)  Pulse:  [71-90] 80  Resp:  [16-22] 18  SpO2:  [96 %-98 %] 96 %  BP: (109-139)/(58-65) 139/65     Weight: 73 kg  (160 lb 15 oz)  Body mass index is 24.47 kg/m².    Intake/Output Summary (Last 24 hours) at 11/27/2018 1515  Last data filed at 11/27/2018 1230  Gross per 24 hour   Intake 160 ml   Output --   Net 160 ml      Physical Exam   Constitutional: He is oriented to person, place, and time. He appears well-developed and well-nourished.   Cardiovascular: Normal rate, regular rhythm, normal heart sounds and intact distal pulses.   No murmur heard.  Pulmonary/Chest: Effort normal and breath sounds normal. No respiratory distress.   Diminished BS to right posterior lobe.  Supplemental oxygen at 2.4 lpm, sats 96%   Abdominal: Soft. Bowel sounds are normal. He exhibits no distension. There is no tenderness.   Musculoskeletal: Normal range of motion. He exhibits no edema or tenderness.   Neurological: He is alert and oriented to person, place, and time.   Skin: Skin is warm and dry. Capillary refill takes less than 2 seconds. No erythema.   Psychiatric: He has a normal mood and affect.       Significant Labs:   BMP:   Recent Labs   Lab 11/26/18  1344   *      K 4.9      CO2 28   BUN 31*   CREATININE 1.1   CALCIUM 8.4*     CBC: No results for input(s): WBC, HGB, HCT, PLT in the last 48 hours.    Significant Imaging: n/a

## 2018-11-27 NOTE — ASSESSMENT & PLAN NOTE
11/27/18  Lab Results   Component Value Date    HGBA1C 6.5 (H) 11/17/2018     No need to do accuchecks as fasting glucose is stable and patient and family are requesting no finger sticks.  Monitor fasting glucose and treat if indicated.

## 2018-11-27 NOTE — ASSESSMENT & PLAN NOTE
11/27/18  BP Readings from Last 3 Encounters:   11/27/18 139/65   11/26/18 117/61   11/16/18 118/62     BP is stable, currently on Norvasc 5 mg daily.  Will restart Lisinopril as above and set holding parameters for administration of BP medications.

## 2018-11-27 NOTE — NURSING
Pt arrived to unit via w/c per transport services. AAOx4 and stable. Transferred to bed safely. Son at bedside with patient. Will monitor.

## 2018-11-27 NOTE — PLAN OF CARE
Problem: Patient Care Overview  Goal: Plan of Care Review  Outcome: Ongoing (interventions implemented as appropriate)  Inadequate oral intake related to acute respiratory failure as evidence by poor po and reliance on ONS and recent wt loss of ~7lbs.     Recommendation/Intervention: Recommend 2000 kcal diabetic diet; add boost glucose control daily and protein smoothie daily. RD to follow.   Goals: po >85% to meet EEN/EPN and ONS acceptance  Nutrition Goal Status: new  Communication of RD Recs: other (comment)(POC)

## 2018-11-27 NOTE — PLAN OF CARE
Problem: Physical Therapy Goal  Goal: Physical Therapy Goal  Goals to be met by: 21 days    Patient will increase functional independence with mobility by performin. Supine to sit with Modified [place]  2. Sit to supine with SBA  3. Sit to stand transfer with SBA  4. Bed to chair transfer with SBA using Rolling Walker  5. Gait  x 150 feet with SBA using Rolling Walker.   6. Wheelchair propulsion x50 feet with SBA using bilateral uppper extremities  7. Ascend/descend 4 stairs with bilateral Handrails Stand-by Assistance using Rolling Walker.   8. Stand for 3 minutes with SBA using Rolling Walker while performing UE activity      Outcome: Ongoing (interventions implemented as appropriate)  LTGs remain appropriate. Pt will continue PT POC.  Sandie Segal, LILLY  2018

## 2018-11-27 NOTE — PT/OT/SLP EVAL
Occupational Therapy  Evaluation/tx    Zbigniew Forman   MRN: [id number]   Admitting Diagnosis: Acute respiratory failure with hypoxia     OT Date of Treatment: 11/27/18   OT Start Time: 0940  OT Stop Time: 1031  OT Total Time (min): 51 min    Billable Minutes:  Evaluation 10  Self Care/Home Management 41    Diagnosis: Acute respiratory failure with hypoxia    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    Chronic kidney disease, stage III (moderate) 9/29/2015    Diabetes mellitus type II, uncontrolled     Glaucoma suspect, high risk 11/19/2013    Hypertension associated with diabetes     S/P evacuation of subdural hematoma 3/17/2015    Senile cataract, unspecified 11/19/2013      Past Surgical History:   Procedure Laterality Date    BRAIN SURGERY  8/19/14    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    [id number]-FIDE HOLES Left 8/17/2014    Performed by Kameron Soto MD at [facility] OR 2ND [facility]    HERNIA REPAIR           General Precautions: Standard, fall, diabetic  Orthopedic Precautions: N/A  Braces: N/A    Do you have any cultural, spiritual, Christian conflicts, given your current situation?: none reported     Patient History:  Lives With: child(susana), adult  Living Arrangements: house  Home Accessibility: (lift device per son)  Stair Railings at Home: outside, present at both sides  Equipment Currently Used at Home: bedside commode, shower chair, wheelchair, walker, rolling    Prior level of function:   Bed Mobility/Transfers: needs device(RW in house/w/c community)  Grooming: independent  Bathing: needs device and assist  Upper Body Dressing: independent  Lower Body Dressing: needs assist  Toileting: needs device  Home Management Skills: unable to perform  Homemaking Responsibilities: No  Driving License: No  Mode of Transportation: Family  Occupation: (retired )  Leisure and Hobbies: watches sports  IADL Comments: Per pt. and son  report on this date:  pt. resides in  2 story house with daughter. Pt. has had light assist with showering on chair, SBA for functional mobility and Min A for LBD (socks and shoes).  family is with pt. at all tiimes.  Pt. utilizes a RW in the house and a w/c in the community. Pt. has a lift device and does not have to negotiate steps.      Dominant hand: right    Subjective:  Communicated with nurse prior to session.    Chief Complaint: pt. Reported he wanted to get stronger to walk  Patient/Family stated goals: Son reported concerns about pt. Ability to ambulate as well as posterior lean    Pain/Comfort  Pain Rating 1: 0/10  Pain Rating Post-Intervention 1: 0/10    Objective:   Patient found with: oxygen(supine in bed son present)    Cognitive Exam:  Oriented to: Person and Place  Follows Commands/attention: Follows two-step commands  Communication: clear/fluent  Memory:  Some deficits with recalling details  Safety awareness/insight to disability: intact  Coping skills/emotional control: Appropriate to situation    Visual/perceptual:  Wears glasses    Physical Exam:  Postural examination/scapula alignment:    -       Rounded shoulders  -       Posterior pelvic tilt  Skin integrity: Bruising of BUE  Edema: none noted in BUE    Sensation:      -       Intact    Upper Extremity Range of Motion:  Right Upper Extremity: WFL  Left Upper Extremity: WFL    Upper Extremity Strength:  Right Upper Extremity: WFL  Left Upper Extremity: WFL   Strength: fair    Fine motor coordination:   Some difficulty noted with opening items    Gross motor coordination: WFL    Occupational Performance:    Bed Mobility:    · Patient completed Supine to Sit with minimum assistance     Functional Mobility/Transfers:  · Patient completed Sit <> Stand Transfer with moderate assistance  with  rolling walker with posterior lean noted  · Patient completed Bed <> Chair Transfer using Stand Pivot technique with moderate assistance with rolling  walker with posterior lean noted   · Functional Mobility: Not performed on this date 2/2 posterior lean     Activities of Daily Living:  · Grooming: stand by assistance to wash face   · Bathing: moderate assistance with assist to wash below knees and buttocks region  · Upper Body Dressing: minimum assistance to don gown   · Lower Body Dressing: maximal assistance with assist to don shoes and socks as well as to thread pants initially and manage over hips    [facility] 6 Click:  [facility] Total Score: 15        Additional Treatment:  Pt. Educated on role of OT and POC   pt. Educated on proper technique to perform transfers  Pt. Educated on safety with ADL task performance and bed mobility    Patient left up in chair with call button in reach and son present    Assessment:  Zbigniew Forman is a 89 y.o. male with a medical diagnosis of Acute respiratory failure with hypoxia and presents with limitations in self-care skills, functional mobility, postural control as well as decreased endurance and would benefit from continued OT services to maximize safety and independence with ADL task performance. Pt evaluation falls under low complexity for evaluation coding due to performance deficits noted in3 areas as stated above and 1 co-morbities affecting current functional status. Data obtained from problem focused assessments. Moderate modifications or assistance was required for completion of evaluation. Only brief occupational profile and history review completed.   .    Rehab identified problem list/impairments: impaired endurance, impaired self care skills, impaired functional mobilty, gait instability, impaired balance, impaired cardiopulmonary response to activity    Rehab potential is good    Activity tolerance: Fair    Discharge recommendations: home health OT(with light physical assist)     Barriers to discharge: None     Equipment recommendations: none     GOALS:   Multidisciplinary Problems     Occupational Therapy Goals         Problem: Occupational Therapy Goal    Goal Priority Disciplines Outcome Interventions   Occupational Therapy Goal     OT, PT/OT     Description:  Goals to be met by: 21 days     Patient will increase functional independence with ADLs by performing:    UE Dressing with Set-up Assistance.  LE Dressing with Minimal Assistance.  Grooming while seated with Set-up Assistance.  Toileting from bedside commode with Minimal Assistance for hygiene and clothing management.   Bathing from  shower chair/bench with Minimal Assistance.  Supine to sit with Stand-by Assistance.  Stand pivot transfers with Stand-by Assistance with RW  Toilet transfer to bedside commode with Stand-by Assistance with RW.  Pt. To engage in dynamic standing activities at table with SBA                      PLAN: Patient to be seen 5 x/week to address the above listed problems via self-care/home management, therapeutic activities, therapeutic exercises  Plan of Care expires: 12/27/18  Plan of Care reviewed with: patient, son    Thuy LOW SPENCER Santiago  11/27/2018

## 2018-11-27 NOTE — ASSESSMENT & PLAN NOTE
11/27/18  Resume ACE for renal protection in light of CHF.  Monitor renal function biweekly.  Last Creatinine is 1.1.  Motrin discontinued as he is no longer using and want to avoid any insult to renal function.  Avoid nephrotoxins.

## 2018-11-27 NOTE — ASSESSMENT & PLAN NOTE
11/27/18  Currently with supplemental oxygen at 2.5 LPM with sats at 96%.  No reported SOB.  Will add IS with neb treatments and continue Xopenex tid as ordered.  Add Duoneb PRN and monitor.  Will attempt to wean oxygen while admitted to SNF.  Echo completed during recent hospitalization.  EF ~60% with no diastolic dysfunction.  Mild right atrial enlargement   Monitor daily weights and treat with Lasix PRN for weight gain as stated above.

## 2018-11-28 ENCOUNTER — TELEPHONE (OUTPATIENT)
Dept: INTERNAL MEDICINE | Facility: CLINIC | Age: 83
End: 2018-11-28

## 2018-11-28 PROCEDURE — 94640 AIRWAY INHALATION TREATMENT: CPT

## 2018-11-28 PROCEDURE — 25000242 PHARM REV CODE 250 ALT 637 W/ HCPCS: Performed by: INTERNAL MEDICINE

## 2018-11-28 PROCEDURE — 94761 N-INVAS EAR/PLS OXIMETRY MLT: CPT

## 2018-11-28 PROCEDURE — 97530 THERAPEUTIC ACTIVITIES: CPT

## 2018-11-28 PROCEDURE — 99900035 HC TECH TIME PER 15 MIN (STAT)

## 2018-11-28 PROCEDURE — 97535 SELF CARE MNGMENT TRAINING: CPT

## 2018-11-28 PROCEDURE — 27000221 HC OXYGEN, UP TO 24 HOURS

## 2018-11-28 PROCEDURE — 97110 THERAPEUTIC EXERCISES: CPT

## 2018-11-28 PROCEDURE — 11000004 HC SNF PRIVATE

## 2018-11-28 PROCEDURE — 97116 GAIT TRAINING THERAPY: CPT

## 2018-11-28 PROCEDURE — 25000003 PHARM REV CODE 250: Performed by: INTERNAL MEDICINE

## 2018-11-28 PROCEDURE — 63600175 PHARM REV CODE 636 W HCPCS: Performed by: INTERNAL MEDICINE

## 2018-11-28 RX ADMIN — LEVALBUTEROL HYDROCHLORIDE 0.63 MG: 0.63 SOLUTION RESPIRATORY (INHALATION) at 05:11

## 2018-11-28 RX ADMIN — LATANOPROST 1 DROP: 50 SOLUTION OPHTHALMIC at 08:11

## 2018-11-28 RX ADMIN — LISINOPRIL 10 MG: 10 TABLET ORAL at 09:11

## 2018-11-28 RX ADMIN — AMLODIPINE BESYLATE 5 MG: 5 TABLET ORAL at 09:11

## 2018-11-28 RX ADMIN — ENOXAPARIN SODIUM 40 MG: 100 INJECTION SUBCUTANEOUS at 04:11

## 2018-11-28 RX ADMIN — LEVALBUTEROL HYDROCHLORIDE 0.63 MG: 0.63 SOLUTION RESPIRATORY (INHALATION) at 11:11

## 2018-11-28 RX ADMIN — LEVALBUTEROL HYDROCHLORIDE 0.63 MG: 0.63 SOLUTION RESPIRATORY (INHALATION) at 08:11

## 2018-11-28 RX ADMIN — LEVALBUTEROL HYDROCHLORIDE 0.63 MG: 0.63 SOLUTION RESPIRATORY (INHALATION) at 12:11

## 2018-11-28 NOTE — PT/OT/SLP PROGRESS
"Physical Therapy  Treatment    Zbigniew Forman   MRN: [id number]   Admitting Diagnosis: Acute respiratory failure with hypoxia    PT Received On: 11/28/18  Total Time (min): 53       Billable Minutes:  Gait Training 20, Therapeutic Activity 10 and Therapeutic Exercise 23    Treatment Type: Treatment  PT/PTA: PTA     PTA Visit Number: 1       General Precautions: Standard, fall, diabetic  Orthopedic Precautions: N/A   Braces: N/A    Do you have any cultural, spiritual, Advent conflicts, given your current situation?: none stated    Subjective:  "terrible for the last 9-12 days, better past 2 days"      Pain/Comfort  Pain Rating 1: 0/10  Pain Rating Post-Intervention 1: 0/10    Objective:   Patient found with: oxygen(2L per nsg 02 sats monitored, nsg notified)     AM-PAC 6 CLICK MOBILITY  Total Score:17    Transfers:  Sit<>Stand: with RW min/mod A vcs for tech  Stand Pivot Transfer: with RW min/mod ~ 4 ft in room WC<>BSC, vcs for sequencing pivot    Gait:02 sats 92-93 % after gait and HR 82-87 bpm  Amb with RW min/mod ~ 38 ft and 50 ft seated rest break, 02 and wc in tow, vc/tcs for erect posture, RW mgmt/closeness     Therex:  2x10 reps AP,GS,LAQ,hip flex,abd/add    Patient left up in chair with all lines intact, call button in reach, son present and belongings in reach.    Assessment:  Zbigniew Forman is a 89 y.o. male with a medical diagnosis of Acute respiratory failure with hypoxia.  Pt tolerated well,pt is very pleasant, appreciative, demo good effort,  pt would continue to benefit from skilled PT services to improve overall functional mobility, strength and endurance.  .    Rehab identified problem list/impairments: impaired endurance, impaired self care skills, impaired functional mobilty, gait instability, impaired balance, decreased coordination, impaired coordination, impaired cardiopulmonary response to activity    Rehab potential is good.    Activity tolerance: Fair    Discharge recommendations: home " health PT     Barriers to discharge: Inaccessible home environment([address] [facility] home)    Equipment recommendations: none     GOALS:   Multidisciplinary Problems     Physical Therapy Goals        Problem: Physical Therapy Goal    Goal Priority Disciplines Outcome Goal Variances Interventions   Physical Therapy Goal     PT, PT/OT Ongoing (interventions implemented as appropriate)     Description:  Goals to be met by: 21 days    Patient will increase functional independence with mobility by performin. Supine to sit with Modified [place]  2. Sit to supine with SBA  3. Sit to stand transfer with SBA  4. Bed to chair transfer with SBA using Rolling Walker  5. Gait  x 150 feet with SBA using Rolling Walker.   6. Wheelchair propulsion x50 feet with SBA using bilateral uppper extremities  7. Ascend/descend 4 stairs with bilateral Handrails Stand-by Assistance using Rolling Walker.   8. Stand for 3 minutes with SBA using Rolling Walker while performing UE activity                        PLAN:    Patient to be seen (5-6x/wk)  to address the above listed problems via gait training, therapeutic activities, therapeutic exercises  Plan of Care expires: 18  Plan of Care reviewed with: patient    Silvia Faraz, PTA  2018

## 2018-11-28 NOTE — ASSESSMENT & PLAN NOTE
11/27/18  Resume ACE for renal protection in light of CHF.  Monitor renal function biweekly.  Last Creatinine is 1.1.  Motrin discontinued as he is no longer using and want to avoid any insult to renal function.  Avoid nephrotoxins

## 2018-11-28 NOTE — TELEPHONE ENCOUNTER
----- Message from Katharina Mahoney sent at 11/27/2018 10:45 AM CST -----  Contact: Anne/Daughter/[phone] or [phone]  Patient called in regards needing to talk with Dr Morgan medical assistant about patient been in the hospital about congestion heart failure. Patient was moved to [facility] yesterday night 11/26/18, and now he is in room 301.   Please call and advise. Thank you

## 2018-11-28 NOTE — H&P
[facility] - Skilled Nursing Care  Department of Hospital Medicine  History & Physical    Patient Name: Zbigniew Forman  MRN: [id number]  Code Status: Full Code  Admission Date: 11/26/2018  Attending Physician: Juliette Michel MD   Primary Care Provider: Blas Morgan Ii, MD    Subjective:     Principal Problem:Acute respiratory failure with hypoxia    Chief Complaint   Patient presents with    Shortness of Breath        HPI: Mr. Forman is a 89-year-old male [employer]  with HTN who presented to [facility] with SOB and worsening LE edema. His initial symptoms were present for several weeks prior to presentation and include orthopnea, some cough, and progressively worsening lower extremity edema and was admitted with acute diastolic heart failure.    Upon admission he reported his SOB started 2-3 weeks ago, and acutely worsened over the last few days prompting him to present to the ED. Family and patient report orthopnea, but patient insists of sleeping flat. He reported some cough and wheeze, but no fever or recent sick contacts. He does not use oxygen at home and lives with his daughter. He reported no chest pain and had no syncope.  He does report some dizziness when he moves around too much. No recent sick contacts. He denies every being told that he has issues with HF. He lost his wife of 74 years in May. 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 He denies history of COPD.       Mr. Forman was admitted to hospital medicine on 11/17 for acute diastolic heart failure, for which he underwent diuresis with IV lasix, later transitioned to oral lasix on 11/18. Due to concern for new infiltrate on CXR and productive cough, antibiotics were started for presumptive PNA. Lab infectious workup including procal, LA and WBC were normal, but he felt subjectively improved so the decision was made to complete a five day course (Levaquin). PT/OT consulted and recommended SNF, for which he was transferred here for  continued PT/OT.    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    [id number] HOLES Left 8/17/2014    Performed by Kameron Soto MD at [facility] OR [address] [place] [address]    HERNIA REPAIR         Review of patient's allergies indicates:   Allergen Reactions    Sulfa (sulfonamide antibiotics)     Metformin Diarrhea    Tradjenta [linagliptin] Diarrhea              Current Facility-Administered Medications on File Prior to Encounter   Medication    [DISCONTINUED] acetaminophen tablet 650 mg    [DISCONTINUED] amLODIPine tablet 5 mg    [DISCONTINUED] bisacodyl suppository 10 mg    [DISCONTINUED] dextrose 50% injection 12.5 g    [DISCONTINUED] dextrose 50% injection 25 g    [DISCONTINUED] enoxaparin injection 40 mg    [DISCONTINUED] glucagon (human recombinant) injection 1 mg    [DISCONTINUED] glucose chewable tablet 16 g    [DISCONTINUED] glucose chewable tablet 24 g    [DISCONTINUED] ibuprofen tablet 400 mg    [DISCONTINUED] levalbuterol nebulizer solution 0.63 mg    [DISCONTINUED] ondansetron disintegrating tablet 8 mg    [DISCONTINUED] ondansetron injection 4 mg    [DISCONTINUED] polyethylene glycol packet 17 g    [DISCONTINUED] ramelteon tablet 8 mg    [DISCONTINUED] sodium chloride 0.9% flush 5 mL     Current Outpatient Medications on File Prior to Encounter   Medication Sig    albuterol (VENTOLIN HFA) 90 mcg/actuation inhaler Inhale 2 puffs into the lungs every 6 (six) hours as needed for Wheezing. Rescue    albuterol-ipratropium  (DUO-NEB) 2.5 mg-0.5 mg/3 mL nebulizer solution Take 3 mLs by nebulization every 6 (six) hours as needed for Wheezing. Rescue    alfuzosin (UROXATRAL) 10 mg Tb24 Take 1 tablet (10 mg total) by mouth daily with breakfast.    amLODIPine (NORVASC) 10 MG tablet Take 1 tablet (10 mg total) by mouth once daily.    furosemide (LASIX) 20 MG tablet Take 1 tablet (20 mg total) by mouth daily as needed (Leg swelling, weight gain >2-3 lbs in one day or >5lbs in 1 week.).    latanoprost 0.005 % ophthalmic solution INSTILL 1 DROP IN BOTH EYES EVERY EVENING    latanoprost 0.005 % ophthalmic solution INSTILL 1 DROP IN BOTH EYES EVERY EVENING    levoFLOXacin (LEVAQUIN) 250 MG tablet Take 1 tablet (250 mg total) by mouth once daily.    lisinopril (PRINIVIL,ZESTRIL) 40 MG tablet Hold this medication until you see your primary care doctor.     Family History     Problem Relation (Age of Onset)    Cerebral aneurysm Mother    Kidney failure Sister (96)    Other Father        Tobacco Use    Smoking status: Never Smoker    Smokeless tobacco: Never Used   Substance and Sexual Activity    Alcohol use: No    Drug use: Not on file    Sexual activity: Not on file     Review of Systems   Constitutional: Negative for appetite change, chills, fatigue and fever.   Respiratory: Negative for cough and shortness of breath.    Cardiovascular: Negative for chest pain, palpitations and leg swelling.   Gastrointestinal: Negative for abdominal pain, constipation, diarrhea, nausea and vomiting.   Endocrine: Negative for cold intolerance and heat intolerance.   Genitourinary:        Occasional urinary burning due to repeated whiteside insertions during admission.   Musculoskeletal: Negative for arthralgias and myalgias.   Skin: Negative for rash.   Psychiatric/Behavioral: Negative for confusion.     Objective:     Vital Signs (Most Recent):  Temp: 97.9 °F (36.6 °C) (11/27/18 0729)  Pulse: 83 (11/27/18 1715)  Resp: 18 (11/27/18 1715)  BP: 139/65  "(11/27/18 0743)  SpO2: 97 % (11/27/18 1715) Vital Signs (24h Range):  Temp:  [97.9 °F (36.6 °C)] 97.9 °F (36.6 °C)  Pulse:  [80-88] 83  Resp:  [16-22] 18  SpO2:  [96 %-97 %] 97 %  BP: (139)/(65) 139/65     Weight: 73 kg (160 lb 15 oz)  Body mass index is 24.47 kg/m².    Physical Exam   Constitutional: He is oriented to person, place, and time. He appears well-developed and well-nourished.   Cardiovascular: Normal rate, regular rhythm, normal heart sounds and intact distal pulses.   No murmur heard.  Pulmonary/Chest: Effort normal and breath sounds normal. No respiratory distress.   Diminished BS to right posterior lobe.  Breathing comfortably on 2.5L supplemental O2 via NC   Abdominal: Soft. Bowel sounds are normal. He exhibits no distension. There is no tenderness.   Musculoskeletal: Normal range of motion. He exhibits no edema or tenderness.   Neurological: He is alert and oriented to person, place, and time.   Skin: Skin is warm and dry. Capillary refill takes less than 2 seconds. No erythema.   Psychiatric: He has a normal mood and affect.       Significant Labs:     CBC:  No results for input(s): WBC, GRAN, HGB, HCT, PLT in the last 48 hours.    Chem 10:  Recent Labs   Lab 11/26/18  0457 11/26/18  1344    140   K 5.4* 4.9    105   CO2 28 28   BUN 30 31*   CREATININE 0.8 1.1   * 184*   CALCIUM 8.5* 8.4*       LFTs:  No results for input(s): ALKPHOS, BILITOT, AST, ALT, ALBUMIN, GGT in the last 48 hours.    Significant Imaging:     CTA chest 11/17  "No evidence of acute pulmonary embolus to the proximal segmental level.    Limited pulmonary parenchymal evaluation secondary to extensive respiratory motion, but there is suggestion of mild edema or infectious/noninfectious inflammatory change most pronounced in the right upper lobe.    Moderate-sized hiatal hernia." - per interpreting radiologist    Assessment/Plan:     * Acute respiratory failure with hypoxia    11/27/18  Currently with " supplemental oxygen at 2.5 LPM with sats at 96%.  No reported SOB.  Will add IS with neb treatments and continue Xopenex tid as ordered.  Add Duoneb PRN and monitor.  Will attempt to wean oxygen while admitted to SNF.  Echo completed during recent hospitalization.  EF ~60% with no diastolic dysfunction.  Mild right atrial enlargement   Monitor daily weights and treat with Lasix PRN for weight gain as stated above. Avoiding scheduled diuresis on the front end given that his most recent labs actually look on the dry side.       Heart failure with preserved ejection fraction    11/27  Euvolemic at present   Continue supplementary oxygen  Lasix PRN as described above; cautious administration in this elderly gentleman with CKD     Hypertension associated with diabetes    11/27/18  BP Readings from Last 3 Encounters:   11/27/18 139/65   11/26/18 117/61   11/16/18 118/62     BP is stable, currently on Norvasc 5 mg daily.  Will restart Lisinopril as above and set holding parameters for administration of BP medications.     Chronic kidney disease, stage III (moderate)    11/27/18  Resume ACE for renal protection in light of CHF.  Monitor renal function biweekly.  Last Creatinine is 1.1.  Motrin discontinued as he is no longer using and want to avoid any insult to renal function.  Avoid nephrotoxins     Glaucoma suspect, high risk    11/27/18  Will resume home Xalatan gtts.  Patient to follow up with PCP.     Controlled type 2 diabetes mellitus without complication, without long-term current use of insulin    11/27/18  Lab Results   Component Value Date    HGBA1C 6.5 (H) 11/17/2018     No need to do accuchecks as fasting glucose is stable and patient and family are requesting no finger sticks.  Monitor fasting glucose and treat if indicated.         Quentin Toney MD  Department of Hospital Medicine  [facility]

## 2018-11-28 NOTE — TELEPHONE ENCOUNTER
Call Pt daughter back she just wanted to update us on how her father is doing. He is now in therapy.

## 2018-11-28 NOTE — PT/OT/SLP PROGRESS
Occupational Therapy  Treatment    Zbigniew Forman   MRN: [id number]   Admitting Diagnosis: Acute respiratory failure with hypoxia    OT Date of Treatment: 11/28/18       Billable Minutes:  Self Care/Home Management 55    General Precautions: Standard, fall, diabetic  Orthopedic Precautions: N/A  Braces: N/A    Do you have any cultural, spiritual, Hoahaoism conflicts, given your current situation?: none reported    Subjective:  Communicated with nsg prior to session.  Pt stated he was really cold    Pain/Comfort  Pain Rating 1: 0/10  Pain Rating Post-Intervention 1: 0/10    Objective:  Patient found with: oxygen sitting up in bed    Occupational Performance:    Bed Mobility:    · Patient completed Rolling/Turning to Left with  contact guard assistance  · Patient completed Scooting/Bridging with contact guard assistance  · Patient completed Supine to Sit with contact guard assistance   · Pt required CGA with VC with all bed mobility with use of bed rails in place for safety and balance    Functional Mobility/Transfers:  · Patient completed Sit <> Stand Transfer with maximal assistance and of 2 persons  From BSC  with  rolling walker 2/2 posterior lean  · Patient completed Bed <> Chair Transfer using Stand Pivot technique with moderate assistance and of 1 persons.  · Patient completed Toilet Transfer Stand Pivot technique with maximal assistance and of 2 persons with  rolling walker  · Functional Mobility: Pt required Mod A  x1  to perform sit<>stands from EOB with RW in place.   ·                                 Pt required Mod A with sit<>stands from bed side chair utilizing bed side rails to stand and maintain balance   ·                                 Pt required Max A x 1 to perform stand pivot from BSC to bed side chair without an AD  ·                                 Pt required Mod A from EOB to BSC with RW    Activities of Daily Living:  · Bathing: Pt required Mod A with assistance to clean buttock, perineal  area, and below knees  · Upper Body Dressing: Pt required Mod A to thread arms and adjust button down shirt. Pt required SBA to don/doff over head shirt and gown  · Lower Body Dressing: Pt required Max A to don depends and thread pants. Pt was total A to don socks  · Toileting: Total A to perform toileting     [facility] 6 Click:  [facility] Total Score: 15    OT Exercises: Not tested on this date     Additional Treatment:  Pt performed additional sit<>stands from bed side chair with bed rails to support and stabilize;focusing on improving forward positioning for transfers. Pt required Mod A to perform sit<>stand task from bedside chair with bed rail for support    Patient left up in chair with call button in reach and nurse and carlos  present    ASSESSMENT:  Zbigniew Forman is a 89 y.o. male with a medical diagnosis of Acute respiratory failure with hypoxia. Pt tolerated tx well, and will benefit from containing OT to incease endurance, ROM, and safety with transfers. Pt experienced loose stool during tx with nursing notified. OT will continue PC as indictaed.     Rehab identified problem list/impairments: impaired endurance, impaired self care skills, impaired functional mobilty, gait instability, impaired balance, impaired cardiopulmonary response to activity    Rehab potential is good    Activity tolerance: Fair    Discharge recommendations: home health OT(with light physical assist)     Barriers to discharge: None     Equipment recommendations: none     GOALS:   Multidisciplinary Problems     Occupational Therapy Goals        Problem: Occupational Therapy Goal    Goal Priority Disciplines Outcome Interventions   Occupational Therapy Goal     OT, PT/OT     Description:  Goals to be met by: 21 days     Patient will increase functional independence with ADLs by performing:    UE Dressing with Set-up Assistance.  LE Dressing with Minimal Assistance.  Grooming while seated with Set-up Assistance.  Toileting from bedside  commode with Minimal Assistance for hygiene and clothing management.   Bathing from  shower chair/bench with Minimal Assistance.  Supine to sit with Stand-by Assistance.  Stand pivot transfers with Stand-by Assistance with RW  Toilet transfer to bedside commode with Stand-by Assistance with RW.  Pt. To engage in dynamic standing activities at table with SBA                      Plan:  Patient to be seen 5 x/week to address the above listed problems via self-care/home management, therapeutic activities, therapeutic exercises  Plan of Care expires: 12/27/18  Plan of Care reviewed with: patient, daughter    Tarik Kuo, SOT  11/28/2018     I certify that I was present in the room directing the student in service delivery and guiding them using my skilled judgment. As the co-signing therapist I have reviewed the students documentation and am responsible for the treatment, assessment, and plan.

## 2018-11-28 NOTE — PLAN OF CARE
Problem: Occupational Therapy Goal  Goal: Occupational Therapy Goal  Goals to be met by: 21 days     Patient will increase functional independence with ADLs by performing:    UE Dressing with Set-up Assistance.  LE Dressing with Minimal Assistance.  Grooming while seated with Set-up Assistance.  Toileting from bedside commode with Minimal Assistance for hygiene and clothing management.   Bathing from  shower chair/bench with Minimal Assistance.  Supine to sit with Stand-by Assistance.  Stand pivot transfers with Stand-by Assistance with RW  Toilet transfer to bedside commode with Stand-by Assistance with RW.  Pt. To engage in dynamic standing activities at table with SBA     Pt. Tolerated session well.

## 2018-11-28 NOTE — PLAN OF CARE
Problem: Patient Care Overview  Goal: Plan of Care Review  Outcome: Ongoing (interventions implemented as appropriate)  Patients family remains at bedside.  O2 sats 95% on 2L with no s/s of distress.  Patient with no c/o pain this shift.

## 2018-11-28 NOTE — ASSESSMENT & PLAN NOTE
11/27  Euvolemic at present   Continue supplementary oxygen  Lasix PRN as described above; cautious administration in this elderly gentleman with CKD

## 2018-11-28 NOTE — SUBJECTIVE & OBJECTIVE
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    [id number] HOLES Left 8/17/2014    Performed by Kameron Soto MD at [facility] OR [address] [place] [address]    HERNIA REPAIR         Review of patient's allergies indicates:   Allergen Reactions    Sulfa (sulfonamide antibiotics)     Metformin Diarrhea    Tradjenta [linagliptin] Diarrhea              Current Facility-Administered Medications on File Prior to Encounter   Medication    [DISCONTINUED] acetaminophen tablet 650 mg    [DISCONTINUED] amLODIPine tablet 5 mg    [DISCONTINUED] bisacodyl suppository 10 mg    [DISCONTINUED] dextrose 50% injection 12.5 g    [DISCONTINUED] dextrose 50% injection 25 g    [DISCONTINUED] enoxaparin injection 40 mg    [DISCONTINUED] glucagon (human recombinant) injection 1 mg    [DISCONTINUED] glucose chewable tablet 16 g    [DISCONTINUED] glucose chewable tablet 24 g    [DISCONTINUED] ibuprofen tablet 400 mg    [DISCONTINUED] levalbuterol nebulizer solution 0.63 mg    [DISCONTINUED] ondansetron disintegrating tablet 8 mg    [DISCONTINUED] ondansetron injection 4 mg    [DISCONTINUED] polyethylene glycol packet 17 g    [DISCONTINUED] ramelteon tablet 8 mg    [DISCONTINUED] sodium chloride 0.9% flush 5 mL     Current Outpatient Medications on File Prior to Encounter   Medication Sig    albuterol (VENTOLIN HFA) 90 mcg/actuation inhaler Inhale 2 puffs into the lungs every 6 (six) hours as needed for Wheezing. Rescue    albuterol-ipratropium (DUO-NEB) 2.5 mg-0.5 mg/3  mL nebulizer solution Take 3 mLs by nebulization every 6 (six) hours as needed for Wheezing. Rescue    alfuzosin (UROXATRAL) 10 mg Tb24 Take 1 tablet (10 mg total) by mouth daily with breakfast.    amLODIPine (NORVASC) 10 MG tablet Take 1 tablet (10 mg total) by mouth once daily.    furosemide (LASIX) 20 MG tablet Take 1 tablet (20 mg total) by mouth daily as needed (Leg swelling, weight gain >2-3 lbs in one day or >5lbs in 1 week.).    latanoprost 0.005 % ophthalmic solution INSTILL 1 DROP IN BOTH EYES EVERY EVENING    latanoprost 0.005 % ophthalmic solution INSTILL 1 DROP IN BOTH EYES EVERY EVENING    levoFLOXacin (LEVAQUIN) 250 MG tablet Take 1 tablet (250 mg total) by mouth once daily.    lisinopril (PRINIVIL,ZESTRIL) 40 MG tablet Hold this medication until you see your primary care doctor.     Family History     Problem Relation (Age of Onset)    Cerebral aneurysm Mother    Kidney failure Sister (96)    Other Father        Tobacco Use    Smoking status: Never Smoker    Smokeless tobacco: Never Used   Substance and Sexual Activity    Alcohol use: No    Drug use: Not on file    Sexual activity: Not on file     Review of Systems   Constitutional: Negative for appetite change, chills, fatigue and fever.   Respiratory: Negative for cough and shortness of breath.    Cardiovascular: Negative for chest pain, palpitations and leg swelling.   Gastrointestinal: Negative for abdominal pain, constipation, diarrhea, nausea and vomiting.   Endocrine: Negative for cold intolerance and heat intolerance.   Genitourinary:        Occasional urinary burning due to repeated whiteside insertions during admission.   Musculoskeletal: Negative for arthralgias and myalgias.   Skin: Negative for rash.   Psychiatric/Behavioral: Negative for confusion.     Objective:     Vital Signs (Most Recent):  Temp: 97.9 °F (36.6 °C) (11/27/18 0729)  Pulse: 83 (11/27/18 1715)  Resp: 18 (11/27/18 1715)  BP: 139/65 (11/27/18 0729)  SpO2: 97 %  "(11/27/18 6675) Vital Signs (24h Range):  Temp:  [97.9 °F (36.6 °C)] 97.9 °F (36.6 °C)  Pulse:  [80-88] 83  Resp:  [16-22] 18  SpO2:  [96 %-97 %] 97 %  BP: (139)/(65) 139/65     Weight: 73 kg (160 lb 15 oz)  Body mass index is 24.47 kg/m².    Physical Exam   Constitutional: He is oriented to person, place, and time. He appears well-developed and well-nourished.   Cardiovascular: Normal rate, regular rhythm, normal heart sounds and intact distal pulses.   No murmur heard.  Pulmonary/Chest: Effort normal and breath sounds normal. No respiratory distress.   Diminished BS to right posterior lobe.  Breathing comfortably on 2.5L supplemental O2 via NC   Abdominal: Soft. Bowel sounds are normal. He exhibits no distension. There is no tenderness.   Musculoskeletal: Normal range of motion. He exhibits no edema or tenderness.   Neurological: He is alert and oriented to person, place, and time.   Skin: Skin is warm and dry. Capillary refill takes less than 2 seconds. No erythema.   Psychiatric: He has a normal mood and affect.       Significant Labs:     CBC:  No results for input(s): WBC, GRAN, HGB, HCT, PLT in the last 48 hours.    Chem 10:  Recent Labs   Lab 11/26/18  0457 11/26/18  1344    140   K 5.4* 4.9    105   CO2 28 28   BUN 30 31*   CREATININE 0.8 1.1   * 184*   CALCIUM 8.5* 8.4*       LFTs:  No results for input(s): ALKPHOS, BILITOT, AST, ALT, ALBUMIN, GGT in the last 48 hours.    Significant Imaging:     CTA chest 11/17  "No evidence of acute pulmonary embolus to the proximal segmental level.    Limited pulmonary parenchymal evaluation secondary to extensive respiratory motion, but there is suggestion of mild edema or infectious/noninfectious inflammatory change most pronounced in the right upper lobe.    Moderate-sized hiatal hernia." - per interpreting radiologist  "

## 2018-11-28 NOTE — PLAN OF CARE
Problem: Physical Therapy Goal  Goal: Physical Therapy Goal  Goals to be met by: 21 days    Patient will increase functional independence with mobility by performin. Supine to sit with Modified [place]  2. Sit to supine with SBA  3. Sit to stand transfer with SBA  4. Bed to chair transfer with SBA using Rolling Walker  5. Gait  x 150 feet with SBA using Rolling Walker.   6. Wheelchair propulsion x50 feet with SBA using bilateral uppper extremities  7. Ascend/descend 4 stairs with bilateral Handrails Stand-by Assistance using Rolling Walker.   8. Stand for 3 minutes with SBA using Rolling Walker while performing UE activity       Outcome: Ongoing (interventions implemented as appropriate)  Goals remain appropriate

## 2018-11-28 NOTE — ASSESSMENT & PLAN NOTE
11/27/18  Currently with supplemental oxygen at 2.5 LPM with sats at 96%.  No reported SOB.  Will add IS with neb treatments and continue Xopenex tid as ordered.  Add Duoneb PRN and monitor.  Will attempt to wean oxygen while admitted to SNF.  Echo completed during recent hospitalization.  EF ~60% with no diastolic dysfunction.  Mild right atrial enlargement   Monitor daily weights and treat with Lasix PRN for weight gain as stated above. Avoiding scheduled diuresis on the front end given that his most recent labs actually look on the dry side.

## 2018-11-28 NOTE — CLINICAL REVIEW
Clinical Pharmacy Chart Review Note      Admit Date: 11/26/2018   LOS: 2 days       Zbigniew Forman is a 89 y.o. male admitted to [facility] for PT/OT after hospitalization for acute respiratory failure with hypoxia.    Active Hospital Problems    Diagnosis  POA    *Acute respiratory failure with hypoxia [J96.01]  Yes    Heart failure with preserved ejection fraction [I50.30]  Yes    Hypertension associated with diabetes [E11.59, I10]  Yes    Chronic kidney disease, stage III (moderate) [N18.3]  Yes    Glaucoma suspect, high risk [H40.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     Review of patient's allergies indicates:   Allergen Reactions    Sulfa (sulfonamide antibiotics)     Metformin Diarrhea    Tradjenta [linagliptin] Diarrhea            Patient Active Problem List    Diagnosis Date Noted    Heart failure with preserved ejection fraction 11/27/2018    Hypoxia 11/17/2018    Acute respiratory failure with hypoxia 11/17/2018    Elevated troponin 11/17/2018    DNR (do not resuscitate) 11/17/2018    Dizziness 10/19/2018    Urinary frequency 10/19/2018    Hypertension associated with diabetes 01/12/2016    Chronic kidney disease, stage III (moderate) 09/29/2015    Glaucoma suspect, high risk 11/19/2013    Anatomical narrow angle of both eyes 11/19/2013    Senile cataract, unspecified 11/19/2013    Controlled type 2 diabetes mellitus without complication, without long-term current use of insulin        Scheduled Meds:    amLODIPine  5 mg Oral Daily    enoxaparin  40 mg Subcutaneous Daily    latanoprost  1 drop Both Eyes QHS    levalbuterol  0.63 mg Nebulization Q8H    lisinopril  10 mg Oral Daily    polyethylene glycol  17 g Oral Daily    senna-docusate 8.6-50 mg  1 tablet Oral BID     Continuous Infusions:   PRN Meds: acetaminophen,  albuterol-ipratropium, bisacodyl, calcium carbonate, furosemide, ondansetron, ramelteon    OBJECTIVE:     Vital Signs (Last 24H)  Temp:  [98.1 °F (36.7 °C)-98.2 °F (36.8 °C)]   Pulse:  [83-92]   Resp:  [16-20]   BP: (130-141)/(63-67)   SpO2:  [92 %-97 %]     Laboratory:  CBC: No results for input(s): WBC, RBC, HGB, HCT, PLT, MCV, MCH, MCHC in the last 168 hours.  BMP:   Recent Labs   Lab 11/25/18  0516 11/26/18  0457 11/26/18  1344   * 118* 184*    142 140   K 4.5 5.4* 4.9    107 105   CO2 25 28 28   BUN 30 30 31*   CREATININE 0.8 0.8 1.1   CALCIUM 8.4* 8.5* 8.4*     CMP:   Recent Labs   Lab 11/25/18  0516 11/26/18 0457 11/26/18  1344   * 118* 184*   CALCIUM 8.4* 8.5* 8.4*    142 140   K 4.5 5.4* 4.9   CO2 25 28 28    107 105   BUN 30 30 31*   CREATININE 0.8 0.8 1.1     LFTs: No results for input(s): ALT, AST, ALKPHOS, BILITOT, PROT, ALBUMIN in the last 168 hours.  Coagulation: No results for input(s): PT, INR, APTT in the last 168 hours.  Cardiac markers: No results for input(s): CKMB, TROPONINT, MYOGLOBIN in the last 168 hours.  ABGs: No results for input(s): PH, PCO2, PO2, HCO3, POCSATURATED, BE in the last 168 hours.  Microbiology Results (last 7 days)     ** No results found for the last 168 hours. **        Specimen (12h ago, onward)    None        Recent Labs   Lab 11/23/18  1858   COLORU Yellow   SPECGRAV 1.025   PHUR 5.0   PROTEINUA Negative   NITRITE Negative   LEUKOCYTESUR Negative     Others: No results for input(s): ZUFROLQG85ET, TSH, T4FREE, LABLIPI in the last 168 hours.    Invalid input(s): A1C      ASSESSMENT/PLAN:     Active Hospital Problems    Diagnosis    *Acute respiratory failure with hypoxia  --Levoalbuterol 0.63 mg q8hrs  --Albuterol-ipratropium 2.5-0.5 mg/3ml q6hrs prn wheezing, SOB  --Continue supplemental oxygen  --PT/OT: APAP 650 mg q4hrs prn mild pain  --bowel regimen for constipation; hold for loose or frequent stools: Miralax daily;  senna-docusate twice daily; bisacodyl 10 mg supp daily prn  --DVT prophylaxis: Lovenox 40 mg daily      Heart failure with preserved ejection fraction   **Monitor weight  --Furosemide 20 mg daily prn wt gain >4 lbs in 24 hrs  --Lisinopril 10 mg daily    Wt Readings from Last 1 Encounters:   11/28/18 0547 70.2 kg (154 lb 12.2 oz)   11/26/18 2000 73 kg (160 lb 15 oz)         Hypertension associated with diabetes   **Monitor BP  --Amlodipine 5 mg daily  --Lisinopril 10 mg daily (monitor BMP)    BP Readings from Last 3 Encounters:   11/28/18 (!) 141/67   11/26/18 117/61   11/16/18 118/62       BMP  Lab Results   Component Value Date     11/26/2018    K 4.9 11/26/2018     11/26/2018    CO2 28 11/26/2018    BUN 31 (H) 11/26/2018    CREATININE 1.1 11/26/2018    CALCIUM 8.4 (L) 11/26/2018    ANIONGAP 7 (L) 11/26/2018    ESTGFRAFRICA >60.0 11/26/2018    EGFRNONAA 55.2 (A) 11/26/2018         Chronic kidney disease, stage III (moderate)   **Monitor renal function and adjust medications accordingly  11/28/2018 Estimated Creatinine Clearance: 35.4 mL/min (based on SCr of 1.1 mg/dL).       Glaucoma suspect, high risk   --Latanoprost 0.002% 1 gtt both eyes nightly      Controlled type 2 diabetes mellitus without complication, without long-term current use of insulin   --No need to do accuchecks as fasting glucose is stable and patient and family are requesting no finger sticks.  --Monitor fasting glucose and treat if indicated; most recent EUE=[id number], if remains >180 may need to treat    Lab Results   Component Value Date    HGBA1C 6.5 (H) 11/17/2018               I have reviewed the medications in compliance with CMS Regulation F329 of the [place] Appendix PP.      Delores Bower, Pharm. D.  Clinical Pharmacist  [facility]

## 2018-11-29 LAB
ANION GAP SERPL CALC-SCNC: 7 MMOL/L
BASOPHILS # BLD AUTO: 0.05 K/UL
BASOPHILS NFR BLD: 0.6 %
BUN SERPL-MCNC: 37 MG/DL
CALCIUM SERPL-MCNC: 8.3 MG/DL
CHLORIDE SERPL-SCNC: 108 MMOL/L
CO2 SERPL-SCNC: 27 MMOL/L
CREAT SERPL-MCNC: 1 MG/DL
DIFFERENTIAL METHOD: ABNORMAL
EOSINOPHIL # BLD AUTO: 0.2 K/UL
EOSINOPHIL NFR BLD: 2.8 %
ERYTHROCYTE [DISTWIDTH] IN BLOOD BY AUTOMATED COUNT: 13.2 %
EST. GFR  (AFRICAN AMERICAN): >60 ML/MIN/1.73 M^2
EST. GFR  (NON AFRICAN AMERICAN): >60 ML/MIN/1.73 M^2
GLUCOSE SERPL-MCNC: 132 MG/DL
HCT VFR BLD AUTO: 33.4 %
HGB BLD-MCNC: 10.7 G/DL
IMM GRANULOCYTES # BLD AUTO: 0.05 K/UL
IMM GRANULOCYTES NFR BLD AUTO: 0.6 %
LYMPHOCYTES # BLD AUTO: 2.8 K/UL
LYMPHOCYTES NFR BLD: 33.7 %
MAGNESIUM SERPL-MCNC: 2.1 MG/DL
MCH RBC QN AUTO: 27.2 PG
MCHC RBC AUTO-ENTMCNC: 32 G/DL
MCV RBC AUTO: 85 FL
MONOCYTES # BLD AUTO: 0.5 K/UL
MONOCYTES NFR BLD: 5.4 %
NEUTROPHILS # BLD AUTO: 4.7 K/UL
NEUTROPHILS NFR BLD: 56.9 %
NRBC BLD-RTO: 0 /100 WBC
PHOSPHATE SERPL-MCNC: 2.7 MG/DL
PLATELET # BLD AUTO: 302 K/UL
PMV BLD AUTO: 9.9 FL
POTASSIUM SERPL-SCNC: 4.4 MMOL/L
RBC # BLD AUTO: 3.93 M/UL
SODIUM SERPL-SCNC: 142 MMOL/L
WBC # BLD AUTO: 8.31 K/UL

## 2018-11-29 PROCEDURE — 97116 GAIT TRAINING THERAPY: CPT

## 2018-11-29 PROCEDURE — 84100 ASSAY OF PHOSPHORUS: CPT

## 2018-11-29 PROCEDURE — 97110 THERAPEUTIC EXERCISES: CPT

## 2018-11-29 PROCEDURE — 97530 THERAPEUTIC ACTIVITIES: CPT

## 2018-11-29 PROCEDURE — 11000004 HC SNF PRIVATE

## 2018-11-29 PROCEDURE — 83735 ASSAY OF MAGNESIUM: CPT

## 2018-11-29 PROCEDURE — 36415 COLL VENOUS BLD VENIPUNCTURE: CPT

## 2018-11-29 PROCEDURE — 80048 BASIC METABOLIC PNL TOTAL CA: CPT

## 2018-11-29 PROCEDURE — 27000221 HC OXYGEN, UP TO 24 HOURS

## 2018-11-29 PROCEDURE — 85025 COMPLETE CBC W/AUTO DIFF WBC: CPT

## 2018-11-29 PROCEDURE — 94799 UNLISTED PULMONARY SVC/PX: CPT

## 2018-11-29 PROCEDURE — 25000003 PHARM REV CODE 250: Performed by: INTERNAL MEDICINE

## 2018-11-29 PROCEDURE — 94761 N-INVAS EAR/PLS OXIMETRY MLT: CPT

## 2018-11-29 PROCEDURE — 25000242 PHARM REV CODE 250 ALT 637 W/ HCPCS: Performed by: INTERNAL MEDICINE

## 2018-11-29 PROCEDURE — 63600175 PHARM REV CODE 636 W HCPCS: Performed by: INTERNAL MEDICINE

## 2018-11-29 PROCEDURE — 94640 AIRWAY INHALATION TREATMENT: CPT

## 2018-11-29 PROCEDURE — 97537 COMMUNITY/WORK REINTEGRATION: CPT

## 2018-11-29 RX ADMIN — IPRATROPIUM BROMIDE AND ALBUTEROL SULFATE 3 ML: .5; 3 SOLUTION RESPIRATORY (INHALATION) at 08:11

## 2018-11-29 RX ADMIN — LATANOPROST 1 DROP: 50 SOLUTION OPHTHALMIC at 08:11

## 2018-11-29 RX ADMIN — ENOXAPARIN SODIUM 40 MG: 100 INJECTION SUBCUTANEOUS at 05:11

## 2018-11-29 RX ADMIN — LEVALBUTEROL HYDROCHLORIDE 0.63 MG: 0.63 SOLUTION RESPIRATORY (INHALATION) at 04:11

## 2018-11-29 RX ADMIN — AMLODIPINE BESYLATE 5 MG: 5 TABLET ORAL at 08:11

## 2018-11-29 RX ADMIN — LISINOPRIL 10 MG: 10 TABLET ORAL at 08:11

## 2018-11-29 NOTE — PT/OT/SLP PROGRESS
Occupational Therapy  Treatment    Zbigniew Forman   MRN: [id number]   Admitting Diagnosis: Acute respiratory failure with hypoxia    OT Date of Treatment: 11/29/18  Total Time (min): 45 min    Billable Minutes:  Therapeutic Activity 25 and Therapeutic Exercise 20    General Precautions: Standard, fall, diabetic  Orthopedic Precautions: N/A  Braces: N/A    Do you have any cultural, spiritual, Episcopal conflicts, given your current situation?: none reported    Subjective:  Communicated with nsg prior to session.  I am doing well    Pain/Comfort  Pain Rating 1: 0/10  Pain Rating Post-Intervention 1: 0/10    Objective:  Patient found with: oxygen    Occupational Performance:    Bed Mobility:    · Not tested      Functional Mobility/Transfers:  · Patient completed Sit <> Stand Transfer with minimum assistance  with  rolling walker   · Functional Mobility: Pt. With to stand x 2 trials with cues for safety and stability and hand placement     Activities of Daily Living:  · Lower Body Dressing: maximal assistance to akbar BLE' into pants and (A) over hips instance    AMPA 6 Click:  [facility] Total Score: 15    OT Exercises: UE Ergometer 10 min    Additional Treatment:  Pt. With standing act on this day with task. Pt. With CGA/ Min A for balance aspects with task with no AD at raised counter Pt with visual perception task with discrimination of background and placement  x 5 min and then 3 min with standing bal and min cues through out. Pt. With seated rest break and also mild complaints of visual discrepancy at time during task.  Pt. With 2# dowel activity with 2x20 reps with  shd flex, bicep curls horz adb/add and forward flex motion to increase BUE ROM and strength,.   Pt. With standing and therex performed to increase ROM, endurance selfcare task and fxl mobility for independence     Patient left up in chair with all lines intact and call button in reach    ASSESSMENT:  Zbigniew Forman is a 89 y.o. male with a medical  diagnosis of Acute respiratory failure with hypoxia Pt. participated well with session on this day.Pt demos physical deficits with balance  functional mobility, UB strength, endurance  level of functional indep with daily tasks and activities and selfcare skills .Pt. Will continue to benefit from continued OT to progress towards goals  .    Rehab identified problem list/impairments: impaired endurance, impaired self care skills, impaired functional mobilty, gait instability, impaired balance, impaired cardiopulmonary response to activity    Rehab potential is fair    Activity tolerance: Fair    Discharge recommendations: home health OT(with light physical assist)     Barriers to discharge: None     Equipment recommendations: none     GOALS:   Multidisciplinary Problems     Occupational Therapy Goals        Problem: Occupational Therapy Goal    Goal Priority Disciplines Outcome Interventions   Occupational Therapy Goal     OT, PT/OT Ongoing (interventions implemented as appropriate)    Description:  Goals to be met by: 21 days     Patient will increase functional independence with ADLs by performing:    UE Dressing with Set-up Assistance.  LE Dressing with Minimal Assistance.  Grooming while seated with Set-up Assistance.  Toileting from bedside commode with Minimal Assistance for hygiene and clothing management.   Bathing from  shower chair/bench with Minimal Assistance.  Supine to sit with Stand-by Assistance.  Stand pivot transfers with Stand-by Assistance with RW  Toilet transfer to bedside commode with Stand-by Assistance with RW.  Pt. To engage in dynamic standing activities at table with SBA                  Plan:  Patient to be seen 5 x/week to address the above listed problems via self-care/home management, therapeutic activities, therapeutic exercises  Plan of Care expires: 12/27/18  Plan of Care reviewed with: patient  The [facility] and LIBIA have collaborated and discussed the patient's status, treatment plan  and progress toward established goals.   LIBIA Bennett/HAYES 11/29/2018

## 2018-11-29 NOTE — PLAN OF CARE
Problem: Fall Risk (Adult)  Goal: Identify Related Risk Factors and Signs and Symptoms  Related risk factors and signs and symptoms are identified upon initiation of Human Response Clinical Practice Guideline (CPG)  Outcome: Ongoing (interventions implemented as appropriate)   11/29/18 0851   Fall Risk   Related Risk Factors (Fall Risk) fatigue/slow reaction;gait/mobility problems

## 2018-11-29 NOTE — PLAN OF CARE
Problem: Physical Therapy Goal  Goal: Physical Therapy Goal  Goals to be met by: 21 days    Patient will increase functional independence with mobility by performin. Supine to sit with Modified [place]  2. Sit to supine with SBA  3. Sit to stand transfer with SBA  4. Bed to chair transfer with SBA using Rolling Walker  5. Gait  x 150 feet with SBA using Rolling Walker.   6. Wheelchair propulsion x50 feet with SBA using bilateral uppper extremities  7. Ascend/descend 4 stairs with bilateral Handrails Stand-by Assistance using Rolling Walker.   8. Stand for 3 minutes with SBA using Rolling Walker while performing UE activity       Outcome: Ongoing (interventions implemented as appropriate)  Goals remains appropriate

## 2018-11-29 NOTE — PT/OT/SLP PROGRESS
"Physical Therapy  Treatment    Zbigniew Forman   MRN: [id number]   Admitting Diagnosis: Acute respiratory failure with hypoxia    PT Received On: 11/29/18  Total Time (min): 53       Billable Minutes:  Gait Training 23, Therapeutic Activity 15 and Therapeutic Exercise 15    Treatment Type: Treatment son present throughout session ed on all functional mobility  PT/PTA: PTA     PTA Visit Number: 2       General Precautions: Standard, fall, diabetic  Orthopedic Precautions: N/A   Braces: N/A    Do you have any cultural, spiritual, Roman Catholic conflicts, given your current situation?: none stated    Subjective:  "good"    Pain/Comfort  Pain Rating 1: 0/10  Pain Rating Post-Intervention 1: 0/10    Objective:   Patient found with: oxygen(1 L confirmed with nsg, 02 sats monitored,nsg notified)     AM-PAC 6 CLICK MOBILITY  Total Score:17    Transfers:  Sit<>Stand: with RW min A vcs for tech    Gait:  Amb with RW min A ~ 60 ft x 2 trials 02 in/WC in tow, seated rest break, minimal repts of SOB "little bit" 02 sats at rest on 1L 93-94 % HR 93bpm  After gait 1st trial 87 % ~ took 45 sec to return to 92% with cues for proper breathing, after second trial 02 sats 91 % nsg notified of all the above     Therex:  2x10 reps AP,GS,LAQ,hip flex,abd/add    Balance:  Dyn standing bal act with RW min A for bal removing rings off tree and replacing on tree with RUE from left low to right high ~ 2:40 sec    Patient left up in chair with call button in reach and belongings in reach.    Assessment:  Zbigniew Forman is a 89 y.o. male with a medical diagnosis of Acute respiratory failure with hypoxia.  Pt tolerated well, pt would continue to benefit from skilled PT services to improve overall functional mobility, strength and endurance.  .    Rehab identified problem list/impairments: impaired endurance, impaired self care skills, impaired functional mobilty, gait instability, impaired balance, decreased coordination, impaired coordination, " impaired cardiopulmonary response to activity    Rehab potential is good.    Activity tolerance: Fair    Discharge recommendations: home health PT     Barriers to discharge: Inaccessible home environment(4 [facility] home)    Equipment recommendations: none     GOALS:   Multidisciplinary Problems     Physical Therapy Goals        Problem: Physical Therapy Goal    Goal Priority Disciplines Outcome Goal Variances Interventions   Physical Therapy Goal     PT, PT/OT Ongoing (interventions implemented as appropriate)     Description:  Goals to be met by: 21 days    Patient will increase functional independence with mobility by performin. Supine to sit with Modified [place]  2. Sit to supine with SBA  3. Sit to stand transfer with SBA  4. Bed to chair transfer with SBA using Rolling Walker  5. Gait  x 150 feet with SBA using Rolling Walker.   6. Wheelchair propulsion x50 feet with SBA using bilateral uppper extremities  7. Ascend/descend 4 stairs with bilateral Handrails Stand-by Assistance using Rolling Walker.   8. Stand for 3 minutes with SBA using Rolling Walker while performing UE activity                        PLAN:    Patient to be seen (5-6x/wk)  to address the above listed problems via gait training, therapeutic activities, therapeutic exercises  Plan of Care expires: 18  Plan of Care reviewed with: patient    Silvia Ruth, PTA  2018

## 2018-11-29 NOTE — PLAN OF CARE
Problem: Occupational Therapy Goal  Goal: Occupational Therapy Goal  Goals to be met by: 21 days     Patient will increase functional independence with ADLs by performing:    UE Dressing with Set-up Assistance.  LE Dressing with Minimal Assistance.  Grooming while seated with Set-up Assistance.  Toileting from bedside commode with Minimal Assistance for hygiene and clothing management.   Bathing from  shower chair/bench with Minimal Assistance.  Supine to sit with Stand-by Assistance.  Stand pivot transfers with Stand-by Assistance with RW  Toilet transfer to bedside commode with Stand-by Assistance with RW.  Pt. To engage in dynamic standing activities at table with SBA     Outcome: Ongoing (interventions implemented as appropriate)  .

## 2018-11-29 NOTE — PLAN OF CARE
Problem: Fall Risk (Adult)  Goal: Absence of Falls  Patient will demonstrate the desired outcomes by discharge/transition of care.  n

## 2018-11-30 PROCEDURE — 97116 GAIT TRAINING THERAPY: CPT

## 2018-11-30 PROCEDURE — 94799 UNLISTED PULMONARY SVC/PX: CPT

## 2018-11-30 PROCEDURE — 27000221 HC OXYGEN, UP TO 24 HOURS

## 2018-11-30 PROCEDURE — 97110 THERAPEUTIC EXERCISES: CPT

## 2018-11-30 PROCEDURE — 97530 THERAPEUTIC ACTIVITIES: CPT

## 2018-11-30 PROCEDURE — 63600175 PHARM REV CODE 636 W HCPCS: Performed by: INTERNAL MEDICINE

## 2018-11-30 PROCEDURE — 99900035 HC TECH TIME PER 15 MIN (STAT)

## 2018-11-30 PROCEDURE — 25000242 PHARM REV CODE 250 ALT 637 W/ HCPCS: Performed by: INTERNAL MEDICINE

## 2018-11-30 PROCEDURE — 25000003 PHARM REV CODE 250: Performed by: INTERNAL MEDICINE

## 2018-11-30 PROCEDURE — 97535 SELF CARE MNGMENT TRAINING: CPT

## 2018-11-30 PROCEDURE — 11000004 HC SNF PRIVATE

## 2018-11-30 PROCEDURE — 94640 AIRWAY INHALATION TREATMENT: CPT

## 2018-11-30 PROCEDURE — 94761 N-INVAS EAR/PLS OXIMETRY MLT: CPT

## 2018-11-30 RX ADMIN — LEVALBUTEROL HYDROCHLORIDE 0.63 MG: 0.63 SOLUTION RESPIRATORY (INHALATION) at 03:11

## 2018-11-30 RX ADMIN — LEVALBUTEROL HYDROCHLORIDE 0.63 MG: 0.63 SOLUTION RESPIRATORY (INHALATION) at 12:11

## 2018-11-30 RX ADMIN — LEVALBUTEROL HYDROCHLORIDE 0.63 MG: 0.63 SOLUTION RESPIRATORY (INHALATION) at 09:11

## 2018-11-30 RX ADMIN — LEVALBUTEROL HYDROCHLORIDE 0.63 MG: 0.63 SOLUTION RESPIRATORY (INHALATION) at 11:11

## 2018-11-30 RX ADMIN — LATANOPROST 1 DROP: 50 SOLUTION OPHTHALMIC at 08:11

## 2018-11-30 RX ADMIN — ENOXAPARIN SODIUM 40 MG: 100 INJECTION SUBCUTANEOUS at 05:11

## 2018-11-30 RX ADMIN — LISINOPRIL 10 MG: 10 TABLET ORAL at 09:11

## 2018-11-30 RX ADMIN — AMLODIPINE BESYLATE 5 MG: 5 TABLET ORAL at 09:11

## 2018-11-30 NOTE — PLAN OF CARE
Problem: Physical Therapy Goal  Goal: Physical Therapy Goal  Goals to be met by: 21 days    Patient will increase functional independence with mobility by performin. Supine to sit with Modified [place]  2. Sit to supine with SBA  3. Sit to stand transfer with SBA  4. Bed to chair transfer with SBA using Rolling Walker  5. Gait  x 150 feet with SBA using Rolling Walker.   6. Wheelchair propulsion x50 feet with SBA using bilateral uppper extremities  7. Ascend/descend 4 stairs with bilateral Handrails Stand-by Assistance using Rolling Walker.   8. Stand for 3 minutes with SBA using Rolling Walker while performing UE activity       Outcome: Ongoing (interventions implemented as appropriate)  Goals remain appropriate

## 2018-11-30 NOTE — PT/OT/SLP PROGRESS
"Physical Therapy  Treatment    Zbigniew Forman   MRN: [id number]   Admitting Diagnosis: Acute respiratory failure with hypoxia    PT Received On: 11/30/18  Total Time (min): 55       Billable Minutes:  Gait Training 23, Therapeutic Activity 15 and Therapeutic Exercise 17    Treatment Type: Treatment ed pt and son throughout session  PT/PTA: PTA     PTA Visit Number: 3       General Precautions: Standard, fall, diabetic  Orthopedic Precautions: N/A   Braces: N/A    Do you have any cultural, spiritual, Congregational conflicts, given your current situation?: none stated    Subjective:  "alright"    Pain/Comfort  Pain Rating 1: 0/10  Pain Rating Post-Intervention 1: 0/10    Objective:   Patient found with: oxygen(1L)     AM-PAC 6 CLICK MOBILITY  Total Score:17    Transfers:  Sit<>Stand: with RW min A vcs for positioning/tech  Stand Pivot Transfer: WC>BSC no AD vcs for safety/tech/sequencing/handplacement min A    Gait:  Amb with RW min A 02/wc in tow vc/tcs for RW mgmt/closeness/erect posture, looking ahead ~ 70 ft and 74 ft seated rest break    Advanced Gait: 02 sats at rest on 1L 93-94 % HR 75-80 bpm, 02 sats dropped to 89% and HR 95-6bpm each trial 30-40 sec to return to 92%  Curb Step: asc/anand 1 " step up on scale with RW min A vcs for safety/tech    Therex:  2x10 reps AP,GS,LAQ,hip flex,add with pillow,abd with RTB    Balance:  Dynamic standing bal/skip act with RW min A taking objects out of cabiniet and returning with LUE ~2:30 sec with uni lat support on RW on counter    Patient left up in chair with call button in reach and belongings in reach, son present.    Assessment:  Zbigniew Forman is a 89 y.o. male with a medical diagnosis of Acute respiratory failure with hypoxia.  Pt tolerated well, pt would continue to benefit from skilled PT services to improve overall functional mobility, strength and endurance.  .    Rehab identified problem list/impairments: impaired endurance, impaired self care skills, impaired " functional mobilty, gait instability, impaired balance, decreased coordination, impaired coordination, impaired cardiopulmonary response to activity    Rehab potential is good.    Activity tolerance: Fair    Discharge recommendations: home health PT     Barriers to discharge: Inaccessible home environment([facility] home)    Equipment recommendations: none     GOALS:   Multidisciplinary Problems     Physical Therapy Goals        Problem: Physical Therapy Goal    Goal Priority Disciplines Outcome Goal Variances Interventions   Physical Therapy Goal     PT, PT/OT Ongoing (interventions implemented as appropriate)     Description:  Goals to be met by: 21 days    Patient will increase functional independence with mobility by performin. Supine to sit with Modified [place]  2. Sit to supine with SBA  3. Sit to stand transfer with SBA  4. Bed to chair transfer with SBA using Rolling Walker  5. Gait  x 150 feet with SBA using Rolling Walker.   6. Wheelchair propulsion x50 feet with SBA using bilateral uppper extremities  7. Ascend/descend 4 stairs with bilateral Handrails Stand-by Assistance using Rolling Walker.   8. Stand for 3 minutes with SBA using Rolling Walker while performing UE activity                        PLAN:    Patient to be seen (5-6x/wk)  to address the above listed problems via gait training, therapeutic activities, therapeutic exercises  Plan of Care expires: 18  Plan of Care reviewed with: patient    Silvia Ruth, PTA  2018

## 2018-11-30 NOTE — PT/OT/SLP PROGRESS
Occupational Therapy  Treatment    Zbigniew Forman   MRN: [id number]   Admitting Diagnosis: Acute respiratory failure with hypoxia    OT Date of Treatment: 11/30/18  Total Time (min): 58 min    Billable Minutes:  Self Care/Home Management 43 and Therapeutic Activity 15    General Precautions: Standard, fall, diabetic  Orthopedic Precautions: N/A  Braces: N/A    Do you have any cultural, spiritual, Quaker conflicts, given your current situation?: none reported    Subjective:  Communicated with nurse prior to session.      Pain/Comfort  Pain Rating 1: 0/10  Pain Rating Post-Intervention 1: 0/10    Objective:  Patient found with: oxygen    Occupational Performance:    Bed Mobility:    · Patient completed Rolling/Turning to Right with stand by assistance  · Patient completed Scooting/Bridging with stand by assistance  · Patient completed Supine to Sit with minimum assistance     Functional Mobility/Transfers:  · Patient completed Sit <> Stand Transfer with minimum assistance  with  rolling walker   · Patient completed Bed <> Chair Transfer using Stand Pivot technique with minimum assistance with rolling walker  · Patient completed Toilet Transfer Stand Pivot technique with minimum assistance with  rolling walker    Activities of Daily Living:  · Grooming: supervision seated sinkside  · Upper Body Dressing: minimum assistance donning/ doffing button front shirt  · Lower Body Dressing: moderate assistance donning pants and socks.    [facility] 6 Click:  AMPA Total Score: 15    Additional Treatment:  Pt edu on Plan of care,  safety when performing functional transfers, self care tasks and functional standing activities.  - White board updated  - Self care tasks completed-- as noted above   - He  performed dynamic sitting activity  while sitting unsupported EOB and working on self care tasks.    Pt worked on functional standing activity consisting of standing with RW  while reaching in all planes , crossing of midline and  reaching to varying heights to facilitate (B) wt shifting and stability in standing to increase (I)ce when performing self care, and functional activities with standing component.  Pt tolerated up to 6 Min. In standing with Min to CGA  and RW to steady and assist to facilitate postural adjustments when standing as Pt presents with posterior lean.   .  Patient left up in chair with call button in reach, nurse notified and daughter present    ASSESSMENT:  Zbigniew Forman is a 89 y.o. male with a medical diagnosis of Acute respiratory failure with hypoxia . Pt was agreeable to OT and tolerated Tx without incidence but continues to require (A) to perform functional activities to completion. OT addressed self care tasks, functional mobility, functional transfers, functional standing and endurance to perform ADLS.  Pt is making progress but continues to require OT Intervention to perform functional transfers, functional standing activities, and self care tasks with standing component more independently. OT is recommended to further his functional (I)ce and safety. Goals remain appropriate and continued OT is recommended.        Rehab identified problem list/impairments: impaired endurance, impaired self care skills, impaired functional mobilty, gait instability, impaired balance, impaired cardiopulmonary response to activity    Rehab potential is good    Activity tolerance: Good    Discharge recommendations: home health OT(with light physical assist)     Barriers to discharge: None     Equipment recommendations: none     GOALS:   Multidisciplinary Problems     Occupational Therapy Goals        Problem: Occupational Therapy Goal    Goal Priority Disciplines Outcome Interventions   Occupational Therapy Goal     OT, PT/OT Ongoing (interventions implemented as appropriate)    Description:  Goals to be met by: 21 days     Patient will increase functional independence with ADLs by performing:    UE Dressing with Set-up  Assistance.  LE Dressing with Minimal Assistance.  Grooming while seated with Set-up Assistance.  Toileting from bedside commode with Minimal Assistance for hygiene and clothing management.   Bathing from  shower chair/bench with Minimal Assistance.  Supine to sit with Stand-by Assistance.  Stand pivot transfers with Stand-by Assistance with RW  Toilet transfer to bedside commode with Stand-by Assistance with RW.  Pt. To engage in dynamic standing activities at table with SBA                      Plan:  Patient to be seen 5 x/week to address the above listed problems via self-care/home management, therapeutic activities, therapeutic exercises  Plan of Care expires: 12/27/18  Plan of Care reviewed with: patient    Sky Sandhu, OTR/HAYES  11/30/2018

## 2018-11-30 NOTE — PLAN OF CARE
Problem: Occupational Therapy Goal  Goal: Occupational Therapy Goal  Goals to be met by: 21 days     Patient will increase functional independence with ADLs by performing:    UE Dressing with Set-up Assistance.  LE Dressing with Minimal Assistance.  Grooming while seated with Set-up Assistance.  Toileting from bedside commode with Minimal Assistance for hygiene and clothing management.   Bathing from  shower chair/bench with Minimal Assistance.  Supine to sit with Stand-by Assistance.  Stand pivot transfers with Stand-by Assistance with RW  Toilet transfer to bedside commode with Stand-by Assistance with RW.  Pt. To engage in dynamic standing activities at table with SBA     Outcome: Ongoing (interventions implemented as appropriate)  NADYA Sandhu/HAYES      11/30/2018

## 2018-12-01 PROCEDURE — 25000242 PHARM REV CODE 250 ALT 637 W/ HCPCS: Performed by: INTERNAL MEDICINE

## 2018-12-01 PROCEDURE — 97110 THERAPEUTIC EXERCISES: CPT

## 2018-12-01 PROCEDURE — 94640 AIRWAY INHALATION TREATMENT: CPT

## 2018-12-01 PROCEDURE — 94799 UNLISTED PULMONARY SVC/PX: CPT

## 2018-12-01 PROCEDURE — 94761 N-INVAS EAR/PLS OXIMETRY MLT: CPT

## 2018-12-01 PROCEDURE — 97535 SELF CARE MNGMENT TRAINING: CPT

## 2018-12-01 PROCEDURE — 11000004 HC SNF PRIVATE

## 2018-12-01 PROCEDURE — 25000003 PHARM REV CODE 250: Performed by: INTERNAL MEDICINE

## 2018-12-01 PROCEDURE — 63600175 PHARM REV CODE 636 W HCPCS: Performed by: INTERNAL MEDICINE

## 2018-12-01 PROCEDURE — 27000221 HC OXYGEN, UP TO 24 HOURS

## 2018-12-01 RX ADMIN — LISINOPRIL 10 MG: 10 TABLET ORAL at 09:12

## 2018-12-01 RX ADMIN — LEVALBUTEROL HYDROCHLORIDE 0.63 MG: 0.63 SOLUTION RESPIRATORY (INHALATION) at 08:12

## 2018-12-01 RX ADMIN — AMLODIPINE BESYLATE 5 MG: 5 TABLET ORAL at 09:12

## 2018-12-01 RX ADMIN — LEVALBUTEROL HYDROCHLORIDE 0.63 MG: 0.63 SOLUTION RESPIRATORY (INHALATION) at 11:12

## 2018-12-01 RX ADMIN — LATANOPROST 1 DROP: 50 SOLUTION OPHTHALMIC at 09:12

## 2018-12-01 RX ADMIN — ENOXAPARIN SODIUM 40 MG: 100 INJECTION SUBCUTANEOUS at 05:12

## 2018-12-01 RX ADMIN — LEVALBUTEROL HYDROCHLORIDE 0.63 MG: 0.63 SOLUTION RESPIRATORY (INHALATION) at 04:12

## 2018-12-01 NOTE — PT/OT/SLP PROGRESS
Occupational Therapy  Treatment    Zbigniew Forman   MRN: [id number]   Admitting Diagnosis: Acute respiratory failure with hypoxia    OT Date of Treatment: 12/01/18  Total Time (min): 48 min    Billable Minutes:  Self Care/Home Management 20 and Therapeutic Exercise 28    General Precautions: Standard, fall, diabetic  Orthopedic Precautions: N/A  Braces: N/A    Do you have any cultural, spiritual, Tenriism conflicts, given your current situation?: none reported    Subjective:  Communicated with nsg prior to session.  I am doing well today    Pain/Comfort  Pain Rating 1: 0/10  Pain Rating Post-Intervention 1: 0/10    Objective:  Patient found with: oxygen Pt. Seated in room with son present     Occupational Performance:    Bed Mobility:    ·  Not tested     Functional Mobility/Transfers:  · Patient completed Sit <> Stand Transfer with contact guard assistance and minimum assistance  with  rolling walker   · Functional Mobility: Pt. With sit to stand from chair with SPT to w/c and then from w/c with RW and cues for safety    Activities of Daily Living:  · Grooming: stand by assistance for oral care and hair care at sink level  · Upper Body Dressing: minimum assistance to akbar robe around back     AMPA 6 Click:  [facility] Total Score: 15    OT Exercises: UE Ergometer 10 min    Additional Treatment:  Pt. With 2# dowel activity with 15 reps with shd flex, bicep curls horz adb/add and forward flex motion to increase BUE ROM and strength,.   Pt. With fxl mobility from gym to room apporx 810 ft with CGA with cues for posture and  Head posture and maneuverability with RW with cues for safety   Pt. With fxl mobility and therex performed to increase ROM, endurance selfcare task and fxl mobility for independence   Patient left up in chair with all lines intact and call button in reach    ASSESSMENT:  Zbigniew Forman is a 89 y.o. male with a medical diagnosis of Acute respiratory failure with hypoxia Pt. participated well with  session on this day.Pt demos physical deficits with balance  functional mobility, UB strength, endurance  level of functional indep with daily tasks and activities and selfcare skills .Pt. Will continue to benefit from continued OT to progress towards goals      Rehab identified problem list/impairments: impaired endurance, impaired self care skills, impaired functional mobilty, gait instability, impaired balance, impaired cardiopulmonary response to activity    Rehab potential is fair    Activity tolerance: Fair    Discharge recommendations: home health OT(with light physical assist)     Barriers to discharge: None     Equipment recommendations: none     GOALS:   Multidisciplinary Problems     Occupational Therapy Goals        Problem: Occupational Therapy Goal    Goal Priority Disciplines Outcome Interventions   Occupational Therapy Goal     OT, PT/OT Ongoing (interventions implemented as appropriate)    Description:  Goals to be met by: 21 days     Patient will increase functional independence with ADLs by performing:    UE Dressing with Set-up Assistance.  LE Dressing with Minimal Assistance.  Grooming while seated with Set-up Assistance.  Toileting from bedside commode with Minimal Assistance for hygiene and clothing management.   Bathing from  shower chair/bench with Minimal Assistance.  Supine to sit with Stand-by Assistance.  Stand pivot transfers with Stand-by Assistance with RW  Toilet transfer to bedside commode with Stand-by Assistance with RW.  Pt. To engage in dynamic standing activities at table with SBA                  Plan:  Patient to be seen 5 x/week to address the above listed problems via self-care/home management, therapeutic activities, therapeutic exercises  Plan of Care expires: 12/27/18  Plan of Care reviewed with: patient    SUNITA Bennett  12/01/2018

## 2018-12-01 NOTE — PLAN OF CARE
Problem: Patient Care Overview  Goal: Plan of Care Review  Outcome: Ongoing (interventions implemented as appropriate)   12/01/18 0141   Coping/Psychosocial   Plan Of Care Reviewed With patient       Problem: Fall Risk (Adult)  Goal: Identify Related Risk Factors and Signs and Symptoms  Related risk factors and signs and symptoms are identified upon initiation of Human Response Clinical Practice Guideline (CPG)  Outcome: Ongoing (interventions implemented as appropriate)   12/01/18 0141   Fall Risk   Related Risk Factors (Fall Risk) age-related changes;confusion/agitation;gait/mobility problems

## 2018-12-02 PROCEDURE — 25000003 PHARM REV CODE 250: Performed by: INTERNAL MEDICINE

## 2018-12-02 PROCEDURE — 11000004 HC SNF PRIVATE

## 2018-12-02 PROCEDURE — 94640 AIRWAY INHALATION TREATMENT: CPT

## 2018-12-02 PROCEDURE — 94799 UNLISTED PULMONARY SVC/PX: CPT

## 2018-12-02 PROCEDURE — 97530 THERAPEUTIC ACTIVITIES: CPT

## 2018-12-02 PROCEDURE — 63600175 PHARM REV CODE 636 W HCPCS: Performed by: INTERNAL MEDICINE

## 2018-12-02 PROCEDURE — 97116 GAIT TRAINING THERAPY: CPT

## 2018-12-02 PROCEDURE — 25000242 PHARM REV CODE 250 ALT 637 W/ HCPCS: Performed by: INTERNAL MEDICINE

## 2018-12-02 PROCEDURE — 27000221 HC OXYGEN, UP TO 24 HOURS

## 2018-12-02 PROCEDURE — 94761 N-INVAS EAR/PLS OXIMETRY MLT: CPT

## 2018-12-02 RX ADMIN — LEVALBUTEROL HYDROCHLORIDE 0.63 MG: 0.63 SOLUTION RESPIRATORY (INHALATION) at 04:12

## 2018-12-02 RX ADMIN — LISINOPRIL 10 MG: 10 TABLET ORAL at 09:12

## 2018-12-02 RX ADMIN — LATANOPROST 1 DROP: 50 SOLUTION OPHTHALMIC at 08:12

## 2018-12-02 RX ADMIN — ENOXAPARIN SODIUM 40 MG: 100 INJECTION SUBCUTANEOUS at 05:12

## 2018-12-02 RX ADMIN — LEVALBUTEROL HYDROCHLORIDE 0.63 MG: 0.63 SOLUTION RESPIRATORY (INHALATION) at 11:12

## 2018-12-02 RX ADMIN — LEVALBUTEROL HYDROCHLORIDE 0.63 MG: 0.63 SOLUTION RESPIRATORY (INHALATION) at 09:12

## 2018-12-02 RX ADMIN — AMLODIPINE BESYLATE 5 MG: 5 TABLET ORAL at 09:12

## 2018-12-02 NOTE — PROGRESS NOTES
"Patient refused to stand to get a weight in the morning, he told the PCT " This is not Neccessary". Will make charge nurse aware and pass on it report to day shift .   "

## 2018-12-02 NOTE — PLAN OF CARE
Problem: Patient Care Overview  Goal: Plan of Care Review  Outcome: Ongoing (interventions implemented as appropriate)   12/01/18 2343   Coping/Psychosocial   Plan Of Care Reviewed With patient       Problem: Fall Risk (Adult)  Goal: Identify Related Risk Factors and Signs and Symptoms  Related risk factors and signs and symptoms are identified upon initiation of Human Response Clinical Practice Guideline (CPG)  Outcome: Ongoing (interventions implemented as appropriate)   12/01/18 5013   Fall Risk   Related Risk Factors (Fall Risk) gait/mobility problems;fear of falling;fatigue/slow reaction;age-related changes

## 2018-12-02 NOTE — PLAN OF CARE
Problem: Physical Therapy Goal  Goal: Physical Therapy Goal  Goals to be met by: 21 days    Patient will increase functional independence with mobility by performin. Supine to sit with Modified [place]  2. Sit to supine with SBA  3. Sit to stand transfer with SBA  4. Bed to chair transfer with SBA using Rolling Walker  5. Gait  x 150 feet with SBA using Rolling Walker.= revise 2018     REVISED 2018 :5b: gait x75 feet w SBA w RW    5c: gait x150 feet w RW and CGA  6. Wheelchair propulsion x50 feet with SBA using bilateral uppper extremities  7. Ascend/descend 4 stairs with bilateral Handrails Stand-by Assistance using Rolling Walker.   8. Stand for 3 minutes with SBA using Rolling Walker while performing UE activity       Goals remain appropriate. Continue with Physical therapy Plan of Care. Divya Meier, PT 2018

## 2018-12-02 NOTE — PT/OT/SLP PROGRESS
Physical Therapy  Treatment    Zbigniew Forman   MRN: [id number]   Admitting Diagnosis: Acute respiratory failure with hypoxia    PT Received On: 12/02/18          Billable Minutes:  Gait Training 25 and Therapeutic Activity 17=42    Treatment Type: Treatment  PT/PTA: PT     PTA Visit Number: 0       General Precautions: Standard, fall, diabetic  Orthopedic Precautions: N/A   Braces: N/A    Do you have any cultural, spiritual, Anabaptist conflicts, given your current situation?: none stated    Subjective:  Communicated with patient prior to session.  Agreeable to session    Pain/Comfort  Pain Rating 1: 0/10  Pain Rating Post-Intervention 1: 0/10    Objective:  Patient found seated in bedside chair with oxygen       AM-PAC 6 CLICK MOBILITY  Total Score:17    Bed Mobility:  Sit>Supine:np  Supine>Sit: np    Transfers:  Sit<>Stand: w/ RW and min assist for steadying t/f w/c x3 trials      Gait:  Amb w/ RW and CGA to occ min assist 95 feet x2 trials, oxygen in tow and w/c follow.   Slow pace, forward flexed posture; cues for improved upright posture and to step closer into RW.  Oxygen monitored as follows:  At rest 2 LPM SpO2=98% [90  After walking 95 feet as above w/ 2 LPM. SpO2 95% and p 101   Oxygen changed to 1 LPM. After seated rest break SpO2 on 1 LPM=98% and p 94  After walking 95 feet w/ 1 LPM SpO2=90% p 97  After seated rest break =97% on 1 LPM    Gait speed= 0.23 m/s: which is speed for a house hold walker, but also w/ indicates increase risk of death, hospitaization and fals.    Advanced Gait:  Stairs: np  Curb Step: up/down one inch curb step w/ RW and min assitance, cues, 2 LPM.    Wheelchair Mobility:  np     Therex:  Ankle  Pumps,   hip abduction/adduction,  LAQ   Hip flexion  x20 reps w/ assist as needed      Additional Treatment:      Patient left up in chair with all lines intact and call button in reach.    Assessment:  Zbigniew Forman is a 89 y.o. male with a medical diagnosis of Acute respiratory  failure with hypoxia.  Patient participated well. Increased amb distance to 95 feet x2 trials. Monitored oxygen saturation as above. SpO2 dropped to 90% on 1 LPM w/ activity. Patient will benefit from continued physical therapy to address deficits and improve safety and functional mobility. Continue with physical therapy plan of care.       Rehab identified problem list/impairments: impaired endurance, impaired self care skills, impaired functional mobilty, gait instability, impaired balance, decreased coordination, impaired coordination, impaired cardiopulmonary response to activity    Rehab potential is good.    Activity tolerance: Good    Discharge recommendations: home health PT     Barriers to discharge: Inaccessible home environment([facility] home)    Equipment recommendations: none     GOALS:   Multidisciplinary Problems     Physical Therapy Goals        Problem: Physical Therapy Goal    Goal Priority Disciplines Outcome Goal Variances Interventions   Physical Therapy Goal     PT, PT/OT Ongoing (interventions implemented as appropriate)     Description:  Goals to be met by: 21 days    Patient will increase functional independence with mobility by performin. Supine to sit with Modified [place]  2. Sit to supine with SBA  3. Sit to stand transfer with SBA  4. Bed to chair transfer with SBA using Rolling Walker  5. Gait  x 150 feet with SBA using Rolling Walker.= revise 2018     REVISED 2018 :5b: gait x75 feet w SBA w RW    5c: gait x150 feet w RW and CGA  6. Wheelchair propulsion x50 feet with SBA using bilateral uppper extremities  7. Ascend/descend 4 stairs with bilateral Handrails Stand-by Assistance using Rolling Walker.   8. Stand for 3 minutes with SBA using Rolling Walker while performing UE activity                         PLAN:    Patient to be seen (5-6x/wk)  to address the above listed problems via gait training, therapeutic activities, therapeutic exercises  Plan of Care expires:  12/27/18  Plan of Care reviewed with: patient    Divya DARIUS Meier, PT  12/02/2018

## 2018-12-03 PROBLEM — N39.46 MIXED STRESS AND URGE URINARY INCONTINENCE: Status: ACTIVE | Noted: 2018-12-03

## 2018-12-03 LAB
ANION GAP SERPL CALC-SCNC: 8 MMOL/L
BASOPHILS # BLD AUTO: 0.04 K/UL
BASOPHILS NFR BLD: 0.4 %
BUN SERPL-MCNC: 40 MG/DL
CALCIUM SERPL-MCNC: 8.6 MG/DL
CHLORIDE SERPL-SCNC: 108 MMOL/L
CO2 SERPL-SCNC: 24 MMOL/L
CREAT SERPL-MCNC: 0.9 MG/DL
DIFFERENTIAL METHOD: ABNORMAL
EOSINOPHIL # BLD AUTO: 0.2 K/UL
EOSINOPHIL NFR BLD: 2.1 %
ERYTHROCYTE [DISTWIDTH] IN BLOOD BY AUTOMATED COUNT: 13.3 %
EST. GFR  (AFRICAN AMERICAN): >60 ML/MIN/1.73 M^2
EST. GFR  (NON AFRICAN AMERICAN): >60 ML/MIN/1.73 M^2
GLUCOSE SERPL-MCNC: 127 MG/DL
HCT VFR BLD AUTO: 36.7 %
HGB BLD-MCNC: 11.7 G/DL
IMM GRANULOCYTES # BLD AUTO: 0.03 K/UL
IMM GRANULOCYTES NFR BLD AUTO: 0.3 %
LYMPHOCYTES # BLD AUTO: 3.6 K/UL
LYMPHOCYTES NFR BLD: 38.6 %
MAGNESIUM SERPL-MCNC: 2.3 MG/DL
MCH RBC QN AUTO: 27.8 PG
MCHC RBC AUTO-ENTMCNC: 31.9 G/DL
MCV RBC AUTO: 87 FL
MONOCYTES # BLD AUTO: 0.4 K/UL
MONOCYTES NFR BLD: 4.4 %
NEUTROPHILS # BLD AUTO: 5.1 K/UL
NEUTROPHILS NFR BLD: 54.2 %
NRBC BLD-RTO: 0 /100 WBC
PHOSPHATE SERPL-MCNC: 2.9 MG/DL
PLATELET # BLD AUTO: 288 K/UL
PMV BLD AUTO: 10.1 FL
POTASSIUM SERPL-SCNC: 4.6 MMOL/L
RBC # BLD AUTO: 4.21 M/UL
SODIUM SERPL-SCNC: 140 MMOL/L
WBC # BLD AUTO: 9.4 K/UL

## 2018-12-03 PROCEDURE — 97535 SELF CARE MNGMENT TRAINING: CPT

## 2018-12-03 PROCEDURE — 94761 N-INVAS EAR/PLS OXIMETRY MLT: CPT

## 2018-12-03 PROCEDURE — 25000242 PHARM REV CODE 250 ALT 637 W/ HCPCS: Performed by: INTERNAL MEDICINE

## 2018-12-03 PROCEDURE — 25000003 PHARM REV CODE 250: Performed by: INTERNAL MEDICINE

## 2018-12-03 PROCEDURE — 99308 SBSQ NF CARE LOW MDM 20: CPT | Mod: ,,, | Performed by: NURSE PRACTITIONER

## 2018-12-03 PROCEDURE — 94640 AIRWAY INHALATION TREATMENT: CPT

## 2018-12-03 PROCEDURE — 97116 GAIT TRAINING THERAPY: CPT

## 2018-12-03 PROCEDURE — 85025 COMPLETE CBC W/AUTO DIFF WBC: CPT

## 2018-12-03 PROCEDURE — 99900035 HC TECH TIME PER 15 MIN (STAT)

## 2018-12-03 PROCEDURE — 63600175 PHARM REV CODE 636 W HCPCS: Performed by: INTERNAL MEDICINE

## 2018-12-03 PROCEDURE — 11000004 HC SNF PRIVATE

## 2018-12-03 PROCEDURE — 27000221 HC OXYGEN, UP TO 24 HOURS

## 2018-12-03 PROCEDURE — 36415 COLL VENOUS BLD VENIPUNCTURE: CPT

## 2018-12-03 PROCEDURE — 94799 UNLISTED PULMONARY SVC/PX: CPT

## 2018-12-03 PROCEDURE — 97530 THERAPEUTIC ACTIVITIES: CPT

## 2018-12-03 PROCEDURE — 97110 THERAPEUTIC EXERCISES: CPT

## 2018-12-03 PROCEDURE — 80048 BASIC METABOLIC PNL TOTAL CA: CPT

## 2018-12-03 PROCEDURE — 97803 MED NUTRITION INDIV SUBSEQ: CPT

## 2018-12-03 PROCEDURE — 84100 ASSAY OF PHOSPHORUS: CPT

## 2018-12-03 PROCEDURE — 83735 ASSAY OF MAGNESIUM: CPT

## 2018-12-03 PROCEDURE — 99900058 HC 022 PAID UNDER SNF PPS

## 2018-12-03 RX ADMIN — LEVALBUTEROL HYDROCHLORIDE 0.63 MG: 0.63 SOLUTION RESPIRATORY (INHALATION) at 04:12

## 2018-12-03 RX ADMIN — LEVALBUTEROL HYDROCHLORIDE 0.63 MG: 0.63 SOLUTION RESPIRATORY (INHALATION) at 11:12

## 2018-12-03 RX ADMIN — AMLODIPINE BESYLATE 5 MG: 5 TABLET ORAL at 09:12

## 2018-12-03 RX ADMIN — ACETAMINOPHEN 325 MG: 325 TABLET ORAL at 02:12

## 2018-12-03 RX ADMIN — LATANOPROST 1 DROP: 50 SOLUTION OPHTHALMIC at 08:12

## 2018-12-03 RX ADMIN — LISINOPRIL 10 MG: 10 TABLET ORAL at 09:12

## 2018-12-03 RX ADMIN — ENOXAPARIN SODIUM 40 MG: 100 INJECTION SUBCUTANEOUS at 05:12

## 2018-12-03 RX ADMIN — LEVALBUTEROL HYDROCHLORIDE 0.63 MG: 0.63 SOLUTION RESPIRATORY (INHALATION) at 09:12

## 2018-12-03 NOTE — PLAN OF CARE
Problem: Pressure Ulcer Risk (Bubba Scale) (Adult,Obstetrics,Pediatric)  Intervention: Prevent/Minimize Sheer/Friction Injuries   12/03/18 1104   Positioning   Positioning/Transfer Devices pillows;in use   Skin Interventions   Pressure Reduction Devices Heel offloading device utilized   Pressure Reduction Techniques frequent weight shift encouraged;rest period provided between sit times;weight shift assistance provided

## 2018-12-03 NOTE — PROGRESS NOTES
[facility] - Skilled Nursing Care  Department of [facility] Medicine  Progress Note    Patient Name: Zbigniew Forman  MRN: [id number]  Code Status: Full Code  Admission Date: 11/26/2018  Length of Stay: 7 days  Attending Physician: Quentin Toney MD  Primary Care Provider: Blas Morgan Ii, MD    Subjective:     Principal Problem:Acute respiratory failure with hypoxia    HPI:  Mr. Forman is a 89-year-old male [employer]  with HTN who presented to [facility] with SOB and worsening LE edema. His initial symptoms were present for several weeks prior to presentation and include orthopnea, some cough, and progressively worsening lower extremity edema and was admitted with acute diastolic heart failure.    Upon admission he reported his SOB started 2-3 weeks ago, and acutely worsened over the last few days prompting him to present to the ED. Family and patient report orthopnea, but patient insists of sleeping flat. He reported some cough and wheeze, but no fever or recent sick contacts. He does not use oxygen at home and lives with his daughter. He reported no chest pain and had no syncope.  He does report some dizziness when he moves around too much. No recent sick contacts. He denies every being told that he has issues with HF. He lost his wife of 74 years in May. 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 He denies history of COPD.       Mr. Forman was admitted to hospital medicine on 11/17 for acute diastolic heart failure, for which he underwent diuresis with IV lasix, later transitioned to oral lasix on 11/18. Due to concern for new infiltrate on CXR and productive cough, antibiotics were started for presumptive PNA. Lab infectious workup including procal, LA and WBC were normal, but he felt subjectively improved so the decision was made to complete a five day course (Levaquin). PT/OT consulted and recommended SNF, for which he was transferred here for continued PT/OT.    Interval History:    11/27/18  Patient seen at bedside, daughter is present.  He currently denies pain or SOB.  He is currently wearing supplemental oxygen which he reports he did not wear at home.  Reviewed home medication, hospital medication and current medications with him and his daughter.  Daughter reports he was taking Lasix and Lisinopril at the hospital and did not understand why it was not continued here.  Will restart Lisinopril with holding parameters and add Lasix PRN for weight gain > 4 lbs in 24 hours.  Advised daughter will try to wean oxygen while admitted.  Daughter also requesting no accuchecks as he was not doing at home and his glucose has been fine.  A1c is 6.5 and recent fasting glucoses are stable.  Lastly, discussed EOL care and code status.  Daughter requested this topic not be discussed and patient has not stated his code intentions therefore will update records to indicate full code at this time.    12/3/18  Patient seen at bedside with son.  Patient's son inquires about using uroxatral to prevent bladder leakage.  States was told about this medication in the hospital and prescribed by provider there but has not taken it yet.  Advised son, medications were discussed with his sister upon admit and was told he did not use and therefore did not ordered.  Also explained potential side effects including syncope and orthostatic hypotension.  He said he did not know these effects and agrees to avoid use at this time.  Patient has no other complaints.        Review of Systems   Constitutional: Negative for fatigue and fever.   Respiratory: Negative for cough and shortness of breath.    Cardiovascular: Negative for chest pain, palpitations and leg swelling.   Gastrointestinal: Negative for abdominal pain, constipation, diarrhea, nausea and vomiting.   Genitourinary: Negative for dysuria.        Occasional urinary burning due to repeated whiteside insertions during admission.   Musculoskeletal: Negative for arthralgias  and myalgias.   Skin: Negative for rash.     Objective:     Vital Signs (Most Recent):  Temp: 97 °F (36.1 °C) (12/03/18 0800)  Pulse: 90 (12/03/18 0926)  Resp: (!) 24 (12/03/18 0926)  BP: 116/60 (12/03/18 0800)  SpO2: 98 % (12/03/18 0926) Vital Signs (24h Range):  Temp:  [97 °F (36.1 °C)-97.6 °F (36.4 °C)] 97 °F (36.1 °C)  Pulse:  [72-90] 90  Resp:  [18-24] 24  SpO2:  [95 %-98 %] 98 %  BP: (116-125)/(60-66) 116/60     Weight: 68.1 kg (150 lb 2.1 oz)  Body mass index is 22.83 kg/m².    Intake/Output Summary (Last 24 hours) at 12/3/2018 1135  Last data filed at 12/3/2018 0800  Gross per 24 hour   Intake 680 ml   Output --   Net 680 ml      Physical Exam   Constitutional: He is oriented to person, place, and time. He appears well-developed and well-nourished.   Cardiovascular: Normal rate, regular rhythm, normal heart sounds and intact distal pulses.   No murmur heard.  Pulmonary/Chest: Effort normal and breath sounds normal. No respiratory distress.   Supplemental oxygen at 1 lpm, sats 98%   Abdominal: Soft. Bowel sounds are normal. He exhibits no distension. There is no tenderness.   Musculoskeletal: Normal range of motion. He exhibits no edema or tenderness.   Neurological: He is alert and oriented to person, place, and time.   Skin: Skin is warm and dry. Capillary refill takes less than 2 seconds. No erythema.       Significant Labs:   BMP:   Recent Labs   Lab 12/03/18  0517   *      K 4.6      CO2 24   BUN 40*   CREATININE 0.9   CALCIUM 8.6*   MG 2.3     CBC:   Recent Labs   Lab 12/03/18  0517   WBC 9.40   HGB 11.7*   HCT 36.7*          Significant Imaging: n/a    Assessment/Plan:      * Acute respiratory failure with hypoxia    11/27/18  Currently with supplemental oxygen at 2.5 LPM with sats at 96%.  No reported SOB.  Will add IS with neb treatments and continue Xopenex tid as ordered.  Add Duoneb PRN and monitor.  Will attempt to wean oxygen while admitted to SNF.  Echo completed  during recent hospitalization.  EF ~60% with no diastolic dysfunction.  Mild right atrial enlargement   Monitor daily weights and treat with Lasix PRN for weight gain as stated above. Avoiding scheduled diuresis on the front end given that his most recent labs actually look on the dry side.    12/3/18  Appears compensated and euvolemic.  BUN up, recommend increasing oral hydration.     Mixed stress and urge urinary incontinence    12/3/18  POA  Son inquired about using Uroxatral as prescribed by transferring provider.  Son states he did not take at home PTA.  Advised son, had spoke with sister upon admit and was told he did not use drug previously and therefore was not ordered.  Advised son I could ordered but told him of potential side effects as relates to Orthostatic Hypotension and syncope and he agreed to avoid use.     Heart failure with preserved ejection fraction    11/27  Euvolemic at present   Continue supplementary oxygen  Lasix PRN as described above; cautious administration in this elderly gentleman with CKD    12/3/18  As above.  No CP or SOB reported.  Continue ACE with BP holding parameters.     Hypertension associated with diabetes    11/27/18  BP Readings from Last 3 Encounters:   12/03/18 116/60   11/26/18 117/61   11/16/18 118/62     BP is stable, currently on Norvasc 5 mg daily.  Will restart Lisinopril as above and set holding parameters for administration of BP medications.    12/3/18  BP is stable.  Continue Norvasc and Lisinopril as ordered.  Holding parameters in place.     Chronic kidney disease, stage III (moderate)    11/27/18  Resume ACE for renal protection in light of CHF.  Monitor renal function biweekly.  Last Creatinine is 1.1.  Motrin discontinued as he is no longer using and want to avoid any insult to renal function.  Avoid nephrotoxins    12/3/18  Creatinine is stable, encouraged oral hydration.  Continue Lisinopril for renal protection.  Wt Readings from Last 1 Encounters:    12/03/18 0516 68.1 kg (150 lb 2.1 oz)   12/01/18 0624 67.7 kg (149 lb 4 oz)   11/30/18 0600 67.5 kg (148 lb 13 oz)   11/29/18 0630 70.9 kg (156 lb 4.9 oz)   11/28/18 0547 70.2 kg (154 lb 12.2 oz)   11/26/18 2000 73 kg (160 lb 15 oz)     Weight is stable.     Glaucoma suspect, high risk    11/27/18  Will resume home Xalatan gtts.  Patient to follow up with PCP.    12/3/18  No acute issues.   Continue Latanoprost gtts as ordered.     Controlled type 2 diabetes mellitus without complication, without long-term current use of insulin    11/27/18  Lab Results   Component Value Date    HGBA1C 6.5 (H) 11/17/2018     No need to do accuchecks as fasting glucose is stable and patient and family are requesting no finger sticks.  Monitor fasting glucose and treat if indicated.    12/3/18  Fasting glucose is stable, no changes in treatment plan.         Tuan Rocha NP  Department of Hospital Medicine  [facility] - [facility] Nursing Care

## 2018-12-03 NOTE — ASSESSMENT & PLAN NOTE
11/27/18  Currently with supplemental oxygen at 2.5 LPM with sats at 96%.  No reported SOB.  Will add IS with neb treatments and continue Xopenex tid as ordered.  Add Duoneb PRN and monitor.  Will attempt to wean oxygen while admitted to SNF.  Echo completed during recent hospitalization.  EF ~60% with no diastolic dysfunction.  Mild right atrial enlargement   Monitor daily weights and treat with Lasix PRN for weight gain as stated above. Avoiding scheduled diuresis on the front end given that his most recent labs actually look on the dry side.    12/3/18  Appears compensated and euvolemic.  BUN up, recommend increasing oral hydration.

## 2018-12-03 NOTE — ASSESSMENT & PLAN NOTE
11/27/18  Lab Results   Component Value Date    HGBA1C 6.5 (H) 11/17/2018     No need to do accuchecks as fasting glucose is stable and patient and family are requesting no finger sticks.  Monitor fasting glucose and treat if indicated.    12/3/18  Fasting glucose is stable, no changes in treatment plan.

## 2018-12-03 NOTE — PROGRESS NOTES
" [facility] - Skilled Nursing Care  Adult Nutrition  Progress Note    SUMMARY       Recommendations    Recommendation/Intervention: Continue cardiac diet, fluid restriction per MD , boost plus bid  Goals: po >85% to meet EEN/EPN and ONS acceptance  Nutrition Goal Status: progressing towards goal    Reason for Assessment  Weight stable 148-150#  Reason for Assessment: RD follow-up  Diagnosis: (acute respiratory failure with hypoxia; debility)  Relevant Medical History: HTN; DM2; CKD3;   Interdisciplinary Rounds: attended  General Information Comments: family member in room for meals, pt states not getting smoothie and not getting boost,  contacted and states pt received for lunch today. pt wants chocolate updated preferences  Nutrition Discharge Planning: d/c regular diet  Anthropometrics    Temp: 97 °F (36.1 °C)  Height Method: Stated  Height: 5' 8" (172.7 cm)  Height (inches): 68 in  Weight Method: Bed Scale  Weight: 68.1 kg (150 lb 2.1 oz)  Weight (lb): 150.13 lb  Ideal Body Weight (IBW), Male: 154 lb  % Ideal Body Weight, Male (lb): 104.51 lb  BMI (Calculated): 24.5  BMI Grade: 18.5-24.9 - normal  Weight Loss: unintentional  Usual Body Weight (UBW), k.36 kg  % Usual Body Weight: 95.8       Lab/Procedures/Meds    Pertinent Labs Reviewed: reviewed  Pertinent Labs Comments: BUN 40, glucose 127  Pertinent Medications Reviewed: reviewed    Physical Findings/Assessment    Overall Physical Appearance: advanced age, loss of subcutaneous fat  Tubes: (-)  Oral/Mouth Cavity: WDL  Skin: intact    Estimated/Assessed Needs    Weight Used For Calorie Calculations: 73 kg (160 lb 15 oz)  Energy Calorie Requirements (kcal): 1650 kcal/d  Energy Need Method: Harper-St Jeor((x1.25 stress factor))  Protein Requirements:  g/d(1.2-1.4 gm/kg)  Weight Used For Protein Calculations: 73 kg (160 lb 15 oz)  Fluid Requirements (mL): 1 mL/kcal or per MD     RDA Method (mL): 1650  CHO Requirement: -      Nutrition " Prescription Ordered    Current Diet Order: cardiac, 2L fluid restriction,   Nutrition Order Comments: 2L fluid restriction  Oral Nutrition Supplement: boost plus BID    Evaluation of Received Nutrient/Fluid Intake    Energy Calories Required: meeting needs  Protein Required: not meeting needs  Fluid Required: meeting needs  Comments: PO %  Tolerance: tolerating  % Intake of Estimated Energy Needs: 75 - 100 %  % Meal Intake: 75 - 100 %    Nutrition Risk    Level of Risk/Frequency of Follow-up: low     Assessment and Plan   Inadequate oral intake related to acute respiratory failure as evidence by poor po and reliance on ONS and recent wt loss of ~7lbs.   Follow weights  Monitor and Evaluation    Food and Nutrient Intake: energy intake  Food and Nutrient Adminstration: diet order  Physical Activity and Function: nutrition-related ADLs and IADLs  Anthropometric Measurements: weight, weight change  Biochemical Data, Medical Tests and Procedures: electrolyte and renal panel, lipid profile, gastrointestinal profile, glucose/endocrine profile, inflammatory profile  Nutrition-Focused Physical Findings: overall appearance     Nutrition Follow-Up    RD Follow-up?: Yes

## 2018-12-03 NOTE — PLAN OF CARE
Problem: Physical Therapy Goal  Goal: Physical Therapy Goal  Goals to be met by: 21 days    Patient will increase functional independence with mobility by performin. Supine to sit with Modified [place]  2. Sit to supine with SBA  3. Sit to stand transfer with SBA  4. Bed to chair transfer with SBA using Rolling Walker  5. Gait  x 150 feet with SBA using Rolling Walker.= revise 2018     REVISED 2018 :5b: gait x75 feet w SBA w RW    5c: gait x150 feet w RW and CGA  6. Wheelchair propulsion x50 feet with SBA using bilateral uppper extremities  7. Ascend/descend 4 stairs with bilateral Handrails Stand-by Assistance.   8. Stand for 3 minutes with SBA using Rolling Walker while performing UE activity        Goal revised as noted.   Sandie Segal, SPT  12/3/2018

## 2018-12-03 NOTE — ASSESSMENT & PLAN NOTE
11/27/18  Will resume home Xalatan gtts.  Patient to follow up with PCP.    12/3/18  No acute issues.   Continue Latanoprost gtts as ordered.

## 2018-12-03 NOTE — PLAN OF CARE
Problem: Patient Care Overview  Goal: Plan of Care Review  Outcome: Ongoing (interventions implemented as appropriate)     Recommendation/Intervention: Continue cardiac diet, fluid restriction per MD , boost plus bid dc smoothie  Goals: po >85% to meet EEN/EPN and ONS acceptance  Nutrition Goal Status: progressing towards goal

## 2018-12-03 NOTE — ASSESSMENT & PLAN NOTE
11/27/18  Resume ACE for renal protection in light of CHF.  Monitor renal function biweekly.  Last Creatinine is 1.1.  Motrin discontinued as he is no longer using and want to avoid any insult to renal function.  Avoid nephrotoxins    12/3/18  Creatinine is stable, encouraged oral hydration.  Continue Lisinopril for renal protection.  Wt Readings from Last 1 Encounters:   12/03/18 0516 68.1 kg (150 lb 2.1 oz)   12/01/18 0624 67.7 kg (149 lb 4 oz)   11/30/18 0600 67.5 kg (148 lb 13 oz)   11/29/18 0630 70.9 kg (156 lb 4.9 oz)   11/28/18 0547 70.2 kg (154 lb 12.2 oz)   11/26/18 2000 73 kg (160 lb 15 oz)     Weight is stable.

## 2018-12-03 NOTE — ASSESSMENT & PLAN NOTE
12/3/18  POA  Son inquired about using Uroxatral as prescribed by transferring provider.  Son states he did not take at home PTA.  Advised son, had spoke with sister upon admit and was told he did not use drug previously and therefore was not ordered.  Advised son I could ordered but told him of potential side effects as relates to Orthostatic Hypotension and syncope and he agreed to avoid use.

## 2018-12-03 NOTE — PLAN OF CARE
Problem: Patient Care Overview  Goal: Plan of Care Review  Outcome: Ongoing (interventions implemented as appropriate)   12/03/18 0543   Coping/Psychosocial   Plan Of Care Reviewed With patient       Problem: Fall Risk (Adult)  Goal: Identify Related Risk Factors and Signs and Symptoms  Related risk factors and signs and symptoms are identified upon initiation of Human Response Clinical Practice Guideline (CPG)  Outcome: Ongoing (interventions implemented as appropriate)   12/03/18 0543   Fall Risk   Related Risk Factors (Fall Risk) fatigue/slow reaction;fear of falling;gait/mobility problems

## 2018-12-03 NOTE — PT/OT/SLP PROGRESS
Occupational Therapy  Treatment    Zbigniew Forman   MRN: [id number]   Admitting Diagnosis: Acute respiratory failure with hypoxia    OT Date of Treatment: 12/03/18  Total Time (min): 55 min    Billable Minutes:  Self Care/Home Management 55    General Precautions: Standard, fall, diabetic  Orthopedic Precautions: N/A  Braces: N/A    Do you have any cultural, spiritual, Jewish conflicts, given your current situation?: none reported    Subjective:  Communicated with nsg prior to session.  I am doing well today    Pain/Comfort  Pain Rating 1: 0/10  Pain Rating Post-Intervention 1: 0/10    Objective:  Patient found with: oxygen seated in chair with son present     Occupational Performance:    Bed Mobility:    ·  Not tested     Functional Mobility/Transfers:  · Patient completed Sit <> Stand Transfer with minimum assistance  with  rolling walker   · Patient completed Toilet Transfer Stand Pivot technique with contact guard assistance and minimum assistance with  grab bars   · Pt. With SPT from chair to w/c and then from w/c to 3n1 with bra bars and from 3n1 with RW for stand bal    Activities of Daily Living:  · Grooming: supervision seated at sink level  · Bathing: moderate assistance for BLE and post terra management   · Upper Body Dressing: contact guard assistance to doff/akbar pull over shirt and akbar button up from around back  · Lower Body Dressing: maximal assistance for LB dressing with doffing/ diaper/pants and socks and (A) for BL E insertion into pants legs and (Pt. able to (A) to manage over hips instance with RW for bal and TA for BLE socks   · Toileting: total assistance from 3n1 with clearing management Pt. with large smudgy BM with going twice while on 3n1    AMPA 6 Click:  AMPA Total Score: 15    Patient left up in chair with all lines intact, call button in reach and son present    ASSESSMENT:  Zbigniew Forman is a 89 y.o. male with a medical diagnosis of Acute respiratory failure with hypoxia Pt.  participated well with session on this day.Pt demos physical deficits with balance  functional mobility, UB strength, endurance  level of functional indep with daily tasks and activities and selfcare skills .Pt. Will continue to benefit from continued OT to progress towards goals  .    Rehab identified problem list/impairments: impaired endurance, impaired self care skills, impaired functional mobilty, gait instability, impaired balance, impaired cardiopulmonary response to activity    Rehab potential is fair    Activity tolerance: Fair    Discharge recommendations: home health OT(with light physical assist)     Barriers to discharge: None     Equipment recommendations: none     GOALS:   Multidisciplinary Problems     Occupational Therapy Goals        Problem: Occupational Therapy Goal    Goal Priority Disciplines Outcome Interventions   Occupational Therapy Goal     OT, PT/OT Ongoing (interventions implemented as appropriate)    Description:  Goals to be met by: 21 days     Patient will increase functional independence with ADLs by performing:    UE Dressing with Set-up Assistance.  LE Dressing with Minimal Assistance.  Grooming while seated with Set-up Assistance.  Toileting from bedside commode with Minimal Assistance for hygiene and clothing management.   Bathing from  shower chair/bench with Minimal Assistance.  Supine to sit with Stand-by Assistance.  Stand pivot transfers with Stand-by Assistance with RW  Toilet transfer to bedside commode with Stand-by Assistance with RW.  Pt. To engage in dynamic standing activities at table with SBA                  Plan:  Patient to be seen 5 x/week to address the above listed problems via self-care/home management, therapeutic activities, therapeutic exercises  Plan of Care expires: 12/27/18  Plan of Care reviewed with: patient    SUNITA Bennett  12/03/2018

## 2018-12-03 NOTE — PT/OT/SLP PROGRESS
"Physical Therapy  Treatment    Zbigniew Forman   MRN: [id number]   Admitting Diagnosis: Acute respiratory failure with hypoxia    PT Received On: 12/03/18  Total Time (min): 47       Billable Minutes:  Gait Training 12, Therapeutic Activity 15 and Therapeutic Exercise 20    Treatment Type: Treatment  PT/PTA: PTA     PTA Visit Number: 1       General Precautions: Standard, fall, diabetic  Orthopedic Precautions: N/A   Braces: N/A    Do you have any cultural, spiritual, Congregation conflicts, given your current situation?: none stated    Subjective:  "fine just a little back pain"      Pain/Comfort  Pain Rating 1: 7/10  Location - Side 1: Bilateral  Location - Orientation 1: lower  Location 1: back(since yesterday)  Pain Addressed 1: Reposition, Distraction, Cessation of Activity, Nurse notified(tylenol requested given during session)  Pain Rating Post-Intervention 1: (no further mention)    Objective:   Patient found with: oxygen(1L)     AM-PAC 6 CLICK MOBILITY  Total Score:17    Transfers:  Sit<>Stand: with RW min A vcs for tech    Gait:  Amb with RW min A vc/tcs for erect posture/erect posture ~ 98 ft, deferred 2nd trial to perform steps today    Advanced Gait:  Stairs: asc/anand 4 steps with BHR CGA vcs for safety/tech    Therex:  2x10 reps AP,GS,LAQ,hip flex,abd/add    Balance:  Dyn standing bal act with RW min/CGA uni lat support on RW or counter using both hands reaching for object in cabinet and placing on counter then returned back in cabinet, vc/tcs for erect posture, RUE ROM appears less than LUE    Patient left up in chair with call button in reach and belongings in reach.    Assessment:  Zbigniew Forman is a 89 y.o. male with a medical diagnosis of Acute respiratory failure with hypoxia.  Pt tolerated well, pt performed steps today with CGA,  pt would continue to benefit from skilled PT services to improve overall functional mobility, strength and endurance.  .    Rehab identified problem list/impairments: " impaired endurance, impaired self care skills, impaired functional mobilty, gait instability, impaired balance, decreased coordination, impaired coordination, impaired cardiopulmonary response to activity    Rehab potential is good.    Activity tolerance: Fair    Discharge recommendations: home health PT     Barriers to discharge: Inaccessible home environment([address] [facility] home)    Equipment recommendations: none     GOALS:   Multidisciplinary Problems     Physical Therapy Goals        Problem: Physical Therapy Goal    Goal Priority Disciplines Outcome Goal Variances Interventions   Physical Therapy Goal     PT, PT/OT Ongoing (interventions implemented as appropriate)     Description:  Goals to be met by: 21 days    Patient will increase functional independence with mobility by performin. Supine to sit with Modified [place]  2. Sit to supine with SBA  3. Sit to stand transfer with SBA  4. Bed to chair transfer with SBA using Rolling Walker  5. Gait  x 150 feet with SBA using Rolling Walker.= revise 2018     REVISED 2018 :5b: gait x75 feet w SBA w RW    5c: gait x150 feet w RW and CGA  6. Wheelchair propulsion x50 feet with SBA using bilateral uppper extremities  7. Ascend/descend 4 stairs with bilateral Handrails Stand-by Assistance.   8. Stand for 3 minutes with SBA using Rolling Walker while performing UE activity                          PLAN:    Patient to be seen (5-6x/wk)  to address the above listed problems via gait training, therapeutic activities, therapeutic exercises  Plan of Care expires: 18  Plan of Care reviewed with: patient    Silvia Ruth, PTA  2018

## 2018-12-03 NOTE — SUBJECTIVE & OBJECTIVE
Interval History:   11/27/18  Patient seen at bedside, daughter is present.  He currently denies pain or SOB.  He is currently wearing supplemental oxygen which he reports he did not wear at home.  Reviewed home medication, hospital medication and current medications with him and his daughter.  Daughter reports he was taking Lasix and Lisinopril at the hospital and did not understand why it was not continued here.  Will restart Lisinopril with holding parameters and add Lasix PRN for weight gain > 4 lbs in 24 hours.  Advised daughter will try to wean oxygen while admitted.  Daughter also requesting no accuchecks as he was not doing at home and his glucose has been fine.  A1c is 6.5 and recent fasting glucoses are stable.  Lastly, discussed EOL care and code status.  Daughter requested this topic not be discussed and patient has not stated his code intentions therefore will update records to indicate full code at this time.    12/3/18  Patient seen at bedside with son.  Patient's son inquires about using uroxatral to prevent bladder leakage.  States was told about this medication in the hospital and prescribed by provider there but has not taken it yet.  Advised son, medications were discussed with his sister upon admit and was told he did not use and therefore did not ordered.  Also explained potential side effects including syncope and orthostatic hypotension.  He said he did not know these effects and agrees to avoid use at this time.  Patient has no other complaints.        Review of Systems   Constitutional: Negative for fatigue and fever.   Respiratory: Negative for cough and shortness of breath.    Cardiovascular: Negative for chest pain, palpitations and leg swelling.   Gastrointestinal: Negative for abdominal pain, constipation, diarrhea, nausea and vomiting.   Genitourinary: Negative for dysuria.        Occasional urinary burning due to repeated whiteside insertions during admission.   Musculoskeletal: Negative  for arthralgias and myalgias.   Skin: Negative for rash.     Objective:     Vital Signs (Most Recent):  Temp: 97 °F (36.1 °C) (12/03/18 0800)  Pulse: 90 (12/03/18 0926)  Resp: (!) 24 (12/03/18 0926)  BP: 116/60 (12/03/18 0800)  SpO2: 98 % (12/03/18 0926) Vital Signs (24h Range):  Temp:  [97 °F (36.1 °C)-97.6 °F (36.4 °C)] 97 °F (36.1 °C)  Pulse:  [72-90] 90  Resp:  [18-24] 24  SpO2:  [95 %-98 %] 98 %  BP: (116-125)/(60-66) 116/60     Weight: 68.1 kg (150 lb 2.1 oz)  Body mass index is 22.83 kg/m².    Intake/Output Summary (Last 24 hours) at 12/3/2018 1135  Last data filed at 12/3/2018 0800  Gross per 24 hour   Intake 680 ml   Output --   Net 680 ml      Physical Exam   Constitutional: He is oriented to person, place, and time. He appears well-developed and well-nourished.   Cardiovascular: Normal rate, regular rhythm, normal heart sounds and intact distal pulses.   No murmur heard.  Pulmonary/Chest: Effort normal and breath sounds normal. No respiratory distress.   Supplemental oxygen at 1 lpm, sats 98%   Abdominal: Soft. Bowel sounds are normal. He exhibits no distension. There is no tenderness.   Musculoskeletal: Normal range of motion. He exhibits no edema or tenderness.   Neurological: He is alert and oriented to person, place, and time.   Skin: Skin is warm and dry. Capillary refill takes less than 2 seconds. No erythema.       Significant Labs:   BMP:   Recent Labs   Lab 12/03/18 0517   *      K 4.6      CO2 24   BUN 40*   CREATININE 0.9   CALCIUM 8.6*   MG 2.3     CBC:   Recent Labs   Lab 12/03/18 0517   WBC 9.40   HGB 11.7*   HCT 36.7*          Significant Imaging: n/a

## 2018-12-03 NOTE — ASSESSMENT & PLAN NOTE
11/27  Euvolemic at present   Continue supplementary oxygen  Lasix PRN as described above; cautious administration in this elderly gentleman with CKD    12/3/18  As above.  No CP or SOB reported.  Continue ACE with BP holding parameters.

## 2018-12-04 ENCOUNTER — TELEPHONE (OUTPATIENT)
Dept: CARDIOLOGY | Facility: CLINIC | Age: 83
End: 2018-12-04

## 2018-12-04 PROCEDURE — 25000003 PHARM REV CODE 250: Performed by: INTERNAL MEDICINE

## 2018-12-04 PROCEDURE — 11000004 HC SNF PRIVATE

## 2018-12-04 PROCEDURE — 97110 THERAPEUTIC EXERCISES: CPT

## 2018-12-04 PROCEDURE — 63600175 PHARM REV CODE 636 W HCPCS: Performed by: INTERNAL MEDICINE

## 2018-12-04 PROCEDURE — 97530 THERAPEUTIC ACTIVITIES: CPT

## 2018-12-04 PROCEDURE — 97116 GAIT TRAINING THERAPY: CPT

## 2018-12-04 PROCEDURE — 99900035 HC TECH TIME PER 15 MIN (STAT)

## 2018-12-04 PROCEDURE — 27000221 HC OXYGEN, UP TO 24 HOURS

## 2018-12-04 RX ADMIN — LATANOPROST 1 DROP: 50 SOLUTION OPHTHALMIC at 08:12

## 2018-12-04 RX ADMIN — LISINOPRIL 10 MG: 10 TABLET ORAL at 09:12

## 2018-12-04 RX ADMIN — AMLODIPINE BESYLATE 5 MG: 5 TABLET ORAL at 09:12

## 2018-12-04 RX ADMIN — ENOXAPARIN SODIUM 40 MG: 100 INJECTION SUBCUTANEOUS at 05:12

## 2018-12-04 NOTE — PT/OT/SLP PROGRESS
Occupational Therapy  Treatment    Zbigniew Forman   MRN: [id number]   Admitting Diagnosis: Acute respiratory failure with hypoxia    OT Date of Treatment: 12/04/18  Total Time (min): 45 min    Billable Minutes:  Therapeutic Activity 45    General Precautions: Standard, fall, diabetic  Orthopedic Precautions: N/A  Braces: N/A    Do you have any cultural, spiritual, Confucianism conflicts, given your current situation?: none reported    Subjective:  Communicated with nsg prior to session.  I am tired today    Pain/Comfort  Pain Rating 1: 0/10  Pain Rating Post-Intervention 1: 0/10    Objective:    Pt. Seated in chair on arrival with son present   Occupational Performance:    Bed Mobility:    ·  Not tested     Functional Mobility/Transfers:  · Patient completed Sit <> Stand Transfer with minimum assistance  with  hand-held assist  with  SPT from chair <> w/c and cues for safety Son participated in perform t/f's and w/c management    Activities of Daily Living:  · Pt. Already dressed     AMPA 6 Click:  [facility] Total Score: 15    OT Exercises: UE Ergometer 10 min    Additional Treatment:  Pt. Decline standing activity on this day. Pt. Stated he was not up for standing and was to fatigue due to previous session. Pt. With FM activity with Nuts/bolts for hand strengthen  aspects and coordination to task.    Pt. With 2# dowel activity with 2x20 reps with  shd flex, bicep curls horz adb/add and forward flex motion to increase BUE ROM and strength,.   Pt. With therex performed to increase ROM, endurance selfcare task and fxl mobility for independence     Patient left up in chair with all lines intact, call button in reach and son present    ASSESSMENT:  Zbigniew Forman is a 89 y.o. male with a medical diagnosis of Acute respiratory failure with hypoxia Pt. participated well with session on this day.Pt demos physical deficits with balance  functional mobility, UB strength, endurance  level of functional indep with daily tasks and  activities and selfcare skills .Pt. Will continue to benefit from continued OT to progress towards goals  .    Rehab identified problem list/impairments: impaired endurance, impaired self care skills, impaired functional mobilty, gait instability, impaired balance, impaired cardiopulmonary response to activity    Rehab potential is fair    Activity tolerance: Fair    Discharge recommendations: home health OT(with light physical assist)     Barriers to discharge: None     Equipment recommendations: none     GOALS:   Multidisciplinary Problems     Occupational Therapy Goals        Problem: Occupational Therapy Goal    Goal Priority Disciplines Outcome Interventions   Occupational Therapy Goal     OT, PT/OT Ongoing (interventions implemented as appropriate)    Description:  Goals to be met by: 21 days     Patient will increase functional independence with ADLs by performing:    UE Dressing with Set-up Assistance.  LE Dressing with Minimal Assistance.  Grooming while seated with Set-up Assistance.  Toileting from bedside commode with Minimal Assistance for hygiene and clothing management.   Bathing from  shower chair/bench with Minimal Assistance.  Supine to sit with Stand-by Assistance.  Stand pivot transfers with Stand-by Assistance with RW  Toilet transfer to bedside commode with Stand-by Assistance with RW.  Pt. To engage in dynamic standing activities at table with SBA                  Plan:  Patient to be seen 5 x/week to address the above listed problems via self-care/home management, therapeutic activities, therapeutic exercises  Plan of Care expires: 12/27/18  Plan of Care reviewed with: patient    SUNITA Bennett  12/04/2018

## 2018-12-04 NOTE — PT/OT/SLP PROGRESS
"Physical Therapy  Treatment    Zbigniew Forman   MRN: [id number]   Admitting Diagnosis: Acute respiratory failure with hypoxia    PT Received On: 12/04/18  Total Time (min): 57       Billable Minutes:  Gait Training 15, Therapeutic Activity 17 and Therapeutic Exercise 25    Treatment Type: Treatment son present ed on safety/tech with trfs,gait,steps,therex,bed mob, plan for FT tomorrow with daughter  PT/PTA: PTA     PTA Visit Number: 2       General Precautions: Standard, fall, diabetic  Orthopedic Precautions: N/A   Braces: N/A    Do you have any cultural, spiritual, Advent conflicts, given your current situation?: none stated    Subjective:  "doing alright"    Pain/Comfort  Pain Rating 1: 0/10  Pain Rating Post-Intervention 1: 0/10    Objective:   Patient found with: oxygen(1L)     AM-PAC 6 CLICK MOBILITY  Total Score:18    Bed Mobility:  Sit>Supine:SBA/S with HOB flat no rail  Supine>Sit: SBA/S with HOB falt no rail    Transfers:  Sit<>Stand: with RW CG/SBA  Stand Pivot Transfer: CG/SBA ~ 5 ft WC>EOB and WC>BSC    Gait:  Amb with RW CG/close SBA occ vcs for RW mgmt closeness especially negotiating turns/erect posture/inc step height  ~120 ft slow marilyn     Advanced Gait:  Stairs: asc/anand 4 steps with BHR CGA vcs for safety/tech  Asc/anand 6" step with BHR CGA x 3 trials each BLE     Therex:  3x10 reps AP,LAQ,hip flex    Patient left up in chair with all lines intact, call button in reach, son/nsg present and belongings in reach.    Assessment:  Zbigniew Forman is a 89 y.o. male with a medical diagnosis of Acute respiratory failure with hypoxia.  Pt tolerated well, pt and son very pleased/complimentary of pts progress, pt would continue to benefit from skilled PT services to improve overall functional mobility, strength and endurance.  .    Rehab identified problem list/impairments: impaired endurance, impaired self care skills, impaired functional mobilty, gait instability, impaired balance, decreased " coordination, impaired coordination, impaired cardiopulmonary response to activity    Rehab potential is good.    Activity tolerance: Fair    Discharge recommendations: home health PT     Barriers to discharge: Inaccessible home environment([facility] home)    Equipment recommendations: none     GOALS:   Multidisciplinary Problems     Physical Therapy Goals        Problem: Physical Therapy Goal    Goal Priority Disciplines Outcome Goal Variances Interventions   Physical Therapy Goal     PT, PT/OT Ongoing (interventions implemented as appropriate)     Description:  Goals to be met by: 21 days    Patient will increase functional independence with mobility by performin. Supine to sit with Modified [place]  2. Sit to supine with SBA  3. Sit to stand transfer with SBA  4. Bed to chair transfer with SBA using Rolling Walker  5. Gait  x 150 feet with SBA using Rolling Walker.= revise 2018     REVISED 2018 :5b: gait x75 feet w SBA w RW    5c: gait x150 feet w RW and CGA  6. Wheelchair propulsion x50 feet with SBA using bilateral uppper extremities  7. Ascend/descend 4 stairs with bilateral Handrails Stand-by Assistance.   8. Stand for 3 minutes with SBA using Rolling Walker while performing UE activity                          PLAN:    Patient to be seen (5-6x/wk)  to address the above listed problems via gait training, therapeutic activities, therapeutic exercises  Plan of Care expires: 18  Plan of Care reviewed with: patient    Silvia Ruth, PTA  2018

## 2018-12-04 NOTE — PLAN OF CARE
Problem: Patient Care Overview  Goal: Plan of Care Review  Outcome: Ongoing (interventions implemented as appropriate)   12/04/18 0530   Coping/Psychosocial   Plan Of Care Reviewed With patient       Problem: Fall Risk (Adult)  Goal: Identify Related Risk Factors and Signs and Symptoms  Related risk factors and signs and symptoms are identified upon initiation of Human Response Clinical Practice Guideline (CPG)  Outcome: Ongoing (interventions implemented as appropriate)   12/04/18 0530   Fall Risk   Related Risk Factors (Fall Risk) fatigue/slow reaction;fear of falling;gait/mobility problems

## 2018-12-04 NOTE — PLAN OF CARE
Problem: Physical Therapy Goal  Goal: Physical Therapy Goal  Goals to be met by: 21 days    Patient will increase functional independence with mobility by performin. Supine to sit with Modified [place]  2. Sit to supine with SBA  3. Sit to stand transfer with SBA  4. Bed to chair transfer with SBA using Rolling Walker  5. Gait  x 150 feet with SBA using Rolling Walker.= revise 2018     REVISED 2018 :5b: gait x75 feet w SBA w RW    5c: gait x150 feet w RW and CGA  6. Wheelchair propulsion x50 feet with SBA using bilateral uppper extremities  7. Ascend/descend 4 stairs with bilateral Handrails Stand-by Assistance.   8. Stand for 3 minutes with SBA using Rolling Walker while performing UE activity         Outcome: Ongoing (interventions implemented as appropriate)  Goals remain appropriate

## 2018-12-04 NOTE — PLAN OF CARE
Problem: Breathing Pattern Ineffective (Adult)  Intervention: Monitor/Manage Contributing Psychosocial Factors   12/04/18 1256   Coping/Psychosocial Interventions   Supportive Measures active listening utilized;problem solving facilitated;self-reflection promoted;relaxation techniques promoted   Psychosocial Support   Family/Support System Care support provided;caregiver stress acknowledged

## 2018-12-04 NOTE — PLAN OF CARE
Problem: Occupational Therapy Goal  Goal: Occupational Therapy Goal  Goals to be met by: 21 days     Patient will increase functional independence with ADLs by performing:    UE Dressing with Set-up Assistance.  LE Dressing with Minimal Assistance.  Grooming while seated with Set-up Assistance.  Toileting from bedside commode with Minimal Assistance for hygiene and clothing management.   Bathing from  shower chair/bench with Minimal Assistance.  Supine to sit with Stand-by Assistance.  Stand pivot transfers with Stand-by Assistance with RW  Toilet transfer to bedside commode with Stand-by Assistance with RW.  Pt. To engage in dynamic standing activities at table with SBA     Outcome: Ongoing (interventions implemented as appropriate)  .    Comments: .

## 2018-12-04 NOTE — PROGRESS NOTES
Patients family member at the bedside refused for the patient to get weighed this morning. Will pass on in report to oncoming nurse.

## 2018-12-05 PROCEDURE — 99900035 HC TECH TIME PER 15 MIN (STAT)

## 2018-12-05 PROCEDURE — 25000003 PHARM REV CODE 250: Performed by: INTERNAL MEDICINE

## 2018-12-05 PROCEDURE — 94799 UNLISTED PULMONARY SVC/PX: CPT

## 2018-12-05 PROCEDURE — 27000221 HC OXYGEN, UP TO 24 HOURS

## 2018-12-05 PROCEDURE — 97530 THERAPEUTIC ACTIVITIES: CPT

## 2018-12-05 PROCEDURE — 97110 THERAPEUTIC EXERCISES: CPT

## 2018-12-05 PROCEDURE — 97535 SELF CARE MNGMENT TRAINING: CPT

## 2018-12-05 PROCEDURE — 97116 GAIT TRAINING THERAPY: CPT

## 2018-12-05 PROCEDURE — 25000242 PHARM REV CODE 250 ALT 637 W/ HCPCS: Performed by: INTERNAL MEDICINE

## 2018-12-05 PROCEDURE — 94761 N-INVAS EAR/PLS OXIMETRY MLT: CPT

## 2018-12-05 PROCEDURE — 63600175 PHARM REV CODE 636 W HCPCS: Performed by: INTERNAL MEDICINE

## 2018-12-05 PROCEDURE — 11000004 HC SNF PRIVATE

## 2018-12-05 PROCEDURE — 94640 AIRWAY INHALATION TREATMENT: CPT

## 2018-12-05 RX ADMIN — LISINOPRIL 10 MG: 10 TABLET ORAL at 08:12

## 2018-12-05 RX ADMIN — LATANOPROST 1 DROP: 50 SOLUTION OPHTHALMIC at 08:12

## 2018-12-05 RX ADMIN — IPRATROPIUM BROMIDE AND ALBUTEROL SULFATE 3 ML: .5; 3 SOLUTION RESPIRATORY (INHALATION) at 04:12

## 2018-12-05 RX ADMIN — AMLODIPINE BESYLATE 5 MG: 5 TABLET ORAL at 08:12

## 2018-12-05 RX ADMIN — ENOXAPARIN SODIUM 40 MG: 100 INJECTION SUBCUTANEOUS at 04:12
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
## 2018-12-05 NOTE — PLAN OF CARE
Problem: Occupational Therapy Goal  Goal: Occupational Therapy Goal  Goals to be met by: 21 days     Patient will increase functional independence with ADLs by performing:    UE Dressing with Set-up Assistance.  LE Dressing with Minimal Assistance.  Grooming while seated with Set-up Assistance.  Toileting from bedside commode with Minimal Assistance for hygiene and clothing management.   Bathing from  shower chair/bench with Minimal Assistance.  Supine to sit with Stand-by Assistance.  Stand pivot transfers with Stand-by Assistance with RW  Toilet transfer to bedside commode with Stand-by Assistance with RW.  Pt. To engage in dynamic standing activities at table with SBA     Pt. Tolerated session well.  Daughter trained on this date.

## 2018-12-05 NOTE — PLAN OF CARE
Problem: Patient Care Overview  Goal: Plan of Care Review  Outcome: Ongoing (interventions implemented as appropriate)   12/05/18 3185   Coping/Psychosocial   Plan Of Care Reviewed With patient

## 2018-12-05 NOTE — PLAN OF CARE
Problem: Patient Care Overview  Goal: Plan of Care Review  Outcome: Revised  Repositions minimal assist, no new skin breakdowns noted. Afebrile. Monitored for pain and safety. Safety maintained. Denies pain

## 2018-12-05 NOTE — TREATMENT PLAN
Rehab Services' DME recommendations    Zbigniew Forman  MRN: [id number]    [x]  No DME needed    [x] Home health PT, OT and Aide  SPENCER Melchor 12/5/2018

## 2018-12-05 NOTE — PLAN OF CARE
Problem: Physical Therapy Goal  Goal: Physical Therapy Goal  Goals to be met by: 21 days    Patient will increase functional independence with mobility by performin. Supine to sit with Modified [place]  2. Sit to supine with SBA  3. Sit to stand transfer with SBA  4. Bed to chair transfer with SBA using Rolling Walker  5. Gait  x 150 feet with SBA using Rolling Walker.= revise 2018     REVISED 2018 :5b: gait x75 feet w SBA w RW    5c: gait x150 feet w RW and CGA  6. Wheelchair propulsion x50 feet with SBA using bilateral uppper extremities  7. Ascend/descend 4 stairs with bilateral Handrails Stand-by Assistance.   8. Stand for 3 minutes with SBA using Rolling Walker while performing UE activity         Outcome: Ongoing (interventions implemented as appropriate)  Goals remain appropriate

## 2018-12-06 LAB
AMORPH CRY UR QL COMP ASSIST: ABNORMAL
ANION GAP SERPL CALC-SCNC: 6 MMOL/L
BACTERIA #/AREA URNS AUTO: ABNORMAL /HPF
BASOPHILS # BLD AUTO: 0.04 K/UL
BASOPHILS NFR BLD: 0.5 %
BILIRUB UR QL STRIP: NEGATIVE
BUN SERPL-MCNC: 38 MG/DL
CALCIUM SERPL-MCNC: 8.4 MG/DL
CHLORIDE SERPL-SCNC: 107 MMOL/L
CLARITY UR REFRACT.AUTO: ABNORMAL
CO2 SERPL-SCNC: 27 MMOL/L
COLOR UR AUTO: ABNORMAL
CREAT SERPL-MCNC: 1 MG/DL
DIFFERENTIAL METHOD: ABNORMAL
EOSINOPHIL # BLD AUTO: 0.2 K/UL
EOSINOPHIL NFR BLD: 2.3 %
ERYTHROCYTE [DISTWIDTH] IN BLOOD BY AUTOMATED COUNT: 13.4 %
EST. GFR  (AFRICAN AMERICAN): >60 ML/MIN/1.73 M^2
EST. GFR  (NON AFRICAN AMERICAN): >60 ML/MIN/1.73 M^2
GLUCOSE SERPL-MCNC: 143 MG/DL
GLUCOSE UR QL STRIP: ABNORMAL
HCT VFR BLD AUTO: 34.9 %
HGB BLD-MCNC: 10.9 G/DL
HGB UR QL STRIP: ABNORMAL
HYALINE CASTS UR QL AUTO: 16 /LPF
IMM GRANULOCYTES # BLD AUTO: 0.03 K/UL
IMM GRANULOCYTES NFR BLD AUTO: 0.3 %
KETONES UR QL STRIP: NEGATIVE
LEUKOCYTE ESTERASE UR QL STRIP: NEGATIVE
LYMPHOCYTES # BLD AUTO: 3.1 K/UL
LYMPHOCYTES NFR BLD: 35.7 %
MAGNESIUM SERPL-MCNC: 2.1 MG/DL
MCH RBC QN AUTO: 27.5 PG
MCHC RBC AUTO-ENTMCNC: 31.2 G/DL
MCV RBC AUTO: 88 FL
MICROSCOPIC COMMENT: ABNORMAL
MONOCYTES # BLD AUTO: 0.6 K/UL
MONOCYTES NFR BLD: 6.7 %
NEUTROPHILS # BLD AUTO: 4.8 K/UL
NEUTROPHILS NFR BLD: 54.5 %
NITRITE UR QL STRIP: NEGATIVE
NRBC BLD-RTO: 0 /100 WBC
PH UR STRIP: 5 [PH] (ref 5–8)
PHOSPHATE SERPL-MCNC: 2.9 MG/DL
PLATELET # BLD AUTO: 318 K/UL
PMV BLD AUTO: 10.2 FL
POTASSIUM SERPL-SCNC: 4.5 MMOL/L
PROT UR QL STRIP: NEGATIVE
RBC # BLD AUTO: 3.96 M/UL
RBC #/AREA URNS AUTO: >100 /HPF (ref 0–4)
SODIUM SERPL-SCNC: 140 MMOL/L
SP GR UR STRIP: 1.02 (ref 1–1.03)
SQUAMOUS #/AREA URNS AUTO: 1 /HPF
URN SPEC COLLECT METH UR: ABNORMAL
WBC # BLD AUTO: 8.79 K/UL
WBC #/AREA URNS AUTO: 5 /HPF (ref 0–5)

## 2018-12-06 PROCEDURE — 83735 ASSAY OF MAGNESIUM: CPT

## 2018-12-06 PROCEDURE — 25000242 PHARM REV CODE 250 ALT 637 W/ HCPCS: Performed by: INTERNAL MEDICINE

## 2018-12-06 PROCEDURE — 27000221 HC OXYGEN, UP TO 24 HOURS

## 2018-12-06 PROCEDURE — 97110 THERAPEUTIC EXERCISES: CPT

## 2018-12-06 PROCEDURE — 94640 AIRWAY INHALATION TREATMENT: CPT

## 2018-12-06 PROCEDURE — 85025 COMPLETE CBC W/AUTO DIFF WBC: CPT

## 2018-12-06 PROCEDURE — 84100 ASSAY OF PHOSPHORUS: CPT

## 2018-12-06 PROCEDURE — 81001 URINALYSIS AUTO W/SCOPE: CPT

## 2018-12-06 PROCEDURE — 11000004 HC SNF PRIVATE

## 2018-12-06 PROCEDURE — 97116 GAIT TRAINING THERAPY: CPT

## 2018-12-06 PROCEDURE — 25000003 PHARM REV CODE 250: Performed by: INTERNAL MEDICINE

## 2018-12-06 PROCEDURE — 63600175 PHARM REV CODE 636 W HCPCS: Performed by: INTERNAL MEDICINE

## 2018-12-06 PROCEDURE — 94799 UNLISTED PULMONARY SVC/PX: CPT

## 2018-12-06 PROCEDURE — 80048 BASIC METABOLIC PNL TOTAL CA: CPT

## 2018-12-06 PROCEDURE — 97530 THERAPEUTIC ACTIVITIES: CPT

## 2018-12-06 PROCEDURE — 36415 COLL VENOUS BLD VENIPUNCTURE: CPT

## 2018-12-06 PROCEDURE — 94761 N-INVAS EAR/PLS OXIMETRY MLT: CPT

## 2018-12-06 RX ADMIN — AMLODIPINE BESYLATE 5 MG: 5 TABLET ORAL at 08:12

## 2018-12-06 RX ADMIN — LISINOPRIL 10 MG: 10 TABLET ORAL at 08:12

## 2018-12-06 RX ADMIN — IPRATROPIUM BROMIDE AND ALBUTEROL SULFATE 3 ML: .5; 3 SOLUTION RESPIRATORY (INHALATION) at 05:12

## 2018-12-06 RX ADMIN — LATANOPROST 1 DROP: 50 SOLUTION OPHTHALMIC at 08:12

## 2018-12-06 RX ADMIN — IPRATROPIUM BROMIDE AND ALBUTEROL SULFATE 3 ML: .5; 3 SOLUTION RESPIRATORY (INHALATION) at 08:12

## 2018-12-06 RX ADMIN — IPRATROPIUM BROMIDE AND ALBUTEROL SULFATE 3 ML: .5; 3 SOLUTION RESPIRATORY (INHALATION) at 10:12

## 2018-12-06 RX ADMIN — ENOXAPARIN SODIUM 40 MG: 100 INJECTION SUBCUTANEOUS at 05:12

## 2018-12-06 NOTE — PLAN OF CARE
Problem: Physical Therapy Goal  Goal: Physical Therapy Goal  Goals to be met by: 21 days    Patient will increase functional independence with mobility by performin. Supine to sit with Modified [place]  2. Sit to supine with SBA  3. Sit to stand transfer with SBA  4. Bed to chair transfer with SBA using Rolling Walker  5. Gait  x 150 feet with SBA using Rolling Walker.= revise 2018     REVISED 2018 :5b: gait x75 feet w SBA w RW    5c: gait x150 feet w RW and CGA  6. Wheelchair propulsion x50 feet with SBA using bilateral uppper extremities  7. Ascend/descend 4 stairs with bilateral Handrails Stand-by Assistance.   8. Stand for 3 minutes with SBA using Rolling Walker while performing UE activity         Outcome: Ongoing (interventions implemented as appropriate)  Goals remain appropriate

## 2018-12-06 NOTE — NURSING
PCT assited pt to toilet, pt urinated 100cc john urine with sediment. Specimen sent to lab for u/a

## 2018-12-06 NOTE — PT/OT/SLP PROGRESS
Occupational Therapy  Treatment    Zbigniew Forman   MRN: [id number]   Admitting Diagnosis: Acute respiratory failure with hypoxia    OT Date of Treatment: 12/06/18  Total Time (min): 45 min    Billable Minutes:  Therapeutic Activity 25 and Therapeutic Exercise 20    General Precautions: Standard, fall, diabetic  Orthopedic Precautions: N/A  Braces: N/A    Do you have any cultural, spiritual, Restoration conflicts, given your current situation?: none reported    Subjective:  Communicated with nsg prior to session.  I am doing well today    Pain/Comfort  Pain Rating 1: 0/10  Pain Rating Post-Intervention 1: 0/10    Objective:  Patient found with: oxygen seated in chair with daughter present     Occupational Performance:    Bed Mobility:    ·  Not tested     Functional Mobility/Transfers:  · Patient completed Sit <> Stand Transfer with contact guard assistance and minimum assistance  with  rolling walker   · Functional Mobility: Pt. With sit to stand from chair <>w/c with SPT and cues for safety and then from w/c with cues for safety and then from w/c with RW and varying (A)    Activities of Daily Living:  · Upper Body Dressing: minimum assistance to akbar harsh aojackelin shoulders     [facility] 6 Click:  [facility] Total Score: 15    OT Exercises: UE Ergometer 10 min    Additional Treatment:  Pt. With standing act on this day with task. Pt. With CGA for balance aspects with task with  AD at raised counter Pt with visual perception task with discrimination sizes with slots and also Pt. With post lean and cues for upright posture and balance with standing breaks in between and alternating use of  BUE hands.  Pt. With 2# dowel activity with 2x20 reps with  shd flex, bicep curls horz adb/add and forward flex motion to increase BUE ROM and strength,.   Pt. With standing and therex performed to increase ROM, endurance selfcare task and fxl mobility for independence   Patient left up in chair with all lines intact and call button in  reach    ASSESSMENT:  Zbigniew Forman is a 89 y.o. male with a medical diagnosis of Acute respiratory failure with hypoxia Pt. participated well with session on this day.Pt demos physical deficits with balance  functional mobility, UB strength, endurance  level of functional indep with daily tasks and activities and selfcare skills .Pt. Will continue to benefit from continued OT to progress towards goals  .    Rehab identified problem list/impairments: impaired endurance, impaired self care skills, impaired functional mobilty, gait instability, impaired balance, impaired cardiopulmonary response to activity    Rehab potential is fair    Activity tolerance: Fair    Discharge recommendations: home health OT(with light physical assist)     Barriers to discharge: None     Equipment recommendations: none     GOALS:   Multidisciplinary Problems     Occupational Therapy Goals        Problem: Occupational Therapy Goal    Goal Priority Disciplines Outcome Interventions   Occupational Therapy Goal     OT, PT/OT Ongoing (interventions implemented as appropriate)    Description:  Goals to be met by: 21 days     Patient will increase functional independence with ADLs by performing:    UE Dressing with Set-up Assistance.  LE Dressing with Minimal Assistance.  Grooming while seated with Set-up Assistance.  Toileting from bedside commode with Minimal Assistance for hygiene and clothing management.   Bathing from  shower chair/bench with Minimal Assistance.  Supine to sit with Stand-by Assistance.  Stand pivot transfers with Stand-by Assistance with RW  Toilet transfer to bedside commode with Stand-by Assistance with RW.  Pt. To engage in dynamic standing activities at table with SBA                  Plan:  Patient to be seen 5 x/week to address the above listed problems via self-care/home management, therapeutic activities, therapeutic exercises  Plan of Care expires: 12/27/18  Plan of Care reviewed with: nigel LOW  LIBIA Marroquin/HAYES  12/06/2018

## 2018-12-06 NOTE — PT/OT/SLP PROGRESS
"Physical Therapy  Treatment    Zbigniew Forman   MRN: [id number]   Admitting Diagnosis: Acute respiratory failure with hypoxia    PT Received On: 12/06/18  Total Time (min): 45       Billable Minutes:  Gait Training 15, Therapeutic Activity 15 and Therapeutic Exercise 15    Treatment Type: Treatment  PT/PTA: PTA     PTA Visit Number: 3       General Precautions: Standard, fall, diabetic  Orthopedic Precautions: N/A   Braces: N/A    Do you have any cultural, spiritual, Jew conflicts, given your current situation?: none stated    Subjective:  "better than yesterday" explained to son situation with bony trng with sister yesterday, son repts feeling comfortable with pt care upon D/C  Son present again today for entire session      Pain/Comfort  Pain Rating 1: 0/10  Pain Rating Post-Intervention 1: 0/10    Objective:   Patient found with: oxygen     AM-PAC 6 CLICK MOBILITY  Total Score:18    Transfers:  Sit<>Stand: with RW from BSC (low) CGA for ant wt shifting over ABE, close SBA with RW from WC (with pillow) x 2 trials some vcs to sustain transition and ant wt shifting over ABE  Stand Pivot Transfer: with RW CGA/SBA BSC>WC some vcs for sequencing    Gait:02 sat at rest on 1 L95-97 % after gait 92 %  Amb with RW close SBA vcs for erect posture, RW mgmt/closeness more so with inc distance/fatigue especially with turn     Advanced Gait:  Stairs: asc/anand 4 steps with BHR close SBA vcs for safety/tech    Therex:  2x10 reps AP,GS,LAQ,hip flex,abd/add    Balance:  Dyn standing bal act with RW CGA uni lat support removing/replacing rings on ring tree from low to high facilitating trunk extension ~ 2 minutes    Patient left up in chair with all lines intact, call button in reach and belongings in reach.    Assessment:  Zbigniew Forman is a 89 y.o. male with a medical diagnosis of Acute respiratory failure with hypoxia.  Pt tolerated well, pt would continue to benefit from skilled PT services to improve overall functional " mobility, strength and endurance.  .    Rehab identified problem list/impairments: impaired endurance, impaired self care skills, impaired functional mobilty, gait instability, impaired balance, decreased coordination, impaired coordination, impaired cardiopulmonary response to activity    Rehab potential is good.    Activity tolerance: Fair    Discharge recommendations: home health PT     Barriers to discharge: Inaccessible home environment([facility] home)    Equipment recommendations: none     GOALS:   Multidisciplinary Problems     Physical Therapy Goals        Problem: Physical Therapy Goal    Goal Priority Disciplines Outcome Goal Variances Interventions   Physical Therapy Goal     PT, PT/OT Ongoing (interventions implemented as appropriate)     Description:  Goals to be met by: 21 days    Patient will increase functional independence with mobility by performin. Supine to sit with Modified [place]  2. Sit to supine with SBA  3. Sit to stand transfer with SBA  4. Bed to chair transfer with SBA using Rolling Walker  5. Gait  x 150 feet with SBA using Rolling Walker.= revise 2018     REVISED 2018 :5b: gait x75 feet w SBA w RW    5c: gait x150 feet w RW and CGA  6. Wheelchair propulsion x50 feet with SBA using bilateral uppper extremities  7. Ascend/descend 4 stairs with bilateral Handrails Stand-by Assistance.   8. Stand for 3 minutes with SBA using Rolling Walker while performing UE activity                          PLAN:    Patient to be seen (5-6x/wk)  to address the above listed problems via gait training, therapeutic activities, therapeutic exercises  Plan of Care expires: 18  Plan of Care reviewed with: patient    Silvia Ruth, PTA  2018

## 2018-12-06 NOTE — NURSING
Clarified with MANDO Harmon on unit that pt is full code per his previous discussion with family members

## 2018-12-06 NOTE — NURSING
Pt with increased productive cough, paged RT for treatment. Encouraged IS. Pt and son at bedside educated on importance of C&DB and IS

## 2018-12-07 PROCEDURE — 25000242 PHARM REV CODE 250 ALT 637 W/ HCPCS: Performed by: INTERNAL MEDICINE

## 2018-12-07 PROCEDURE — 11000004 HC SNF PRIVATE

## 2018-12-07 PROCEDURE — 97530 THERAPEUTIC ACTIVITIES: CPT

## 2018-12-07 PROCEDURE — 97535 SELF CARE MNGMENT TRAINING: CPT

## 2018-12-07 PROCEDURE — 25000003 PHARM REV CODE 250: Performed by: INTERNAL MEDICINE

## 2018-12-07 PROCEDURE — 63600175 PHARM REV CODE 636 W HCPCS: Performed by: INTERNAL MEDICINE

## 2018-12-07 PROCEDURE — 97116 GAIT TRAINING THERAPY: CPT

## 2018-12-07 RX ADMIN — AMLODIPINE BESYLATE 5 MG: 5 TABLET ORAL at 09:12

## 2018-12-07 RX ADMIN — IPRATROPIUM BROMIDE AND ALBUTEROL SULFATE 3 ML: .5; 3 SOLUTION RESPIRATORY (INHALATION) at 09:12

## 2018-12-07 RX ADMIN — LISINOPRIL 10 MG: 10 TABLET ORAL at 09:12

## 2018-12-07 RX ADMIN — ENOXAPARIN SODIUM 40 MG: 100 INJECTION SUBCUTANEOUS at 05:12

## 2018-12-07 RX ADMIN — LATANOPROST 1 DROP: 50 SOLUTION OPHTHALMIC at 09:12

## 2018-12-07 NOTE — PT/OT/SLP PROGRESS
Physical Therapy  Treatment    Zbigniew Forman   MRN: [id number]   Admitting Diagnosis: Acute respiratory failure with hypoxia    PT Received On: 12/07/18          Billable Minutes:  Gait Training 24, Therapeutic Activity 19 Total Time: 43     Treatment Type: Treatment  PT/PTA: PT     PTA Visit Number: 0       General Precautions: Standard, fall, diabetic  Orthopedic Precautions: N/A   Braces: N/A    Do you have any cultural, spiritual, Confucianist conflicts, given your current situation?: none stated    Subjective:  Communicated with Pt prior to session.  Pt agreeable to session     Pain/Comfort  Pain Rating 1: 0/10  Pain Rating Post-Intervention 1: 0/10    Objective:  Patient found seated in w/c Patient found with: oxygen (1 lpm) and son present     AM-PAC 6 CLICK MOBILITY  Total Score:18    Transfers:  Sit<>Stand: to/from w/c (multiple trials) all w/ RW and CGA/SBA   Stand Pivot Transfer: w/c<> nustep (1 trial) w/ RW and CGA/SBA  Cueing for sequencing, hand and foot placement    Gait:  Amb 2 trials (78 ft, 120 ft) w/ RW and CGA for safety   Tactile and verbal cueing for upright posture, forward gaze, RW management and staying inside RW    Pulse Ox  Immediately after second gait trial: 93-94% 1Lpm    Advanced Gait:  Stairs: 4 steps (1 trial) w/ BHR and CGA   Cueing for sequencing     Additional Treatment:  Recumbent cross  x10 minutes resistance level 2 for endurance    Patient left up in chair with all lines intact and call button in reach.    Assessment:  Zbigniew Forman is a 89 y.o. male with a medical diagnosis of Acute respiratory failure with hypoxia.  Pt tolerated session well. Pt continues to require CGA/SBA during functional mobility and verbal/tactile cues for upright posture. Pt is progressing and will continue w/ PT POC.    Rehab identified problem list/impairments: impaired endurance, impaired self care skills, impaired functional mobilty, gait instability, impaired balance, decreased  coordination, impaired coordination, impaired cardiopulmonary response to activity    Rehab potential is good.    Activity tolerance: Good    Discharge recommendations: home health PT     Barriers to discharge: Inaccessible home environment([facility] home)    Equipment recommendations: none     GOALS:   Multidisciplinary Problems     Physical Therapy Goals        Problem: Physical Therapy Goal    Goal Priority Disciplines Outcome Goal Variances Interventions   Physical Therapy Goal     PT, PT/OT Ongoing (interventions implemented as appropriate)     Description:  Goals to be met by: 21 days    Patient will increase functional independence with mobility by performin. Supine to sit with Modified [place]  2. Sit to supine with SBA  3. Sit to stand transfer with SBA  4. Bed to chair transfer with SBA using Rolling Walker  5. Gait  x 150 feet with SBA using Rolling Walker.= revise 2018     REVISED 2018 :5b: gait x75 feet w SBA w RW    5c: gait x150 feet w RW and CGA  6. Wheelchair propulsion x50 feet with SBA using bilateral uppper extremities  7. Ascend/descend 4 stairs with bilateral Handrails Stand-by Assistance.   8. Stand for 3 minutes with SBA using Rolling Walker while performing UE activity                          PLAN:    Patient to be seen (5-6x/wk)  to address the above listed problems via gait training, therapeutic activities, therapeutic exercises  Plan of Care expires: 18  Plan of Care reviewed with: patient    Sandie Segal, SPT  2018

## 2018-12-07 NOTE — PLAN OF CARE
Problem: Physical Therapy Goal  Goal: Physical Therapy Goal  Goals to be met by: 21 days    Patient will increase functional independence with mobility by performin. Supine to sit with Modified [place]  2. Sit to supine with SBA  3. Sit to stand transfer with SBA  4. Bed to chair transfer with SBA using Rolling Walker  5. Gait  x 150 feet with SBA using Rolling Walker.= revise 2018     REVISED 2018 :5b: gait x75 feet w SBA w RW    5c: gait x150 feet w RW and CGA  6. Wheelchair propulsion x50 feet with SBA using bilateral uppper extremities  7. Ascend/descend 4 stairs with bilateral Handrails Stand-by Assistance.   8. Stand for 3 minutes with SBA using Rolling Walker while performing UE activity         Outcome: Ongoing (interventions implemented as appropriate)  LTGs remain appropriate. Pt will continue PT POC.  Sandie Segal, SPT  2018

## 2018-12-07 NOTE — CARE UPDATE
MACEY met with the Pt and his daughter.  The NOMNC was explained to both and the Pt requested his daughter sign.  The form was faxed to [facility], copy to the Pt and original put in the medical record.  The Pt wants to use [facility] at discharge.

## 2018-12-07 NOTE — PLAN OF CARE
Problem: Patient Care Overview  Goal: Plan of Care Review  Outcome: Ongoing (interventions implemented as appropriate)   12/07/18 0215   Coping/Psychosocial   Plan Of Care Reviewed With patient       Problem: Pressure Ulcer Risk (Bubba Scale) (Adult,Obstetrics,Pediatric)  Goal: Skin Integrity  Patient will demonstrate the desired outcomes by discharge/transition of care.  Outcome: Ongoing (interventions implemented as appropriate)   12/07/18 0215   Pressure Ulcer Risk (Bubba Scale) (Adult,Obstetrics,Pediatric)   Skin Integrity making progress toward outcome       Problem: Fall Risk (Adult)  Goal: Absence of Falls  Patient will demonstrate the desired outcomes by discharge/transition of care.  Outcome: Ongoing (interventions implemented as appropriate)   12/07/18 0215   Fall Risk (Adult)   Absence of Falls making progress toward outcome

## 2018-12-07 NOTE — PT/OT/SLP PROGRESS
Occupational Therapy  Treatment    Zbigniew Forman   MRN: [id number]   Admitting Diagnosis: Acute respiratory failure with hypoxia    OT Date of Treatment: 12/07/18  Total Time (min): 45 min    Billable Minutes:  Self Care/Home Management 45    General Precautions: Standard, fall, diabetic  Orthopedic Precautions: N/A  Braces: N/A    Do you have any cultural, spiritual, Cheondoism conflicts, given your current situation?: none reported    Subjective:  Communicated with nsg prior to session.  I got a mess for you to clean up    Pain/Comfort  Pain Rating 1: 0/10  Pain Rating Post-Intervention 1: 0/10    Objective:  Patient found with: oxygen pt. Seated in chair with son present     Occupational Performance:    Bed Mobility:    ·  Not tested      Functional Mobility/Transfers:  · Patient completed Sit <> Stand Transfer with contact guard assistance  with  rolling walker   · Patient completed Toilet Transfer Stand Pivot technique with contact guard assistance with  rolling walker and bedside commode  · Functional Mobility: Pt. With sit to stand from chair to w/c with SPT and cues for safety and then from w/c with RW to 3n1 and cues for safety    Activities of Daily Living:  · Grooming: contact guard assistance for hair care  · Bathing: moderate assistance for BLE feet and post terra care instance with RW for balance   · Upper Body Dressing: minimum assistance to doff/akbar pull over shirt and also button up shirt with (A) with LUE and to button up  · Lower Body Dressing: moderate assistance to akbar BLE feet into pants and (A) over hips with RW for bal and Total A for BLE socks and slippers   · Toileting: total assistance from 3n1 with cleaning  Pt. incot in diaper Pt. with large mush BM.    [facility] 6 Click:  [facility] Total Score: 15    Patient left up in chair with all lines intact, call button in reach and son present    ASSESSMENT:  Zbigniew Forman is a 89 y.o. male with a medical diagnosis of Acute respiratory failure with  hypoxia Pt. participated well with session on this day.Pt demos physical deficits with balance  functional mobility, UB strength, endurance  level of functional indep with daily tasks and activities and selfcare skills .Pt. Will continue to benefit from continued OT to progress towards goals  .Rehab identified problem list/impairments: impaired endurance, impaired self care skills, impaired functional mobilty, gait instability, impaired balance, impaired cardiopulmonary response to activity    Rehab potential is fair    Activity tolerance: Fair    Discharge recommendations: home health OT(with light physical assist)     Barriers to discharge: None     Equipment recommendations: none     GOALS:   Multidisciplinary Problems     Occupational Therapy Goals        Problem: Occupational Therapy Goal    Goal Priority Disciplines Outcome Interventions   Occupational Therapy Goal     OT, PT/OT Ongoing (interventions implemented as appropriate)    Description:  Goals to be met by: 21 days     Patient will increase functional independence with ADLs by performing:    UE Dressing with Set-up Assistance.  LE Dressing with Minimal Assistance.  Grooming while seated with Set-up Assistance.  Toileting from bedside commode with Minimal Assistance for hygiene and clothing management.   Bathing from  shower chair/bench with Minimal Assistance.  Supine to sit with Stand-by Assistance.  Stand pivot transfers with Stand-by Assistance with RW  Toilet transfer to bedside commode with Stand-by Assistance with RW.  Pt. To engage in dynamic standing activities at table with SBA                  Plan:  Patient to be seen 5 x/week to address the above listed problems via self-care/home management, therapeutic activities, therapeutic exercises  Plan of Care expires: 12/27/18  Plan of Care reviewed with: patient    SUNITA Bennett  12/07/2018

## 2018-12-07 NOTE — PLAN OF CARE
Problem: Fall Risk (Adult)  Intervention: Reduce Risk/Promote Restraint Free Environment   18 1606   Safety Interventions   Environmental Safety Modification clutter free environment maintained;assistive device/personal items within reach;lighting adjusted   Safety Interventions   Safety Precautions emergency equipment at bedside   Prevent  Drop/Fall   Safety/Security Measures family to remain at bedside

## 2018-12-08 PROCEDURE — 25000003 PHARM REV CODE 250: Performed by: INTERNAL MEDICINE

## 2018-12-08 PROCEDURE — 11000004 HC SNF PRIVATE

## 2018-12-08 PROCEDURE — 27000221 HC OXYGEN, UP TO 24 HOURS

## 2018-12-08 PROCEDURE — 63600175 PHARM REV CODE 636 W HCPCS: Performed by: INTERNAL MEDICINE

## 2018-12-08 PROCEDURE — 94761 N-INVAS EAR/PLS OXIMETRY MLT: CPT

## 2018-12-08 RX ADMIN — AMLODIPINE BESYLATE 5 MG: 5 TABLET ORAL at 09:12

## 2018-12-08 RX ADMIN — LISINOPRIL 10 MG: 10 TABLET ORAL at 09:12

## 2018-12-08 RX ADMIN — ENOXAPARIN SODIUM 40 MG: 100 INJECTION SUBCUTANEOUS at 04:12

## 2018-12-08 RX ADMIN — LATANOPROST 1 DROP: 50 SOLUTION OPHTHALMIC at 08:12

## 2018-12-08 NOTE — PLAN OF CARE
Problem: Patient Care Overview  Goal: Plan of Care Review  Outcome: Ongoing (interventions implemented as appropriate)  Mr Forman remains AAO; pt is sitting up in recliner with oxygen therapy in progress at 1 L/min via nasal cannula.  No respiratory distress.  He denies pain or discomfort. Pt also offered oppurtunity to void. Mr Forman stated he was alright at this time. Safety measures maintained. Call light is within reach, and son is at the bedside. Will continue with plan of care.

## 2018-12-08 NOTE — PLAN OF CARE
Problem: Pressure Ulcer Risk (Bubba Scale) (Adult,Obstetrics,Pediatric)  Goal: Skin Integrity  Patient will demonstrate the desired outcomes by discharge/transition of care.  Outcome: Ongoing (interventions implemented as appropriate)  Pressure Ulcer Risk: Encouraged patient to turn throughout the night to help minimize his risk of pressure ulcer. Encouraged patient to call for staff assistance if he needs assistance to turn . Patient verbalized understanding.Daughter at bedside.  Will maintain safety precautions and follow up as needed.

## 2018-12-09 PROCEDURE — 97535 SELF CARE MNGMENT TRAINING: CPT

## 2018-12-09 PROCEDURE — 97530 THERAPEUTIC ACTIVITIES: CPT

## 2018-12-09 PROCEDURE — 25000003 PHARM REV CODE 250: Performed by: INTERNAL MEDICINE

## 2018-12-09 PROCEDURE — 11000004 HC SNF PRIVATE

## 2018-12-09 PROCEDURE — 97110 THERAPEUTIC EXERCISES: CPT

## 2018-12-09 PROCEDURE — 63600175 PHARM REV CODE 636 W HCPCS: Performed by: INTERNAL MEDICINE

## 2018-12-09 RX ADMIN — AMLODIPINE BESYLATE 5 MG: 5 TABLET ORAL at 09:12

## 2018-12-09 RX ADMIN — ENOXAPARIN SODIUM 40 MG: 100 INJECTION SUBCUTANEOUS at 04:12

## 2018-12-09 RX ADMIN — LATANOPROST 1 DROP: 50 SOLUTION OPHTHALMIC at 07:12

## 2018-12-09 RX ADMIN — POLYETHYLENE GLYCOL 3350 17 G: 17 POWDER, FOR SOLUTION ORAL at 09:12

## 2018-12-09 RX ADMIN — LISINOPRIL 10 MG: 10 TABLET ORAL at 09:12

## 2018-12-09 NOTE — PLAN OF CARE
Problem: Patient Care Overview  Goal: Plan of Care Review  Outcome: Ongoing (interventions implemented as appropriate)   18   Coping/Psychosocial   Plan Of Care Reviewed With patient       Problem: Fall Risk (Adult)  Intervention: Reduce Risk/Promote Restraint Free Environment   18   Safety Interventions   Environmental Safety Modification assistive device/personal items within reach;clutter free environment maintained;lighting adjusted;mobility aid in reach;room organization consistent   Prevent  Drop/Fall   Safety/Security Measures family to remain at bedside       Intervention: Patient Rounds   18   Safety Interventions   Patient Rounds bed in low position;call light in reach;ID band on;bed wheels locked;clutter free environment maintained;placement of personal items at bedside;toileting offered;visualized patient       Goal: Identify Related Risk Factors and Signs and Symptoms  Related risk factors and signs and symptoms are identified upon initiation of Human Response Clinical Practice Guideline (CPG)  Outcome: Ongoing (interventions implemented as appropriate)   18   Fall Risk   Related Risk Factors (Fall Risk) age-related changes;gait/mobility problems;fatigue/slow reaction

## 2018-12-09 NOTE — PT/OT/SLP PROGRESS
Occupational Therapy  Treatment    Zbigniew Forman   MRN: [id number]   Admitting Diagnosis: Acute respiratory failure with hypoxia    OT Date of Treatment: 12/09/18  Total Time (min): 45 min    Billable Minutes:  Self Care/Home Management 15, Therapeutic Activity 15 and Therapeutic Exercise 15    General Precautions: Standard, fall, diabetic  Orthopedic Precautions: N/A  Braces: N/A         Subjective:  Communicated with nurse prior to session.  Pt. Reported he was ready for therapy session     Pain/Comfort  Pain Rating 1: 0/10  Pain Rating Post-Intervention 1: 0/10    Objective:  Patient found with: oxygen(seated in bedside chair with son present.  )    Occupational Performance:    Bed Mobility:    · Not tested     Functional Mobility/Transfers:  · Patient completed Sit <> Stand Transfer with minimum assistance  with  rolling walker   · Patient completed Bed <> Chair Transfer using Stand Pivot technique with contact guard assistance with rolling walker  · Functional Mobility: Pt. Ambulated in room with RW and CGA as well as vc's for upright posture, walker management (not pushing it too far forward) and picking up feet when ambulating    Activities of Daily Living:  · Upper Body Dressing: maximal assistance to don jacket and robe   · Lower Body Dressing: maximal assistance to don slippers    [facility] 6 Click:  [facility] Total Score: 15    OT Exercises: AROM with 1 # dowel x 2 sets 10 reps for all major planes of BUE motion to assist with improving endurance  Pt. Performed BLE there ex in all available planes seated in w/c x 2 sets 10 reps  Additional Treatment:  Pt. Engaged in 2 ambulation trials in gym with RW x 38 feet each trial with CGA.  Pt. Did requrie cuing for proper posture, walker management as well as lifting feet.     Patient participated in standing trials at counter with CGA x 2 trials focusing on improving upright positioning.     Patient left up in chair with call button in reach and son  present    ASSESSMENT:  Zbigniew Forman is a 89 y.o. male with a medical diagnosis of Acute respiratory failure with hypoxia and presents with limitations in self-care skills, functional mobility as well as deficits with endurance.  Pt. Tolerated session well on this date. Pt. Has made good improvements with mobility.  Pt. Would beenfit from continued OT services on d/c.     Rehab identified problem list/impairments: impaired endurance, impaired self care skills, impaired functional mobilty, gait instability, impaired balance, impaired cardiopulmonary response to activity    Rehab potential is good    Activity tolerance: Good    Discharge recommendations: home health OT(with light physical assist)     Barriers to discharge: None     Equipment recommendations: none     GOALS:   Multidisciplinary Problems     Occupational Therapy Goals        Problem: Occupational Therapy Goal    Goal Priority Disciplines Outcome Interventions   Occupational Therapy Goal     OT, PT/OT Ongoing (interventions implemented as appropriate)    Description:  Goals to be met by: 21 days     Patient will increase functional independence with ADLs by performing:    UE Dressing with Set-up Assistance.  LE Dressing with Minimal Assistance.  Grooming while seated with Set-up Assistance.  Toileting from bedside commode with Minimal Assistance for hygiene and clothing management.   Bathing from  shower chair/bench with Minimal Assistance.  Supine to sit with Stand-by Assistance.  Stand pivot transfers with Stand-by Assistance with RW  Toilet transfer to bedside commode with Stand-by Assistance with RW.  Pt. To engage in dynamic standing activities at table with SBA                      Plan:  Patient to be seen 5 x/week to address the above listed problems via self-care/home management, therapeutic activities, therapeutic exercises  Plan of Care expires: 12/27/18  Plan of Care reviewed with: patient    SPENCER Melchor  12/09/2018

## 2018-12-10 VITALS
HEART RATE: 80 BPM | RESPIRATION RATE: 18 BRPM | HEIGHT: 68 IN | OXYGEN SATURATION: 96 % | SYSTOLIC BLOOD PRESSURE: 141 MMHG | DIASTOLIC BLOOD PRESSURE: 67 MMHG | BODY MASS INDEX: 22.22 KG/M2 | WEIGHT: 146.63 LBS | TEMPERATURE: 98 F

## 2018-12-10 PROCEDURE — 25000003 PHARM REV CODE 250: Performed by: INTERNAL MEDICINE

## 2018-12-10 PROCEDURE — 97110 THERAPEUTIC EXERCISES: CPT

## 2018-12-10 PROCEDURE — 97530 THERAPEUTIC ACTIVITIES: CPT

## 2018-12-10 PROCEDURE — 97116 GAIT TRAINING THERAPY: CPT

## 2018-12-10 PROCEDURE — 99316 NF DSCHRG MGMT 30 MIN+: CPT | Mod: ,,, | Performed by: NURSE PRACTITIONER

## 2018-12-10 PROCEDURE — 97803 MED NUTRITION INDIV SUBSEQ: CPT

## 2018-12-10 RX ORDER — AMLODIPINE BESYLATE 10 MG/1
5 TABLET ORAL DAILY
Qty: 90 TABLET | Refills: 3
Start: 2018-12-10 | End: 2018-12-21 | Stop reason: SDUPTHER

## 2018-12-10 RX ORDER — LISINOPRIL 10 MG/1
TABLET ORAL
Qty: 30 TABLET | Refills: 3 | Status: SHIPPED | OUTPATIENT
Start: 2018-12-10 | End: 2018-12-10 | Stop reason: SDUPTHER

## 2018-12-10 RX ORDER — AMOXICILLIN 250 MG
1 CAPSULE ORAL 2 TIMES DAILY PRN
Status: ON HOLD | COMMUNITY
Start: 2018-12-10 | End: 2020-07-26 | Stop reason: HOSPADM

## 2018-12-10 RX ORDER — LISINOPRIL 10 MG/1
10 TABLET ORAL DAILY
Qty: 30 TABLET | Refills: 3 | Status: SHIPPED | OUTPATIENT
Start: 2018-12-10 | End: 2019-08-19 | Stop reason: SDUPTHER

## 2018-12-10 RX ORDER — ALFUZOSIN HYDROCHLORIDE 10 MG/1
10 TABLET, EXTENDED RELEASE ORAL
Qty: 90 TABLET | Refills: 3
Start: 2018-12-10 | End: 2018-12-21

## 2018-12-10 RX ADMIN — AMLODIPINE BESYLATE 5 MG: 5 TABLET ORAL at 08:12

## 2018-12-10 RX ADMIN — LISINOPRIL 10 MG: 10 TABLET ORAL at 08:12

## 2018-12-10 NOTE — NURSING
[facility] representative to room with oxygen equipment. Pt and daughter instructed by representative.

## 2018-12-10 NOTE — NURSING
Verified with MACEY Hendrix home portable 02 from [facility] will be delivered to pt's room and 02 canister and nebulizer to be delivered to home. Daughter at bedside and pt notified of delay by me and MACEY to room.

## 2018-12-10 NOTE — PLAN OF CARE
Problem: Patient Care Overview  Goal: Plan of Care Review  Outcome: Ongoing (interventions implemented as appropriate)    18   Coping/Psychosocial   Plan Of Care Reviewed With patient         Problem: Fall Risk (Adult)  Intervention: Reduce Risk/Promote Restraint Free Environment    18   Safety Interventions   Environmental Safety Modification assistive device/personal items within reach;clutter free environment maintained;lighting adjusted;mobility aid in reach;room organization consistent   Prevent [place] Drop/Fall   Safety/Security Measures family to remain at bedside         Intervention: Patient Rounds    18   Safety Interventions   Patient Rounds bed in low position;call light in reach;ID band on;bed wheels locked;clutter free environment maintained;placement of personal items at bedside;toileting offered;visualized patient         Goal: Identify Related Risk Factors and Signs and Symptoms  Related risk factors and signs and symptoms are identified upon initiation of Human [employer] Clinical Practice Guideline (CPG)  Outcome: Ongoing (interventions implemented as appropriate)    18   Fall Risk   Related Risk Factors (Fall Risk) age-related changes;gait/mobility problems;fatigue/slow reaction

## 2018-12-10 NOTE — PT/OT/SLP PROGRESS
"Physical Therapy  Treatment    Zbigniew Forman   MRN: [id number]   Admitting Diagnosis: Acute respiratory failure with hypoxia    PT Received On: 12/10/18  Total Time (min): 38       Billable Minutes:  Gait Training 13, Therapeutic Activity 10 and Therapeutic Exercise 15    Treatment Type: Treatment  PT/PTA: PTA     PTA Visit Number: 1       General Precautions: Standard, fall, diabetic  Orthopedic Precautions: N/A   Braces: N/A         Subjective:  "I'm good"      Pain/Comfort  Pain Rating 1: 0/10  Pain Rating Post-Intervention 1: 0/10    Objective:   Patient found with: oxygen     AM-PAC 6 CLICK MOBILITY  Total Score:18    Transfers:  Sit<>Stand: with RW close SBA x 2 trials    Gait:  Amb with RW close SBA despite constant vc/tcs for erect posture and RW mgt/closeness, difficulty maintaining, especially with fatigue and negotiating turns, son well aware ("been like this my whole life")    Advanced Gait:  Stairs: asc/anand 4 steps with BHR close SBA    Therex:  2x10 reps AP,GS,LAQ,hip flex,abd/add    Patient left up in chair with all lines intact, call button in reach and belongings in reach.    Assessment:  Zbigniew Forman is a 89 y.o. male with a medical diagnosis of Acute respiratory failure with hypoxia.  Pt tolerated well, pt would continue to benefit from skilled PT services to improve overall functional mobility, strength and endurance.  .    Rehab identified problem list/impairments: impaired endurance, impaired self care skills, impaired functional mobilty, gait instability, impaired balance, decreased coordination, impaired coordination, impaired cardiopulmonary response to activity    Rehab potential is good.    Activity tolerance: Fair    Discharge recommendations: home health PT     Barriers to discharge: Inaccessible home environment([facility] home)    Equipment recommendations: none     GOALS:   Multidisciplinary Problems     Physical Therapy Goals        Problem: Physical Therapy Goal    Goal Priority " Disciplines Outcome Goal Variances Interventions   Physical Therapy Goal     PT, PT/OT Ongoing (interventions implemented as appropriate)     Description:  Goals to be met by: 21 days    Patient will increase functional independence with mobility by performin. Supine to sit with Modified [place]  2. Sit to supine with SBA  3. Sit to stand transfer with SBA met  4. Bed to chair transfer with SBA using Rolling Walker  5. Gait  x 150 feet with SBA using Rolling Walker.= revise 2018     REVISED 2018 :5b: gait x75 feet w SBA w RW    5c: gait x150 feet w RW and CGA  6. Wheelchair propulsion x50 feet with SBA using bilateral uppper extremities  7. Ascend/descend 4 stairs with bilateral Handrails Stand-by Assistance. met  8. Stand for 3 minutes with SBA using Rolling Walker while performing UE activity                           PLAN:    Patient to be seen (5-6x/wk)  to address the above listed problems via gait training, therapeutic activities, therapeutic exercises  Plan of Care expires: 18  Plan of Care reviewed with: patient    Silvia Goodsonhayley, PTA  12/10/2018

## 2018-12-10 NOTE — PROGRESS NOTES
Food & Nutrition  Education    Diet Education:Cardiac, no added sugar, 2L fluid restriction  Time Spent: 15 minutes  Learners: patient and daughter(caregiver)      Nutrition Education provided with handouts: Daughter was concerned to leave without information on this home diet, they use boost or ensure at home to supplement, she feels 2L fluid restriction is liberal enough for him to be compliant, They follow a no added sugar diet at home. Discussed with pt and daughter that low sodium foods, less processed foods and regular meals would be recommended. Patient must count ice chips as part of fluid restriction.            All questions and concerns answered.

## 2018-12-10 NOTE — PLAN OF CARE
Problem: Physical Therapy Goal  Goal: Physical Therapy Goal  Goals to be met by: 21 days    Patient will increase functional independence with mobility by performin. Supine to sit with Modified [place]  2. Sit to supine with SBA  3. Sit to stand transfer with SBA met  4. Bed to chair transfer with SBA using Rolling Walker  5. Gait  x 150 feet with SBA using Rolling Walker.= revise 2018     REVISED 2018 :5b: gait x75 feet w SBA w RW    5c: gait x150 feet w RW and CGA  6. Wheelchair propulsion x50 feet with SBA using bilateral uppper extremities  7. Ascend/descend 4 stairs with bilateral Handrails Stand-by Assistance. met  8. Stand for 3 minutes with SBA using Rolling Walker while performing UE activity         Outcome: Ongoing (interventions implemented as appropriate)  Goals remain appropriate

## 2018-12-10 NOTE — PLAN OF CARE
Problem: Patient Care Overview  Goal: Plan of Care Review  Outcome: Ongoing (interventions implemented as appropriate)  Mr Forman is sitting up in recliner with oxygen therapy in progress at 1 L/min via nasal cannula. No respiratory distress noted. Scheduled medication explained and administered. Pt denies pain or discomfort. Safety measures maintained. Call light is within reach, and daughter is at the bedside. Will continue with plan of care.

## 2018-12-10 NOTE — NURSING
Home Oxygen Evaluation    Date Performed: 12/10/2018    1) Patient's Home O2 Sat on room air, while at rest: 89        If O2 sats on room air at rest are 88% or below, patient qualifies. No additional testing needed. Document N/A in steps 2 and 3. If 89% or above, complete steps 2.      2) Patient's O2 Sat on room air while exercisin    If O2 sats on room air while exercising remain 89% or above patient does not qualify, no further testing needed Document N/A in step 3. If O2 sats on room air while exercising are 88% or below, continue to step 3.      3) Patient's O2 Sat while exercising on O2: 92 at 1L LPM         (Must show improvement from #2 for patients to qualify)    If O2 sats improve on oxygen, patient qualifies for portable oxygen. If not, the patient does not qualify.

## 2018-12-10 NOTE — NURSING
Delores/Pharmacist to room, instructed pt and daughter on home discharge medications, verbalized understanding.

## 2018-12-10 NOTE — PLAN OF CARE
[facility] - Skilled Nursing Care    HOME HEALTH ORDERS  FACE TO FACE ENCOUNTER    Patient Name: Zbigniew Forman  YOB: 1919    PCP: Blas Morgan Ii, MD   PCP Address: [address] / [place] [address]  PCP Phone Number: [phone]  PCP Fax: [phone]    Encounter Date: 12/10/2018    Admit to Home Health    Diagnoses:  Active Hospital Problems    Diagnosis  POA    *Acute respiratory failure with hypoxia [J96.01]  Yes    Mixed stress and urge urinary incontinence [N39.46]  Yes    Heart failure with preserved ejection fraction [I50.30]  Yes    Hypertension associated with diabetes [E11.59, I10]  Yes    Chronic kidney disease, stage III (moderate) [N18.3]  Yes    Glaucoma suspect, high risk [H40.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       Future Appointments   Date Time Provider Department Center   1/3/2019  9:20 AM Blas Morgan II, MD [facility]   1/16/2019  1:00 PM Chantelle Richey DPM [facility] [place]     Follow-up Information     Blas Morgan Ii, MD. Schedule an appointment as soon as possible for a visit in 2 weeks.    Specialty:  Internal Medicine  Contact information:  [address]  [facility] [id number] [phone]                 I have seen and examined this patient face to face today. My clinical findings that support the need for the home health skilled services and home bound status are the following:  Weakness/numbness causing balance and gait disturbance due to Heart Failure and Weakness/Debility making it taxing to leave home.    Allergies:  Review of patient's allergies indicates:   Allergen Reactions    Sulfa (sulfonamide antibiotics)     Metformin Diarrhea    Tradjenta [linagliptin] Diarrhea              Diet: cardiac diet and fluid restriction: 2000ml    Activities: activity as  tolerated and no lifting, Driving, or Strenuous exercise    Nursing:   SN to complete comprehensive assessment including routine vital signs. Instruct on disease process and s/s of complications to report to MD. Review/verify medication list sent home with the patient at time of discharge  and instruct patient/caregiver as needed. Frequency may be adjusted depending on start of care date.    Notify MD if SBP > 160 or < 90; DBP > 90 or < 50; HR > 120 or < 50; Temp > 101      CONSULTS:    Physical Therapy to evaluate and treat. Evaluate for home safety and equipment needs; Establish/upgrade home exercise program. Perform / instruct on therapeutic exercises, gait training, transfer training, and Range of Motion.  Occupational Therapy to evaluate and treat. Evaluate home environment for safety and equipment needs. Perform/Instruct on transfers, ADL training, ROM, and therapeutic exercises.  Aide to provide assistance with personal care, ADLs, and vital signs.    MISCELLANEOUS CARE:  Heart Failure:      SN to instruct on the following:    Instruct on the definition of CHF.   Instruct on the signs/sympoms of CHF to be reported.   Instruct on and monitor daily weights.   Instruct on factors that cause exacerbation.   Instruct on action, dose, schedule, and side effects of medications.   Instruct on diet as prescribed.   Instruct on activity allowed.   Instruct on life-style modifications for life long management of CHF   SN to assess compliance with daily weights, diet, medications, fluid retention,    safety precautions, activities permitted and life-style modifications.   Additional 1-2 SN visits per week as needed for signs and symptoms     of CHF exacerbation.      For Weight Gain > 2-3 lbs in 1 day or 4-6 lbs over 1 week notify PCP.    Home Oxygen:  Oxygen at 2 L/min nasal canula to be used:  Continuously.    WOUND CARE ORDERS  n/a      Medications: Review discharge medications with patient and family and provide  education.      Current Discharge Medication List      START taking these medications    Details   senna-docusate 8.6-50 mg (PERICOLACE) 8.6-50 mg per tablet Take 1 tablet by mouth 2 (two) times daily as needed for Constipation.    Associated Diagnoses: Constipation, unspecified constipation type         CONTINUE these medications which have CHANGED    Details   alfuzosin (UROXATRAL) 10 mg Tb24 Take 1 tablet (10 mg total) by mouth daily with breakfast. Do not take until resumed by PCP  Qty: 90 tablet, Refills: 3    Associated Diagnoses: Urinary frequency      amLODIPine (NORVASC) 10 MG tablet Take 0.5 tablets (5 mg total) by mouth once daily.  Qty: 90 tablet, Refills: 3    Associated Diagnoses: Hypertension associated with diabetes      lisinopril 10 MG tablet Take 1 tablet (10 mg total) by mouth once daily.  Qty: 30 tablet, Refills: 3    Associated Diagnoses: Hypertension associated with diabetes         CONTINUE these medications which have NOT CHANGED    Details   albuterol (VENTOLIN HFA) 90 mcg/actuation inhaler Inhale 2 puffs into the lungs every 6 (six) hours as needed for Wheezing. Rescue  Qty: 1 each, Refills: 6      albuterol-ipratropium (DUO-NEB) 2.5 mg-0.5 mg/3 mL nebulizer solution Take 3 mLs by nebulization every 6 (six) hours as needed for Wheezing. Rescue  Qty: 1 Box, Refills: 6      furosemide (LASIX) 20 MG tablet Take 1 tablet (20 mg total) by mouth daily as needed (Leg swelling, weight gain >2-3 lbs in one day or >5lbs in 1 week.).  Qty: 30 tablet, Refills: 2      latanoprost 0.005 % ophthalmic solution INSTILL 1 DROP IN BOTH EYES EVERY EVENING  Qty: 2.5 mL, Refills: 12         STOP taking these medications       levoFLOXacin (LEVAQUIN) 250 MG tablet Comments:   Reason for Stopping:               I certify that this patient is confined to his home and needs intermittent skilled nursing care, physical therapy and occupational therapy.

## 2018-12-10 NOTE — NURSING
Instructed pt and daughter on importance of follow up appt and safety. verbalized understanding. Pt transported via w/c accompanied by PCT, pt's daughter and son to front entrance for discharge home with home health

## 2018-12-11 ENCOUNTER — TELEPHONE (OUTPATIENT)
Dept: INTERNAL MEDICINE | Facility: CLINIC | Age: 83
End: 2018-12-11

## 2018-12-11 DIAGNOSIS — R54 AGE-RELATED PHYSICAL DEBILITY: Primary | ICD-10-CM

## 2018-12-11 NOTE — TELEPHONE ENCOUNTER
Pt daughter is requesting  portable oxygen, he also wants home health nurse and physical therapy to come back out. She only wants [facility] services.

## 2018-12-12 NOTE — TELEPHONE ENCOUNTER
I ordered the home health, but he has to meet certain qualifications for Oxygen therapy.  We can deal with that at his next clinic visit.    Please call the daughter and let her know.    D

## 2018-12-17 ENCOUNTER — TELEPHONE (OUTPATIENT)
Dept: INTERNAL MEDICINE | Facility: CLINIC | Age: 83
End: 2018-12-17

## 2018-12-17 DIAGNOSIS — J96.01 ACUTE RESPIRATORY FAILURE WITH HYPOXIA: Primary | ICD-10-CM

## 2018-12-17 NOTE — TELEPHONE ENCOUNTER
----- Message from Devyn Philip sent at 12/17/2018  2:19 PM CST -----  Contact: [employer]/ Araseli [phone]  [facility] said that he needs the Nebulize kit with mask because, the patient is unable to use the current one that was ordered by the hospital. She said to fax the new orders to [facility] at [phone]. Please do it asap.    Thank you

## 2018-12-19 ENCOUNTER — TELEPHONE (OUTPATIENT)
Dept: INTERNAL MEDICINE | Facility: CLINIC | Age: 83
End: 2018-12-19

## 2018-12-19 NOTE — ASSESSMENT & PLAN NOTE
11/27/18  Lab Results   Component Value Date    HGBA1C 6.5 (H) 11/17/2018     No need to do accuchecks as fasting glucose is stable and patient and family are requesting no finger sticks.  Monitor fasting glucose and treat if indicated.    12/3/18  Fasting glucose is stable, no changes in treatment plan.    Discharge  F/u with PCP for ongoing monitoring

## 2018-12-19 NOTE — ASSESSMENT & PLAN NOTE
11/27/18  Currently with supplemental oxygen at 2.5 LPM with sats at 96%.  No reported SOB.  Will add IS with neb treatments and continue Xopenex tid as ordered.  Add Duoneb PRN and monitor.  Will attempt to wean oxygen while admitted to SNF.  Echo completed during recent hospitalization.  EF ~60% with no diastolic dysfunction.  Mild right atrial enlargement   Monitor daily weights and treat with Lasix PRN for weight gain as stated above. Avoiding scheduled diuresis on the front end given that his most recent labs actually look on the dry side.    12/3/18  Appears compensated and euvolemic.  BUN up, recommend increasing oral hydration.    Discharge  Qualifies for supplemental O2  O2 for home use

## 2018-12-19 NOTE — ASSESSMENT & PLAN NOTE
11/27/18  BP Readings from Last 3 Encounters:   12/10/18 (!) 141/67   11/26/18 117/61   11/16/18 118/62     BP is stable, currently on Norvasc 5 mg daily.  Will restart Lisinopril as above and set holding parameters for administration of BP medications.    12/3/18  BP is stable.  Continue Norvasc and Lisinopril as ordered.  Holding parameters in place.    Discharge  BP stable  Continue norvasc and lisinopril

## 2018-12-19 NOTE — TELEPHONE ENCOUNTER
"----- Message from Palak Garcia sent at 12/19/2018 11:40 AM CST -----  Contact: Araseli/ [employer]/[phone]   Office is calling to speak with someone in regards to the pt. She states that orders were sent over fo the pt to receive a nebulizer kit. She states that the orders need to be sent over and they should say "Nebulizer Kit with Mask. She states that the pt is unable to use his current option. The code to use is  and it is to be faxed over to [phone]. Please advise.      Thanks  "

## 2018-12-19 NOTE — ASSESSMENT & PLAN NOTE
11/27/18  Resume ACE for renal protection in light of CHF.  Monitor renal function biweekly.  Last Creatinine is 1.1.  Motrin discontinued as he is no longer using and want to avoid any insult to renal function.  Avoid nephrotoxins    12/3/18  Creatinine is stable, encouraged oral hydration.  Continue Lisinopril for renal protection.  Wt Readings from Last 1 Encounters:   12/09/18 0612 66.5 kg (146 lb 9.7 oz)   12/08/18 1230 69.8 kg (153 lb 14.1 oz)   12/07/18 0926 69.9 kg (154 lb 1.6 oz)   12/06/18 1045 70.5 kg (155 lb 6.8 oz)   12/05/18 0853 69.7 kg (153 lb 10.6 oz)   12/04/18 0800 68.7 kg (151 lb 7.3 oz)   12/03/18 0516 68.1 kg (150 lb 2.1 oz)   12/01/18 0624 67.7 kg (149 lb 4 oz)   11/30/18 0600 67.5 kg (148 lb 13 oz)   11/29/18 0630 70.9 kg (156 lb 4.9 oz)   11/28/18 0547 70.2 kg (154 lb 12.2 oz)   11/26/18 2000 73 kg (160 lb 15 oz)     Weight is stable.    Discharge  Weight stable  Continue lisinopril  F/u with PCP

## 2018-12-19 NOTE — DISCHARGE SUMMARY
[facility] [place] [facility] - Skilled Nursing Care  Department of Hospital Medicine  Discharge Summary      Patient Name: Zbigniew Forman  MRN: [id number]  Admission Date: 11/26/2018  Hospital Length of Stay: 14 days  Discharge Date and Time: 12/10/2018  3:16 PM  Attending Physician: Juliette Michel MD  Discharging Provider: Pamela Vines NP  Primary Care Provider: Blas Morgan Ii, MD    HPI:   Mr. Forman is a 89-year-old male [employer]  with HTN who presented to [facility] with SOB and worsening LE edema. His initial symptoms were present for several weeks prior to presentation and include orthopnea, some cough, and progressively worsening lower extremity edema and was admitted with acute diastolic heart failure.    Upon admission he reported his SOB started 2-3 weeks ago, and acutely worsened over the last few days prompting him to present to the ED. Family and patient report orthopnea, but patient insists of sleeping flat. He reported some cough and wheeze, but no fever or recent sick contacts. He does not use oxygen at home and lives with his daughter. He reported no chest pain and had no syncope.  He does report some dizziness when he moves around too much. No recent sick contacts. He denies every being told that he has issues with HF. He lost his wife of 74 years in May. He worked with [employer] most of his life and didn't smoke. He denies history of COPD.       Mr. Forman was admitted to hospital medicine on 11/17 for acute diastolic heart failure, for which he underwent diuresis with IV lasix, later transitioned to oral lasix on 11/18. Due to concern for new infiltrate on CXR and productive cough, antibiotics were started for presumptive PNA. Lab infectious workup including procal, LA and WBC were normal, but he felt subjectively improved so the decision was made to complete a five day course (Levaquin). PT/OT consulted and recommended SNF, for which he was transferred here for continued PT/OT.     Hospital Course:    11/27/18  Patient seen at bedside, daughter is present.  He currently denies pain or SOB.  He is currently wearing supplemental oxygen which he reports he did not wear at home.  Reviewed home medication, hospital medication and current medications with him and his daughter.  Daughter reports he was taking Lasix and Lisinopril at the hospital and did not understand why it was not continued here.  Will restart Lisinopril with holding parameters and add Lasix PRN for weight gain > 4 lbs in 24 hours.  Advised daughter will try to wean oxygen while admitted.  Daughter also requesting no accuchecks as he was not doing at home and his glucose has been fine.  A1c is 6.5 and recent fasting glucoses are stable.  Lastly, discussed EOL care and code status.  Daughter requested this topic not be discussed and patient has not stated his code intentions therefore will update records to indicate full code at this time.    12/3/18  Patient seen at bedside with son.  Patient's son inquires about using uroxatral to prevent bladder leakage.  States was told about this medication in the hospital and prescribed by provider there but has not taken it yet.  Advised son, medications were discussed with his sister upon admit and was told he did not use and therefore did not ordered.  Also explained potential side effects including syncope and orthostatic hypotension.  He said he did not know these effects and agrees to avoid use at this time.  Patient has no other complaints.    12/10: Patient seen at bedside, doing well, has no complaints, discharging home with home health service, answered all questions from the family.     Significant Diagnostic Studies:  Results for MARQUISE MACKENZIE (MRN [id number]) as of 12/18/2018 18:35   Ref. Range 12/6/2018 05:32   WBC Latest Ref Range: 3.90 - 12.70 K/uL 8.79   RBC Latest Ref Range: 4.60 - 6.20 M/uL 3.96 (L)   Hemoglobin Latest Ref Range: 14.0 - 18.0 g/dL 10.9 (L)   Hematocrit Latest Ref Range: 40.0  - 54.0 % 34.9 (L)   MCV Latest Ref Range: 82 - 98 fL 88   MCH Latest Ref Range: 27.0 - 31.0 pg 27.5   MCHC Latest Ref Range: 32.0 - 36.0 g/dL 31.2 (L)   RDW Latest Ref Range: 11.5 - 14.5 % 13.4   Platelets Latest Ref Range: 150 - 350 K/uL 318     Results for MARQUISE MACKENZIE (MRN [id number]) as of 12/18/2018 18:35   Ref. Range 12/6/2018 05:32   Sodium Latest Ref Range: 136 - 145 mmol/L 140   Potassium Latest Ref Range: 3.5 - 5.1 mmol/L 4.5   Chloride Latest Ref Range: 95 - 110 mmol/L 107   CO2 Latest Ref Range: 23 - 29 mmol/L 27   Anion Gap Latest Ref Range: 8 - 16 mmol/L 6 (L)   BUN, Bld Latest Ref Range: 10 - 30 mg/dL 38 (H)   Creatinine Latest Ref Range: 0.5 - 1.4 mg/dL 1.0   eGFR if non African American Latest Ref Range: >60 mL/min/1.73 m^2 >60.0   eGFR if African American Latest Ref Range: >60 mL/min/1.73 m^2 >60.0   Glucose Latest Ref Range: 70 - 110 mg/dL 143 (H)   Calcium Latest Ref Range: 8.7 - 10.5 mg/dL 8.4 (L)   Phosphorus Latest Ref Range: 2.7 - 4.5 mg/dL 2.9   Magnesium Latest Ref Range: 1.6 - 2.6 mg/dL 2.1   Physical Exam   Constitutional: He is oriented to person, place, and time. He appears well-developed and well-nourished.   Cardiovascular: Normal rate, regular rhythm, normal heart sounds and intact distal pulses.   No murmur heard.  Pulmonary/Chest: Effort normal and breath sounds normal. No respiratory distress.   Supplemental oxygen  Abdominal: Soft. Bowel sounds are normal. He exhibits no distension. There is no tenderness.   Musculoskeletal: Normal range of motion. He exhibits no edema or tenderness.   Neurological: He is alert and oriented to person, place, and time.   Skin: Skin is warm and dry. Capillary refill takes less than 2 seconds. No erythema.     Final Active Diagnoses:    Diagnosis Date Noted POA    PRINCIPAL PROBLEM:  Acute respiratory failure with hypoxia [J96.01] 11/17/2018 Yes    Mixed stress and urge urinary incontinence [N39.46] 12/03/2018 Yes    Heart failure with  preserved ejection fraction [I50.30] 11/27/2018 Yes    Hypertension associated with diabetes [E11.59, I10] 01/12/2016 Yes    Chronic kidney disease, stage III (moderate) [N18.3] 09/29/2015 Yes    Glaucoma suspect, high risk [H40.029] 11/19/2013 Yes    Controlled type 2 diabetes mellitus without complication, without long-term current use of insulin [E11.9]  Yes      Problems Resolved During this Admission:      * Acute respiratory failure with hypoxia    11/27/18  Currently with supplemental oxygen at 2.5 LPM with sats at 96%.  No reported SOB.  Will add IS with neb treatments and continue Xopenex tid as ordered.  Add Duoneb PRN and monitor.  Will attempt to wean oxygen while admitted to SNF.  Echo completed during recent hospitalization.  EF ~60% with no diastolic dysfunction.  Mild right atrial enlargement   Monitor daily weights and treat with Lasix PRN for weight gain as stated above. Avoiding scheduled diuresis on the front end given that his most recent labs actually look on the dry side.    12/3/18  Appears compensated and euvolemic.  BUN up, recommend increasing oral hydration.    Discharge  Qualifies for supplemental O2  O2 for home use     Mixed stress and urge urinary incontinence    12/3/18  POA  Son inquired about using Uroxatral as prescribed by transferring provider.  Son states he did not take at home PTA.  Advised son, had spoke with sister upon admit and was told he did not use drug previously and therefore was not ordered.  Advised son I could ordered but told him of potential side effects as relates to Orthostatic Hypotension and syncope and he agreed to avoid use.     Heart failure with preserved ejection fraction    11/27  Euvolemic at present   Continue supplementary oxygen  Lasix PRN as described above; cautious administration in this elderly gentleman with CKD    12/3/18  As above.  No CP or SOB reported.  Continue ACE with BP holding parameters.    Discharge  Supplemental home O2  ordered  Continue lisinopril     Hypertension associated with diabetes    11/27/18  BP Readings from Last 3 Encounters:   12/10/18 (!) 141/67   11/26/18 117/61   11/16/18 118/62     BP is stable, currently on Norvasc 5 mg daily.  Will restart Lisinopril as above and set holding parameters for administration of BP medications.    12/3/18  BP is stable.  Continue Norvasc and Lisinopril as ordered.  Holding parameters in place.    Discharge  BP stable  Continue norvasc and lisinopril      Chronic kidney disease, stage III (moderate)    11/27/18  Resume ACE for renal protection in light of CHF.  Monitor renal function biweekly.  Last Creatinine is 1.1.  Motrin discontinued as he is no longer using and want to avoid any insult to renal function.  Avoid nephrotoxins    12/3/18  Creatinine is stable, encouraged oral hydration.  Continue Lisinopril for renal protection.  Wt Readings from Last 1 Encounters:   12/09/18 0612 66.5 kg (146 lb 9.7 oz)   12/08/18 1230 69.8 kg (153 lb 14.1 oz)   12/07/18 0926 69.9 kg (154 lb 1.6 oz)   12/06/18 1045 70.5 kg (155 lb 6.8 oz)   12/05/18 0853 69.7 kg (153 lb 10.6 oz)   12/04/18 0800 68.7 kg (151 lb 7.3 oz)   12/03/18 0516 68.1 kg (150 lb 2.1 oz)   12/01/18 0624 67.7 kg (149 lb 4 oz)   11/30/18 0600 67.5 kg (148 lb 13 oz)   11/29/18 0630 70.9 kg (156 lb 4.9 oz)   11/28/18 0547 70.2 kg (154 lb 12.2 oz)   11/26/18 2000 73 kg (160 lb 15 oz)     Weight is stable.    Discharge  Weight stable  Continue lisinopril  F/u with PCP     Glaucoma suspect, high risk    11/27/18  Will resume home Xalatan gtts.  Patient to follow up with PCP.    12/3/18  No acute issues.   Continue Latanoprost gtts as ordered.    Discharge  Continue latanoprost     Controlled type 2 diabetes mellitus without complication, without long-term current use of insulin    11/27/18  Lab Results   Component Value Date    HGBA1C 6.5 (H) 11/17/2018     No need to do accuchecks as fasting glucose is stable and patient and family  "are requesting no finger sticks.  Monitor fasting glucose and treat if indicated.    12/3/18  Fasting glucose is stable, no changes in treatment plan.    Discharge  F/u with PCP for ongoing monitoring      PT note, ANGELINA Ruth, PT 12/10/18  General Precautions: Standard, fall, diabetic  Orthopedic Precautions: N/A   Braces: N/A  Transfers:  Sit<>Stand: with RW close SBA x 2 trials  Gait:  Amb with RW close SBA despite constant vc/tcs for erect posture and RW mgt/closeness, difficulty maintaining, especially with fatigue and negotiating turns, son well aware ("been like this my whole life")  Advanced Gait:  Stairs: asc/anand 4 steps with BHR close SBA  Discharge recommendations: home health PT        Ot note, SPENCER Forrest 12/9/18  General Precautions: Standard, fall, diabetic  Orthopedic Precautions: N/A  Braces: N/A  Bed Mobility:    · Not tested     Functional Mobility/Transfers:  · Patient completed Sit <> Stand Transfer with minimum assistance  with  rolling walker   · Patient completed Bed <> Chair Transfer using Stand Pivot technique with contact guard assistance with rolling walker  · Functional Mobility: Pt. Ambulated in room with RW and CGA as well as vc's for upright posture, walker management (not pushing it too far forward) and picking up feet when ambulating  Activities of Daily Living:  · Upper Body Dressing: maximal assistance to don jacket and robe   · Lower Body Dressing: maximal assistance to don slippers  Discharge recommendations: home health OT(with light physical assist)         Discharged Condition: stable    Disposition: Home-Health Care [facility] [place]    Follow Up:  Follow-up Information     Blas Morgan Ii, MD. Schedule an appointment as soon as possible for a visit in 2 weeks.    Specialty:  Internal Medicine  Contact information:  [address]  [facility] [id number] [phone]             [facility].    Specialty:  DME Provider  Why:  Faxed home health referral to [facility]. " "Family requesting [facility].  Contact information:  [address]  [address]  [place] [address]  [phone]                 Future Appointments   Date Time Provider Department Center   12/21/2018 11:20 AM Blas Morgan II, MD [facility]   1/16/2019  1:00 PM Chantelle Richey DPM [facility] [place]       Patient Instructions:      OXYGEN FOR HOME USE     Order Specific Question Answer Comments   Liter Flow 2    Duration Continuous    Qualifying SpO2: 86    Testing done at: Exercise/Activity    Route nasal cannula    Portable mode: pulse dose acceptable    Device home concentrator with portable unit    Length of need (in months): 99 mos    Patient condition with qualifying saturation CHF    Height: 5' 8" (1.727 m)    Weight: 146lbs    Does patient have medical equipment at home? bedside commode    Does patient have medical equipment at home? shower chair    Does patient have medical equipment at home? wheelchair    Does patient have medical equipment at home? walker, rolling    Alternative treatment measures have been tried or considered and deemed clinically ineffective. Yes    Vendor: [facility]    Expected Date of Delivery: 12/10/2018      Diet Cardiac   Order Comments: 2000ml fluid restriction     No driving until:     Notify your health care provider if you experience any of the following:  temperature >100.4     Notify your health care provider if you experience any of the following:  persistent nausea and vomiting or diarrhea     Notify your health care provider if you experience any of the following:  severe uncontrolled pain     Notify your health care provider if you experience any of the following:  difficulty breathing or increased cough     Notify your health care provider if you experience any of the following:  severe persistent headache     Notify your health care provider if you experience any of the following:  persistent dizziness, light-headedness, or visual " disturbances     Notify your health care provider if you experience any of the following:  increased confusion or weakness     Activity as tolerated     Medications:  Reconciled Home Medications:      Medication List      START taking these medications    lisinopril 10 MG tablet  Take 1 tablet (10 mg total) by mouth once daily.     senna-docusate 8.6-50 mg 8.6-50 mg per tablet  Commonly known as:  PERICOLACE  Take 1 tablet by mouth 2 (two) times daily as needed for Constipation.        CHANGE how you take these medications    alfuzosin 10 mg Tb24  Commonly known as:  UROXATRAL  Take 1 tablet (10 mg total) by mouth daily with breakfast. Do not take until resumed by PCP  What changed:  additional instructions     amLODIPine 10 MG tablet  Commonly known as:  NORVASC  Take 0.5 tablets (5 mg total) by mouth once daily.  What changed:  how much to take        CONTINUE taking these medications    albuterol 90 mcg/actuation inhaler  Commonly known as:  VENTOLIN HFA  Inhale 2 puffs into the lungs every 6 (six) hours as needed for Wheezing. Rescue     albuterol-ipratropium 2.5 mg-0.5 mg/3 mL nebulizer solution  Commonly known as:  DUO-NEB  Take 3 mLs by nebulization every 6 (six) hours as needed for Wheezing. Rescue     furosemide 20 MG tablet  Commonly known as:  LASIX  Take 1 tablet (20 mg total) by mouth daily as needed (Leg swelling, weight gain >2-3 lbs in one day or >5lbs in 1 week.).     latanoprost 0.005 % ophthalmic solution  INSTILL 1 DROP IN BOTH EYES EVERY EVENING        STOP taking these medications    levoFLOXacin 250 MG tablet  Commonly known as:  LEVAQUIN          Time spent on the discharge of patient: 45 minutes    Pamela Vines NP  Department of Hospital Medicine  [facility] [place] [facility]

## 2018-12-19 NOTE — ASSESSMENT & PLAN NOTE
11/27  Euvolemic at present   Continue supplementary oxygen  Lasix PRN as described above; cautious administration in this elderly gentleman with CKD    12/3/18  As above.  No CP or SOB reported.  Continue ACE with BP holding parameters.    Discharge  Supplemental home O2 ordered  Continue lisinopril

## 2018-12-19 NOTE — TELEPHONE ENCOUNTER
----- Message from Lauryn Sandoval sent at 12/19/2018 10:19 AM CST -----  Contact: [facility]- [place] [phone]  Camila is requesting a prescription for a portable Oxygen Concentrator for patient.    Please advise,thanks

## 2018-12-20 ENCOUNTER — TELEPHONE (OUTPATIENT)
Dept: ADMINISTRATIVE | Facility: CLINIC | Age: 83
End: 2018-12-20

## 2018-12-20 NOTE — TELEPHONE ENCOUNTER
Home Health SOC 12/13/2018 -= 02/10/2019 with [facility] - Dr. Juliette Michel. Patient received SN, PT, OT and HHA services.

## 2018-12-21 ENCOUNTER — OFFICE VISIT (OUTPATIENT)
Dept: INTERNAL MEDICINE | Facility: CLINIC | Age: 83
End: 2018-12-21
Payer: MEDICARE

## 2018-12-21 VITALS
BODY MASS INDEX: 25.01 KG/M2 | SYSTOLIC BLOOD PRESSURE: 108 MMHG | DIASTOLIC BLOOD PRESSURE: 60 MMHG | WEIGHT: 164.44 LBS

## 2018-12-21 DIAGNOSIS — E11.59 HYPERTENSION ASSOCIATED WITH DIABETES: ICD-10-CM

## 2018-12-21 DIAGNOSIS — R35.0 URINARY FREQUENCY: ICD-10-CM

## 2018-12-21 DIAGNOSIS — N18.30 CHRONIC KIDNEY DISEASE, STAGE III (MODERATE): ICD-10-CM

## 2018-12-21 DIAGNOSIS — I15.2 HYPERTENSION ASSOCIATED WITH DIABETES: ICD-10-CM

## 2018-12-21 DIAGNOSIS — I50.30 HEART FAILURE WITH PRESERVED EJECTION FRACTION: Primary | ICD-10-CM

## 2018-12-21 DIAGNOSIS — Z99.81 DEPENDENCE ON CONTINUOUS SUPPLEMENTAL OXYGEN: ICD-10-CM

## 2018-12-21 PROCEDURE — 99999 PR PBB SHADOW E&M-EST. PATIENT-LVL III: CPT | Mod: PBBFAC,,, | Performed by: INTERNAL MEDICINE

## 2018-12-21 PROCEDURE — 99499 UNLISTED E&M SERVICE: CPT | Mod: S$GLB,,, | Performed by: INTERNAL MEDICINE

## 2018-12-21 PROCEDURE — 99214 OFFICE O/P EST MOD 30 MIN: CPT | Mod: S$GLB,,, | Performed by: INTERNAL MEDICINE

## 2018-12-21 RX ORDER — AMLODIPINE BESYLATE 5 MG/1
5 TABLET ORAL DAILY
Qty: 90 TABLET | Refills: 3 | Status: SHIPPED | OUTPATIENT
Start: 2018-12-21 | End: 2019-09-06 | Stop reason: SDUPTHER
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
## 2018-12-21 NOTE — PROGRESS NOTES
Transitional Care Note  Subjective:       Patient ID: Zbigniew Forman is a 89 y.o. male.  Chief Complaint: Follow-up (ed )    Family and/or Caretaker present at visit?  Yes.  Diagnostic tests reviewed/disposition: No diagnosic tests pending after this hospitalization.  Disease/illness education: Discussed heart failure and oxygen therapy.  Reviewed medications  Home health/community services discussion/referrals: Patient has home health established at [facility].   Establishment or re-establishment of referral orders for community resources: No other necessary community resources.   Discussion with other health care providers: No discussion with other health care providers necessary.   HPI  Review of Systems    Objective:      Physical Exam    Assessment:       1. Heart failure with preserved ejection fraction    2. Hypertension associated with diabetes    3. Urinary frequency    4. Chronic kidney disease, stage III (moderate)    5. Dependence on continuous supplemental oxygen        Plan:       See my note dated today for details of my assessment and plan

## 2019-01-09 ENCOUNTER — TELEPHONE (OUTPATIENT)
Dept: PODIATRY | Facility: CLINIC | Age: 84
End: 2019-01-09

## 2019-01-09 NOTE — TELEPHONE ENCOUNTER
Spoke to pat. Daughter and offered her appt for her dad today to see Dr. Xiong at 1:45 pm. Daughter has to check back with her dad and will call back if he can do it or not

## 2019-01-09 NOTE — TELEPHONE ENCOUNTER
----- Message from Yumiko Kimble sent at 1/9/2019  9:43 AM CST -----  Contact: Daughter/ [phone]  Same Day Appointment Request       Reason for FST appt.: Patient daughter would like the patient to come in today for his toe nails.   Communication Preference: [phone].

## 2019-01-16 ENCOUNTER — OFFICE VISIT (OUTPATIENT)
Dept: PODIATRY | Facility: CLINIC | Age: 84
End: 2019-01-16
Payer: MEDICARE

## 2019-01-16 VITALS — BODY MASS INDEX: 24.86 KG/M2 | HEIGHT: 68 IN | WEIGHT: 164 LBS

## 2019-01-16 DIAGNOSIS — B35.1 ONYCHOMYCOSIS DUE TO DERMATOPHYTE: ICD-10-CM

## 2019-01-16 DIAGNOSIS — L84 CORN OR CALLUS: ICD-10-CM

## 2019-01-16 DIAGNOSIS — E11.42 DIABETIC POLYNEUROPATHY ASSOCIATED WITH TYPE 2 DIABETES MELLITUS: Primary | ICD-10-CM

## 2019-01-16 PROCEDURE — 99999 PR PBB SHADOW E&M-EST. PATIENT-LVL III: ICD-10-PCS | Mod: PBBFAC,,, | Performed by: PODIATRIST

## 2019-01-16 PROCEDURE — 11721 PR DEBRIDEMENT OF NAILS, 6 OR MORE: ICD-10-PCS | Mod: 59,Q9,S$GLB, | Performed by: PODIATRIST

## 2019-01-16 PROCEDURE — 99999 PR PBB SHADOW E&M-EST. PATIENT-LVL III: CPT | Mod: PBBFAC,,, | Performed by: PODIATRIST

## 2019-01-16 PROCEDURE — 99499 UNLISTED E&M SERVICE: CPT | Mod: S$GLB,,, | Performed by: PODIATRIST

## 2019-01-16 PROCEDURE — 11056 PARNG/CUTG B9 HYPRKR LES 2-4: CPT | Mod: Q9,S$GLB,, | Performed by: PODIATRIST

## 2019-01-16 PROCEDURE — 11056 PR TRIM BENIGN HYPERKERATOTIC SKIN LESION,2-4: ICD-10-PCS | Mod: Q9,S$GLB,, | Performed by: PODIATRIST

## 2019-01-16 PROCEDURE — 99499 RISK ADDL DX/OHS AUDIT: ICD-10-PCS | Mod: S$GLB,,, | Performed by: PODIATRIST

## 2019-01-16 PROCEDURE — 11721 DEBRIDE NAIL 6 OR MORE: CPT | Mod: 59,Q9,S$GLB, | Performed by: PODIATRIST

## 2019-01-22 NOTE — PT/OT/SLP DISCHARGE
Occupational Therapy Discharge Summary    Zbigniew Forman  MRN: [id number]   Principal Problem: Acute respiratory failure with hypoxia      Patient Discharged from acute Occupational Therapy on 11/26/2018.  Please refer to prior OT note dated 11/26/2018 for functional status.    Assessment:      Patient appropriate for care in another setting.    Objective:     GOALS:   Multidisciplinary Problems     Occupational Therapy Goals     Not on file          Multidisciplinary Problems (Resolved)        Problem: Occupational Therapy Goal    Goal Priority Disciplines Outcome Interventions   Occupational Therapy Goal   (Resolved)     OT, PT/OT Outcome(s) achieved    Description:  Goals to be met by: 12/2     Patient will increase functional independence with ADLs by performing:    UE Dressing with Contact Guard Assistance. Discontinue (pt does not do this at baseline)  LE Dressing with Minimal Assistance. Discontinue (pt's son does this for him at home)  Grooming while seated with Contact Guard Assistance. Upgrade   Grooming while standing with CGA.   Toileting from toilet with Minimal Assistance for hygiene and clothing management.   Bathing from  shower chair/bench with Minimal Assistance. Discontinue (son assists him at home)                       Reasons for Discontinuation of Therapy Services  Transfer to alternate level of care.      Plan:     Patient Discharged to: Skilled Nursing Facility    SPENCER Ochoa  1/22/2019

## 2019-01-23 NOTE — PROGRESS NOTES
Subjective:     Zbigniew Forman is a 89 y.o. male male who presents to the clinic for evaluation and treatment of high risk feet. Zbigniew has a past medical history of Hypertension associated with diabetes; Diabetes mellitus type II, uncontrolled; Glaucoma suspect, high risk (11/19/2013); and Senile cataract, unspecified (11/19/2013). The patient's chief complaint is long toenails. This patient has documented high risk feet requiring routine maintenance secondary to peripheral neuropathy. Experiences occasional numbness at the digits.No trauma reported.          PCP: Blas Morgan Ii, MD  Date Last Seen by PCP:   Chief Complaint   Patient presents with    Nail Care    Diabetes Mellitus     ernie 12/21/18    Diabetic Foot Exam                 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    Chronic kidney disease, stage III (moderate) 9/29/2015    Diabetes mellitus type II, uncontrolled     Glaucoma suspect, high risk 11/19/2013    Hypertension associated with diabetes     S/P evacuation of subdural hematoma 3/17/2015    Senile cataract, unspecified 11/19/2013             Current Outpatient Medications on File Prior to Visit   Medication Sig Dispense Refill    albuterol (VENTOLIN HFA) 90 mcg/actuation inhaler Inhale 2 puffs into the lungs every 6 (six) hours as needed for Wheezing. Rescue 1 each 6    albuterol-ipratropium (DUO-NEB) 2.5 mg-0.5 mg/3 mL nebulizer solution Take 3 mLs by nebulization every 6 (six) hours as needed for Wheezing. Rescue 1 Box 6    amLODIPine (NORVASC) 5 MG tablet Take 1 tablet (5 mg total) by mouth once daily. 90 tablet 3    latanoprost 0.005 % ophthalmic solution INSTILL 1 DROP IN BOTH EYES EVERY EVENING 2.5 mL 12    lisinopril 10 MG tablet Take 1 tablet (10 mg total) by mouth once daily. 30 tablet 3    senna-docusate 8.6-50 mg (PERICOLACE) 8.6-50 mg per tablet Take 1 tablet by mouth 2 (two) times daily as needed for  "Constipation.      furosemide (LASIX) 20 MG tablet Take 1 tablet (20 mg total) by mouth daily as needed (Leg swelling, weight gain >2-3 lbs in one day or >5lbs in 1 week.). 30 tablet 2     No current facility-administered medications on file prior to visit.          Review of patient's allergies indicates:   Allergen Reactions    Metformin Diarrhea    Sulfa (sulfonamide antibiotics)     Tradjenta [linagliptin]      Diarrhea           Social History     Socioeconomic History    Marital status:      Spouse name: Not on file    Number of children: Not on file    Years of education: Not on file    Highest education level: Not on file   Social Needs    Financial resource strain: Not on file    Food insecurity - worry: Not on file    Food insecurity - inability: Not on file    Transportation needs - medical: Not on file    Transportation needs - non-medical: Not on file   Occupational History    Not on file   Tobacco Use    Smoking status: Never Smoker    Smokeless tobacco: Never Used   Substance and Sexual Activity    Alcohol use: No    Drug use: Not on file    Sexual activity: Not on file   Other Topics Concern    Not on file   Social History Narrative    Not on file               Review of Systems   Constitution: Positive for weight gain. Negative for chills and fever.   Cardiovascular: Negative for chest pain, claudication and leg swelling.   Respiratory: Negative for cough and shortness of breath.    Skin: Positive for nail changes and dry skin.   Musculoskeletal: Positive for stiffness.   Gastrointestinal: Negative for nausea and vomiting.   Neurological: Positive for numbness. Negative for paresthesias.   Psychiatric/Behavioral: Negative for altered mental status.               Objective:     Vitals:    01/16/19 1322   Weight: 74.4 kg (164 lb)   Height: 5' 8" (1.727 m)           Physical Exam   Constitutional: He appears well-developed and well-nourished.   HENT:   Head: Normocephalic. "   Cardiovascular: Intact distal pulses.    Pulses:       Dorsalis pedis pulses are 2+ on the right side, and 2+ on the left side.        Posterior tibial pulses are 1+ on the right side, and 1+ on the left side.   CRT < 3 sec to tips of toes. Mild 1+ edema noted to b/l LE. Mild spider veins and vericosities noted to b/l LEs.      Musculoskeletal:   Equinus noted b/l ankles with < 10 deg DF noted. MMT 5/5 in DF/PF/Inv/Ev resistance with no reproduction of pain in any direction. Passive range of motion of ankle and pedal joints is painless. Hammertoes noted to digits 2-4 b/l, semi reducible. HAV deformity noted b/l with non trackbound joint, hypermobile 1st ray b/l.      Neurological: He has normal strength. No sensory deficit.   Light touch, proprioception, and sharp/dull sensation are all intact bilaterally. Protective threshold with the [place]-Wienstein monofilament is intact bilaterally. Vibratory sensation diminished distal foot b/l.      Skin: Skin is warm, dry and intact.   No open lesions, lacerations or wounds noted. Nails are elongated by 4-5 mm, thickened by 3-4mm with yellow brown discoloration, subungual debris to toes of R 1-5 and L 1-5. Interdigital spaces clean, dry and intact b/l. No erythema noted to b/l foot.   HKL's noted to b/L HIPJ              Assessment / Plan :         I counseled the patient on his conditions, their implications and medical management.    Diabetic polyneuropathy associated with type 2 diabetes mellitus    Onychomycosis due to dermatophyte      Diabetic foot exam performed on pt. Pt advised on daily monitoring and moisturizing of the feet and keeping the webspaces clean and dry Patient advised to contact the clinic if any new pedal problems should arise.   After cleansing with an alcohol prep pad, the about mentioned hyperkeratotic lesions were sharply debrided X 2 utilizing a #15 blade to a smooth base without incident. Pt tolerated the procedure well and reported comfort to  the debarment sites. Pt will continue to use padding and moisture the callused areas.   With the patient's permission, nails were aggressively reduced and debrided X 10 to their soft tissue attachment mechanically and with an electric  removing all offending nail and debris. Pt relates relief following the procedure. he will continue to monitor the areas daily, inspect his feet, wear protective shoe gear when ambulating, moisturized daily to maintain skin integrity and follow up in the office in 3 months.

## 2019-01-25 ENCOUNTER — TELEPHONE (OUTPATIENT)
Dept: INTERNAL MEDICINE | Facility: CLINIC | Age: 84
End: 2019-01-25

## 2019-01-25 ENCOUNTER — OFFICE VISIT (OUTPATIENT)
Dept: INTERNAL MEDICINE | Facility: CLINIC | Age: 84
End: 2019-01-25
Payer: MEDICARE

## 2019-01-25 ENCOUNTER — LAB VISIT (OUTPATIENT)
Dept: LAB | Facility: HOSPITAL | Age: 84
End: 2019-01-25
Attending: INTERNAL MEDICINE
Payer: MEDICARE

## 2019-01-25 VITALS
DIASTOLIC BLOOD PRESSURE: 60 MMHG | OXYGEN SATURATION: 97 % | HEART RATE: 86 BPM | BODY MASS INDEX: 25.07 KG/M2 | HEIGHT: 68 IN | SYSTOLIC BLOOD PRESSURE: 120 MMHG | WEIGHT: 165.38 LBS

## 2019-01-25 DIAGNOSIS — D64.9 ANEMIA, UNSPECIFIED TYPE: ICD-10-CM

## 2019-01-25 DIAGNOSIS — E11.59 HYPERTENSION ASSOCIATED WITH DIABETES: ICD-10-CM

## 2019-01-25 DIAGNOSIS — E11.9 CONTROLLED TYPE 2 DIABETES MELLITUS WITHOUT COMPLICATION, WITHOUT LONG-TERM CURRENT USE OF INSULIN: ICD-10-CM

## 2019-01-25 DIAGNOSIS — I15.2 HYPERTENSION ASSOCIATED WITH DIABETES: ICD-10-CM

## 2019-01-25 DIAGNOSIS — I50.30 HEART FAILURE WITH PRESERVED EJECTION FRACTION: Primary | ICD-10-CM

## 2019-01-25 PROBLEM — R42 DIZZINESS: Status: RESOLVED | Noted: 2018-10-19 | Resolved: 2019-01-25

## 2019-01-25 PROBLEM — J96.01 ACUTE RESPIRATORY FAILURE WITH HYPOXIA: Status: RESOLVED | Noted: 2018-11-17 | Resolved: 2019-01-25

## 2019-01-25 PROBLEM — R79.89 ELEVATED TROPONIN: Status: RESOLVED | Noted: 2018-11-17 | Resolved: 2019-01-25

## 2019-01-25 LAB
BASOPHILS # BLD AUTO: 0.02 K/UL
BASOPHILS NFR BLD: 0.3 %
DIFFERENTIAL METHOD: ABNORMAL
EOSINOPHIL # BLD AUTO: 0.1 K/UL
EOSINOPHIL NFR BLD: 1.3 %
ERYTHROCYTE [DISTWIDTH] IN BLOOD BY AUTOMATED COUNT: 14.2 %
HCT VFR BLD AUTO: 38.4 %
HGB BLD-MCNC: 11.7 G/DL
LYMPHOCYTES # BLD AUTO: 2.6 K/UL
LYMPHOCYTES NFR BLD: 37.6 %
MCH RBC QN AUTO: 26.7 PG
MCHC RBC AUTO-ENTMCNC: 30.5 G/DL
MCV RBC AUTO: 88 FL
MONOCYTES # BLD AUTO: 0.5 K/UL
MONOCYTES NFR BLD: 7.1 %
NEUTROPHILS # BLD AUTO: 3.8 K/UL
NEUTROPHILS NFR BLD: 53.6 %
PLATELET # BLD AUTO: 243 K/UL
PMV BLD AUTO: 10 FL
RBC # BLD AUTO: 4.38 M/UL
WBC # BLD AUTO: 7 K/UL

## 2019-01-25 PROCEDURE — 99214 PR OFFICE/OUTPT VISIT, EST, LEVL IV, 30-39 MIN: ICD-10-PCS | Mod: S$GLB,,, | Performed by: INTERNAL MEDICINE

## 2019-01-25 PROCEDURE — 1101F PT FALLS ASSESS-DOCD LE1/YR: CPT | Mod: CPTII,S$GLB,, | Performed by: INTERNAL MEDICINE

## 2019-01-25 PROCEDURE — 99499 RISK ADDL DX/OHS AUDIT: ICD-10-PCS | Mod: S$GLB,,, | Performed by: INTERNAL MEDICINE

## 2019-01-25 PROCEDURE — 36415 COLL VENOUS BLD VENIPUNCTURE: CPT

## 2019-01-25 PROCEDURE — 1101F PR PT FALLS ASSESS DOC 0-1 FALLS W/OUT INJ PAST YR: ICD-10-PCS | Mod: CPTII,S$GLB,, | Performed by: INTERNAL MEDICINE

## 2019-01-25 PROCEDURE — 99999 PR PBB SHADOW E&M-EST. PATIENT-LVL III: CPT | Mod: PBBFAC,,, | Performed by: INTERNAL MEDICINE

## 2019-01-25 PROCEDURE — 85025 COMPLETE CBC W/AUTO DIFF WBC: CPT

## 2019-01-25 PROCEDURE — 99499 UNLISTED E&M SERVICE: CPT | Mod: S$GLB,,, | Performed by: INTERNAL MEDICINE

## 2019-01-25 PROCEDURE — 99999 PR PBB SHADOW E&M-EST. PATIENT-LVL III: ICD-10-PCS | Mod: PBBFAC,,, | Performed by: INTERNAL MEDICINE

## 2019-01-25 PROCEDURE — 99214 OFFICE O/P EST MOD 30 MIN: CPT | Mod: S$GLB,,, | Performed by: INTERNAL MEDICINE

## 2019-01-25 RX ORDER — FUROSEMIDE 20 MG/1
20 TABLET ORAL DAILY PRN
Qty: 30 TABLET | Refills: 2 | Status: SHIPPED | OUTPATIENT
Start: 2019-01-25 | End: 2019-09-06 | Stop reason: SDUPTHER

## 2019-01-25 NOTE — PROGRESS NOTES
Subjective:       Patient ID: Zbigniew Forman is a 89 y.o. male.    Chief Complaint: Follow-up    HPI - Mr. Forman was brought into clinic by his two daughters and son today b/c of LE swelling. He's also been more lethargic over the past couple of days, also.  Wearing oxygen at home.  Taking all meds as prescribed except for lasix.  They're supposed to give it to him if/when he gains 3-5 lbs, but they haven't been weighing him and haven't given it.  He's not a smoker.    PMH:  CHF, diastolic dysfunction  HTN, at goal  DM2, dietary management  S/p SDH evacuation  CKD3  Debility  Prostatism   Dizziness, most c/w dysequilibrium     Meds:  Reviewed and reconciled in EPIC with patient during visit today     Review of Systems   Constitutional: Negative for fever.   HENT: Negative for congestion.    Respiratory: Negative for shortness of breath.    Cardiovascular: Negative for chest pain.   Gastrointestinal: Negative for abdominal pain.   Genitourinary: Negative for difficulty urinating.   Musculoskeletal: Positive for arthralgias.   Skin: Negative for rash.   Neurological: Negative for headaches.   Psychiatric/Behavioral: Negative for sleep disturbance.       Objective:      Physical Exam   Constitutional: He is oriented to person, place, and time. He appears well-developed and well-nourished. No distress.   HENT:   Head: Normocephalic and atraumatic.   Cardiovascular: Normal rate, regular rhythm and normal heart sounds. Exam reveals no gallop and no friction rub.   No murmur heard.  Pulmonary/Chest: No respiratory distress. He has wheezes. He has no rales. He exhibits no tenderness.   Musculoskeletal: He exhibits edema (3+ edema to knees bilaterally; R>L,).   Neurological: He is alert and oriented to person, place, and time.   Skin: Skin is warm. He is not diaphoretic. No erythema.   Psychiatric: He has a normal mood and affect. Thought content normal.   Nursing note and vitals reviewed.      Assessment:       1. Heart  failure with preserved ejection fraction    2. Controlled type 2 diabetes mellitus without complication, without long-term current use of insulin    3. Hypertension associated with diabetes        Plan:       Zbigniew was seen today for follow-up.    Diagnoses and all orders for this visit:    Heart failure with preserved ejection fraction - They're not giving the lasix as prescribed and not weighing him.  Give 20mg lasix when they get home and start weighing him daily.    -     furosemide (LASIX) 20 MG tablet; Take 1 tablet (20 mg total) by mouth daily as needed (Leg swelling, weight gain >2-3 lbs in one day or >5lbs in 1 week.).    Controlled type 2 diabetes mellitus without complication, without long-term current use of insulin - at goal, stay the course    Hypertension associated with diabetes - at goal, stay the course    Anemia, unspecified type - slowly drifting downward, but in range still of where he has been for 2 years.  We need to continue to monitor  -     CBC auto differential; Future    rtc prn, or in 3 months    JANELL Morgan MD MPH  Staff Internist

## 2019-03-08 ENCOUNTER — OFFICE VISIT (OUTPATIENT)
Dept: INTERNAL MEDICINE | Facility: CLINIC | Age: 84
End: 2019-03-08
Payer: MEDICARE

## 2019-03-08 VITALS
SYSTOLIC BLOOD PRESSURE: 120 MMHG | OXYGEN SATURATION: 95 % | HEART RATE: 85 BPM | HEIGHT: 68 IN | BODY MASS INDEX: 25.14 KG/M2 | DIASTOLIC BLOOD PRESSURE: 60 MMHG

## 2019-03-08 DIAGNOSIS — E11.9 CONTROLLED TYPE 2 DIABETES MELLITUS WITHOUT COMPLICATION, WITHOUT LONG-TERM CURRENT USE OF INSULIN: ICD-10-CM

## 2019-03-08 DIAGNOSIS — N39.46 MIXED STRESS AND URGE URINARY INCONTINENCE: ICD-10-CM

## 2019-03-08 DIAGNOSIS — E11.59 HYPERTENSION ASSOCIATED WITH DIABETES: ICD-10-CM

## 2019-03-08 DIAGNOSIS — I15.2 HYPERTENSION ASSOCIATED WITH DIABETES: ICD-10-CM

## 2019-03-08 DIAGNOSIS — B37.2 YEAST INFECTION OF THE SKIN: Primary | ICD-10-CM

## 2019-03-08 PROCEDURE — 99999 PR PBB SHADOW E&M-EST. PATIENT-LVL III: ICD-10-PCS | Mod: PBBFAC,,, | Performed by: INTERNAL MEDICINE

## 2019-03-08 PROCEDURE — 99214 OFFICE O/P EST MOD 30 MIN: CPT | Mod: S$GLB,,, | Performed by: INTERNAL MEDICINE

## 2019-03-08 PROCEDURE — 1101F PT FALLS ASSESS-DOCD LE1/YR: CPT | Mod: CPTII,S$GLB,, | Performed by: INTERNAL MEDICINE

## 2019-03-08 PROCEDURE — 99214 PR OFFICE/OUTPT VISIT, EST, LEVL IV, 30-39 MIN: ICD-10-PCS | Mod: S$GLB,,, | Performed by: INTERNAL MEDICINE

## 2019-03-08 PROCEDURE — 99999 PR PBB SHADOW E&M-EST. PATIENT-LVL III: CPT | Mod: PBBFAC,,, | Performed by: INTERNAL MEDICINE

## 2019-03-08 PROCEDURE — 1101F PR PT FALLS ASSESS DOC 0-1 FALLS W/OUT INJ PAST YR: ICD-10-PCS | Mod: CPTII,S$GLB,, | Performed by: INTERNAL MEDICINE

## 2019-03-08 RX ORDER — CLOTRIMAZOLE 1 G/ML
SOLUTION TOPICAL 2 TIMES DAILY
Qty: 30 ML | Refills: 0 | Status: SHIPPED | OUTPATIENT
Start: 2019-03-08 | End: 2019-03-18 | Stop reason: SDUPTHER

## 2019-03-08 RX ORDER — FLUCONAZOLE 150 MG/1
150 TABLET ORAL DAILY
Qty: 1 TABLET | Refills: 0 | Status: SHIPPED | OUTPATIENT
Start: 2019-03-08 | End: 2019-03-09

## 2019-03-08 NOTE — PROGRESS NOTES
Subjective:       Patient ID: Zbigniew Forman is a 89 y.o. male.    Chief Complaint: Anal Itching    HPI - Feels generally well.  He has a terra-rectal rash and itch for a few weeks.  Wonders if he has piles.  Not a smoker, here with son and two daughters as usual.  He is due for diabetic eye exam, but otherwise up to date.  Not a smoker.    PMH:  CHF, diastolic dysfunction  HTN, at goal  DM2, dietary management  S/p SDH evacuation  CKD3  Debility  Prostatism   Dysequilibrium, wheelchair-bound     Meds:  Reviewed and reconciled in EPIC with patient during visit today    Review of Systems   Constitutional: Negative for fever.   HENT: Negative for congestion.    Respiratory: Negative for shortness of breath.    Cardiovascular: Negative for chest pain.   Gastrointestinal: Negative for abdominal pain.   Genitourinary: Negative for difficulty urinating.   Musculoskeletal: Positive for gait problem.   Skin: Positive for rash.   Neurological: Negative for headaches.   Psychiatric/Behavioral: Negative for sleep disturbance.       Objective:      Physical Exam   Constitutional: He appears well-developed and well-nourished. No distress.   HENT:   Head: Normocephalic and atraumatic.   Neurological: He is alert.   Skin: Rash (perirectal area with moist skin, erythema and warmth.  no skin breakdown noted) noted. He is not diaphoretic. There is erythema.   Psychiatric: He has a normal mood and affect.   Nursing note and vitals reviewed.      Assessment:       1. Yeast infection of the skin    2. Controlled type 2 diabetes mellitus without complication, without long-term current use of insulin    3. Hypertension associated with diabetes    4. Mixed stress and urge urinary incontinence        Plan:       Zbigniew was seen today for anal itching.    Diagnoses and all orders for this visit:    Yeast infection of the skin - new problem.  One dose diflucan will help; follow with lotrimin and gold bond powder to keep the area dry.  -      fluconazole (DIFLUCAN) 150 MG Tab; Take 1 tablet (150 mg total) by mouth once daily. for 1 day  -     clotrimazole (LOTRIMIN) 1 % Soln; Apply topically 2 (two) times daily.    Controlled type 2 diabetes mellitus without complication, without long-term current use of insulin - at goal.  Time for diabetic eye exam  -     Ambulatory consult to Ophthalmology    Hypertension associated with diabetes - at goal, stay the course    Mixed stress and urge urinary incontinence - stable.  Stay the course    rtc prn, or in 3 months    JANELL Morgan MD MPH  Staff Internist

## 2019-03-18 DIAGNOSIS — B37.2 YEAST INFECTION OF THE SKIN: ICD-10-CM

## 2019-03-19 RX ORDER — CLOTRIMAZOLE 1 G/ML
SOLUTION TOPICAL
Qty: 30 ML | Refills: 3 | Status: SHIPPED | OUTPATIENT
Start: 2019-03-19 | End: 2019-09-06

## 2019-04-02 ENCOUNTER — OFFICE VISIT (OUTPATIENT)
Dept: PODIATRY | Facility: CLINIC | Age: 84
End: 2019-04-02
Payer: MEDICARE

## 2019-04-02 VITALS
SYSTOLIC BLOOD PRESSURE: 147 MMHG | BODY MASS INDEX: 25.01 KG/M2 | DIASTOLIC BLOOD PRESSURE: 76 MMHG | HEART RATE: 93 BPM | WEIGHT: 165 LBS | HEIGHT: 68 IN

## 2019-04-02 DIAGNOSIS — L84 CORN OR CALLUS: ICD-10-CM

## 2019-04-02 DIAGNOSIS — E11.42 DIABETIC POLYNEUROPATHY ASSOCIATED WITH TYPE 2 DIABETES MELLITUS: Primary | ICD-10-CM

## 2019-04-02 DIAGNOSIS — B35.1 ONYCHOMYCOSIS DUE TO DERMATOPHYTE: ICD-10-CM

## 2019-04-02 PROCEDURE — 11056 PARNG/CUTG B9 HYPRKR LES 2-4: CPT | Mod: Q9,S$GLB,, | Performed by: PODIATRIST

## 2019-04-02 PROCEDURE — 99999 PR PBB SHADOW E&M-EST. PATIENT-LVL III: ICD-10-PCS | Mod: PBBFAC,,, | Performed by: PODIATRIST

## 2019-04-02 PROCEDURE — 99499 RISK ADDL DX/OHS AUDIT: ICD-10-PCS | Mod: S$GLB,,, | Performed by: PODIATRIST

## 2019-04-02 PROCEDURE — 11056 PR TRIM BENIGN HYPERKERATOTIC SKIN LESION,2-4: ICD-10-PCS | Mod: Q9,S$GLB,, | Performed by: PODIATRIST

## 2019-04-02 PROCEDURE — 11721 DEBRIDE NAIL 6 OR MORE: CPT | Mod: Q9,59,S$GLB, | Performed by: PODIATRIST

## 2019-04-02 PROCEDURE — 99499 UNLISTED E&M SERVICE: CPT | Mod: S$GLB,,, | Performed by: PODIATRIST

## 2019-04-02 PROCEDURE — 99999 PR PBB SHADOW E&M-EST. PATIENT-LVL III: CPT | Mod: PBBFAC,,, | Performed by: PODIATRIST

## 2019-04-02 PROCEDURE — 11721 PR DEBRIDEMENT OF NAILS, 6 OR MORE: ICD-10-PCS | Mod: Q9,59,S$GLB, | Performed by: PODIATRIST

## 2019-04-08 NOTE — PROGRESS NOTES
Subjective:     Zbigniew Forman is a 89 y.o. male male who presents to the clinic for evaluation and treatment of high risk feet. Zbigniew has a past medical history of Hypertension associated with diabetes; Diabetes mellitus type II, uncontrolled; Glaucoma suspect, high risk (11/19/2013); and Senile cataract, unspecified (11/19/2013). The patient's chief complaint is long toenails. This patient has documented high risk feet requiring routine maintenance secondary to peripheral neuropathy. Experiences occasional numbness at the digits.No trauma reported. No new issues reported.         PCP: Kristy Morgan Ii, MD  Date Last Seen by PCP:   Chief Complaint   Patient presents with    Diabetes Mellitus     3/8/19 dr kristy morgan    Nail Care                 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    Chronic kidney disease, stage III (moderate) 9/29/2015    Diabetes mellitus type II, uncontrolled     Glaucoma suspect, high risk 11/19/2013    Hypertension associated with diabetes     S/P evacuation of subdural hematoma 3/17/2015    Senile cataract, unspecified 11/19/2013             Current Outpatient Medications on File Prior to Visit   Medication Sig Dispense Refill    albuterol (VENTOLIN HFA) 90 mcg/actuation inhaler Inhale 2 puffs into the lungs every 6 (six) hours as needed for Wheezing. Rescue 1 each 6    albuterol-ipratropium (DUO-NEB) 2.5 mg-0.5 mg/3 mL nebulizer solution Take 3 mLs by nebulization every 6 (six) hours as needed for Wheezing. Rescue 1 Box 6    amLODIPine (NORVASC) 5 MG tablet Take 1 tablet (5 mg total) by mouth once daily. 90 tablet 3    clotrimazole (LOTRIMIN) 1 % Soln APPLY EXTERNALLY TO THE AFFECTED AREA TWICE DAILY 30 mL 3    latanoprost 0.005 % ophthalmic solution INSTILL 1 DROP IN BOTH EYES EVERY EVENING 2.5 mL 12    lisinopril 10 MG tablet Take 1 tablet (10 mg total) by mouth once daily. 30 tablet 3    senna-docusate 8.6-50  mg (PERICOLACE) 8.6-50 mg per tablet Take 1 tablet by mouth 2 (two) times daily as needed for Constipation.      furosemide (LASIX) 20 MG tablet Take 1 tablet (20 mg total) by mouth daily as needed (Leg swelling, weight gain >2-3 lbs in one day or >5lbs in 1 week.). 30 tablet 2     No current facility-administered medications on file prior to visit.          Review of patient's allergies indicates:   Allergen Reactions    Metformin Diarrhea    Sulfa (sulfonamide antibiotics)     Tradjenta [linagliptin]      Diarrhea           Social History     Socioeconomic History    Marital status:      Spouse name: Not on file    Number of children: Not on file    Years of education: Not on file    Highest education level: Not on file   Occupational History    Not on file   Social Needs    Financial resource strain: Not on file    Food insecurity:     Worry: Not on file     Inability: Not on file    Transportation needs:     Medical: Not on file     Non-medical: Not on file   Tobacco Use    Smoking status: Never Smoker    Smokeless tobacco: Never Used   Substance and Sexual Activity    Alcohol use: No    Drug use: Not on file    Sexual activity: Not on file   Lifestyle    Physical activity:     Days per week: Not on file     Minutes per session: Not on file    Stress: Not on file   Relationships    Social connections:     Talks on phone: Not on file     Gets together: Not on file     Attends [employer] service: Not on file     Active member of club or organization: Not on file     Attends meetings of clubs or organizations: Not on file     Relationship status: Not on file   Other Topics Concern    Not on file   Social History Narrative    Not on file               Review of Systems   Constitution: Positive for weight gain. Negative for chills and fever.   Cardiovascular: Negative for chest pain, claudication and leg swelling.   Respiratory: Negative for cough and shortness of breath.    Skin: Positive  "for nail changes and dry skin.   Musculoskeletal: Positive for stiffness.   Gastrointestinal: Negative for nausea and vomiting.   Neurological: Positive for numbness. Negative for paresthesias.   Psychiatric/Behavioral: Negative for altered mental status.               Objective:     Vitals:    04/02/19 1333   BP: (!) 147/76   Pulse: 93   Weight: 74.8 kg (165 lb)   Height: 5' 8" (1.727 m)           Physical Exam   Constitutional: He appears well-developed and well-nourished.   HENT:   Head: Normocephalic.   Cardiovascular: Intact distal pulses.    Pulses:       Dorsalis pedis pulses are 2+ on the right side, and 2+ on the left side.        Posterior tibial pulses are 1+ on the right side, and 1+ on the left side.   CRT < 3 sec to tips of toes. Mild 1+ edema noted to b/l LE. Mild spider veins and vericosities noted to b/l LEs.      Musculoskeletal:   Equinus noted b/l ankles with < 10 deg DF noted. MMT 5/5 in DF/PF/Inv/Ev resistance with no reproduction of pain in any direction. Passive range of motion of ankle and pedal joints is painless. Hammertoes noted to digits 2-4 b/l, semi reducible. HAV deformity noted b/l with non trackbound joint, hypermobile 1st ray b/l.      Neurological: He has normal strength. No sensory deficit.   Light touch, proprioception, and sharp/dull sensation are all intact bilaterally. Protective threshold with the [place]-Wienstein monofilament is intact bilaterally. Vibratory sensation diminished distal foot b/l.      Skin: Skin is warm, dry and intact.   No open lesions, lacerations or wounds noted. Nails are elongated by 4-5 mm, thickened by 3-4mm with yellow brown discoloration, subungual debris to toes of R 1-5 and L 1-5. Interdigital spaces clean, dry and intact b/l. No erythema noted to b/l foot.   HKL's noted to b/L HIPJ              Assessment / Plan :         I counseled the patient on his conditions, their implications and medical management.    Diabetic polyneuropathy associated " with type 2 diabetes mellitus    Corn or callus    Onychomycosis due to dermatophyte      Diabetic foot exam performed on pt. Pt advised on daily monitoring and moisturizing of the feet and keeping the webspaces clean and dry Patient advised to contact the clinic if any new pedal problems should arise.   After cleansing with an alcohol prep pad, the about mentioned hyperkeratotic lesions were sharply debrided X 2 utilizing a #15 blade to a smooth base without incident. Pt tolerated the procedure well and reported comfort to the debarment sites. Pt will continue to use padding and moisture the callused areas.   With the patient's permission, nails were aggressively reduced and debrided X 10 to their soft tissue attachment mechanically and with an electric  removing all offending nail and debris. Pt relates relief following the procedure. he will continue to monitor the areas daily, inspect his feet, wear protective shoe gear when ambulating, moisturized daily to maintain skin integrity and follow up in the office in 3 months.

## 2019-04-24 ENCOUNTER — TELEPHONE (OUTPATIENT)
Dept: INTERNAL MEDICINE | Facility: CLINIC | Age: 84
End: 2019-04-24

## 2019-04-24 NOTE — TELEPHONE ENCOUNTER
----- Message from Eleanor Garcia sent at 4/22/2019 11:45 AM CDT -----  Contact: Anne/daughter/ [phone]  Caller is requesting a sooner appointment. Caller declined first available appointment listed below. Caller will not accept being placed on the wait list and is requesting a message be sent to the provider.    When is the next available appointment:  5/16/19  Did you offer to schedule the next available appt and put the patient on the wait list?:   no  What visit type: ep  Symptoms:  The cream for patient bottom is not working, pain and itching.   Patient preference of timeframe to be scheduled:    What is the reason the patient is requesting a sooner appointment? (insurance terminating, changing jobs):    Would the patient rather a call back or a response via [facility]?:  no  Comments:  Patient has an appointment scheduled for June however he would like to be seen asap.

## 2019-04-24 NOTE — TELEPHONE ENCOUNTER
Pt daughter states that she spoke with Giulia on Monday and the pt is supposed to be seeing Dr. Carlson on Friday for pm. Please confirm apt with daughter.

## 2019-04-25 ENCOUNTER — TELEPHONE (OUTPATIENT)
Dept: INTERNAL MEDICINE | Facility: CLINIC | Age: 84
End: 2019-04-25

## 2019-04-25 NOTE — TELEPHONE ENCOUNTER
----- Message from Beni Ferrer sent at 4/25/2019  8:31 AM CDT -----  Contact: Anne [phone]  Patient's daughter would like a call from the office in regards to patient upcoming appointment tomorrow with Dr. Carlson . Please call and advise, Thanks

## 2019-04-26 ENCOUNTER — OFFICE VISIT (OUTPATIENT)
Dept: INTERNAL MEDICINE | Facility: CLINIC | Age: 84
End: 2019-04-26
Payer: MEDICARE

## 2019-04-26 VITALS
DIASTOLIC BLOOD PRESSURE: 74 MMHG | TEMPERATURE: 99 F | SYSTOLIC BLOOD PRESSURE: 132 MMHG | BODY MASS INDEX: 25.09 KG/M2 | HEIGHT: 68 IN | OXYGEN SATURATION: 95 % | HEART RATE: 92 BPM

## 2019-04-26 DIAGNOSIS — B36.9 FUNGAL DERMATITIS: Primary | ICD-10-CM

## 2019-04-26 PROCEDURE — 1101F PT FALLS ASSESS-DOCD LE1/YR: CPT | Mod: CPTII,S$GLB,, | Performed by: INTERNAL MEDICINE

## 2019-04-26 PROCEDURE — 99999 PR PBB SHADOW E&M-EST. PATIENT-LVL III: CPT | Mod: PBBFAC,,, | Performed by: INTERNAL MEDICINE

## 2019-04-26 PROCEDURE — 99999 PR PBB SHADOW E&M-EST. PATIENT-LVL III: ICD-10-PCS | Mod: PBBFAC,,, | Performed by: INTERNAL MEDICINE

## 2019-04-26 PROCEDURE — 99213 OFFICE O/P EST LOW 20 MIN: CPT | Mod: S$GLB,,, | Performed by: INTERNAL MEDICINE

## 2019-04-26 PROCEDURE — 99213 PR OFFICE/OUTPT VISIT, EST, LEVL III, 20-29 MIN: ICD-10-PCS | Mod: S$GLB,,, | Performed by: INTERNAL MEDICINE

## 2019-04-26 PROCEDURE — 1101F PR PT FALLS ASSESS DOC 0-1 FALLS W/OUT INJ PAST YR: ICD-10-PCS | Mod: CPTII,S$GLB,, | Performed by: INTERNAL MEDICINE

## 2019-04-26 RX ORDER — CLOTRIMAZOLE AND BETAMETHASONE DIPROPIONATE 10; .64 MG/G; MG/G
CREAM TOPICAL 2 TIMES DAILY
Qty: 45 G | Refills: 2 | Status: SHIPPED | OUTPATIENT
Start: 2019-04-26 | End: 2019-08-29 | Stop reason: SDUPTHER

## 2019-04-26 NOTE — PROGRESS NOTES
Subjective:       Patient ID: Zbigniew Forman is a 89 y.o. male.    Chief Complaint: Rash (buttock)    Complains of perianal rash that itches.  Has been using clotrimazole on it but getting no better.    Review of Systems   Constitutional: Negative for activity change, appetite change and fever.   HENT: Negative for congestion, postnasal drip and sore throat.    Respiratory: Negative for cough, shortness of breath and wheezing.    Cardiovascular: Negative for chest pain and palpitations.   Gastrointestinal: Negative for abdominal pain, blood in stool, constipation, diarrhea, nausea and vomiting.   Genitourinary: Negative for decreased urine volume, difficulty urinating, flank pain and frequency.   Musculoskeletal: Negative for arthralgias.   Neurological: Negative for dizziness, weakness and headaches.       Objective:      Physical Exam   Skin:            Assessment:       1. Fungal dermatitis        Plan:   Zbigniew was seen today for rash.    Diagnoses and all orders for this visit:    Fungal dermatitis    Other orders  -     clotrimazole-betamethasone 1-0.05% (LOTRISONE) cream; Apply topically 2 (two) times daily.

## 2019-05-29 ENCOUNTER — TELEPHONE (OUTPATIENT)
Dept: INTERNAL MEDICINE | Facility: CLINIC | Age: 84
End: 2019-05-29

## 2019-05-29 NOTE — TELEPHONE ENCOUNTER
----- Message from Katharina Mahoney sent at 5/29/2019  1:01 PM CDT -----  Contact: Anne/Daughter/[phone]  Anne called in regards needing to talk with Dr Morgan medical assistant about getting a late appointment around 11, 11:20 which is when usually patient schedule his appointment. Please call and advise. Thank you

## 2019-05-29 NOTE — TELEPHONE ENCOUNTER
I don't have afternoon clinics.  See if someone can schedule him for a later morning appointment.  Thanks.

## 2019-05-30 NOTE — TELEPHONE ENCOUNTER
----- Message from Isha De La Cruz MA sent at 5/30/2019  3:34 PM CDT -----  Contact: Daughter/ Anne [phone]   Pt's daughter would like to speak with the nurse. Please advise  ----- Message -----  From: Devyn Philip  Sent: 5/30/2019   2:25 PM  To: Eddie Odonnell Staff    She wants to speak with you about scheduling future appointments.    Thank you

## 2019-06-02 ENCOUNTER — HOSPITAL ENCOUNTER (EMERGENCY)
Facility: HOSPITAL | Age: 84
Discharge: HOME | End: 2019-06-02
Attending: EMERGENCY MEDICINE
Payer: MEDICARE

## 2019-06-02 VITALS
SYSTOLIC BLOOD PRESSURE: 158 MMHG | OXYGEN SATURATION: 96 % | BODY MASS INDEX: 25.46 KG/M2 | HEIGHT: 68 IN | WEIGHT: 168 LBS | DIASTOLIC BLOOD PRESSURE: 69 MMHG | HEART RATE: 86 BPM | RESPIRATION RATE: 19 BRPM

## 2019-06-02 DIAGNOSIS — S05.11XA ECCHYMOSIS OF RIGHT EYE, INITIAL ENCOUNTER: ICD-10-CM

## 2019-06-02 DIAGNOSIS — H57.11 ACUTE RIGHT EYE PAIN: ICD-10-CM

## 2019-06-02 DIAGNOSIS — H11.31 SUBCONJUNCTIVAL HEMORRHAGE OF RIGHT EYE: Primary | ICD-10-CM

## 2019-06-02 PROBLEM — E11.3299: Status: ACTIVE | Noted: 2019-06-02

## 2019-06-02 LAB
ALBUMIN SERPL BCP-MCNC: 3.5 G/DL (ref 3.5–5.2)
ALP SERPL-CCNC: 62 U/L (ref 55–135)
ALT SERPL W/O P-5'-P-CCNC: 8 U/L (ref 10–44)
ANION GAP SERPL CALC-SCNC: 11 MMOL/L (ref 8–16)
AST SERPL-CCNC: 13 U/L (ref 10–40)
BASOPHILS # BLD AUTO: 0.06 K/UL (ref 0–0.2)
BASOPHILS NFR BLD: 0.6 % (ref 0–1.9)
BILIRUB SERPL-MCNC: 0.3 MG/DL (ref 0.1–1)
BUN SERPL-MCNC: 29 MG/DL (ref 10–30)
CALCIUM SERPL-MCNC: 9.1 MG/DL (ref 8.7–10.5)
CHLORIDE SERPL-SCNC: 107 MMOL/L (ref 95–110)
CO2 SERPL-SCNC: 25 MMOL/L (ref 23–29)
CREAT SERPL-MCNC: 1.3 MG/DL (ref 0.5–1.4)
DIFFERENTIAL METHOD: ABNORMAL
EOSINOPHIL # BLD AUTO: 0.1 K/UL (ref 0–0.5)
EOSINOPHIL NFR BLD: 1.1 % (ref 0–8)
ERYTHROCYTE [DISTWIDTH] IN BLOOD BY AUTOMATED COUNT: 14 % (ref 11.5–14.5)
EST. GFR  (AFRICAN AMERICAN): 52.1 ML/MIN/1.73 M^2
EST. GFR  (NON AFRICAN AMERICAN): 45.1 ML/MIN/1.73 M^2
GLUCOSE SERPL-MCNC: 156 MG/DL (ref 70–110)
HCT VFR BLD AUTO: 39.3 % (ref 40–54)
HGB BLD-MCNC: 12.6 G/DL (ref 14–18)
IMM GRANULOCYTES # BLD AUTO: 0.03 K/UL (ref 0–0.04)
IMM GRANULOCYTES NFR BLD AUTO: 0.3 % (ref 0–0.5)
INR PPP: 1.2 (ref 0.8–1.2)
LYMPHOCYTES # BLD AUTO: 4 K/UL (ref 1–4.8)
LYMPHOCYTES NFR BLD: 37.4 % (ref 18–48)
MCH RBC QN AUTO: 27.9 PG (ref 27–31)
MCHC RBC AUTO-ENTMCNC: 32.1 G/DL (ref 32–36)
MCV RBC AUTO: 87 FL (ref 82–98)
MONOCYTES # BLD AUTO: 0.5 K/UL (ref 0.3–1)
MONOCYTES NFR BLD: 4.2 % (ref 4–15)
NEUTROPHILS # BLD AUTO: 6 K/UL (ref 1.8–7.7)
NEUTROPHILS NFR BLD: 56.4 % (ref 38–73)
NRBC BLD-RTO: 0 /100 WBC
PLATELET # BLD AUTO: 268 K/UL (ref 150–350)
PMV BLD AUTO: 9.9 FL (ref 9.2–12.9)
POCT GLUCOSE: 150 MG/DL (ref 70–110)
POTASSIUM SERPL-SCNC: 4.9 MMOL/L (ref 3.5–5.1)
PROT SERPL-MCNC: 6.8 G/DL (ref 6–8.4)
PROTHROMBIN TIME: 12.2 SEC (ref 9–12.5)
RBC # BLD AUTO: 4.51 M/UL (ref 4.6–6.2)
SODIUM SERPL-SCNC: 143 MMOL/L (ref 136–145)
WBC # BLD AUTO: 10.65 K/UL (ref 3.9–12.7)

## 2019-06-02 PROCEDURE — 85025 COMPLETE CBC W/AUTO DIFF WBC: CPT

## 2019-06-02 PROCEDURE — 99285 EMERGENCY DEPT VISIT HI MDM: CPT | Mod: ,,, | Performed by: PHYSICIAN ASSISTANT

## 2019-06-02 PROCEDURE — 93010 EKG 12-LEAD: ICD-10-PCS | Mod: ,,, | Performed by: INTERNAL MEDICINE

## 2019-06-02 PROCEDURE — 85610 PROTHROMBIN TIME: CPT

## 2019-06-02 PROCEDURE — 80053 COMPREHEN METABOLIC PANEL: CPT

## 2019-06-02 PROCEDURE — 82962 GLUCOSE BLOOD TEST: CPT

## 2019-06-02 PROCEDURE — 93010 ELECTROCARDIOGRAM REPORT: CPT | Mod: ,,, | Performed by: INTERNAL MEDICINE

## 2019-06-02 PROCEDURE — 99284 EMERGENCY DEPT VISIT MOD MDM: CPT | Mod: 25

## 2019-06-02 PROCEDURE — 93005 ELECTROCARDIOGRAM TRACING: CPT

## 2019-06-02 PROCEDURE — 99285 PR EMERGENCY DEPT VISIT,LEVEL V: ICD-10-PCS | Mod: ,,, | Performed by: PHYSICIAN ASSISTANT

## 2019-06-02 NOTE — ED TRIAGE NOTES
Daughter reports pt stated at 2am his eye was hurting, bruising to right eye noted this am after pt woke up, denies recent illness, N/V or eye injury, pt oriented states he doesn't know what happened to his eye, woke up and eye was hurting,noted scant bloody drainage, redness, swelling and bruising right eye and eyelid.

## 2019-06-02 NOTE — HPI
Zbigniew Forman is a 89 y.o. 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 Pt reports that patient woke up with mild pain this AM associated with subconj heme. Denies any trauma to eye, valsalva movement. Family reports that patient does rub his eye so it is possible that he rubbed his eye at night during sleep. Denies any blood thinner use, bleeding disorders. Denies vision changes, eye pain,irritation.

## 2019-06-02 NOTE — CONSULTS
[facility]  Ophthalmology  Consult Note    Patient Name: Zbigniew Forman  MRN: [id number]  Admission Date: 6/2/2019  Hospital Length of Stay: 0 days  Attending Provider: Praveen Bangura DO   Primary Care Physician: Blas Morgan Ii, MD  Principal Problem:<principal problem not specified>    Inpatient consult to Ophthalmology  Consult performed by: Mukul Torres MD  Consult ordered by: Praveen Bangura DO        Subjective:     Chief Complaint:  Subconjunctival heme od      HPI:   Zbigniew Forman is a 89 y.o. 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 Pt reports that patient woke up with mild pain this AM associated with subconj heme. Denies any trauma to eye, valsalva movement. Family reports that patient does rub his eye so it is possible that he rubbed his eye at night during sleep. Denies any blood thinner use, bleeding disorders. Denies vision changes, eye pain,irritation.         No new subjective & objective note has been filed under this hospital service since the last note was generated.      Base Eye Exam     Visual Acuity       Right Left    Near cc 20/400 20/200          Tonometry (Tonopen, 3:01 PM)       Right Left    Pressure 18 21          Pupils       Dark Light Shape React APD    Right 4 3 Round Slow +1    Left 4 3 Round slow  +1          Visual Fields    Unable to test 2/2 patients vision and cooperation           Extraocular Movement       Right Left     Full, Ortho Full, Ortho            Slit Lamp and Fundus Exam     External Exam       Right Left    External normal retropusulsion, ecchymosis of RUL normal retropusulsion           Pen Light Exam       Right Left    Lids/Lashes  ecchymosis and mild edema of RUL  Normal    Conjunctiva/Sclera 360 subconjunctival heme. No  fluro uptake.  White and quiet    Cornea Clear Clear    Anterior Chamber Deep and quiet Deep and quiet    Iris Round and reactive Round and reactive    Lens 3+NSC 3+NSC    Vitreous syneresis  syneresis           Fundus Exam       Right Left    Disc Normal Normal    C/D Ratio 0.8 0.8    Macula hard exudates Normal    Vessels attenauted  attenauted     Periphery few MAs cobble stone supratemporal, few peripheral drusen               Assessment and Plan:     Subconjunctival hematoma, right  -located 360 degrees: No evidence of hyphema or open globe underlying conjunctiva.   -spontaneous likely 2/2 HTN/DM combined with possible rubbing of eye at night.  -CT w/o orbital fractures; patient denies any elder abuse.  -PT/PTT/INR WNL.  -Recommend to avoid valsalva, heavy lifting, straining, heavy coughing.   -Can consider avoiding NSAIDS/ASA or other blood thinners unless medically necessary.   -Artifical tears 4-6X/day (OTC) + cold compress PRN for comfort if irritation occurs.   -Will resolve on its own with time. Patient informed that can take days to 2-4 weeks to resolved, especially given the extent.   -Patient informed to return for evaluation if not improving or reoccurs.                   Diabetes mellitus with mild nonproliferative retinopathy  -Encourage BS control per PCP      Glaucoma suspect, high risk  -Recommend continued follow up with glaucoma specialist as has been a few years.      Thank you for your consult. I will sign off. Please contact us if you have any additional questions.    Mukul Torres MD  Ophthalmology  [facility]

## 2019-06-02 NOTE — DISCHARGE INSTRUCTIONS
Please schedule an appointment with your primary care doctor for follow-up.  You should placed an ice pack on the eye to help with pain and swelling. Your eye will likely be bloody and swollen for several weeks, if it has not healed within 1 month you should make an appointment with an ophthalmology doctor.  If you start to have any new or worsening symptoms, please come back to the emergency department.

## 2019-06-02 NOTE — ASSESSMENT & PLAN NOTE
-located 360 degrees: No evidence of hyphema or open globe underlying conjunctiva.   -spontaneous likely 2/2 HTN/DM combined with possible rubbing of eye at night.  -CT w/o orbital fractures; patient denies any elder abuse.  -PT/PTT/INR WNL.  -Recommend to avoid valsalva, heavy lifting, straining, heavy coughing.   -Can consider avoiding NSAIDS/ASA or other blood thinners unless medically necessary.   -Artifical tears 4-6X/day (OTC) + cold compress PRN for comfort if irritation occurs.   -Will resolve on its own with time. Patient informed that can take days to 2-4 weeks to resolved, especially given the extent.   -Patient informed to return for evaluation if not improving or reoccurs.

## 2019-06-02 NOTE — ED PROVIDER NOTES
Encounter Date: 6/2/2019       History     Chief Complaint   Patient presents with    Eye Problem     Pt presents with bloody sclera to the right eye that began this morning. Pt denies trauma.      HPI   89-year-old male with a history of glaucoma, chronic kidney disease, diabetes, hypertension and history of evacuation of subdural hematoma presenting with sudden onset right eye pain with subconjunctival hemorrhage. Onset was sudden, associated with pain that occurred approximately 2:00 a.m. last night.  He woke last night complaining of right eye pain, and then this morning at 9:00 a.m. was examined by his daughter and found to have blood around his sclerae as well as hemorrhage around his eye and eyelid on the right side.  He denies any headache, neck pain, ear pain, chest pain, falls or trauma. He denies any fevers, chills, nausea or vomiting. Current pain 3/10 to the right eye.  No other aggravating or alleviating factors.  Of note, he has significant contusion and bruising around the eyelid and supraorbital region.    Review of patient's allergies indicates:   Allergen Reactions    Metformin Diarrhea    Sulfa (sulfonamide antibiotics) Other (See Comments)     unknown reactions    Tradjenta [linagliptin] Diarrhea            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    [id number] HOLES Left 8/17/2014    Performed [facility]    HERNIA REPAIR       Family History   Problem Relation Age of Onset    Other  Father         peritonitis    Cerebral aneurysm Mother     Kidney failure Sister 96    Amblyopia Neg Hx     Blindness Neg Hx     Cancer Neg Hx     Cataracts Neg Hx     Diabetes Neg Hx     Glaucoma Neg Hx     Hypertension Neg Hx     Macular degeneration Neg Hx     Retinal detachment Neg Hx     Strabismus Neg Hx     Stroke Neg Hx     Thyroid disease Neg Hx      Social History     Tobacco Use    Smoking status: Never Smoker    Smokeless tobacco: Never Used   Substance Use Topics    Alcohol use: No    Drug use: Not on file     Review of Systems   Constitutional: Negative for chills and fever.   HENT: Negative for congestion and nosebleeds.    Eyes: Positive for pain, discharge and redness. Negative for visual disturbance.   Respiratory: Negative for apnea and shortness of breath.    Cardiovascular: Negative for chest pain and palpitations.   Gastrointestinal: Negative for abdominal distention and abdominal pain.   Genitourinary: Negative for dysuria, flank pain and hematuria.   Musculoskeletal: Negative for arthralgias, back pain, myalgias and neck pain.   Skin: Negative for color change.   Neurological: Negative for dizziness, facial asymmetry, speech difficulty and light-headedness.   Psychiatric/Behavioral: Negative for agitation and confusion.       Physical Exam     Initial Vitals [06/02/19 1254]   BP Pulse Resp Temp SpO2   (!) 144/67 92 19 -- 95 %      MAP       --         Physical Exam  Constitutional: Well-developed. Well-nourished.  No emotional distress.  HENT: NCAT. OP moist. Uvula midline. No OP masses. Posterior pharynx with no erythema. No tonsillar edema. No exudate. Floor of the mouth is soft, tongue is not elevated. No rhinorrhea. TMs clear bilaterally. Mastoid process nontender bilaterally.  EYES: No conjunctival injection or discharge of the left eye.  Right eye:  Subconjunctival hemorrhage, blood around the sclera, with surrounding ecchymosis, visual acuity intact grossly, no active  bleeding noted,   NECK: Full ROM. Supple. No rigidity. No cervical LAD. No stridor. Brudzinskis test negative.  CARDIAC: RRR. No murmurs or rubs. Strong distal pulses.  PULM: Normal effort. Breath sounds clear bilaterally. No wheezes. No crackles.  ABD: Soft, non-tender, non-distended, normal BS. No guarding. No rebound.  : No CVA tenderness.  MS: Warm and well perfused. No edema..  NEURO: Alert and oriented. Cranial nerves grossly intact. Normal gate.  SKIN: Dry. No rashes.  PSYCH: Normal judgment. Normal affect.    ED Course   Procedures  Labs Reviewed   CBC W/ AUTO DIFFERENTIAL - Abnormal; Notable for the following components:       Result Value    RBC 4.51 (*)     Hemoglobin 12.6 (*)     Hematocrit 39.3 (*)     All other components within normal limits   COMPREHENSIVE METABOLIC PANEL - Abnormal; Notable for the following components:    Glucose 156 (*)     ALT 8 (*)     eGFR if  52.1 (*)     eGFR if non  45.1 (*)     All other components within normal limits   POCT GLUCOSE - Abnormal; Notable for the following components:    POCT Glucose 150 (*)     All other components within normal limits   PROTIME-INR          Imaging Results          CT Head Without Contrast (Final result)  Result time 06/02/19 14:13:02    Final result by Lion Holm MD (06/02/19 14:13:02)                 Impression:      1. Sequela of chronic microvascular ischemic change and senescent change, similar to the previous exam, no convincing acute intracranial abnormalities.  2. Surgical changes of the globes and left calvarium.      Electronically signed by: Lion Holm MD  Date:    06/02/2019  Time:    14:13             Narrative:    EXAMINATION:  CT HEAD WITHOUT CONTRAST    CLINICAL HISTORY:  Subconjunctival hemorrhage;    TECHNIQUE:  Low dose axial images were obtained through the head.  Coronal and sagittal reformations were also performed. Contrast was not  administered.    COMPARISON:  09/13/2018    FINDINGS:  There is generalized cerebral volume loss.  There is hypoattenuation in a periventricular fashion, likely sequela of chronic microvascular ischemic change.  There is no evidence of acute major vascular territory infarct, hemorrhage, or mass.  There is no hydrocephalus.  There are no abnormal extra-axial fluid collections.  The paranasal sinuses and mastoid air cells are clear, and there is no evidence of calvarial fracture.  The visualized soft tissues are unremarkable.  Surgical changes are noted of the left calvarium.                                 Medical Decision Making:   History:   Old Medical Records: I decided to obtain old medical records.  Initial Assessment:   89-year-old male with a history of glaucoma,  chronic kidney disease, diabetes, hypertension and remote history of subdural hematoma evacuation presenting with acute onset right eye pain with subconjunctival hemorrhage.  Differential Diagnosis:   Differential diagnosis includes but not limited to:  Subconjunctival hemorrhage, traumatic hemorrhage, retrobulbar hematoma, contusion, hyphema  Independently Interpreted Test(s):   I have ordered and independently interpreted X-rays - see prior notes.  I have ordered and independently interpreted EKG Reading(s) - see prior notes  Clinical Tests:   Lab Tests: Ordered and Reviewed  Radiological Study: Ordered and Reviewed  Medical Tests: Ordered and Reviewed  ED Management:  IV  ECG  Labs  CT head  Ophthalmology consult       APC / Resident Notes:   89-year-old male presenting with atraumatic bleeding and pain in his right eye.  On exam, his blood throughout the sclera as well as ecchymosis of the soft tissue around his eye.  Not on any anticoagulation.  Differential diagnosis includes subconjunctival hemorrhage, retrobulbar hematoma, coagulopathy.  Will check labs, CT head and consult Ophthalmology.    Head CT shows surgical changes of the globes.  It  does appear that there is some blood collecting on the lateral canthus of the eye.  Patient seen and evaluated by ophthalmology, suspect that patient's symptoms are simply a subconjunctival hemorrhage. Dr. Torres recommends artificial tears and ice packs for comfort.  I have no reason to suspect elder abuse, however his presentation is consistent with trauma. I spoke to the patient alone without any family members and inquired about any abuse.  He adamantly denies any abuse, states that he feels safe at home and that he is very well taking care of.  Family members appear to be very caring interacting with the patient and and the he is clean and well dressed.  Given reassuring workup, I do not believe he requires further ED treatment is stable for discharge. Discharge with Rx for artificial tears and instruct patient to follow up with PCP. Stressed the importance of follow-up, strict ED return precautions given.  Patient voiced understanding and is comfortable with discharge. I discussed this patient with my supervising physician.    Dot Mcdaniel PA-C                   Clinical Impression:       ICD-10-CM ICD-9-CM   1. Subconjunctival hemorrhage of right eye H11.31 372.72   2. Acute right eye pain H57.11 379.91   3. Ecchymosis of right eye, initial encounter S05.11XA 921.9         Disposition:   Disposition: Discharged  Condition: Stable                        Dot Mcdaniel PA-C  06/02/19 2031

## 2019-06-03 ENCOUNTER — TELEPHONE (OUTPATIENT)
Dept: INTERNAL MEDICINE | Facility: CLINIC | Age: 84
End: 2019-06-03

## 2019-06-03 NOTE — TELEPHONE ENCOUNTER
----- Message from Devyn Philip sent at 6/3/2019 10:21 AM CDT -----  Contact: Daughter/ Anne [phone]  Telephone consult     She wanted you to know that her dad went to [facility] ED yesterday. She also wants to speak with you about it.    Thank you

## 2019-06-05 ENCOUNTER — PATIENT OUTREACH (OUTPATIENT)
Dept: ADMINISTRATIVE | Facility: HOSPITAL | Age: 84
End: 2019-06-05

## 2019-06-05 DIAGNOSIS — E11.9 CONTROLLED TYPE 2 DIABETES MELLITUS WITHOUT COMPLICATION, WITHOUT LONG-TERM CURRENT USE OF INSULIN: Primary | ICD-10-CM

## 2019-06-05 NOTE — PROGRESS NOTES
Chart review completed. Immunizations reconciled, HM modifiers updated, HM duplicate entries deleted, care team updated, old orders deleted. Pt due for A1c & shingles vaccine.

## 2019-06-07 ENCOUNTER — OFFICE VISIT (OUTPATIENT)
Dept: INTERNAL MEDICINE | Facility: CLINIC | Age: 84
End: 2019-06-07
Payer: MEDICARE

## 2019-06-07 VITALS — OXYGEN SATURATION: 98 % | HEART RATE: 78 BPM | SYSTOLIC BLOOD PRESSURE: 136 MMHG | DIASTOLIC BLOOD PRESSURE: 70 MMHG

## 2019-06-07 DIAGNOSIS — H11.31 SUBCONJUNCTIVAL HEMATOMA, RIGHT: ICD-10-CM

## 2019-06-07 DIAGNOSIS — E11.9 CONTROLLED TYPE 2 DIABETES MELLITUS WITHOUT COMPLICATION, WITHOUT LONG-TERM CURRENT USE OF INSULIN: ICD-10-CM

## 2019-06-07 DIAGNOSIS — Z66 DNR (DO NOT RESUSCITATE): ICD-10-CM

## 2019-06-07 DIAGNOSIS — L89.301 PRESSURE INJURY OF BUTTOCK, STAGE 1, UNSPECIFIED LATERALITY: Primary | ICD-10-CM

## 2019-06-07 PROCEDURE — 1101F PR PT FALLS ASSESS DOC 0-1 FALLS W/OUT INJ PAST YR: ICD-10-PCS | Mod: CPTII,S$GLB,, | Performed by: INTERNAL MEDICINE

## 2019-06-07 PROCEDURE — 1101F PT FALLS ASSESS-DOCD LE1/YR: CPT | Mod: CPTII,S$GLB,, | Performed by: INTERNAL MEDICINE

## 2019-06-07 PROCEDURE — 99499 UNLISTED E&M SERVICE: CPT | Mod: S$GLB,,, | Performed by: INTERNAL MEDICINE

## 2019-06-07 PROCEDURE — 99999 PR PBB SHADOW E&M-EST. PATIENT-LVL III: CPT | Mod: PBBFAC,,, | Performed by: INTERNAL MEDICINE

## 2019-06-07 PROCEDURE — 99214 PR OFFICE/OUTPT VISIT, EST, LEVL IV, 30-39 MIN: ICD-10-PCS | Mod: S$GLB,,, | Performed by: INTERNAL MEDICINE

## 2019-06-07 PROCEDURE — 99499 RISK ADDL DX/OHS AUDIT: ICD-10-PCS | Mod: S$GLB,,, | Performed by: INTERNAL MEDICINE

## 2019-06-07 PROCEDURE — 99999 PR PBB SHADOW E&M-EST. PATIENT-LVL III: ICD-10-PCS | Mod: PBBFAC,,, | Performed by: INTERNAL MEDICINE

## 2019-06-07 PROCEDURE — 99214 OFFICE O/P EST MOD 30 MIN: CPT | Mod: S$GLB,,, | Performed by: INTERNAL MEDICINE

## 2019-06-07 NOTE — PROGRESS NOTES
Subjective:       Patient ID: Zbigniew Forman is a 89 y.o. male.    Chief Complaint: Follow-up    HPI - Was in the ED on 6/2 for subconjunctival hemorrhage and periorbital bruising on the right.  He's here to f/u; improving bruising and bloody eye.  He also wants me to look at the rash in his gluteal cleft; son says it's much better.  He's due for some diabetes lab work.  Feels fine otherwise.     PMH:  CHF, diastolic dysfunction  HTN, at goal  DM2, dietary management  S/p SDH evacuation  CKD3  Debility  Prostatism   Dysequilibrium, wheelchair-bound     Meds:  Reviewed and reconciled in EPIC with patient during visit today    Review of Systems   Constitutional: Negative for fever.   HENT: Negative for congestion.    Eyes: Positive for redness.   Respiratory: Negative for shortness of breath.    Cardiovascular: Negative for chest pain.   Gastrointestinal: Negative for abdominal pain.   Musculoskeletal: Positive for gait problem.   Skin: Positive for rash.   Neurological: Negative for headaches.   Psychiatric/Behavioral: Negative for sleep disturbance.       Objective:      Physical Exam   Constitutional: He appears well-developed and well-nourished. No distress.   HENT:   Head: Normocephalic and atraumatic.   Eyes:   Right eye with large subconjunctival hemorrhage and surrounding bruising in periorbital area.  Nontender to palpation   Neurological: He is alert.   Skin: He is not diaphoretic.   Mild erythema in gluteal cleft.  Rash/pressure ulcer has healed   Psychiatric: He has a normal mood and affect.   Nursing note and vitals reviewed.      Assessment:       1. Pressure injury of buttock, stage 1, unspecified laterality    2. Subconjunctival hematoma, right    3. DNR (do not resuscitate)    4. Controlled type 2 diabetes mellitus without complication, without long-term current use of insulin        Plan:       Zbigniew was seen today for follow-up.    Diagnoses and all orders for this visit:    Pressure injury of  buttock, stage 1, unspecified laterality - improved.  Continue with soap/water now.  Keep dry and use powder in the cleft to keep it dry    Subconjunctival hematoma, right - provided reassurance    DNR (do not resuscitate)    Controlled type 2 diabetes mellitus without complication, without long-term current use of insulin - has been at goal.  Overdue for lab work  -     Hemoglobin A1c; Future    rtc prn, or as scheduled    JANELL Morgan MD MPH  Staff Internist

## 2019-06-10 NOTE — PROGRESS NOTES
faxed home health referral to [facility]. Pt requested [facility]. No DME needed for pt for discharge. Patient to discharge home with the assistance of family and home health. No other barriers to discharge.   Speaking Coherently

## 2019-06-18 ENCOUNTER — TELEPHONE (OUTPATIENT)
Dept: PODIATRY | Facility: CLINIC | Age: 84
End: 2019-06-18

## 2019-06-18 NOTE — TELEPHONE ENCOUNTER
----- Message from Tana Ferrer sent at 6/18/2019 11:46 AM CDT -----  Contact: Daughter - Anne  Patient Requesting Sooner Appointment.     Reason for sooner appt.: Need routine nailcare    When is the first available appointment? 7/1    Communication Preference: [phone]    Additional Information: Daughter ask if can bring the  Patient in on this Fri - 6/21 at 1p if possible please

## 2019-07-01 ENCOUNTER — OFFICE VISIT (OUTPATIENT)
Dept: PODIATRY | Facility: CLINIC | Age: 84
End: 2019-07-01
Payer: MEDICARE

## 2019-07-01 VITALS
HEART RATE: 94 BPM | SYSTOLIC BLOOD PRESSURE: 142 MMHG | BODY MASS INDEX: 24.05 KG/M2 | WEIGHT: 168 LBS | HEIGHT: 70 IN | DIASTOLIC BLOOD PRESSURE: 75 MMHG

## 2019-07-01 DIAGNOSIS — B35.1 ONYCHOMYCOSIS DUE TO DERMATOPHYTE: ICD-10-CM

## 2019-07-01 DIAGNOSIS — E11.42 DIABETIC POLYNEUROPATHY ASSOCIATED WITH TYPE 2 DIABETES MELLITUS: ICD-10-CM

## 2019-07-01 DIAGNOSIS — L84 CORN OR CALLUS: Primary | ICD-10-CM

## 2019-07-01 PROCEDURE — 99499 UNLISTED E&M SERVICE: CPT | Mod: S$GLB,,, | Performed by: PODIATRIST

## 2019-07-01 PROCEDURE — 11721 PR DEBRIDEMENT OF NAILS, 6 OR MORE: ICD-10-PCS | Mod: 59,Q9,S$GLB, | Performed by: PODIATRIST

## 2019-07-01 PROCEDURE — 11056 PARNG/CUTG B9 HYPRKR LES 2-4: CPT | Mod: Q9,S$GLB,, | Performed by: PODIATRIST

## 2019-07-01 PROCEDURE — 99999 PR PBB SHADOW E&M-EST. PATIENT-LVL III: ICD-10-PCS | Mod: PBBFAC,,, | Performed by: PODIATRIST

## 2019-07-01 PROCEDURE — 11056 PR TRIM BENIGN HYPERKERATOTIC SKIN LESION,2-4: ICD-10-PCS | Mod: Q9,S$GLB,, | Performed by: PODIATRIST

## 2019-07-01 PROCEDURE — 99999 PR PBB SHADOW E&M-EST. PATIENT-LVL III: CPT | Mod: PBBFAC,,, | Performed by: PODIATRIST

## 2019-07-01 PROCEDURE — 11721 DEBRIDE NAIL 6 OR MORE: CPT | Mod: 59,Q9,S$GLB, | Performed by: PODIATRIST

## 2019-07-01 PROCEDURE — 99499 NO LOS: ICD-10-PCS | Mod: S$GLB,,, | Performed by: PODIATRIST

## 2019-07-08 NOTE — PROGRESS NOTES
Subjective:     Zbigniew Forman is a 89 y.o. male male who presents to the clinic for evaluation and treatment of high risk feet. Zbigniew has a past medical history of Hypertension associated with diabetes; Diabetes mellitus type II, uncontrolled; Glaucoma suspect, high risk (11/19/2013); and Senile cataract, unspecified (11/19/2013). The patient's chief complaint is long toenails. This patient has documented high risk feet requiring routine maintenance secondary to peripheral neuropathy. Experiences occasional numbness at the digits.No trauma reported. No new issues reported.         PCP: Kristy Morgan Ii, MD  Date Last Seen by PCP:   Chief Complaint   Patient presents with    Diabetes Mellitus     06/07/2019 dr kristy morgan    Diabetic Foot Exam                 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    Chronic kidney disease, stage III (moderate) 9/29/2015    Diabetes mellitus type II, uncontrolled     Glaucoma suspect, high risk 11/19/2013    Hypertension associated with diabetes     S/P evacuation of subdural hematoma 3/17/2015    Senile cataract, unspecified 11/19/2013             Current Outpatient Medications on File Prior to Visit   Medication Sig Dispense Refill    albuterol (VENTOLIN HFA) 90 mcg/actuation inhaler Inhale 2 puffs into the lungs every 6 (six) hours as needed for Wheezing. Rescue 1 each 6    albuterol-ipratropium (DUO-NEB) 2.5 mg-0.5 mg/3 mL nebulizer solution Take 3 mLs by nebulization every 6 (six) hours as needed for Wheezing. Rescue 1 Box 6    amLODIPine (NORVASC) 5 MG tablet Take 1 tablet (5 mg total) by mouth once daily. 90 tablet 3    artificial tears w/ lanolin (AKWA TEARS) 0.5% ophthalmic ointment Apply to affected eye(s) as needed for dryness. 3.5 g 0    clotrimazole (LOTRIMIN) 1 % Soln APPLY EXTERNALLY TO THE AFFECTED AREA TWICE DAILY 30 mL 3    clotrimazole-betamethasone 1-0.05% (LOTRISONE) cream Apply topically  2 (two) times daily. 45 g 2    latanoprost 0.005 % ophthalmic solution INSTILL 1 DROP IN BOTH EYES EVERY EVENING 2.5 mL 12    lisinopril 10 MG tablet Take 1 tablet (10 mg total) by mouth once daily. 30 tablet 3    senna-docusate 8.6-50 mg (PERICOLACE) 8.6-50 mg per tablet Take 1 tablet by mouth 2 (two) times daily as needed for Constipation.      furosemide (LASIX) 20 MG tablet Take 1 tablet (20 mg total) by mouth daily as needed (Leg swelling, weight gain >2-3 lbs in one day or >5lbs in 1 week.). 30 tablet 2     No current facility-administered medications on file prior to visit.          Review of patient's allergies indicates:   Allergen Reactions    Metformin Diarrhea    Sulfa (sulfonamide antibiotics)     Tradjenta [linagliptin]      Diarrhea           Social History     Socioeconomic History    Marital status:      Spouse name: Not on file    Number of children: Not on file    Years of education: Not on file    Highest education level: Not on file   Occupational History    Not on file   Social Needs    Financial resource strain: Not on file    Food insecurity:     Worry: Not on file     Inability: Not on file    Transportation needs:     Medical: Not on file     Non-medical: Not on file   Tobacco Use    Smoking status: Never Smoker    Smokeless tobacco: Never Used   Substance and Sexual Activity    Alcohol use: No    Drug use: Not on file    Sexual activity: Not on file   Lifestyle    Physical activity:     Days per week: Not on file     Minutes per session: Not on file    Stress: Not on file   Relationships    Social connections:     Talks on phone: Not on file     Gets together: Not on file     Attends [employer] service: Not on file     Active member of club or organization: Not on file     Attends meetings of clubs or organizations: Not on file     Relationship status: Not on file   Other Topics Concern    Not on file   Social History Narrative    Not on file  "              Review of Systems   Constitution: Positive for weight gain. Negative for chills and fever.   Cardiovascular: Negative for chest pain, claudication and leg swelling.   Respiratory: Negative for cough and shortness of breath.    Skin: Positive for nail changes and dry skin.   Musculoskeletal: Positive for stiffness.   Gastrointestinal: Negative for nausea and vomiting.   Neurological: Positive for numbness. Negative for paresthesias.   Psychiatric/Behavioral: Negative for altered mental status.               Objective:     Vitals:    07/01/19 1447   BP: (!) 142/75   Pulse: 94   Weight: 76.2 kg (168 lb)   Height: 5' 9.6" (1.768 m)           Physical Exam   Constitutional: He appears well-developed and well-nourished.   HENT:   Head: Normocephalic.   Cardiovascular: Intact distal pulses.    Pulses:       Dorsalis pedis pulses are 2+ on the right side, and 2+ on the left side.        Posterior tibial pulses are 1+ on the right side, and 1+ on the left side.   CRT < 3 sec to tips of toes. Mild 1+ edema noted to b/l LE. Mild spider veins and vericosities noted to b/l LEs.      Musculoskeletal:   Equinus noted b/l ankles with < 10 deg DF noted. MMT 5/5 in DF/PF/Inv/Ev resistance with no reproduction of pain in any direction. Passive range of motion of ankle and pedal joints is painless. Hammertoes noted to digits 2-4 b/l, semi reducible. HAV deformity noted b/l with non trackbound joint, hypermobile 1st ray b/l.      Neurological: He has normal strength. No sensory deficit.   Light touch, proprioception, and sharp/dull sensation are all intact bilaterally. Protective threshold with the [place]-Wienstein monofilament is intact bilaterally. Vibratory sensation diminished distal foot b/l.      Skin: Skin is warm, dry and intact.   No open lesions, lacerations or wounds noted. Nails are elongated by 4-6 mm, thickened by 3-4mm with yellow brown discoloration, subungual debris to toes of R 1-5 and L 1-5. Interdigital " spaces clean, dry and intact b/l. No erythema noted to b/l foot.   HKL's noted to b/L HIPJ              Assessment / Plan :         I counseled the patient on his conditions, their implications and medical management.    Corn or callus    Diabetic polyneuropathy associated with type 2 diabetes mellitus    Onychomycosis due to dermatophyte      Diabetic foot exam performed on pt. Pt advised on daily monitoring and moisturizing of the feet and keeping the webspaces clean and dry Patient advised to contact the clinic if any new pedal problems should arise.   After cleansing with an alcohol prep pad, the about mentioned hyperkeratotic lesions were sharply debrided X 2 utilizing a #15 blade to a smooth base without incident. Pt tolerated the procedure well and reported comfort to the debarment sites. Pt will continue to use padding and moisture the callused areas.   With the patient's permission, nails were aggressively reduced and debrided X 10 to their soft tissue attachment mechanically and with an electric  removing all offending nail and debris. Pt relates relief following the procedure. he will continue to monitor the areas daily, inspect his feet, wear protective shoe gear when ambulating, moisturized daily to maintain skin integrity and follow up in the office in 3 months.

## 2019-08-15 DIAGNOSIS — E11.59 HYPERTENSION ASSOCIATED WITH DIABETES: ICD-10-CM

## 2019-08-15 DIAGNOSIS — I15.2 HYPERTENSION ASSOCIATED WITH DIABETES: ICD-10-CM

## 2019-08-16 RX ORDER — LISINOPRIL 10 MG/1
TABLET ORAL
Qty: 30 TABLET | Refills: 0 | OUTPATIENT
Start: 2019-08-16

## 2019-08-19 ENCOUNTER — TELEPHONE (OUTPATIENT)
Dept: INTERNAL MEDICINE | Facility: CLINIC | Age: 84
End: 2019-08-19

## 2019-08-19 DIAGNOSIS — I15.2 HYPERTENSION ASSOCIATED WITH DIABETES: ICD-10-CM

## 2019-08-19 DIAGNOSIS — E11.59 HYPERTENSION ASSOCIATED WITH DIABETES: ICD-10-CM

## 2019-08-19 RX ORDER — LISINOPRIL 10 MG/1
10 TABLET ORAL DAILY
Qty: 90 TABLET | Refills: 3 | Status: SHIPPED | OUTPATIENT
Start: 2019-08-19 | End: 2019-09-06 | Stop reason: SDUPTHER

## 2019-08-19 NOTE — TELEPHONE ENCOUNTER
----- Message from Vivien Luciano sent at 8/19/2019  7:45 AM CDT -----  Contact: daughter/venecia/[phone]  Pt daughter called in regards to wanting to know why the pt meds was refused.     lisinopril 10 MG tablet 30 tablet 3 12/10/2018  No  Take 1 tablet (10 mg total) by mouth once daily    They would need the Rx asp. She would like a call once it is done.    [facility] [phone]  Please advise

## 2019-08-30 RX ORDER — CLOTRIMAZOLE AND BETAMETHASONE DIPROPIONATE 10; .64 MG/G; MG/G
CREAM TOPICAL 2 TIMES DAILY
Qty: 45 G | Refills: 0 | Status: SHIPPED | OUTPATIENT
Start: 2019-08-30 | End: 2019-09-06 | Stop reason: SDUPTHER

## 2019-08-30 RX ORDER — CLOTRIMAZOLE AND BETAMETHASONE DIPROPIONATE 10; .64 MG/G; MG/G
CREAM TOPICAL
Qty: 45 G | Refills: 0 | Status: SHIPPED | OUTPATIENT
Start: 2019-08-30 | End: 2019-08-30 | Stop reason: SDUPTHER

## 2019-08-30 NOTE — TELEPHONE ENCOUNTER
----- Message from Calli Grover sent at 8/30/2019  1:29 PM CDT -----  Contact: [phone]  Type: Rx    Name of medication(s): clotrimazole-betamethasone 1-0.05% (LOTRISONE) cream    Is this a refill? New rx?  refill    Who prescribed medication? Ashe    Pharmacy Name, Phone, & Location: [facility] [address] [place] [address] [address] AT [facility] [address]     Comments:  Please advise, thank you

## 2019-08-30 NOTE — TELEPHONE ENCOUNTER
----- Message from Kathy Lo sent at 8/30/2019  9:57 AM CDT -----  Contact: pt daughter Anne [phone]  Patient is calling for an RX refill or new RX.  Is this a refill or new RX:  New   RX name and strength: clotrimazole-betamethasone 1-0.05% (LOTRISONE) cream  Directions (copy/paste from chart):  Apply topically 2 (two) times daily.   Is this a 30 day or 90 day RX:  90  Local pharmacy or mail order pharmacy:  Local   Pharmacy name and phone # (copy/paste from chart):   [id number] - [place], [place] - [facility] [address] AT [facility] [address] [phone] (Phone)  [phone] (Fax)  Comments:

## 2019-08-31 ENCOUNTER — TELEPHONE (OUTPATIENT)
Dept: INTERNAL MEDICINE | Facility: CLINIC | Age: 84
End: 2019-08-31

## 2019-08-31 NOTE — TELEPHONE ENCOUNTER
----- Message from Hero Durham sent at 8/30/2019  7:27 PM CDT -----  Contact: pt daughter  Pt called to speak with nurse to [facility] appt she stated pt legs is swollen and feet.            Pt callback number [phone]

## 2019-09-03 ENCOUNTER — TELEPHONE (OUTPATIENT)
Dept: INTERNAL MEDICINE | Facility: CLINIC | Age: 84
End: 2019-09-03

## 2019-09-03 ENCOUNTER — PATIENT OUTREACH (OUTPATIENT)
Dept: ADMINISTRATIVE | Facility: HOSPITAL | Age: 84
End: 2019-09-03

## 2019-09-03 NOTE — TELEPHONE ENCOUNTER
----- Message from Lauryn Sandoval sent at 9/3/2019  1:58 PM CDT -----  Contact: [phone]  Patient is returning a phone call.  Who left a message for the patient: Tiki   Does patient know what this is regarding:    Comments: please advise, thanks

## 2019-09-03 NOTE — TELEPHONE ENCOUNTER
----- Message from Vivien Richards sent at 9/3/2019  9:35 AM CDT -----  Contact: Anne/ daughter [phone]  Patient's daughter called to request an appt for this Friday 9/06 at 11 am. I advised her of the next available appt and she said that was not acceptable and would like to speak with  or his nurse.

## 2019-09-04 ENCOUNTER — TELEPHONE (OUTPATIENT)
Dept: INTERNAL MEDICINE | Facility: CLINIC | Age: 84
End: 2019-09-04

## 2019-09-04 NOTE — TELEPHONE ENCOUNTER
----- Message from Montana Meyer sent at 9/4/2019 11:51 AM CDT -----  Contact: Self [phone]  Patient is returning a phone call.  Who left a message for the patient: conchita  Does patient know what this is regarding:  appointment  Comments:

## 2019-09-05 ENCOUNTER — TELEPHONE (OUTPATIENT)
Dept: INTERNAL MEDICINE | Facility: CLINIC | Age: 84
End: 2019-09-05

## 2019-09-05 NOTE — TELEPHONE ENCOUNTER
----- Message from Zarina Rivera sent at 9/4/2019  5:53 PM CDT -----  Contact: patient  Patient called to speak with a nurse. Gave no further details.    He would like a callback at [phone]    Thanks  KB

## 2019-09-05 NOTE — TELEPHONE ENCOUNTER
----- Message from Eleanor Garcia sent at 9/5/2019  9:36 AM CDT -----  Contact: Anne/daughter/ [phone]  Patient is returning a phone call.  Who left a message for the patient: Tiki Curran MA    Does patient know what this is regarding:  Daughter would like her father  to be seen on Friday Sept 6, 2019.  Comments: daughter says she has been calling several times and missing Tiki calls.

## 2019-09-05 NOTE — TELEPHONE ENCOUNTER
Called Anne and she stated she wants her father seen by only Dr. Morgan tomorrow 9/6/19.  There is a daily change tomorrow, unsure if you would like the pt to take this slot.  Its due to feet and legs swelling

## 2019-09-06 ENCOUNTER — OFFICE VISIT (OUTPATIENT)
Dept: INTERNAL MEDICINE | Facility: CLINIC | Age: 84
End: 2019-09-06
Payer: MEDICARE

## 2019-09-06 VITALS
DIASTOLIC BLOOD PRESSURE: 72 MMHG | WEIGHT: 174.63 LBS | OXYGEN SATURATION: 98 % | BODY MASS INDEX: 25.86 KG/M2 | HEIGHT: 69 IN | HEART RATE: 97 BPM | SYSTOLIC BLOOD PRESSURE: 158 MMHG

## 2019-09-06 DIAGNOSIS — E11.59 HYPERTENSION ASSOCIATED WITH DIABETES: ICD-10-CM

## 2019-09-06 DIAGNOSIS — I50.9 ACUTE CONGESTIVE HEART FAILURE, UNSPECIFIED HEART FAILURE TYPE: Primary | ICD-10-CM

## 2019-09-06 DIAGNOSIS — R21 RASH: ICD-10-CM

## 2019-09-06 DIAGNOSIS — I15.2 HYPERTENSION ASSOCIATED WITH DIABETES: ICD-10-CM

## 2019-09-06 DIAGNOSIS — I50.30 HEART FAILURE WITH PRESERVED EJECTION FRACTION: ICD-10-CM

## 2019-09-06 DIAGNOSIS — N40.0 PROSTATISM: ICD-10-CM

## 2019-09-06 LAB
BILIRUB UR QL STRIP: NEGATIVE
CLARITY UR REFRACT.AUTO: CLEAR
COLOR UR AUTO: YELLOW
GLUCOSE UR QL STRIP: NEGATIVE
HGB UR QL STRIP: NEGATIVE
KETONES UR QL STRIP: NEGATIVE
LEUKOCYTE ESTERASE UR QL STRIP: NEGATIVE
MICROSCOPIC COMMENT: NORMAL
NITRITE UR QL STRIP: NEGATIVE
PH UR STRIP: 7 [PH] (ref 5–8)
PROT UR QL STRIP: NEGATIVE
RBC #/AREA URNS AUTO: 2 /HPF (ref 0–4)
SP GR UR STRIP: 1.01 (ref 1–1.03)
SQUAMOUS #/AREA URNS AUTO: 0 /HPF
URN SPEC COLLECT METH UR: NORMAL
WBC #/AREA URNS AUTO: 1 /HPF (ref 0–5)

## 2019-09-06 PROCEDURE — 99999 PR PBB SHADOW E&M-EST. PATIENT-LVL III: CPT | Mod: PBBFAC,,, | Performed by: INTERNAL MEDICINE

## 2019-09-06 PROCEDURE — 99499 RISK ADDL DX/OHS AUDIT: ICD-10-PCS | Mod: S$GLB,,, | Performed by: INTERNAL MEDICINE

## 2019-09-06 PROCEDURE — 1101F PR PT FALLS ASSESS DOC 0-1 FALLS W/OUT INJ PAST YR: ICD-10-PCS | Mod: CPTII,S$GLB,, | Performed by: INTERNAL MEDICINE

## 2019-09-06 PROCEDURE — 99499 UNLISTED E&M SERVICE: CPT | Mod: S$GLB,,, | Performed by: INTERNAL MEDICINE

## 2019-09-06 PROCEDURE — 99214 PR OFFICE/OUTPT VISIT, EST, LEVL IV, 30-39 MIN: ICD-10-PCS | Mod: S$GLB,,, | Performed by: INTERNAL MEDICINE

## 2019-09-06 PROCEDURE — 1101F PT FALLS ASSESS-DOCD LE1/YR: CPT | Mod: CPTII,S$GLB,, | Performed by: INTERNAL MEDICINE

## 2019-09-06 PROCEDURE — 99214 OFFICE O/P EST MOD 30 MIN: CPT | Mod: S$GLB,,, | Performed by: INTERNAL MEDICINE

## 2019-09-06 PROCEDURE — 81001 URINALYSIS AUTO W/SCOPE: CPT

## 2019-09-06 PROCEDURE — 99999 PR PBB SHADOW E&M-EST. PATIENT-LVL III: ICD-10-PCS | Mod: PBBFAC,,, | Performed by: INTERNAL MEDICINE

## 2019-09-06 RX ORDER — AMLODIPINE BESYLATE 5 MG/1
5 TABLET ORAL DAILY
Qty: 90 TABLET | Refills: 3 | Status: ON HOLD | OUTPATIENT
Start: 2019-09-06 | End: 2020-07-26 | Stop reason: HOSPADM

## 2019-09-06 RX ORDER — FUROSEMIDE 20 MG/1
20 TABLET ORAL DAILY PRN
Qty: 90 TABLET | Refills: 3 | Status: SHIPPED | OUTPATIENT
Start: 2019-09-06 | End: 2020-04-20

## 2019-09-06 RX ORDER — ALFUZOSIN HYDROCHLORIDE 10 MG/1
10 TABLET, EXTENDED RELEASE ORAL
Qty: 90 TABLET | Refills: 3 | Status: SHIPPED | OUTPATIENT
Start: 2019-09-06 | End: 2019-10-01

## 2019-09-06 RX ORDER — LISINOPRIL 10 MG/1
10 TABLET ORAL DAILY
Qty: 90 TABLET | Refills: 3 | Status: ON HOLD | OUTPATIENT
Start: 2019-09-06 | End: 2020-07-10 | Stop reason: SDUPTHER

## 2019-09-06 RX ORDER — CLOTRIMAZOLE AND BETAMETHASONE DIPROPIONATE 10; .64 MG/G; MG/G
CREAM TOPICAL 2 TIMES DAILY
Qty: 45 G | Refills: 3 | Status: SHIPPED | OUTPATIENT
Start: 2019-09-06 | End: 2020-06-25

## 2019-09-06 NOTE — PROGRESS NOTES
Subjective:       Patient ID: Zbigniew Forman is a 89 y.o. male.    Chief Complaint: Foot Swelling (both) and Leg Swelling (both)    HPI - urgent visit for Mr. Forman.  Family is worried about worsening leg swelling.  Has gained 6 lbs at home.  Family did not understand the instructions to increase lasix for weight gain.  He's not dyspneic, though.  Doesn't walk much.  Has nocturia at least 4-5 times every night, which is troubling his sleep.  He needs some refills.  Not a smoker    PMH:  CHF, diastolic dysfunction  HTN, at goal  DM2, dietary management  S/p SDH evacuation  CKD3  Debility  Prostatism   Dysequilibrium, wheelchair-bound     Meds:  Reviewed and reconciled in EPIC with patient during visit today     Review of Systems   Constitutional: Positive for unexpected weight change.   HENT: Negative for congestion.    Respiratory: Negative for shortness of breath.    Cardiovascular: Positive for leg swelling.   Gastrointestinal: Negative for abdominal pain.   Genitourinary: Positive for frequency.   Musculoskeletal: Positive for gait problem.   Skin: Negative for rash.   Neurological: Negative for headaches.   Psychiatric/Behavioral: Positive for sleep disturbance.       Objective:      Physical Exam   Constitutional: He is oriented to person, place, and time. He appears well-developed and well-nourished. No distress.   HENT:   Head: Normocephalic and atraumatic.   Cardiovascular: Normal rate, regular rhythm and normal heart sounds. Exam reveals no gallop and no friction rub.   No murmur heard.  Pulmonary/Chest: No respiratory distress. He has wheezes. He has rales. He exhibits no tenderness.   Musculoskeletal: He exhibits edema (4+ to knees bilaterally).   Neurological: He is alert and oriented to person, place, and time.   Skin: Skin is warm. He is not diaphoretic. No erythema.   Psychiatric: He has a normal mood and affect. Thought content normal.   Nursing note and vitals reviewed.      Assessment:       1.  Acute congestive heart failure, unspecified heart failure type    2. Heart failure with preserved ejection fraction    3. Hypertension associated with diabetes    4. Rash    5. Prostatism        Plan:       Zbigniew was seen today for foot swelling and leg swelling.    Diagnoses and all orders for this visit:    Acute congestive heart failure, unspecified heart failure type - Unstable.  Considered admission, but given his age, we decided not to do that.  Increase lasix to 40 mg daily until he loses 6 lbs.  Then use lasix as needed for weight gain of more than 2-3 lbs.  They seemed to understand this time.  Discussed that he should go to ER if not losing weight within a couple of days    Heart failure with preserved ejection fraction  -     furosemide (LASIX) 20 MG tablet; Take 1 tablet (20 mg total) by mouth daily as needed (Leg swelling, weight gain >2-3 lbs in one day or >5lbs in 1 week.).  -     Comprehensive metabolic panel; Future  -     Brain natriuretic peptide; Future    Hypertension associated with diabetes - elevated today, but in failure.  Refilling; recheck next time  -     amLODIPine (NORVASC) 5 MG tablet; Take 1 tablet (5 mg total) by mouth once daily.  -     lisinopril 10 MG tablet; Take 1 tablet (10 mg total) by mouth once daily.    Rash - providing refills  -     clotrimazole-betamethasone 1-0.05% (LOTRISONE) cream; Apply topically 2 (two) times daily.    Prostatism - look for infection today.  Add in uroxatral to see how this works for him  -     Urinalysis  -     alfuzosin (UROXATRAL) 10 mg Tb24; Take 1 tablet (10 mg total) by mouth daily with breakfast.    rtc prn, or as scheduled    JANELL Morgan MD MPH  Staff Internist

## 2019-09-17 ENCOUNTER — TELEPHONE (OUTPATIENT)
Dept: OPHTHALMOLOGY | Facility: CLINIC | Age: 84
End: 2019-09-17

## 2019-09-23 ENCOUNTER — PATIENT OUTREACH (OUTPATIENT)
Dept: ADMINISTRATIVE | Facility: OTHER | Age: 84
End: 2019-09-23

## 2019-09-25 ENCOUNTER — OFFICE VISIT (OUTPATIENT)
Dept: PODIATRY | Facility: CLINIC | Age: 84
End: 2019-09-25
Payer: MEDICARE

## 2019-09-25 VITALS
DIASTOLIC BLOOD PRESSURE: 54 MMHG | BODY MASS INDEX: 24.36 KG/M2 | HEART RATE: 87 BPM | SYSTOLIC BLOOD PRESSURE: 110 MMHG | HEIGHT: 71 IN | WEIGHT: 174 LBS

## 2019-09-25 DIAGNOSIS — L84 CORN OR CALLUS: Primary | ICD-10-CM

## 2019-09-25 DIAGNOSIS — E11.42 DIABETIC POLYNEUROPATHY ASSOCIATED WITH TYPE 2 DIABETES MELLITUS: ICD-10-CM

## 2019-09-25 DIAGNOSIS — B35.1 ONYCHOMYCOSIS DUE TO DERMATOPHYTE: ICD-10-CM

## 2019-09-25 PROCEDURE — 11056 PARNG/CUTG B9 HYPRKR LES 2-4: CPT | Mod: Q9,S$GLB,, | Performed by: PODIATRIST

## 2019-09-25 PROCEDURE — 99499 RISK ADDL DX/OHS AUDIT: ICD-10-PCS | Mod: S$GLB,,, | Performed by: PODIATRIST

## 2019-09-25 PROCEDURE — 99999 PR PBB SHADOW E&M-EST. PATIENT-LVL III: ICD-10-PCS | Mod: PBBFAC,,, | Performed by: PODIATRIST

## 2019-09-25 PROCEDURE — 11721 DEBRIDE NAIL 6 OR MORE: CPT | Mod: Q9,59,S$GLB, | Performed by: PODIATRIST

## 2019-09-25 PROCEDURE — 99499 UNLISTED E&M SERVICE: CPT | Mod: S$GLB,,, | Performed by: PODIATRIST

## 2019-09-25 PROCEDURE — 11056 PR TRIM BENIGN HYPERKERATOTIC SKIN LESION,2-4: ICD-10-PCS | Mod: Q9,S$GLB,, | Performed by: PODIATRIST

## 2019-09-25 PROCEDURE — 99999 PR PBB SHADOW E&M-EST. PATIENT-LVL III: CPT | Mod: PBBFAC,,, | Performed by: PODIATRIST

## 2019-09-25 PROCEDURE — 11721 PR DEBRIDEMENT OF NAILS, 6 OR MORE: ICD-10-PCS | Mod: Q9,59,S$GLB, | Performed by: PODIATRIST

## 2019-09-30 ENCOUNTER — TELEPHONE (OUTPATIENT)
Dept: UROLOGY | Facility: CLINIC | Age: 84
End: 2019-09-30

## 2019-09-30 ENCOUNTER — HOSPITAL ENCOUNTER (EMERGENCY)
Facility: HOSPITAL | Age: 84
Discharge: HOME | End: 2019-10-01
Attending: EMERGENCY MEDICINE
Payer: MEDICARE

## 2019-09-30 DIAGNOSIS — E87.5 HYPERKALEMIA: ICD-10-CM

## 2019-09-30 DIAGNOSIS — N30.01 ACUTE CYSTITIS WITH HEMATURIA: ICD-10-CM

## 2019-09-30 DIAGNOSIS — R60.0 BILATERAL LOWER EXTREMITY EDEMA: Primary | ICD-10-CM

## 2019-09-30 LAB
ALBUMIN SERPL BCP-MCNC: 3.6 G/DL (ref 3.5–5.2)
ALP SERPL-CCNC: 66 U/L (ref 55–135)
ALT SERPL W/O P-5'-P-CCNC: 9 U/L (ref 10–44)
ANION GAP SERPL CALC-SCNC: 11 MMOL/L (ref 8–16)
AST SERPL-CCNC: 13 U/L (ref 10–40)
BASOPHILS # BLD AUTO: 0.04 K/UL (ref 0–0.2)
BASOPHILS NFR BLD: 0.4 % (ref 0–1.9)
BILIRUB SERPL-MCNC: 0.3 MG/DL (ref 0.1–1)
BNP SERPL-MCNC: 330 PG/ML (ref 0–99)
BUN SERPL-MCNC: 52 MG/DL (ref 10–30)
CALCIUM SERPL-MCNC: 9 MG/DL (ref 8.7–10.5)
CHLORIDE SERPL-SCNC: 102 MMOL/L (ref 95–110)
CO2 SERPL-SCNC: 26 MMOL/L (ref 23–29)
CREAT SERPL-MCNC: 1.7 MG/DL (ref 0.5–1.4)
DIFFERENTIAL METHOD: ABNORMAL
EOSINOPHIL # BLD AUTO: 0.1 K/UL (ref 0–0.5)
EOSINOPHIL NFR BLD: 1.2 % (ref 0–8)
ERYTHROCYTE [DISTWIDTH] IN BLOOD BY AUTOMATED COUNT: 14.2 % (ref 11.5–14.5)
EST. GFR  (AFRICAN AMERICAN): 37.4 ML/MIN/1.73 M^2
EST. GFR  (NON AFRICAN AMERICAN): 32.4 ML/MIN/1.73 M^2
GLUCOSE SERPL-MCNC: 205 MG/DL (ref 70–110)
HCT VFR BLD AUTO: 41.2 % (ref 40–54)
HGB BLD-MCNC: 12.7 G/DL (ref 14–18)
IMM GRANULOCYTES # BLD AUTO: 0.04 K/UL (ref 0–0.04)
IMM GRANULOCYTES NFR BLD AUTO: 0.4 % (ref 0–0.5)
LYMPHOCYTES # BLD AUTO: 3.9 K/UL (ref 1–4.8)
LYMPHOCYTES NFR BLD: 39.9 % (ref 18–48)
MCH RBC QN AUTO: 27.7 PG (ref 27–31)
MCHC RBC AUTO-ENTMCNC: 30.8 G/DL (ref 32–36)
MCV RBC AUTO: 90 FL (ref 82–98)
MONOCYTES # BLD AUTO: 0.5 K/UL (ref 0.3–1)
MONOCYTES NFR BLD: 5 % (ref 4–15)
NEUTROPHILS # BLD AUTO: 5.1 K/UL (ref 1.8–7.7)
NEUTROPHILS NFR BLD: 53.1 % (ref 38–73)
NRBC BLD-RTO: 0 /100 WBC
PLATELET # BLD AUTO: 299 K/UL (ref 150–350)
PMV BLD AUTO: 10.2 FL (ref 9.2–12.9)
POTASSIUM SERPL-SCNC: 5.4 MMOL/L (ref 3.5–5.1)
PROT SERPL-MCNC: 7.3 G/DL (ref 6–8.4)
RBC # BLD AUTO: 4.59 M/UL (ref 4.6–6.2)
SODIUM SERPL-SCNC: 139 MMOL/L (ref 136–145)
WBC # BLD AUTO: 9.67 K/UL (ref 3.9–12.7)

## 2019-09-30 PROCEDURE — 83880 ASSAY OF NATRIURETIC PEPTIDE: CPT

## 2019-09-30 PROCEDURE — 99285 EMERGENCY DEPT VISIT HI MDM: CPT | Mod: 25

## 2019-09-30 PROCEDURE — 99285 EMERGENCY DEPT VISIT HI MDM: CPT | Mod: ,,, | Performed by: EMERGENCY MEDICINE

## 2019-09-30 PROCEDURE — 80053 COMPREHEN METABOLIC PANEL: CPT

## 2019-09-30 PROCEDURE — 93010 ELECTROCARDIOGRAM REPORT: CPT | Mod: ,,, | Performed by: INTERNAL MEDICINE

## 2019-09-30 PROCEDURE — 81001 URINALYSIS AUTO W/SCOPE: CPT

## 2019-09-30 PROCEDURE — 87086 URINE CULTURE/COLONY COUNT: CPT

## 2019-09-30 PROCEDURE — 93010 EKG 12-LEAD: ICD-10-PCS | Mod: ,,, | Performed by: INTERNAL MEDICINE

## 2019-09-30 PROCEDURE — 99285 PR EMERGENCY DEPT VISIT,LEVEL V: ICD-10-PCS | Mod: ,,, | Performed by: EMERGENCY MEDICINE

## 2019-09-30 PROCEDURE — 85025 COMPLETE CBC W/AUTO DIFF WBC: CPT

## 2019-09-30 PROCEDURE — 93005 ELECTROCARDIOGRAM TRACING: CPT

## 2019-09-30 NOTE — TELEPHONE ENCOUNTER
----- Message from Grazyna Roberson LPN sent at 9/30/2019  9:37 AM CDT -----  Contact: Anne Dasia (Daughter): [phone]  Pt is new to urology , can you please offer an MORRO. I have nothing sooner, thanks  ----- Message -----  From: Kayla Cruz  Sent: 9/30/2019   9:11 AM CDT  To: [facility] Staff    Patient Requesting Sooner Appointment.     Reason for sooner appt.: Urinary Retention with prostate problems, cloudy urine     When is the first available appointment? 10/29    Communication Preference: Anne Dasia (Daughter): [phone]

## 2019-10-01 ENCOUNTER — PATIENT OUTREACH (OUTPATIENT)
Dept: ADMINISTRATIVE | Facility: OTHER | Age: 84
End: 2019-10-01

## 2019-10-01 VITALS
HEIGHT: 68 IN | HEART RATE: 94 BPM | OXYGEN SATURATION: 95 % | SYSTOLIC BLOOD PRESSURE: 152 MMHG | WEIGHT: 160 LBS | TEMPERATURE: 98 F | DIASTOLIC BLOOD PRESSURE: 70 MMHG | RESPIRATION RATE: 18 BRPM | BODY MASS INDEX: 24.25 KG/M2

## 2019-10-01 LAB
BACTERIA #/AREA URNS AUTO: ABNORMAL /HPF
BILIRUB UR QL STRIP: NEGATIVE
CLARITY UR REFRACT.AUTO: ABNORMAL
COLOR UR AUTO: YELLOW
GLUCOSE UR QL STRIP: NEGATIVE
HGB UR QL STRIP: ABNORMAL
HYALINE CASTS UR QL AUTO: 1 /LPF
KETONES UR QL STRIP: NEGATIVE
LEUKOCYTE ESTERASE UR QL STRIP: ABNORMAL
MICROSCOPIC COMMENT: ABNORMAL
NITRITE UR QL STRIP: NEGATIVE
PH UR STRIP: 6 [PH] (ref 5–8)
PROT UR QL STRIP: NEGATIVE
RBC #/AREA URNS AUTO: >100 /HPF (ref 0–4)
SP GR UR STRIP: 1.01 (ref 1–1.03)
URN SPEC COLLECT METH UR: ABNORMAL
WBC #/AREA URNS AUTO: >100 /HPF (ref 0–5)

## 2019-10-01 PROCEDURE — 25000003 PHARM REV CODE 250: Performed by: STUDENT IN AN ORGANIZED HEALTH CARE EDUCATION/TRAINING PROGRAM

## 2019-10-01 RX ORDER — TAMSULOSIN HYDROCHLORIDE 0.4 MG/1
0.4 CAPSULE ORAL
Status: DISCONTINUED | OUTPATIENT
Start: 2019-10-01 | End: 2019-10-01

## 2019-10-01 RX ORDER — TAMSULOSIN HYDROCHLORIDE 0.4 MG/1
0.4 CAPSULE ORAL DAILY
Qty: 30 CAPSULE | Refills: 0 | Status: SHIPPED | OUTPATIENT
Start: 2019-10-01 | End: 2019-10-28 | Stop reason: SDUPTHER

## 2019-10-01 RX ORDER — TAMSULOSIN HYDROCHLORIDE 0.4 MG/1
0.4 CAPSULE ORAL
Status: COMPLETED | OUTPATIENT
Start: 2019-10-01 | End: 2019-10-01

## 2019-10-01 RX ORDER — CEPHALEXIN 250 MG/1
500 CAPSULE ORAL EVERY 6 HOURS
Qty: 80 CAPSULE | Refills: 0 | Status: SHIPPED | OUTPATIENT
Start: 2019-10-01 | End: 2019-10-11

## 2019-10-01 RX ADMIN — TAMSULOSIN HYDROCHLORIDE 0.4 MG: 0.4 CAPSULE ORAL at 12:10

## 2019-10-01 NOTE — ED PROVIDER NOTES
Encounter Date: 9/30/2019       History     Chief Complaint   Patient presents with    Leg Swelling     Hx of CHF. Also endorses pain with urination     Mr. Forman is a 89yo M with HFpEF (11/2018 EF 60%), HTN (well controlled), T2DM (diet controlled), CKD3, hx SDH evacuation (2015), and urinary incontinence presents to the [facility] ED on 9/30/19 for LE edema and urinary symptoms. Pt is accompanied by his 3 children. CHF newly diagnosed in 2018. He has had progressively worsening b/l LE edema since diagnosis. He takes Lasix 20mg daily, however, earlier this month, he doubled the dose to 40mg daily due to 6lb weight gain (his PCP's recommendation). He was on the higher dose for approx 1 wk, before resuming his usual dose, which reduced his weight back to his goal weight, however no improvement in LE edema on higher Lasix dose. LE swelling b/ to knees, feels tight, but pt denies pain. He walks with a walker minimally, which is unchanged by degree of swelling, He reports CHAHAL which is his baseline. No SOB at rest. No orthopnea. Denies CP, palpitations, cough, F/C, n/v, abd pain, HA, or recent illness.     He also c/o dysuria, incomplete emptying, and urinary frequency. These symptoms have been present for several months. He was told by his PCP that his prostate is enlarged, and he has an outpatient appt with Urology scheduled for this week. No symptoms suggesting systemic infection.         Review of patient's allergies indicates:   Allergen Reactions    Metformin Diarrhea    Sulfa (sulfonamide antibiotics) Other (See Comments)     unknown reactions    Tradjenta [linagliptin] Diarrhea            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    Chronic kidney disease, stage III (moderate) 9/29/2015    Diabetes mellitus type II, uncontrolled     Glaucoma suspect, high risk 11/19/2013    Hypertension associated with diabetes     S/P evacuation of subdural hematoma 3/17/2015     Senile cataract, unspecified 11/19/2013     Past Surgical History:   Procedure Laterality Date    BRAIN SURGERY  8/19/14    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    HERNIA REPAIR       Family History   Problem Relation Age of Onset    Other Father         peritonitis    Cerebral aneurysm Mother     Kidney failure Sister 96    Amblyopia Neg Hx     Blindness Neg Hx     Cancer Neg Hx     Cataracts Neg Hx     Diabetes Neg Hx     Glaucoma Neg Hx     Hypertension Neg Hx     Macular degeneration Neg Hx     Retinal detachment Neg Hx     Strabismus Neg Hx     Stroke Neg Hx     Thyroid disease Neg Hx      Social History     Tobacco Use    Smoking status: Never Smoker    Smokeless tobacco: Never Used   Substance Use Topics    Alcohol use: No    Drug use: Not on file     Review of Systems   Constitutional: Negative for chills, diaphoresis and fever.   HENT: Positive for hearing loss. Negative for congestion and sore throat.    Eyes: Negative for pain.   Respiratory: Negative for cough.    Gastrointestinal: Negative for abdominal distention, abdominal pain, nausea and vomiting.   Endocrine: Positive for cold intolerance.   Genitourinary: Positive for difficulty urinating, dysuria and frequency. Negative for flank pain.   Musculoskeletal: Positive for gait problem.   Skin: Negative for wound.   Neurological: Negative for dizziness, speech difficulty, light-headedness and headaches.   Psychiatric/Behavioral: Negative for confusion.       Physical Exam     Initial Vitals [09/30/19 1851]   BP Pulse Resp Temp SpO2   136/66 96 15 98.2 °F (36.8 °C) 97 %      MAP       --         Physical Exam    Nursing note and vitals reviewed.  Constitutional: He appears well-developed and well-nourished. He is not diaphoretic. No distress.   HENT:   Head: Normocephalic and atraumatic.   Eyes: No scleral icterus.   Neck: No JVD present.   Cardiovascular: Normal rate, regular rhythm, normal heart sounds and intact  distal pulses.   Pedal pulses verified with doppler due to edema   Pulmonary/Chest: No respiratory distress. He has wheezes (faint, occasional end expiratory wheezes RLL, otherwise lungs are CTA b/l). He has no rales.   Abdominal: Soft. Bowel sounds are normal. He exhibits no distension. There is no tenderness.   Musculoskeletal: He exhibits edema (4+ edema LE b/l to knees, no erythema, or skin changes, nontender ).   Neurological: He is alert and oriented to person, place, and time.   Skin: Skin is warm and dry.   Psychiatric: He has a normal mood and affect.         ED Course   Procedures  Labs Reviewed   CBC W/ AUTO DIFFERENTIAL - Abnormal; Notable for the following components:       Result Value    RBC 4.59 (*)     Hemoglobin 12.7 (*)     Mean Corpuscular Hemoglobin Conc 30.8 (*)     All other components within normal limits   URINALYSIS, REFLEX TO URINE CULTURE   COMPREHENSIVE METABOLIC PANEL   B-TYPE NATRIURETIC PEPTIDE          Imaging Results          X-Ray Chest AP Portable (In process)  Result time 09/30/19 21:12:16                 Medical Decision Making:   History:   I obtained history from: someone other than patient.       <> Summary of History: Pt's 3 adult children in the room with the pt provided most of the hx  Old Medical Records: I decided to obtain old medical records.  Initial Assessment:   89yo M with HFpEF, HTN, T2DM, s/p SDH evacuation (2015) presents with progressively worsening LE edema despite temporarily increasing Lasix dose. Also c/o chronic dysuria, urinary frequency, and incomplete emptying. PE with impressive b/l LE edema to knees, however nontender, without skin changes. His exam is not c/w intravascular volume overload. On workup, WBC and H/H wnl. K mildly elevated 5.4, however pt is asymptomatic. BUN/Cr 52/1.7 which is elevated compared to his baseline 29/1.3. . CXR neg for pulmonary edema or acute processes. UA pending. Will also order an EKG due to mild hyperkalemia. I  do not suspect LE edema is due to DVT or acute etiology, rather chronic CHF. He also does not clinically appear to be in a decompensated CHF exacerbation. Np skin, motor, or sensory changes - less likely peripheral vascular dz. No PE or lab findings to suggest systemic infection. Dispo pending UA.  Differential Diagnosis:   LE edema: Acute DVT, CHF exacerbation, PVD  Urinary sx: UTI, BPH, urinary incontinence (undetermined type)  ED Management:  EKG for mild hyperkalemia - nonischemic, no acute changes  Labs, CXR, UA  UA >100 WBCs, 3+ leukocytes, symptomatic. Will treat with Keflex and send for Cx.  Will prescribe flomax at d/c. Pt has urology appt scheduled.  For LE edema, compression stockings and f/u with PCP this week.              Attending Attestation:   Physician Attestation Statement for Resident:  As the supervising MD   Physician Attestation Statement: I have personally seen and examined this patient.   I agree with the above history. -:   As the supervising MD I agree with the above PE.    As the supervising MD I agree with the above treatment, course, plan, and disposition.   -: 89 year old man presenting to emergency department with complaint of lower extremity edema.  He is not short of breath, no increased work of breathing.  His weight has not increased.  Per family..  No evidence of overt fluid overload.  Renal function slightly worse than usual.  Urinalysis consistent with infection, patient also with symptoms of enlarged prostate, plan to see Urology but currently not on Flomax.  Give antibiotics and Flomax, follow-up with primary care doctor and Urology.  I have reviewed and agree with the residents interpretation of the following: lab data.                       Clinical Impression:       ICD-10-CM ICD-9-CM   1. Bilateral lower extremity edema R60.0 782.3   2. Hyperkalemia E87.5 276.7   3. Acute cystitis with hematuria N30.01 595.0                                Augustina Lowery  MD  Resident  10/01/19 0021       Carley Ang MD  10/01/19 6812

## 2019-10-01 NOTE — PROGRESS NOTES
Subjective:     Zbigniew Forman is a 89 y.o. male male who presents to the clinic for evaluation and treatment of high risk feet. Zbigniew has a past medical history of Hypertension associated with diabetes; Diabetes mellitus type II, uncontrolled; Glaucoma suspect, high risk (11/19/2013); and Senile cataract, unspecified (11/19/2013). The patient's chief complaint is long toenails. This patient has documented high risk feet requiring routine maintenance secondary to peripheral neuropathy. Experiences occasional numbness at the digits.No trauma reported. No new issues reported.         PCP: Blas Morgan Ii, MD  Date Last Seen by PCP:   Chief Complaint   Patient presents with    Diabetes Mellitus     09/06/2019 dr ernie rushing    Diabetic Foot Exam    Follow-up                 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    Chronic kidney disease, stage III (moderate) 9/29/2015    Diabetes mellitus type II, uncontrolled     Glaucoma suspect, high risk 11/19/2013    Hypertension associated with diabetes     S/P evacuation of subdural hematoma 3/17/2015    Senile cataract, unspecified 11/19/2013             Current Outpatient Medications on File Prior to Visit   Medication Sig Dispense Refill    albuterol (VENTOLIN HFA) 90 mcg/actuation inhaler Inhale 2 puffs into the lungs every 6 (six) hours as needed for Wheezing. Rescue 1 each 6    albuterol-ipratropium (DUO-NEB) 2.5 mg-0.5 mg/3 mL nebulizer solution Take 3 mLs by nebulization every 6 (six) hours as needed for Wheezing. Rescue 1 Box 6    amLODIPine (NORVASC) 5 MG tablet Take 1 tablet (5 mg total) by mouth once daily. 90 tablet 3    artificial tears w/ lanolin (AKWA TEARS) 0.5% ophthalmic ointment Apply to affected eye(s) as needed for dryness. 3.5 g 0    clotrimazole-betamethasone 1-0.05% (LOTRISONE) cream Apply topically 2 (two) times daily. 45 g 3    furosemide (LASIX) 20 MG tablet Take 1 tablet (20 mg  total) by mouth daily as needed (Leg swelling, weight gain >2-3 lbs in one day or >5lbs in 1 week.). 90 tablet 3    latanoprost 0.005 % ophthalmic solution INSTILL 1 DROP IN BOTH EYES EVERY EVENING 2.5 mL 12    lisinopril 10 MG tablet Take 1 tablet (10 mg total) by mouth once daily. 90 tablet 3    senna-docusate 8.6-50 mg (PERICOLACE) 8.6-50 mg per tablet Take 1 tablet by mouth 2 (two) times daily as needed for Constipation.      [DISCONTINUED] alfuzosin (UROXATRAL) 10 mg Tb24 Take 1 tablet (10 mg total) by mouth daily with breakfast. 90 tablet 3     No current facility-administered medications on file prior to visit.          Review of patient's allergies indicates:   Allergen Reactions    Metformin Diarrhea    Sulfa (sulfonamide antibiotics)     Tradjenta [linagliptin]      Diarrhea           Social History     Socioeconomic History    Marital status:      Spouse name: Not on file    Number of children: Not on file    Years of education: Not on file    Highest education level: Not on file   Occupational History    Not on file   Social Needs    Financial resource strain: Not on file    Food insecurity:     Worry: Not on file     Inability: Not on file    Transportation needs:     Medical: Not on file     Non-medical: Not on file   Tobacco Use    Smoking status: Never Smoker    Smokeless tobacco: Never Used   Substance and Sexual Activity    Alcohol use: No    Drug use: Not on file    Sexual activity: Not on file   Lifestyle    Physical activity:     Days per week: Not on file     Minutes per session: Not on file    Stress: Not on file   Relationships    Social connections:     Talks on phone: Not on file     Gets together: Not on file     Attends Baptism service: Not on file     Active member of club or organization: Not on file     Attends meetings of clubs or organizations: Not on file     Relationship status: Not on file   Other Topics Concern    Not on file   Social History  "Narrative    Not on file               Review of Systems   Constitution: Positive for weight gain. Negative for chills and fever.   Cardiovascular: Negative for chest pain, claudication and leg swelling.   Respiratory: Negative for cough and shortness of breath.    Skin: Positive for nail changes and dry skin.   Musculoskeletal: Positive for stiffness.   Gastrointestinal: Negative for nausea and vomiting.   Neurological: Positive for numbness. Negative for paresthesias.   Psychiatric/Behavioral: Negative for altered mental status.               Objective:     Vitals:    09/25/19 1251   BP: (!) 110/54   Pulse: 87   Weight: 78.9 kg (174 lb)   Height: 5' 10.8" (1.798 m)           Physical Exam   Constitutional: He appears well-developed and well-nourished.   HENT:   Head: Normocephalic.   Cardiovascular: Intact distal pulses.    Pulses:       Dorsalis pedis pulses are 2+ on the right side, and 2+ on the left side.        Posterior tibial pulses are 1+ on the right side, and 1+ on the left side.   CRT < 3 sec to tips of toes. Mild 1+ edema noted to b/l LE. Mild spider veins and vericosities noted to b/l LEs.      Musculoskeletal:   Equinus noted b/l ankles with < 10 deg DF noted. MMT 5/5 in DF/PF/Inv/Ev resistance with no reproduction of pain in any direction. Passive range of motion of ankle and pedal joints is painless. Hammertoes noted to digits 2-4 b/l, semi reducible. HAV deformity noted b/l with non trackbound joint, hypermobile 1st ray b/l.      Neurological: He has normal strength. No sensory deficit.   Light touch, proprioception, and sharp/dull sensation are all intact bilaterally. Protective threshold with the Boulder-Wienstein monofilament is intact bilaterally. Vibratory sensation diminished distal foot b/l.      Skin: Skin is warm, dry and intact.   No open lesions, lacerations or wounds noted. Nails are elongated by 4-7 mm, thickened by 3-4mm with yellow brown discoloration, subungual debris to toes of R " 1-5 and L 1-5. Interdigital spaces clean, dry and intact b/l. No erythema noted to b/l foot.   HKL's noted to b/L HIPJ              Assessment / Plan :         I counseled the patient on his conditions, their implications and medical management.    Corn or callus    Diabetic polyneuropathy associated with type 2 diabetes mellitus    Onychomycosis due to dermatophyte      Diabetic foot exam performed on pt. Pt advised on daily monitoring and moisturizing of the feet and keeping the webspaces clean and dry Patient advised to contact the clinic if any new pedal problems should arise.   After cleansing with an alcohol prep pad, the about mentioned hyperkeratotic lesions were sharply debrided X 2 utilizing a #15 blade to a smooth base without incident. Pt tolerated the procedure well and reported comfort to the debarment sites. Pt will continue to use padding and moisture the callused areas.   With the patient's permission, nails were aggressively reduced and debrided X 10 to their soft tissue attachment mechanically and with an electric  removing all offending nail and debris. Pt relates relief following the procedure. he will continue to monitor the areas daily, inspect his feet, wear protective shoe gear when ambulating, moisturized daily to maintain skin integrity and follow up in the office in 3 months.

## 2019-10-01 NOTE — ED NOTES
Bed: 08  Expected date: 9/30/19  Expected time: 8:14 PM  Means of arrival:   Comments:  Jorge Alberto when rdy

## 2019-10-01 NOTE — ED NOTES
Pt c/o pain with urination and decreased urination. Pt states urine is cloudy and odorous but no discoloration. Pt also c/o leg swelling and SOB from CHF lasting over a month. Pt has +2 edema in bilateral legs and bilateral feet. Pt denies Chest pain, abdominal pain, flank pain, fever, chills, fatigue, sore throat.

## 2019-10-01 NOTE — DISCHARGE INSTRUCTIONS
Take antibiotics as prescribed for urinary tract infection.  Prescription for flomax for urinary symptoms as well.  Follow up with urology outpatient appointment.  Continue Lasix for swelling and add compression stockings, and follow-up with your PCP in a few days.

## 2019-10-02 ENCOUNTER — TELEPHONE (OUTPATIENT)
Dept: INTERNAL MEDICINE | Facility: CLINIC | Age: 84
End: 2019-10-02

## 2019-10-02 LAB
BACTERIA UR CULT: NORMAL
BACTERIA UR CULT: NORMAL

## 2019-10-02 NOTE — TELEPHONE ENCOUNTER
----- Message from Calli Grover sent at 10/2/2019 10:17 AM CDT -----  Contact: [phone]/ Anne/ Daughter  Patient's daughter is requesting if the doctor can look at the notes from the patient's visit to the ER on 9-.  Patient has been schedule to Dr. Sanchez, but not until 10-.    Patient has a consult with Dr. Frazier the urologist tomorrow 10-3-2019.    Please advise, thank you.

## 2019-10-02 NOTE — TELEPHONE ENCOUNTER
Spoke with patient's daughter Anne, she just wants you to be aware of her fathers treatment plan.    Thanks,  Ruchi.

## 2019-10-03 ENCOUNTER — OFFICE VISIT (OUTPATIENT)
Dept: UROLOGY | Facility: CLINIC | Age: 84
End: 2019-10-03
Payer: MEDICARE

## 2019-10-03 ENCOUNTER — TELEPHONE (OUTPATIENT)
Dept: INTERNAL MEDICINE | Facility: CLINIC | Age: 84
End: 2019-10-03

## 2019-10-03 VITALS
HEART RATE: 93 BPM | DIASTOLIC BLOOD PRESSURE: 63 MMHG | BODY MASS INDEX: 24.33 KG/M2 | SYSTOLIC BLOOD PRESSURE: 126 MMHG | HEIGHT: 68 IN

## 2019-10-03 DIAGNOSIS — R35.1 NOCTURIA: ICD-10-CM

## 2019-10-03 DIAGNOSIS — N30.00 ACUTE CYSTITIS WITHOUT HEMATURIA: ICD-10-CM

## 2019-10-03 DIAGNOSIS — R33.9 URINARY RETENTION: Primary | ICD-10-CM

## 2019-10-03 PROCEDURE — 1101F PR PT FALLS ASSESS DOC 0-1 FALLS W/OUT INJ PAST YR: ICD-10-PCS | Mod: CPTII,S$GLB,, | Performed by: UROLOGY

## 2019-10-03 PROCEDURE — 99999 PR PBB SHADOW E&M-EST. PATIENT-LVL III: CPT | Mod: PBBFAC,,, | Performed by: UROLOGY

## 2019-10-03 PROCEDURE — 99999 PR PBB SHADOW E&M-EST. PATIENT-LVL III: ICD-10-PCS | Mod: PBBFAC,,, | Performed by: UROLOGY

## 2019-10-03 PROCEDURE — 99204 PR OFFICE/OUTPT VISIT, NEW, LEVL IV, 45-59 MIN: ICD-10-PCS | Mod: S$GLB,,, | Performed by: UROLOGY

## 2019-10-03 PROCEDURE — 99204 OFFICE O/P NEW MOD 45 MIN: CPT | Mod: S$GLB,,, | Performed by: UROLOGY

## 2019-10-03 PROCEDURE — 1101F PT FALLS ASSESS-DOCD LE1/YR: CPT | Mod: CPTII,S$GLB,, | Performed by: UROLOGY

## 2019-10-03 NOTE — LETTER
October 3, 2019      Blas Morgan II, MD  [address]  [place] [address]           [facility] - Urology 4th Floor  [address]  [place] [address]  Phone: [phone]          Patient: Zbigniew Forman   MR Number: [id number]   YOB: 1919   Date of Visit: 10/3/2019       Dear Dr. Blas Morgan II:    Thank you for referring Zbigniew Forman to me for evaluation. Attached you will find relevant portions of my assessment and plan of care.    If you have questions, please do not hesitate to call me. I look forward to following Zbigniew Forman along with you.    Sincerely,    Sergio Frazier MD    Enclosure  CC:  No Recipients    If you would like to receive this communication electronically, please contact [email or web address] or [phone] to request more information on [employer] access.    For providers and/or their staff who would like to refer a patient to [facility], please contact us through our one-stop-shop provider referral line, [facility], at [phone].    If you feel you have received this communication in error or would no longer like to receive these types of communications, please e-mail [email or web address]

## 2019-10-03 NOTE — PROGRESS NOTES
[facility] Department of Urology      New Urinary Retention Note    10/3/2019    Referred by:  Blas Morgan II, MD    HPI: Zbigniew Forman is a very pleasant 89 y.o. male referred for evaluation of chronic urinary retention of unknown duration. He currently does not perform intermittent catheterization and voids on own.   He was seen in the emergency department earlier this week with suprapubic pain and dysuria suggestive of lower urinary tract infection.  Urine microbiology was reviewed but was indeterminate.  He was begun on empiric antibiotic therapy with cephalexin.  He reports improvement in the dysuria and pain since beginning that antibiotic.  He was also begun on tamsulosin and does report some improvement in his urine flow.   Bladder scan residual today is 240mL. Prior to this episode, he reports symptoms of irritative voiding including frequency.  However urinary frequency is reported to be isolated nocturia.  He reports no daytime frequency or urgency.  He demonstrates considerable lower extremity edema on exam today.    Relevant Medications:   No current medications that would be anticipated to impair voiding    Prior Therapies:   Alpha blockers - tamulosin (Flomax) - reports improvement over the first 3 days    He does not report prior urodynamic evaluation. He does not reports prior cystoscopic evaluation.     A review of 10+ systems was conducted with pertinent positive and negative findings documented in HPI with all other systems reviewed and negative.    Past medical, family, surgical and social history reviewed as documented in chart with pertinent positive medical, family, surgical and social history detailed in HPI.    Exam Findings:    Const: no acute distress, conversant and alert  Eyes: anicteric, extraocular muscles intact  ENMT: normocephalic, Nl oral membranes  Cardio: no cyanosis, nl cap refill  Pulm: no tachypnea; no resp distress  Abd: soft, no tenderness  Musc: no laceration, no  tenderness  Neuro: alert; oriented x 3  Skin: warm, dry; no petichiae  Psych: no anxiety; normal speech          Assessment/Plan:    Chronic Urinary Retention (new, addt'l workup):  He reports chronic urinary retention, likely contributing to his recent lower urinary tract infection.  He has seen some improvement over the last 2-3 days since beginning tamsulosin.  We discussed that it is unknown with a normal postvoid residual would be in a man of his age.  Our goal would be to ensure that he has few if any lower urinary tract infections and that his lower urinary tract symptoms are well controlled.  We will continue the tamsulosin and recheck a postvoid residual next visit.  If he remains free of infections we will continue to manage him expectantly.  If he has additional infections we will begin a more invasive evaluation including likely urodynamic evaluation cystourethroscopy.    Nocturia (new,addt'l workup):  He reports isolated nocturia, typically 3-4 times per night.  A 3 day voiding diary will help to confirm this and screen for nocturnal polyuria.  With his significant lower extremity edema, my suspicion is that his symptoms are the result of mobilization of peripheral edema resulting in nocturnal polyuria rather than bladder outlet obstruction.       Clinical tests reviewed/ordered today: urinalysis (10/03/2019) U/S for post void residual (10/03/2019)   Radiology ordered/reviewed: none   Medical tests ordered/reviewed: [facility]   Radiology independently reviewed: none   Previous records: reviewed today and showing, in summary - Patient seen in ED for lower urinary tract infection and treated as detailed in HPI

## 2019-10-03 NOTE — TELEPHONE ENCOUNTER
Spoke with patient daughter Anne. Wished them all the best from Dr klein. They are very thankful for having him as a doctor.

## 2019-10-03 NOTE — TELEPHONE ENCOUNTER
Mr melquiades's daughter venecia called back today after the urologist visit. Everything went well she needs an order  for compression stockings for her dad,    Please advice.    Ruchi.

## 2019-10-03 NOTE — TELEPHONE ENCOUNTER
----- Message from Fidelia Epstein sent at 10/3/2019 11:19 AM CDT -----  Contact: Daughter Anne - [phone]  Needs to speak with you on her father's office visit,  And insurance. Please advise

## 2019-10-07 ENCOUNTER — TELEPHONE (OUTPATIENT)
Dept: INTERNAL MEDICINE | Facility: CLINIC | Age: 84
End: 2019-10-07

## 2019-10-07 NOTE — TELEPHONE ENCOUNTER
----- Message from Lauryn Sandoval sent at 10/7/2019  9:40 AM CDT -----  Contact: [phone]  Patient is requesting an order for patients diabetic stockings.   Please advise, thanks

## 2019-10-11 ENCOUNTER — OFFICE VISIT (OUTPATIENT)
Dept: INTERNAL MEDICINE | Facility: CLINIC | Age: 84
End: 2019-10-11
Payer: MEDICARE

## 2019-10-11 ENCOUNTER — IMMUNIZATION (OUTPATIENT)
Dept: PHARMACY | Facility: CLINIC | Age: 84
End: 2019-10-11
Payer: MEDICARE

## 2019-10-11 ENCOUNTER — LAB VISIT (OUTPATIENT)
Dept: LAB | Facility: HOSPITAL | Age: 84
End: 2019-10-11
Attending: INTERNAL MEDICINE
Payer: MEDICARE

## 2019-10-11 VITALS
WEIGHT: 171.31 LBS | HEART RATE: 94 BPM | BODY MASS INDEX: 25.96 KG/M2 | SYSTOLIC BLOOD PRESSURE: 130 MMHG | OXYGEN SATURATION: 96 % | DIASTOLIC BLOOD PRESSURE: 60 MMHG | HEIGHT: 68 IN

## 2019-10-11 DIAGNOSIS — I15.2 HYPERTENSION ASSOCIATED WITH DIABETES: ICD-10-CM

## 2019-10-11 DIAGNOSIS — E11.59 HYPERTENSION ASSOCIATED WITH DIABETES: ICD-10-CM

## 2019-10-11 DIAGNOSIS — E11.9 CONTROLLED TYPE 2 DIABETES MELLITUS WITHOUT COMPLICATION, WITHOUT LONG-TERM CURRENT USE OF INSULIN: ICD-10-CM

## 2019-10-11 DIAGNOSIS — R60.9 DEPENDENT EDEMA: ICD-10-CM

## 2019-10-11 DIAGNOSIS — N39.46 MIXED STRESS AND URGE URINARY INCONTINENCE: Primary | ICD-10-CM

## 2019-10-11 DIAGNOSIS — J30.2 SEASONAL ALLERGIES: ICD-10-CM

## 2019-10-11 LAB
ALBUMIN SERPL BCP-MCNC: 3.4 G/DL (ref 3.5–5.2)
ALP SERPL-CCNC: 59 U/L (ref 55–135)
ALT SERPL W/O P-5'-P-CCNC: 24 U/L (ref 10–44)
ANION GAP SERPL CALC-SCNC: 11 MMOL/L (ref 8–16)
AST SERPL-CCNC: 11 U/L (ref 10–40)
BILIRUB SERPL-MCNC: 0.3 MG/DL (ref 0.1–1)
BUN SERPL-MCNC: 31 MG/DL (ref 10–30)
CALCIUM SERPL-MCNC: 8.6 MG/DL (ref 8.7–10.5)
CHLORIDE SERPL-SCNC: 102 MMOL/L (ref 95–110)
CO2 SERPL-SCNC: 27 MMOL/L (ref 23–29)
CREAT SERPL-MCNC: 1.4 MG/DL (ref 0.5–1.4)
EST. GFR  (AFRICAN AMERICAN): 47 ML/MIN/1.73 M^2
EST. GFR  (NON AFRICAN AMERICAN): 41 ML/MIN/1.73 M^2
ESTIMATED AVG GLUCOSE: 166 MG/DL (ref 68–131)
GLUCOSE SERPL-MCNC: 111 MG/DL (ref 70–110)
HBA1C MFR BLD HPLC: 7.4 % (ref 4–5.6)
POTASSIUM SERPL-SCNC: 4.9 MMOL/L (ref 3.5–5.1)
PROT SERPL-MCNC: 6.7 G/DL (ref 6–8.4)
SODIUM SERPL-SCNC: 140 MMOL/L (ref 136–145)

## 2019-10-11 PROCEDURE — 99214 PR OFFICE/OUTPT VISIT, EST, LEVL IV, 30-39 MIN: ICD-10-PCS | Mod: S$GLB,,, | Performed by: INTERNAL MEDICINE

## 2019-10-11 PROCEDURE — 99214 OFFICE O/P EST MOD 30 MIN: CPT | Mod: S$GLB,,, | Performed by: INTERNAL MEDICINE

## 2019-10-11 PROCEDURE — 99999 PR PBB SHADOW E&M-EST. PATIENT-LVL III: ICD-10-PCS | Mod: PBBFAC,,, | Performed by: INTERNAL MEDICINE

## 2019-10-11 PROCEDURE — 99999 PR PBB SHADOW E&M-EST. PATIENT-LVL III: CPT | Mod: PBBFAC,,, | Performed by: INTERNAL MEDICINE

## 2019-10-11 PROCEDURE — 80053 COMPREHEN METABOLIC PANEL: CPT

## 2019-10-11 PROCEDURE — 1101F PR PT FALLS ASSESS DOC 0-1 FALLS W/OUT INJ PAST YR: ICD-10-PCS | Mod: CPTII,S$GLB,, | Performed by: INTERNAL MEDICINE

## 2019-10-11 PROCEDURE — 83036 HEMOGLOBIN GLYCOSYLATED A1C: CPT

## 2019-10-11 PROCEDURE — 1101F PT FALLS ASSESS-DOCD LE1/YR: CPT | Mod: CPTII,S$GLB,, | Performed by: INTERNAL MEDICINE

## 2019-10-11 PROCEDURE — 36415 COLL VENOUS BLD VENIPUNCTURE: CPT

## 2019-10-11 NOTE — PROGRESS NOTES
Subjective:       Patient ID: Zbigniew Forman is a 89 y.o. male.    Chief Complaint: Hospital Follow Up    HPI - Mr. Forman came with his three children as usual.  He has some seasonal allergies right now.  Sleep is disturbed b/c of frequent urination.  Has severe dependent edema bilateral LE's and hasn't gotten on compression stockings.  They go down at night.  He's interested in the flu shot today and will do some blood work.  Not a smoker.    PMH:  CHF, diastolic dysfunction  HTN, at goal  DM2, dietary management  S/p SDH evacuation  CKD3  Debility  Prostatism   Dysequilibrium, wheelchair-bound     Meds:  Reviewed and reconciled in EPIC with patient during visit today     Review of Systems   Constitutional: Negative for fever.   HENT: Negative for congestion.    Respiratory: Positive for shortness of breath.    Cardiovascular: Positive for leg swelling.   Gastrointestinal: Negative for abdominal pain.   Genitourinary: Negative for difficulty urinating.   Musculoskeletal: Positive for arthralgias.   Skin: Negative for rash.   Neurological: Negative for headaches.   Psychiatric/Behavioral: Positive for sleep disturbance.       Objective:      Physical Exam   Constitutional: He is oriented to person, place, and time. He appears well-developed and well-nourished. No distress.   Well-developed man examined in wheelchair today.   HENT:   Head: Normocephalic and atraumatic.   Cardiovascular: Normal rate, regular rhythm and normal heart sounds. Exam reveals no gallop and no friction rub.   No murmur heard.  Pulmonary/Chest: No stridor. No respiratory distress. He has no wheezes. He has no rales. He exhibits no tenderness.   Musculoskeletal: He exhibits edema.   Neurological: He is alert and oriented to person, place, and time.   Skin: Skin is warm. He is not diaphoretic. No erythema.   Psychiatric: He has a normal mood and affect. Thought content normal.   Nursing note and vitals reviewed.      Assessment:       1.  Mixed stress and urge urinary incontinence    2. Dependent edema    3. Controlled type 2 diabetes mellitus without complication, without long-term current use of insulin    4. Hypertension associated with diabetes    5. Seasonal allergies        Plan:       Zbigniew was seen today for hospital follow up.    Diagnoses and all orders for this visit:    Mixed stress and urge urinary incontinence - most worrisome b/c of sleep interruption.  Seeing urology to help.  Will get compression stockings for him now    Dependent edema  -     COMPRESSION STOCKINGS    Controlled type 2 diabetes mellitus without complication, without long-term current use of insulin - has been at goal.  Due for another a1c check  -     Hemoglobin A1c; Future    Hypertension associated with diabetes - at goal, stay the course  -     Comprehensive metabolic panel; Future    Seasonal allergies - discussed addition of claritin    rtc prn, or in 3 months    JANELL Morgan MD MPH  Staff Internist

## 2019-10-28 NOTE — TELEPHONE ENCOUNTER
----- Message from Geraldine Moffett sent at 10/28/2019  3:26 PM CDT -----  Contact: dtr [phone]  Type: Rx    Name of medication(s): tamsulosin (FLOMAX) 0.4 mg Cap    Is this a refill? New rx? Refill    Who prescribed medication? In the hospital    Pharmacy Name, Phone, & Location: [facility] #[id number]     Comments: Patient was given this medication while in the ED.    Thank you

## 2019-10-29 ENCOUNTER — TELEPHONE (OUTPATIENT)
Dept: INTERNAL MEDICINE | Facility: CLINIC | Age: 84
End: 2019-10-29

## 2019-10-29 RX ORDER — TAMSULOSIN HYDROCHLORIDE 0.4 MG/1
0.4 CAPSULE ORAL DAILY
Qty: 90 CAPSULE | Refills: 3 | Status: SHIPPED | OUTPATIENT
Start: 2019-10-29 | End: 2020-07-04

## 2019-10-29 RX ORDER — TAMSULOSIN HYDROCHLORIDE 0.4 MG/1
0.4 CAPSULE ORAL DAILY
Qty: 90 CAPSULE | Refills: 3 | Status: SHIPPED | OUTPATIENT
Start: 2019-10-29 | End: 2019-10-29 | Stop reason: SDUPTHER

## 2019-10-31 ENCOUNTER — TELEPHONE (OUTPATIENT)
Dept: UROLOGY | Facility: CLINIC | Age: 84
End: 2019-10-31

## 2019-10-31 NOTE — TELEPHONE ENCOUNTER
----- Message from Grazyna Roberson LPN sent at 10/31/2019  8:51 AM CDT -----  Contact: pt's daughter    This is a pt of dr. Frazier, I dont know why they booked it for    ----- Message -----  From: Danica Bravo RN  Sent: 10/30/2019   4:58 PM CDT  To: Grazyna Roberson LPN        ----- Message -----  From: Giulia Gore  Sent: 10/30/2019   4:31 PM CDT  To: [facility] Staff    Anne  Daughter   Calling    Has appt Friday  Is there any way he can see him next Friday   Between 10:40   To  1:00     Please call Anne  [phone]    And advise      Pt now 89 years old.    Thanks

## 2019-11-01 ENCOUNTER — OFFICE VISIT (OUTPATIENT)
Dept: UROLOGY | Facility: CLINIC | Age: 84
End: 2019-11-01
Payer: MEDICARE

## 2019-11-01 VITALS — SYSTOLIC BLOOD PRESSURE: 104 MMHG | DIASTOLIC BLOOD PRESSURE: 57 MMHG | HEART RATE: 90 BPM

## 2019-11-01 DIAGNOSIS — N30.00 ACUTE CYSTITIS WITHOUT HEMATURIA: Primary | ICD-10-CM

## 2019-11-01 DIAGNOSIS — N40.1 BPH WITH URINARY OBSTRUCTION: ICD-10-CM

## 2019-11-01 DIAGNOSIS — N39.41 URGE INCONTINENCE OF URINE: ICD-10-CM

## 2019-11-01 DIAGNOSIS — N13.8 BPH WITH URINARY OBSTRUCTION: ICD-10-CM

## 2019-11-01 PROCEDURE — 87186 SC STD MICRODIL/AGAR DIL: CPT

## 2019-11-01 PROCEDURE — 51798 US URINE CAPACITY MEASURE: CPT | Mod: S$GLB,,, | Performed by: UROLOGY

## 2019-11-01 PROCEDURE — 99214 OFFICE O/P EST MOD 30 MIN: CPT | Mod: 25,S$GLB,, | Performed by: UROLOGY

## 2019-11-01 PROCEDURE — 87077 CULTURE AEROBIC IDENTIFY: CPT

## 2019-11-01 PROCEDURE — 99214 PR OFFICE/OUTPT VISIT, EST, LEVL IV, 30-39 MIN: ICD-10-PCS | Mod: 25,S$GLB,, | Performed by: UROLOGY

## 2019-11-01 PROCEDURE — 99999 PR PBB SHADOW E&M-EST. PATIENT-LVL III: ICD-10-PCS | Mod: PBBFAC,,, | Performed by: UROLOGY

## 2019-11-01 PROCEDURE — 87088 URINE BACTERIA CULTURE: CPT

## 2019-11-01 PROCEDURE — 51798 PR MEAS,POST-VOID RES,US,NON-IMAGING: ICD-10-PCS | Mod: S$GLB,,, | Performed by: UROLOGY

## 2019-11-01 PROCEDURE — 1101F PR PT FALLS ASSESS DOC 0-1 FALLS W/OUT INJ PAST YR: ICD-10-PCS | Mod: CPTII,S$GLB,, | Performed by: UROLOGY

## 2019-11-01 PROCEDURE — 99999 PR PBB SHADOW E&M-EST. PATIENT-LVL III: CPT | Mod: PBBFAC,,, | Performed by: UROLOGY

## 2019-11-01 PROCEDURE — 1101F PT FALLS ASSESS-DOCD LE1/YR: CPT | Mod: CPTII,S$GLB,, | Performed by: UROLOGY

## 2019-11-01 PROCEDURE — 87086 URINE CULTURE/COLONY COUNT: CPT

## 2019-11-01 RX ORDER — DUTASTERIDE 0.5 MG/1
0.5 CAPSULE, LIQUID FILLED ORAL DAILY
Qty: 30 CAPSULE | Refills: 11 | Status: SHIPPED | OUTPATIENT
Start: 2019-11-01 | End: 2020-01-17 | Stop reason: SDUPTHER

## 2019-11-01 NOTE — PROGRESS NOTES
CC: incomplete bladder emptying, urgency, nocturia and urge incontinence.    Zbigniew Forman is a 89 y.o. man who is here for the evaluation of No chief complaint on file.    A new pt referred by his PCP, Blas Morgan Ii, MD   referred for evaluation of chronic urinary retention of unknown duration. He currently does not perform intermittent catheterization and voids on own.   He was seen in the emergency department earlier this week with suprapubic pain and dysuria suggestive of lower urinary tract infection.  Urine microbiology was reviewed but was indeterminate.  He was begun on empiric antibiotic therapy with cephalexin.  He reports improvement in the dysuria and pain since beginning that antibiotic.  He was also begun on tamsulosin and does report some improvement in his urine flow.   Bladder scan residual today is 185 mL ( previously 240 ml). Prior to this episode, he reports symptoms of irritative voiding including frequency.  However urinary frequency is reported to be isolated nocturia.  He reports no daytime frequency or urgency.  He demonstrates considerable lower extremity edema on exam today.    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    HERNIA REPAIR       Social History     Tobacco Use    Smoking status: Never Smoker    Smokeless tobacco: Never Used   Substance Use Topics    Alcohol use: No    Drug use: Not on file     Family History   Problem Relation Age of Onset    Other Father         peritonitis    Cerebral aneurysm Mother      Kidney failure Sister 96    Amblyopia Neg Hx     Blindness Neg Hx     Cancer Neg Hx     Cataracts Neg Hx     Diabetes Neg Hx     Glaucoma Neg Hx     Hypertension Neg Hx     Macular degeneration Neg Hx     Retinal detachment Neg Hx     Strabismus Neg Hx     Stroke Neg Hx     Thyroid disease Neg Hx      Allergy:  Review of patient's allergies indicates:   Allergen Reactions    Metformin Diarrhea    Sulfa (sulfonamide antibiotics) Other (See Comments)     unknown reactions    Tradjenta [linagliptin] Diarrhea            Outpatient Encounter Medications as of 11/1/2019   Medication Sig Dispense Refill    albuterol (VENTOLIN HFA) 90 mcg/actuation inhaler Inhale 2 puffs into the lungs every 6 (six) hours as needed for Wheezing. Rescue 1 each 6    albuterol-ipratropium (DUO-NEB) 2.5 mg-0.5 mg/3 mL nebulizer solution Take 3 mLs by nebulization every 6 (six) hours as needed for Wheezing. Rescue 1 Box 6    amLODIPine (NORVASC) 5 MG tablet Take 1 tablet (5 mg total) by mouth once daily. 90 tablet 3    artificial tears w/ lanolin (AKWA TEARS) 0.5% ophthalmic ointment Apply to affected eye(s) as needed for dryness. 3.5 g 0    clotrimazole-betamethasone 1-0.05% (LOTRISONE) cream Apply topically 2 (two) times daily. 45 g 3    furosemide (LASIX) 20 MG tablet Take 1 tablet (20 mg total) by mouth daily as needed (Leg swelling, weight gain >2-3 lbs in one day or >5lbs in 1 week.). 90 tablet 3    latanoprost 0.005 % ophthalmic solution INSTILL 1 DROP IN BOTH EYES EVERY EVENING 2.5 mL 12    lisinopril 10 MG tablet Take 1 tablet (10 mg total) by mouth once daily. 90 tablet 3    senna-docusate 8.6-50 mg (PERICOLACE) 8.6-50 mg per tablet Take 1 tablet by mouth 2 (two) times daily as needed for Constipation.      tamsulosin (FLOMAX) 0.4 mg Cap Take 1 capsule (0.4 mg total) by mouth once daily. 90 capsule 3    dutasteride (AVODART) 0.5 mg capsule Take 1 capsule (0.5 mg total) by mouth once daily. 30 capsule 11     mirabegron (MYRBETRIQ) 25 mg Tb24 ER tablet Take 1 tablet (25 mg total) by mouth once daily. 30 tablet 11     No facility-administered encounter medications on file as of 11/1/2019.      Review of Systems   ROS  Physical Exam     Vitals:    11/01/19 1054   BP: (!) 104/57   Pulse: 90     Physical Exam   Constitutional: He is oriented to person, place, and time. He appears well-developed and well-nourished. No distress.   HENT:   Head: Normocephalic and atraumatic.   Right Ear: External ear normal.   Left Ear: External ear normal.   Nose: Nose normal.   Mouth/Throat: Oropharynx is clear and moist.   Eyes: Conjunctivae are normal. Pupils are equal, round, and reactive to light.   Neck: Normal range of motion. Neck supple. No JVD present. No tracheal deviation present. No thyromegaly present.   Cardiovascular: Normal rate, regular rhythm, normal heart sounds and intact distal pulses.  Exam reveals no gallop and no friction rub.    No murmur heard.  Pulmonary/Chest: Effort normal and breath sounds normal. No respiratory distress. He has no wheezes. He exhibits no tenderness.   Abdominal: Soft. Bowel sounds are normal. He exhibits no distension and no mass. There is no tenderness. There is no rebound and no guarding.   Genitourinary: Rectum normal and penis normal. No penile tenderness.   Genitourinary Comments: Prostate 50 grams with negative nodule or negative tenderness     Musculoskeletal: Normal range of motion. He exhibits edema. He exhibits no tenderness or deformity.   Lymphadenopathy:     He has no cervical adenopathy.   Neurological: He is alert and oriented to person, place, and time.   Skin: Skin is warm and dry. He is not diaphoretic.     Psychiatric: He has a normal mood and affect. His behavior is normal. Thought content normal.     Genitalia:  Scrotum: no rash or lesion  Normal symmetric epididymis without masses  Normal vas palpated  Normal size, symmetric testicles with no masses   Normal urethral  meatus with no discharge  Normal circumcised penis with no lesion   Rectal:  Normal perineum and anus upon inspection.  Normal tone, no masses or tenderness;     LABS:  No results found for: PSA, PSADIAG, PSATOTAL, PSAFREE, PSAFREEPCT  No results found for this or any previous visit.  Lab Results   Component Value Date    CREATININE 1.4 10/11/2019    CREATININE 1.7 (H) 09/30/2019    CREATININE 1.3 09/06/2019     No results found for this or any previous visit.  Urine Culture, Routine   Date Value Ref Range Status   09/30/2019   Final    Multiple organisms isolated. None in predominance.  Repeat if   09/30/2019 clinically necessary.  Final     Hemoglobin A1C   Date Value Ref Range Status   10/11/2019 7.4 (H) 4.0 - 5.6 % Final     Comment:     ADA Screening Guidelines:  5.7-6.4%  Consistent with prediabetes  >or=6.5%  Consistent with diabetes  High levels of fetal hemoglobin interfere with the HbA1C  assay. Heterozygous hemoglobin variants (HbS, HgC, etc)do  not significantly interfere with this assay.   However, presence of multiple variants may affect accuracy.     06/07/2019 6.3 (H) 4.0 - 5.6 % Final     Comment:     ADA Screening Guidelines:  5.7-6.4%  Consistent with prediabetes  >or=6.5%  Consistent with diabetes  High levels of fetal hemoglobin interfere with the HbA1C  assay. Heterozygous hemoglobin variants (HbS, HgC, etc)do  not significantly interfere with this assay.   However, presence of multiple variants may affect accuracy.       UA clear today with no blood or infection  PVR per bladder scan: 185 ml      Assessment and Plan:  Diagnoses and all orders for this visit:    Acute cystitis without hematuria  -     Urine culture    BPH with urinary obstruction  -     dutasteride (AVODART) 0.5 mg capsule; Take 1 capsule (0.5 mg total) by mouth once daily.    Urge incontinence of urine  -     mirabegron (MYRBETRIQ) 25 mg Tb24 ER tablet; Take 1 tablet (25 mg total) by mouth once daily.    discussed his LUTS in  detail.  Given his age and medical condition, will avoid surgery but maximize medical therapy.  Add Avodart in addition to Flomax.  OK to start Myrbetriq 25 mg q hs.  Potential sides effects of each medication explained in detail.  Answered all questions from his son ( my pt as well) and his daughter.  Pt would like to follow up with me instead of Dr. MACIEL.    Follow-up:  Follow up in about 3 months (around 2/1/2020).

## 2019-11-04 ENCOUNTER — TELEPHONE (OUTPATIENT)
Dept: UROLOGY | Facility: CLINIC | Age: 84
End: 2019-11-04

## 2019-11-04 ENCOUNTER — TELEPHONE (OUTPATIENT)
Dept: PODIATRY | Facility: CLINIC | Age: 84
End: 2019-11-04

## 2019-11-04 DIAGNOSIS — N30.00 ACUTE CYSTITIS WITHOUT HEMATURIA: Primary | ICD-10-CM

## 2019-11-04 LAB — BACTERIA UR CULT: ABNORMAL

## 2019-11-04 RX ORDER — AMOXICILLIN AND CLAVULANATE POTASSIUM 875; 125 MG/1; MG/1
1 TABLET, FILM COATED ORAL 2 TIMES DAILY
Qty: 14 TABLET | Refills: 0 | Status: SHIPPED | OUTPATIENT
Start: 2019-11-04 | End: 2019-11-11

## 2019-11-04 NOTE — TELEPHONE ENCOUNTER
Acute cystitis without hematuria  -     amoxicillin-clavulanate 875-125mg (AUGMENTIN) 875-125 mg per tablet; Take 1 tablet by mouth 2 (two) times daily. for 7 days  Dispense: 14 tablet; Refill: 0    he can take it for 3 days only and save the rest for later use.

## 2019-11-04 NOTE — TELEPHONE ENCOUNTER
Nurse called pt to schedule appt. no answer no voicemail        ----- Message from Giulia Gore sent at 11/4/2019  2:53 PM CST -----  Contact: pt's  daughter  Pt needs work in appt    Having severe pain in left heel  No callous  Etc         Could he be worked in ?     Please call daughter  [phone]  Thanks

## 2019-11-05 ENCOUNTER — TELEPHONE (OUTPATIENT)
Dept: INTERNAL MEDICINE | Facility: CLINIC | Age: 84
End: 2019-11-05

## 2019-11-05 NOTE — TELEPHONE ENCOUNTER
Spoke with Anne and informed her that, that is the right dose for Augmentin for infection 875-125 mg twice daily as prescribed by Dr Aleah Hopper.

## 2019-11-05 NOTE — TELEPHONE ENCOUNTER
----- Message from Katharina Mahoney sent at 11/5/2019  9:27 AM CST -----  Contact: Anne/Daughter/[phone]  Anne called in regards needing to talk with Dr Morgan about the urology visit outcome.   Please call and advise. Thank you.

## 2019-11-05 NOTE — TELEPHONE ENCOUNTER
Mr Forman daughter Anne is concerned about the strength of the Augmentin 875-125 mg twice daily prescribed by the urologist Dr Sanchez yesterday for an infection. She stated that it may be too much for Mr Forman because of his age. She has not given him any dose yet.  Wants your opinion    Please advice.    Ruchi.

## 2019-11-06 ENCOUNTER — TELEPHONE (OUTPATIENT)
Dept: UROLOGY | Facility: CLINIC | Age: 84
End: 2019-11-06

## 2019-11-07 ENCOUNTER — TELEPHONE (OUTPATIENT)
Dept: FAMILY MEDICINE | Facility: CLINIC | Age: 84
End: 2019-11-07

## 2019-11-07 NOTE — TELEPHONE ENCOUNTER
----- Message from Nanci Vargas sent at 11/7/2019 10:01 AM CST -----  Contact: Patient's daughter  Attn: Beverly Gambino daughter returned call from 11/4. Please call back.    Phone: [phone]

## 2019-11-07 NOTE — TELEPHONE ENCOUNTER
Spoke with pt daughter and she stated that there not looking for Dr. Ceron STAFF. she's not sure who called her. They didn't leave a VM. I stated to pt that they will call you again or send something in the mail. Pt understood.

## 2019-11-11 DIAGNOSIS — E11.59 HYPERTENSION ASSOCIATED WITH DIABETES: ICD-10-CM

## 2019-11-11 DIAGNOSIS — I15.2 HYPERTENSION ASSOCIATED WITH DIABETES: ICD-10-CM

## 2019-11-11 RX ORDER — LATANOPROST 50 UG/ML
1 SOLUTION/ DROPS OPHTHALMIC NIGHTLY
Qty: 7.5 ML | Refills: 3 | Status: SHIPPED | OUTPATIENT
Start: 2019-11-11

## 2019-11-26 ENCOUNTER — OFFICE VISIT (OUTPATIENT)
Dept: PODIATRY | Facility: CLINIC | Age: 84
End: 2019-11-26
Payer: MEDICARE

## 2019-11-26 VITALS
HEIGHT: 70 IN | WEIGHT: 171 LBS | SYSTOLIC BLOOD PRESSURE: 134 MMHG | BODY MASS INDEX: 24.48 KG/M2 | HEART RATE: 93 BPM | DIASTOLIC BLOOD PRESSURE: 73 MMHG

## 2019-11-26 DIAGNOSIS — B35.1 ONYCHOMYCOSIS DUE TO DERMATOPHYTE: ICD-10-CM

## 2019-11-26 DIAGNOSIS — E11.42 DIABETIC POLYNEUROPATHY ASSOCIATED WITH TYPE 2 DIABETES MELLITUS: Primary | ICD-10-CM

## 2019-11-26 DIAGNOSIS — L84 CORN OR CALLUS: ICD-10-CM

## 2019-11-26 PROCEDURE — 99499 UNLISTED E&M SERVICE: CPT | Mod: S$GLB,,, | Performed by: PODIATRIST

## 2019-11-26 PROCEDURE — 99999 PR PBB SHADOW E&M-EST. PATIENT-LVL III: ICD-10-PCS | Mod: PBBFAC,,, | Performed by: PODIATRIST

## 2019-11-26 PROCEDURE — 11721 PR DEBRIDEMENT OF NAILS, 6 OR MORE: ICD-10-PCS | Mod: Q9,59,S$GLB, | Performed by: PODIATRIST

## 2019-11-26 PROCEDURE — 11056 PR TRIM BENIGN HYPERKERATOTIC SKIN LESION,2-4: ICD-10-PCS | Mod: Q9,S$GLB,, | Performed by: PODIATRIST

## 2019-11-26 PROCEDURE — 99499 NO LOS: ICD-10-PCS | Mod: S$GLB,,, | Performed by: PODIATRIST

## 2019-11-26 PROCEDURE — 11056 PARNG/CUTG B9 HYPRKR LES 2-4: CPT | Mod: Q9,S$GLB,, | Performed by: PODIATRIST

## 2019-11-26 PROCEDURE — 11721 DEBRIDE NAIL 6 OR MORE: CPT | Mod: Q9,59,S$GLB, | Performed by: PODIATRIST

## 2019-11-26 PROCEDURE — 99999 PR PBB SHADOW E&M-EST. PATIENT-LVL III: CPT | Mod: PBBFAC,,, | Performed by: PODIATRIST

## 2019-12-05 NOTE — PROGRESS NOTES
Subjective:     Zbigniew Forman is a 89 y.o. male male who presents to the clinic for evaluation and treatment of high risk feet. Zbigniew has a past medical history of Hypertension associated with diabetes; Diabetes mellitus type II, uncontrolled; Glaucoma suspect, high risk (11/19/2013); and Senile cataract, unspecified (11/19/2013). The patient's chief complaint is long toenails. This patient has documented high risk feet requiring routine maintenance secondary to peripheral neuropathy. Experiences occasional numbness at the digits.No trauma reported. No new issues reported.         PCP: Blas Morgan Ii, MD  Date Last Seen by PCP:   Chief Complaint   Patient presents with    Diabetes Mellitus     ernie 10/11/19    Diabetic Foot Exam    Nail Care    Heel Pain     rt heel                 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    Chronic kidney disease, stage III (moderate) 9/29/2015    Diabetes mellitus type II, uncontrolled     Glaucoma suspect, high risk 11/19/2013    Hypertension associated with diabetes     S/P evacuation of subdural hematoma 3/17/2015    Senile cataract, unspecified 11/19/2013             Current Outpatient Medications on File Prior to Visit   Medication Sig Dispense Refill    albuterol (VENTOLIN HFA) 90 mcg/actuation inhaler Inhale 2 puffs into the lungs every 6 (six) hours as needed for Wheezing. Rescue 1 each 6    amLODIPine (NORVASC) 5 MG tablet Take 1 tablet (5 mg total) by mouth once daily. 90 tablet 3    artificial tears w/ lanolin (AKWA TEARS) 0.5% ophthalmic ointment Apply to affected eye(s) as needed for dryness. 3.5 g 0    clotrimazole-betamethasone 1-0.05% (LOTRISONE) cream Apply topically 2 (two) times daily. 45 g 3    dutasteride (AVODART) 0.5 mg capsule Take 1 capsule (0.5 mg total) by mouth once daily. 30 capsule 11    latanoprost 0.005 % ophthalmic solution Place 1 drop into both eyes every evening. 7.5  mL 3    lisinopril 10 MG tablet Take 1 tablet (10 mg total) by mouth once daily. 90 tablet 3    mirabegron (MYRBETRIQ) 25 mg Tb24 ER tablet Take 1 tablet (25 mg total) by mouth once daily. 30 tablet 11    senna-docusate 8.6-50 mg (PERICOLACE) 8.6-50 mg per tablet Take 1 tablet by mouth 2 (two) times daily as needed for Constipation.      tamsulosin (FLOMAX) 0.4 mg Cap Take 1 capsule (0.4 mg total) by mouth once daily. 90 capsule 3    albuterol-ipratropium (DUO-NEB) 2.5 mg-0.5 mg/3 mL nebulizer solution Take 3 mLs by nebulization every 6 (six) hours as needed for Wheezing. Rescue 1 Box 6    furosemide (LASIX) 20 MG tablet Take 1 tablet (20 mg total) by mouth daily as needed (Leg swelling, weight gain >2-3 lbs in one day or >5lbs in 1 week.). 90 tablet 3     No current facility-administered medications on file prior to visit.          Review of patient's allergies indicates:   Allergen Reactions    Metformin Diarrhea    Sulfa (sulfonamide antibiotics)     Tradjenta [linagliptin]      Diarrhea           Social History     Socioeconomic History    Marital status:      Spouse name: Not on file    Number of children: Not on file    Years of education: Not on file    Highest education level: Not on file   Occupational History    Not on file   Social Needs    Financial resource strain: Not on file    Food insecurity:     Worry: Not on file     Inability: Not on file    Transportation needs:     Medical: Not on file     Non-medical: Not on file   Tobacco Use    Smoking status: Never Smoker    Smokeless tobacco: Never Used   Substance and Sexual Activity    Alcohol use: No    Drug use: Not on file    Sexual activity: Not on file   Lifestyle    Physical activity:     Days per week: Not on file     Minutes per session: Not on file    Stress: Not on file   Relationships    Social connections:     Talks on phone: Not on file     Gets together: Not on file     Attends Confucianism service: Not on file  "    Active member of club or organization: Not on file     Attends meetings of clubs or organizations: Not on file     Relationship status: Not on file   Other Topics Concern    Not on file   Social History Narrative    Not on file               Review of Systems   Constitution: Positive for weight gain. Negative for chills and fever.   Cardiovascular: Negative for chest pain, claudication and leg swelling.   Respiratory: Negative for cough and shortness of breath.    Skin: Positive for nail changes and dry skin.   Musculoskeletal: Positive for stiffness.   Gastrointestinal: Negative for nausea and vomiting.   Neurological: Positive for numbness. Negative for paresthesias.   Psychiatric/Behavioral: Negative for altered mental status.               Objective:     Vitals:    11/26/19 1440   BP: 134/73   Pulse: 93   Weight: 77.6 kg (171 lb)   Height: 5' 9.6" (1.768 m)           Physical Exam   Constitutional: He appears well-developed and well-nourished.   HENT:   Head: Normocephalic.   Cardiovascular: Intact distal pulses.    Pulses:       Dorsalis pedis pulses are 2+ on the right side, and 2+ on the left side.        Posterior tibial pulses are 1+ on the right side, and 1+ on the left side.   CRT < 3 sec to tips of toes. Mild 1+ edema noted to b/l LE. Mild spider veins and vericosities noted to b/l LEs.      Musculoskeletal:   Equinus noted b/l ankles with < 10 deg DF noted. MMT 5/5 in DF/PF/Inv/Ev resistance with no reproduction of pain in any direction. Passive range of motion of ankle and pedal joints is painless. Hammertoes noted to digits 2-4 b/l, semi reducible. HAV deformity noted b/l with non trackbound joint, hypermobile 1st ray b/l.      Neurological: He has normal strength. No sensory deficit.   Light touch, proprioception, and sharp/dull sensation are all intact bilaterally. Protective threshold with the Guston-Wienstein monofilament is intact bilaterally. Vibratory sensation diminished distal foot b/l. "      Skin: Skin is warm, dry and intact.   No open lesions, lacerations or wounds noted. Nails are elongated by 4-7 mm, thickened by 3-4mm with yellow brown discoloration, subungual debris to toes of R 1-5 and L 1-5. Interdigital spaces clean, dry and intact b/l. No erythema noted to b/l foot.   HKL's noted to b/L HIPJ              Assessment / Plan :         I counseled the patient on his conditions, their implications and medical management.    Diabetic polyneuropathy associated with type 2 diabetes mellitus    Onychomycosis due to dermatophyte    Corn or callus      Diabetic foot exam performed on pt. Pt advised on daily monitoring and moisturizing of the feet and keeping the webspaces clean and dry Patient advised to contact the clinic if any new pedal problems should arise.   After cleansing with an alcohol prep pad, the about mentioned hyperkeratotic lesions were sharply debrided X 2 utilizing a #15 blade to a smooth base without incident. Pt tolerated the procedure well and reported comfort to the debarment sites. Pt will continue to use padding and moisture the callused areas.   With the patient's permission, nails were aggressively reduced and debrided X 10 to their soft tissue attachment mechanically and with an electric  removing all offending nail and debris. Pt relates relief following the procedure. he will continue to monitor the areas daily, inspect his feet, wear protective shoe gear when ambulating, moisturized daily to maintain skin integrity and follow up in the office in 3 months.

## 2020-01-17 ENCOUNTER — OFFICE VISIT (OUTPATIENT)
Dept: INTERNAL MEDICINE | Facility: CLINIC | Age: 85
End: 2020-01-17
Payer: MEDICARE

## 2020-01-17 VITALS
HEIGHT: 69 IN | WEIGHT: 171.5 LBS | OXYGEN SATURATION: 99 % | SYSTOLIC BLOOD PRESSURE: 120 MMHG | BODY MASS INDEX: 25.4 KG/M2 | DIASTOLIC BLOOD PRESSURE: 60 MMHG | HEART RATE: 69 BPM

## 2020-01-17 DIAGNOSIS — N40.1 BPH WITH URINARY OBSTRUCTION: ICD-10-CM

## 2020-01-17 DIAGNOSIS — R35.0 URINARY FREQUENCY: ICD-10-CM

## 2020-01-17 DIAGNOSIS — I50.30 HEART FAILURE WITH PRESERVED EJECTION FRACTION, UNSPECIFIED HF CHRONICITY: Primary | ICD-10-CM

## 2020-01-17 DIAGNOSIS — E11.9 CONTROLLED TYPE 2 DIABETES MELLITUS WITHOUT COMPLICATION, WITHOUT LONG-TERM CURRENT USE OF INSULIN: ICD-10-CM

## 2020-01-17 DIAGNOSIS — E87.70 HYPERVOLEMIA, UNSPECIFIED HYPERVOLEMIA TYPE: ICD-10-CM

## 2020-01-17 DIAGNOSIS — E11.42 DIABETIC POLYNEUROPATHY ASSOCIATED WITH TYPE 2 DIABETES MELLITUS: ICD-10-CM

## 2020-01-17 DIAGNOSIS — N39.46 MIXED STRESS AND URGE URINARY INCONTINENCE: ICD-10-CM

## 2020-01-17 DIAGNOSIS — N13.8 BPH WITH URINARY OBSTRUCTION: ICD-10-CM

## 2020-01-17 PROCEDURE — 99499 UNLISTED E&M SERVICE: CPT | Mod: S$GLB,,, | Performed by: INTERNAL MEDICINE

## 2020-01-17 PROCEDURE — 1126F AMNT PAIN NOTED NONE PRSNT: CPT | Mod: S$GLB,,, | Performed by: INTERNAL MEDICINE

## 2020-01-17 PROCEDURE — 1101F PT FALLS ASSESS-DOCD LE1/YR: CPT | Mod: CPTII,S$GLB,, | Performed by: INTERNAL MEDICINE

## 2020-01-17 PROCEDURE — 1126F PR PAIN SEVERITY QUANTIFIED, NO PAIN PRESENT: ICD-10-PCS | Mod: S$GLB,,, | Performed by: INTERNAL MEDICINE

## 2020-01-17 PROCEDURE — 99214 OFFICE O/P EST MOD 30 MIN: CPT | Mod: S$GLB,,, | Performed by: INTERNAL MEDICINE

## 2020-01-17 PROCEDURE — 1159F PR MEDICATION LIST DOCUMENTED IN MEDICAL RECORD: ICD-10-PCS | Mod: S$GLB,,, | Performed by: INTERNAL MEDICINE

## 2020-01-17 PROCEDURE — 99999 PR PBB SHADOW E&M-EST. PATIENT-LVL III: CPT | Mod: PBBFAC,,, | Performed by: INTERNAL MEDICINE

## 2020-01-17 PROCEDURE — 1101F PR PT FALLS ASSESS DOC 0-1 FALLS W/OUT INJ PAST YR: ICD-10-PCS | Mod: CPTII,S$GLB,, | Performed by: INTERNAL MEDICINE

## 2020-01-17 PROCEDURE — 1159F MED LIST DOCD IN RCRD: CPT | Mod: S$GLB,,, | Performed by: INTERNAL MEDICINE

## 2020-01-17 PROCEDURE — 99499 RISK ADDL DX/OHS AUDIT: ICD-10-PCS | Mod: S$GLB,,, | Performed by: INTERNAL MEDICINE

## 2020-01-17 PROCEDURE — 99214 PR OFFICE/OUTPT VISIT, EST, LEVL IV, 30-39 MIN: ICD-10-PCS | Mod: S$GLB,,, | Performed by: INTERNAL MEDICINE

## 2020-01-17 PROCEDURE — 99999 PR PBB SHADOW E&M-EST. PATIENT-LVL III: ICD-10-PCS | Mod: PBBFAC,,, | Performed by: INTERNAL MEDICINE

## 2020-01-17 RX ORDER — DUTASTERIDE 0.5 MG/1
0.5 CAPSULE, LIQUID FILLED ORAL DAILY
Qty: 90 CAPSULE | Refills: 3 | Status: SHIPPED | OUTPATIENT
Start: 2020-01-17 | End: 2020-06-25

## 2020-01-17 NOTE — PROGRESS NOTES
Subjective:       Patient ID: Zbigniew Forman is a 89 y.o. male.    Chief Complaint: Follow-up    HPI - Mr Forman continues to have incontinence, mostly at night.  He also has nocturia, for which he tries to use a urinal.  Not on the avodart that was prescribed by urology.  He also has extensive LE edema, and his elderly children asked about his ideal weight and how they should be using lasix. He's at 170 today, and they note he is more functional (energy and mobility) at 164.  He declined blood work today, but will do it next time.  He is not a smoker    PMH:  CHF, diastolic dysfunction  HTN, at goal  DM2, dietary management  S/p SDH evacuation  CKD3  Debility  Prostatism   Dysequilibrium, wheelchair-bound     Meds:  Reviewed and reconciled in EPIC with patient during visit today    Review of Systems   Constitutional: Negative for fever.   HENT: Negative for congestion.    Respiratory: Negative for shortness of breath.    Cardiovascular: Positive for leg swelling.   Gastrointestinal: Negative for abdominal pain.   Genitourinary: Positive for difficulty urinating and frequency.   Musculoskeletal: Positive for gait problem.   Skin: Negative for rash.   Neurological: Positive for dizziness.   Psychiatric/Behavioral: Negative for sleep disturbance.       Objective:      Physical Exam   Constitutional: He is oriented to person, place, and time. He appears well-developed and well-nourished. No distress.   HENT:   Head: Normocephalic and atraumatic.   Cardiovascular: Normal rate, regular rhythm and normal heart sounds. Exam reveals no gallop and no friction rub.   No murmur heard.  Pulmonary/Chest: No respiratory distress. He has no wheezes. He has rales (bibasilar). He exhibits no tenderness.   Musculoskeletal: He exhibits edema.   Neurological: He is alert and oriented to person, place, and time.   Skin: Skin is warm. He is not diaphoretic. No erythema.   Psychiatric: He has a normal mood and affect. Thought content  normal.   Nursing note and vitals reviewed.      Assessment:       1. Heart failure with preserved ejection fraction, unspecified HF chronicity    2. Hypervolemia, unspecified hypervolemia type    3. Mixed stress and urge urinary incontinence    4. Diabetic polyneuropathy associated with type 2 diabetes mellitus    5. Controlled type 2 diabetes mellitus without complication, without long-term current use of insulin    6. Urinary frequency    7. BPH with urinary obstruction        Plan:       Zbigniew was seen today for follow-up.    Diagnoses and all orders for this visit:    Heart failure with preserved ejection fraction, unspecified HF chronicity - seems a little wet today; maybe we should target a lower weight with more lasix use.  Discussed with family who will work on that    Hypervolemia, unspecified hypervolemia type    Mixed stress and urge urinary incontinence - worsening.  Discussed need to use the avodart and to talk to his urologist    Diabetic polyneuropathy associated with type 2 diabetes mellitus    Controlled type 2 diabetes mellitus without complication, without long-term current use of insulin - he's due for a1c, but wants to wait on that for now.    Urinary frequency    BPH with urinary obstruction  -     dutasteride (AVODART) 0.5 mg capsule; Take 1 capsule (0.5 mg total) by mouth once daily.    rtc prn, or in 3 months    JANELL Morgan MD MPH  Staff Internist

## 2020-01-23 ENCOUNTER — TELEPHONE (OUTPATIENT)
Dept: UROLOGY | Facility: CLINIC | Age: 85
End: 2020-01-23

## 2020-01-23 NOTE — TELEPHONE ENCOUNTER
----- Message from Annamarie Ruby sent at 1/23/2020  9:50 AM CST -----  Contact: [phone]  Calling in regards to scheduling a sooner appointment than scheduled on 3/31/2020. Please call

## 2020-02-19 ENCOUNTER — TELEPHONE (OUTPATIENT)
Dept: PODIATRY | Facility: CLINIC | Age: 85
End: 2020-02-19

## 2020-02-19 NOTE — TELEPHONE ENCOUNTER
----- Message from Meghan Davis sent at 2/19/2020  4:13 PM CST -----  Contact: pt  Pt daughter called wanting to schedule an appointment he dad usually sees dr walker but she wont be back until may and he needs his toe nails done looking for march 11th 2020    Pt can be reached at [phone]

## 2020-04-19 DIAGNOSIS — I50.30 HEART FAILURE WITH PRESERVED EJECTION FRACTION: ICD-10-CM

## 2020-04-20 RX ORDER — FUROSEMIDE 20 MG/1
TABLET ORAL
Qty: 90 TABLET | Refills: 3 | Status: SHIPPED | OUTPATIENT
Start: 2020-04-20 | End: 2020-06-25 | Stop reason: SDUPTHER

## 2020-04-21 ENCOUNTER — TELEPHONE (OUTPATIENT)
Dept: INTERNAL MEDICINE | Facility: CLINIC | Age: 85
End: 2020-04-21

## 2020-04-21 NOTE — TELEPHONE ENCOUNTER
----- Message from Anya Sandhu sent at 4/21/2020  4:06 PM CDT -----  Contact: Patient [phone]  Request call back from Ruchi GATES

## 2020-04-24 ENCOUNTER — TELEPHONE (OUTPATIENT)
Dept: INTERNAL MEDICINE | Facility: CLINIC | Age: 85
End: 2020-04-24

## 2020-04-24 ENCOUNTER — OFFICE VISIT (OUTPATIENT)
Dept: INTERNAL MEDICINE | Facility: CLINIC | Age: 85
End: 2020-04-24
Payer: MEDICARE

## 2020-04-24 VITALS — DIASTOLIC BLOOD PRESSURE: 76 MMHG | SYSTOLIC BLOOD PRESSURE: 135 MMHG

## 2020-04-24 DIAGNOSIS — Z66 DNR (DO NOT RESUSCITATE): ICD-10-CM

## 2020-04-24 DIAGNOSIS — I15.2 HYPERTENSION ASSOCIATED WITH DIABETES: Primary | ICD-10-CM

## 2020-04-24 DIAGNOSIS — N39.46 MIXED STRESS AND URGE URINARY INCONTINENCE: ICD-10-CM

## 2020-04-24 DIAGNOSIS — I50.30 HEART FAILURE WITH PRESERVED EJECTION FRACTION, UNSPECIFIED HF CHRONICITY: ICD-10-CM

## 2020-04-24 DIAGNOSIS — E11.59 HYPERTENSION ASSOCIATED WITH DIABETES: Primary | ICD-10-CM

## 2020-04-24 PROCEDURE — 1101F PR PT FALLS ASSESS DOC 0-1 FALLS W/OUT INJ PAST YR: ICD-10-PCS | Mod: CPTII,95,, | Performed by: INTERNAL MEDICINE

## 2020-04-24 PROCEDURE — 1159F MED LIST DOCD IN RCRD: CPT | Mod: 95,,, | Performed by: INTERNAL MEDICINE

## 2020-04-24 PROCEDURE — 99441 PR PHYSICIAN TELEPHONE EVALUATION 5-10 MIN: CPT | Mod: 95,,, | Performed by: INTERNAL MEDICINE

## 2020-04-24 PROCEDURE — 99441 PR PHYSICIAN TELEPHONE EVALUATION 5-10 MIN: ICD-10-PCS | Mod: 95,,, | Performed by: INTERNAL MEDICINE

## 2020-04-24 PROCEDURE — 1101F PT FALLS ASSESS-DOCD LE1/YR: CPT | Mod: CPTII,95,, | Performed by: INTERNAL MEDICINE

## 2020-04-24 PROCEDURE — 3051F PR MOST RECENT HEMOGLOBIN A1C LEVEL 7.0 - < 8.0%: ICD-10-PCS | Mod: CPTII,95,, | Performed by: INTERNAL MEDICINE

## 2020-04-24 PROCEDURE — 3051F HG A1C>EQUAL 7.0%<8.0%: CPT | Mod: CPTII,95,, | Performed by: INTERNAL MEDICINE

## 2020-04-24 PROCEDURE — 1159F PR MEDICATION LIST DOCUMENTED IN MEDICAL RECORD: ICD-10-PCS | Mod: 95,,, | Performed by: INTERNAL MEDICINE

## 2020-04-24 NOTE — TELEPHONE ENCOUNTER
----- Message from Asya Peres sent at 4/24/2020 11:35 AM CDT -----  Contact: Anne (daughter) [phone]  Anne state the patient had a virtual visit with Dr. Morgan and she stated she want to speak to him again. Please call and advise.

## 2020-04-24 NOTE — PROGRESS NOTES
Established Patient - Audio Only Telehealth Visit     The patient location is: home  The chief complaint leading to consultation is: routine follow up  Visit type: Virtual visit with audio only (telephone)     The reason for the audio only service rather than synchronous audio and video virtual visit was related to patient preference/necessity.  He and his family do not have a smart phone.     Each patient to whom I provide medical services by telemedicine is:  (1) informed of the relationship between the physician and patient and the respective role of any other health care provider with respect to management of the patient; and (2) notified that they may decline to receive medical services by telemedicine and may withdraw from such care at any time. Patient verbally consented to receive this service via voice-only telephone call.       HPI: Mr. Little feels OK at home.  His weight is stable at .  The family knows to give an extra lasix if weight gets to 170.  He has some dyspnea at night and audible wheezing, but only when he lies flat.  Requested condom catheters to use at bedtime b/c of urinary frequency and difficulty getting this 89 yo man to and from the bathroom.  He has some ringing in his ears; will need to look at this next visit in person to see if it's d/t cerumen impaction.      PMH:  CHF, diastolic dysfunction  HTN, at goal  DM2, dietary management  S/p SDH evacuation  CKD3  Debility  Prostatism   Dysequilibrium, wheelchair-bound     Meds:  Reviewed and reconciled in EPIC with patient during visit today     Assessment and plan:  Diagnoses and all orders for this visit:    Hypertension associated with diabetes - at goal, stay the course    DNR (do not resuscitate) - reaffirmed this with his family    Heart failure with preserved ejection fraction, unspecified HF chronicity - stable.  He's carefully balanced between fluid overload and kidney failure.  Stay the course    Mixed stress and urge  urinary incontinence - reasonable to use condom catheters; attempted to send this prescription to his pharmacy.  -     external catheter, male Misc; Use condom catheter every night    rtc 3 months for in-person visit    JANELL Morgan MD MPH  Staff Internist                                This service was not originating from a related E/M service provided within the previous 7 days nor will  to an E/M service or procedure within the next 24 hours or my soonest available appointment.  Prevailing standard of care was able to be met in this audio-only visit.

## 2020-04-30 ENCOUNTER — TELEPHONE (OUTPATIENT)
Dept: INTERNAL MEDICINE | Facility: CLINIC | Age: 85
End: 2020-04-30

## 2020-04-30 NOTE — TELEPHONE ENCOUNTER
Regarding Mr Dasia's rx for Condom Catheter, you may have to place an order so I can fax it to [facility] medical equipment supply. To give it to him. Regular phamacy is not able to.    Thanks     Ruchi

## 2020-05-01 ENCOUNTER — TELEPHONE (OUTPATIENT)
Dept: INTERNAL MEDICINE | Facility: CLINIC | Age: 85
End: 2020-05-01

## 2020-05-01 DIAGNOSIS — R32 URINARY INCONTINENCE, UNSPECIFIED TYPE: Primary | ICD-10-CM

## 2020-05-10 ENCOUNTER — NURSE TRIAGE (OUTPATIENT)
Dept: ADMINISTRATIVE | Facility: CLINIC | Age: 85
End: 2020-05-10

## 2020-05-10 NOTE — TELEPHONE ENCOUNTER
"    Reason for Disposition   [1] Fall in systolic BP > 20 mm Hg from normal AND [2] NOT dizzy, lightheaded, or weak    Additional Information   Negative: Started suddenly after an allergic medicine, an allergic food, or bee sting   Negative: Shock suspected (e.g., cold/pale/clammy skin, too weak to stand, low BP, rapid pulse)   Negative: Difficult to awaken or acting confused (e.g., disoriented, slurred speech)   Negative: Fainted   Negative: [1] Systolic BP < 90 AND [2] dizzy, lightheaded, or weak   Negative: Chest pain   Negative: Bleeding (e.g., vomiting blood, rectal bleeding or tarry stools, severe vaginal bleeding)(Exception: fainted from sight of small amount of blood; small cut or abrasion)   Negative: Extra heart beats or heart is beating fast  (i.e., "palpitations")   Negative: Sounds like a life-threatening emergency to the triager   Negative: [1] Systolic BP < 80 AND [2] NOT dizzy, lightheaded or weak   Negative: Abdominal pain   Negative: Fever > 100.5 F (38.1 C)   Negative: Major surgery in the past month   Negative: [1] Drinking very little AND [2] dehydration suspected (e.g., no urine > 12 hours, very dry mouth, very lightheaded)   Negative: [1] Fall in systolic BP > 20 mm Hg from normal AND [2] dizzy, lightheaded, or weak   Negative: Patient sounds very sick or weak to the triager   Negative: [1] Systolic BP < 90 AND [2] NOT dizzy, lightheaded or weak   Negative: [1] Systolic BP  AND [2] taking blood pressure medications AND [3] dizzy, lightheaded or weak   Negative: [1] Systolic BP  AND [2] taking blood pressure medications AND [3] NOT dizzy, lightheaded or weak    Protocols used: [facility]LOOD PRESSURE[facility]    Pt's daughter Anne called concerned about the Pt's blood pressure. During triage Pt's BP is 113/66. Daughter answered "no" to all triage questions above. Per triage protocol, advised to see a Physician within 3 days. Advised to:   CALL BACK IF:   * " Lightheadedness, weakness, or dizziness occurs   * Systolic BP under 90   * You feel sick   * You become worse    Advised a message will be sent to the PCP's office to follow up. Anne verbalized understanding.

## 2020-06-22 ENCOUNTER — TELEPHONE (OUTPATIENT)
Dept: INTERNAL MEDICINE | Facility: CLINIC | Age: 85
End: 2020-06-22

## 2020-06-22 NOTE — TELEPHONE ENCOUNTER
----- Message from Ilana Kimble sent at 6/22/2020 10:14 AM CDT -----  Regarding: weak  Contact: daughter(venecia) [phone]  Would like to get medical advice.  Symptoms (please be specific):  feeling weak   How long has patient had these symptoms:  few days  [facility] and phone [id number] - [place] - [address] AT [facility] [address] [phone] (Phone)  [phone] (Fax)  Any drug allergies (copy from chart):    see chart  Would the patient rather a call back or a response via [facility]?:  call back   Comments:

## 2020-06-22 NOTE — TELEPHONE ENCOUNTER
He is weak ,  started yesterday   Not eating much   Is taken fluids,   Urine normal ,   Is on oxgen  No chest pain  Short of breath sometimes   Pressure 116/70   He was 165 yesterday

## 2020-06-23 NOTE — TELEPHONE ENCOUNTER
Please call the family.  He should come for urgent care (not the emergency room).  Help them get an appointment.  Thanks.

## 2020-06-23 NOTE — TELEPHONE ENCOUNTER
Spoke with Anne/Archana pt's daughters. Placed pt on the schedule on 06/25/2020 at 10:20 AM. They could not agree on bringing Mr Forman to the clinic because of the concerns of covid. Pt family advised that there is social distancing and safety measures in place at the clinic.    Family to discuss between themselves and agree on bringing pt to the clinic if not we may have to cancel appt.  Pt family verbalized understanding.    Ruchi

## 2020-06-25 ENCOUNTER — OFFICE VISIT (OUTPATIENT)
Dept: INTERNAL MEDICINE | Facility: CLINIC | Age: 85
End: 2020-06-25
Payer: MEDICARE

## 2020-06-25 VITALS
OXYGEN SATURATION: 97 % | BODY MASS INDEX: 25.33 KG/M2 | DIASTOLIC BLOOD PRESSURE: 70 MMHG | HEART RATE: 80 BPM | TEMPERATURE: 98 F | HEIGHT: 69 IN | SYSTOLIC BLOOD PRESSURE: 130 MMHG

## 2020-06-25 DIAGNOSIS — I50.30 HEART FAILURE WITH PRESERVED EJECTION FRACTION: ICD-10-CM

## 2020-06-25 DIAGNOSIS — E11.59 HYPERTENSION ASSOCIATED WITH DIABETES: ICD-10-CM

## 2020-06-25 DIAGNOSIS — E87.70 HYPERVOLEMIA, UNSPECIFIED HYPERVOLEMIA TYPE: ICD-10-CM

## 2020-06-25 DIAGNOSIS — I50.30 HEART FAILURE WITH PRESERVED EJECTION FRACTION, UNSPECIFIED HF CHRONICITY: Primary | ICD-10-CM

## 2020-06-25 DIAGNOSIS — I15.2 HYPERTENSION ASSOCIATED WITH DIABETES: ICD-10-CM

## 2020-06-25 DIAGNOSIS — E11.9 CONTROLLED TYPE 2 DIABETES MELLITUS WITHOUT COMPLICATION, WITHOUT LONG-TERM CURRENT USE OF INSULIN: ICD-10-CM

## 2020-06-25 DIAGNOSIS — R35.0 URINARY FREQUENCY: ICD-10-CM

## 2020-06-25 DIAGNOSIS — R53.83 FATIGUE, UNSPECIFIED TYPE: ICD-10-CM

## 2020-06-25 PROCEDURE — 99499 RISK ADDL DX/OHS AUDIT: ICD-10-PCS | Mod: S$GLB,,, | Performed by: INTERNAL MEDICINE

## 2020-06-25 PROCEDURE — 1126F AMNT PAIN NOTED NONE PRSNT: CPT | Mod: S$GLB,,, | Performed by: INTERNAL MEDICINE

## 2020-06-25 PROCEDURE — 1101F PT FALLS ASSESS-DOCD LE1/YR: CPT | Mod: CPTII,S$GLB,, | Performed by: INTERNAL MEDICINE

## 2020-06-25 PROCEDURE — 99499 UNLISTED E&M SERVICE: CPT | Mod: S$GLB,,, | Performed by: INTERNAL MEDICINE

## 2020-06-25 PROCEDURE — 1159F MED LIST DOCD IN RCRD: CPT | Mod: S$GLB,,, | Performed by: INTERNAL MEDICINE

## 2020-06-25 PROCEDURE — 1126F PR PAIN SEVERITY QUANTIFIED, NO PAIN PRESENT: ICD-10-PCS | Mod: S$GLB,,, | Performed by: INTERNAL MEDICINE

## 2020-06-25 PROCEDURE — 1101F PR PT FALLS ASSESS DOC 0-1 FALLS W/OUT INJ PAST YR: ICD-10-PCS | Mod: CPTII,S$GLB,, | Performed by: INTERNAL MEDICINE

## 2020-06-25 PROCEDURE — 99215 PR OFFICE/OUTPT VISIT, EST, LEVL V, 40-54 MIN: ICD-10-PCS | Mod: S$GLB,,, | Performed by: INTERNAL MEDICINE

## 2020-06-25 PROCEDURE — 3051F HG A1C>EQUAL 7.0%<8.0%: CPT | Mod: CPTII,S$GLB,, | Performed by: INTERNAL MEDICINE

## 2020-06-25 PROCEDURE — 1159F PR MEDICATION LIST DOCUMENTED IN MEDICAL RECORD: ICD-10-PCS | Mod: S$GLB,,, | Performed by: INTERNAL MEDICINE

## 2020-06-25 PROCEDURE — 3051F PR MOST RECENT HEMOGLOBIN A1C LEVEL 7.0 - < 8.0%: ICD-10-PCS | Mod: CPTII,S$GLB,, | Performed by: INTERNAL MEDICINE

## 2020-06-25 PROCEDURE — 99215 OFFICE O/P EST HI 40 MIN: CPT | Mod: S$GLB,,, | Performed by: INTERNAL MEDICINE

## 2020-06-25 PROCEDURE — 99999 PR PBB SHADOW E&M-EST. PATIENT-LVL IV: ICD-10-PCS | Mod: PBBFAC,,, | Performed by: INTERNAL MEDICINE

## 2020-06-25 PROCEDURE — 99999 PR PBB SHADOW E&M-EST. PATIENT-LVL IV: CPT | Mod: PBBFAC,,, | Performed by: INTERNAL MEDICINE

## 2020-06-25 RX ORDER — FUROSEMIDE 40 MG/1
40 TABLET ORAL DAILY
Qty: 90 TABLET | Refills: 3 | Status: ON HOLD | OUTPATIENT
Start: 2020-06-25 | End: 2020-07-10 | Stop reason: SDUPTHER

## 2020-06-25 NOTE — PROGRESS NOTES
Subjective:       Patient ID: Zbigniew Forman is a 89 y.o. male.    Chief Complaint:   Fatigue    HPI - Worsening constellation of symptoms - weakness, dizziness, weak legs, wheezy and short of breath.  Lives at home with three geriatric children, all of whom are scared of COVID and do not want to come to clinic or the ER.  Weight has been ranging 162-168; they give an extra lasix when it goes up more than 3 lbs.  Not a smoker    PMH:  CHF, diastolic dysfunction  HTN, at goal  DM2, dietary management  S/p SDH evacuation  CKD3  Debility  Prostatism   Dysequilibrium, wheelchair-bound     Meds:  Reviewed and reconciled in EPIC with patient during visit today     Review of Systems   Constitutional: Positive for fatigue.   HENT: Positive for tinnitus.    Respiratory: Positive for shortness of breath and wheezing.    Cardiovascular: Positive for leg swelling.   Gastrointestinal: Negative for abdominal pain.   Genitourinary: Negative for difficulty urinating.   Musculoskeletal: Positive for arthralgias.   Skin: Negative for rash.   Neurological: Positive for dizziness and weakness.   Psychiatric/Behavioral: Negative for sleep disturbance.       Objective:      Physical Exam  Vitals signs and nursing note reviewed. Exam conducted with a chaperone present.   Constitutional:       General: He is not in acute distress.     Appearance: He is well-developed. He is not diaphoretic.      Comments: Conversational dyspnea.  Fatigued, frail man   HENT:      Head: Normocephalic and atraumatic.   Cardiovascular:      Rate and Rhythm: Normal rate and regular rhythm.      Heart sounds: Normal heart sounds. No murmur. No friction rub. No gallop.    Pulmonary:      Effort: Respiratory distress present.      Breath sounds: Rales (basilar) present. No wheezing.   Chest:      Chest wall: No tenderness.   Skin:     General: Skin is warm.      Findings: No erythema.   Neurological:      Mental Status: He is alert and oriented to person,  place, and time.   Psychiatric:         Thought Content: Thought content normal.         Assessment:       1. Heart failure with preserved ejection fraction, unspecified HF chronicity    2. Controlled type 2 diabetes mellitus without complication, without long-term current use of insulin    3. Hypertension associated with diabetes    4. Urinary frequency    5. Hypervolemia, unspecified hypervolemia type    6. Fatigue, unspecified type    7. Heart failure with preserved ejection fraction        Plan:       Zbigniew was seen today for fatigue.    Diagnoses and all orders for this visit:    Heart failure with preserved ejection fraction, unspecified HF chronicity - encouraged referral for admission, but family is resistant.  Will come to ED if no better in 2 days.  Increase lasix to 40; blood work today  -     Brain Natriuretic Peptide; Future    Controlled type 2 diabetes mellitus without complication, without long-term current use of insulin - has been at goal.  Time for a1c check  -     Hemoglobin A1C; Future    Hypertension associated with diabetes - at goal.  Labs   -     Comprehensive metabolic panel; Future    Urinary frequency - improved now that he's got condom catheters for use at night    Hypervolemia, unspecified hypervolemia type - serious issue today.  I'm concerned and would prefer if he would come into hospital.  Family declined  -     Brain Natriuretic Peptide; Future    Fatigue, unspecified type - likely d/t heart failure  -     CBC auto differential; Future    Heart failure with preserved ejection fraction  -     furosemide (LASIX) 40 MG tablet; Take 1 tablet (40 mg total) by mouth once daily.    rtc prn, or in 4 weeks    JANELL Morgan MD MPH  Staff Internist

## 2020-06-26 ENCOUNTER — TELEPHONE (OUTPATIENT)
Dept: INTERNAL MEDICINE | Facility: CLINIC | Age: 85
End: 2020-06-26

## 2020-06-26 NOTE — TELEPHONE ENCOUNTER
Please call Mr. Little (or one of his children).  His labs were consistent with fluid overload, as we discussed during the clinic visit.  He should continue taking the higher dose of lasix.    Thanks.  D

## 2020-06-29 NOTE — TELEPHONE ENCOUNTER
"Please call them back.  Provide some reassurance.  Let's keep an eye on this and see how it goes.  He can take tylenol for the "pain in the prostate".    "

## 2020-06-29 NOTE — TELEPHONE ENCOUNTER
He is taken the higher dose   He is complaining hurting in prostate area    No problems with urine , no smell or discoloration  Please advise

## 2020-07-04 ENCOUNTER — HOSPITAL ENCOUNTER (INPATIENT)
Facility: HOSPITAL | Age: 85
LOS: 12 days | Discharge: NURSING, REHAB OR LONG-TERM CARE | DRG: 291 | End: 2020-07-16
Attending: EMERGENCY MEDICINE | Admitting: EMERGENCY MEDICINE
Payer: MEDICARE

## 2020-07-04 DIAGNOSIS — E87.5 HYPERKALEMIA: ICD-10-CM

## 2020-07-04 DIAGNOSIS — D72.829 LEUKOCYTOSIS, UNSPECIFIED TYPE: ICD-10-CM

## 2020-07-04 DIAGNOSIS — R78.81 BACTEREMIA DUE TO KLEBSIELLA PNEUMONIAE: ICD-10-CM

## 2020-07-04 DIAGNOSIS — N18.9 CHRONIC KIDNEY DISEASE, UNSPECIFIED CKD STAGE: ICD-10-CM

## 2020-07-04 DIAGNOSIS — I50.30 (HFPEF) HEART FAILURE WITH PRESERVED EJECTION FRACTION: ICD-10-CM

## 2020-07-04 DIAGNOSIS — R79.89 ELEVATED TROPONIN: ICD-10-CM

## 2020-07-04 DIAGNOSIS — I15.2 HYPERTENSION ASSOCIATED WITH DIABETES: ICD-10-CM

## 2020-07-04 DIAGNOSIS — E11.59 HYPERTENSION ASSOCIATED WITH DIABETES: ICD-10-CM

## 2020-07-04 DIAGNOSIS — I50.33 ACUTE ON CHRONIC DIASTOLIC CONGESTIVE HEART FAILURE: Primary | ICD-10-CM

## 2020-07-04 DIAGNOSIS — B96.1 BACTEREMIA DUE TO KLEBSIELLA PNEUMONIAE: ICD-10-CM

## 2020-07-04 DIAGNOSIS — R06.02 SHORTNESS OF BREATH: ICD-10-CM

## 2020-07-04 DIAGNOSIS — R09.02 HYPOXIA: ICD-10-CM

## 2020-07-04 DIAGNOSIS — I50.30 HEART FAILURE WITH PRESERVED EJECTION FRACTION: ICD-10-CM

## 2020-07-04 LAB
ALBUMIN SERPL BCP-MCNC: 3.3 G/DL (ref 3.5–5.2)
ALLENS TEST: ABNORMAL
ALP SERPL-CCNC: 67 U/L (ref 55–135)
ALT SERPL W/O P-5'-P-CCNC: 7 U/L (ref 10–44)
ANION GAP SERPL CALC-SCNC: 10 MMOL/L (ref 8–16)
AST SERPL-CCNC: 12 U/L (ref 10–40)
BACTERIA #/AREA URNS AUTO: ABNORMAL /HPF
BASOPHILS # BLD AUTO: 0.03 K/UL (ref 0–0.2)
BASOPHILS NFR BLD: 0.1 % (ref 0–1.9)
BILIRUB SERPL-MCNC: 0.4 MG/DL (ref 0.1–1)
BILIRUB UR QL STRIP: NEGATIVE
BNP SERPL-MCNC: 1150 PG/ML (ref 0–99)
BUN SERPL-MCNC: 38 MG/DL (ref 10–30)
CALCIUM SERPL-MCNC: 8.5 MG/DL (ref 8.7–10.5)
CHLORIDE SERPL-SCNC: 102 MMOL/L (ref 95–110)
CK SERPL-CCNC: 85 U/L (ref 20–200)
CLARITY UR REFRACT.AUTO: ABNORMAL
CO2 SERPL-SCNC: 27 MMOL/L (ref 23–29)
COLOR UR AUTO: YELLOW
CREAT SERPL-MCNC: 1.7 MG/DL (ref 0.5–1.4)
CRP SERPL-MCNC: 84.1 MG/L (ref 0–8.2)
DELSYS: ABNORMAL
DIFFERENTIAL METHOD: ABNORMAL
EOSINOPHIL # BLD AUTO: 0 K/UL (ref 0–0.5)
EOSINOPHIL NFR BLD: 0 % (ref 0–8)
ERYTHROCYTE [DISTWIDTH] IN BLOOD BY AUTOMATED COUNT: 14 % (ref 11.5–14.5)
EST. GFR  (AFRICAN AMERICAN): 37.4 ML/MIN/1.73 M^2
EST. GFR  (NON AFRICAN AMERICAN): 32.4 ML/MIN/1.73 M^2
FERRITIN SERPL-MCNC: 100 NG/ML (ref 20–300)
GLUCOSE SERPL-MCNC: 215 MG/DL (ref 70–110)
GLUCOSE UR QL STRIP: NEGATIVE
HCO3 UR-SCNC: 24.5 MMOL/L (ref 24–28)
HCT VFR BLD AUTO: 40.2 % (ref 40–54)
HGB BLD-MCNC: 12.3 G/DL (ref 14–18)
HGB UR QL STRIP: ABNORMAL
HYALINE CASTS UR QL AUTO: 6 /LPF
IMM GRANULOCYTES # BLD AUTO: 0.15 K/UL (ref 0–0.04)
IMM GRANULOCYTES NFR BLD AUTO: 0.7 % (ref 0–0.5)
INR PPP: 1.3 (ref 0.8–1.2)
KETONES UR QL STRIP: NEGATIVE
LDH SERPL L TO P-CCNC: 366 U/L (ref 110–260)
LEUKOCYTE ESTERASE UR QL STRIP: ABNORMAL
LYMPHOCYTES # BLD AUTO: 1 K/UL (ref 1–4.8)
LYMPHOCYTES NFR BLD: 5 % (ref 18–48)
MCH RBC QN AUTO: 28.1 PG (ref 27–31)
MCHC RBC AUTO-ENTMCNC: 30.6 G/DL (ref 32–36)
MCV RBC AUTO: 92 FL (ref 82–98)
MICROSCOPIC COMMENT: ABNORMAL
MODE: ABNORMAL
MONOCYTES # BLD AUTO: 0.4 K/UL (ref 0.3–1)
MONOCYTES NFR BLD: 2.1 % (ref 4–15)
NEUTROPHILS # BLD AUTO: 18.8 K/UL (ref 1.8–7.7)
NEUTROPHILS NFR BLD: 92.1 % (ref 38–73)
NITRITE UR QL STRIP: NEGATIVE
NRBC BLD-RTO: 0 /100 WBC
PCO2 BLDA: 36.1 MMHG (ref 35–45)
PH SMN: 7.44 [PH] (ref 7.35–7.45)
PH UR STRIP: 5 [PH] (ref 5–8)
PLATELET # BLD AUTO: 286 K/UL (ref 150–350)
PMV BLD AUTO: 9.9 FL (ref 9.2–12.9)
PO2 BLDA: 56 MMHG (ref 80–100)
POC BE: 0 MMOL/L
POC SATURATED O2: 90 % (ref 95–100)
POC TCO2: 26 MMOL/L (ref 23–27)
POCT GLUCOSE: 188 MG/DL (ref 70–110)
POTASSIUM SERPL-SCNC: 5.3 MMOL/L (ref 3.5–5.1)
PROCALCITONIN SERPL IA-MCNC: 17.77 NG/ML
PROT SERPL-MCNC: 7.1 G/DL (ref 6–8.4)
PROT UR QL STRIP: NEGATIVE
PROTHROMBIN TIME: 12.9 SEC (ref 9–12.5)
RBC # BLD AUTO: 4.37 M/UL (ref 4.6–6.2)
RBC #/AREA URNS AUTO: 1 /HPF (ref 0–4)
SAMPLE: ABNORMAL
SARS-COV-2 RDRP RESP QL NAA+PROBE: NEGATIVE
SITE: ABNORMAL
SODIUM SERPL-SCNC: 139 MMOL/L (ref 136–145)
SP GR UR STRIP: 1.01 (ref 1–1.03)
SQUAMOUS #/AREA URNS AUTO: 0 /HPF
TROPONIN I SERPL DL<=0.01 NG/ML-MCNC: 0.15 NG/ML (ref 0–0.03)
URN SPEC COLLECT METH UR: ABNORMAL
WBC # BLD AUTO: 20.45 K/UL (ref 3.9–12.7)
WBC #/AREA URNS AUTO: >100 /HPF (ref 0–5)

## 2020-07-04 PROCEDURE — 99291 CRITICAL CARE FIRST HOUR: CPT | Mod: 25

## 2020-07-04 PROCEDURE — 87086 URINE CULTURE/COLONY COUNT: CPT

## 2020-07-04 PROCEDURE — 87186 SC STD MICRODIL/AGAR DIL: CPT

## 2020-07-04 PROCEDURE — 63600175 PHARM REV CODE 636 W HCPCS: Performed by: STUDENT IN AN ORGANIZED HEALTH CARE EDUCATION/TRAINING PROGRAM

## 2020-07-04 PROCEDURE — 93005 ELECTROCARDIOGRAM TRACING: CPT

## 2020-07-04 PROCEDURE — 93010 EKG 12-LEAD: ICD-10-PCS | Mod: ,,, | Performed by: INTERNAL MEDICINE

## 2020-07-04 PROCEDURE — 99291 PR CRITICAL CARE, E/M 30-74 MINUTES: ICD-10-PCS | Mod: ,,, | Performed by: EMERGENCY MEDICINE

## 2020-07-04 PROCEDURE — 87088 URINE BACTERIA CULTURE: CPT

## 2020-07-04 PROCEDURE — 99900035 HC TECH TIME PER 15 MIN (STAT)

## 2020-07-04 PROCEDURE — 99291 CRITICAL CARE FIRST HOUR: CPT | Mod: ,,, | Performed by: EMERGENCY MEDICINE

## 2020-07-04 PROCEDURE — 20600001 HC STEP DOWN PRIVATE ROOM

## 2020-07-04 PROCEDURE — 82803 BLOOD GASES ANY COMBINATION: CPT

## 2020-07-04 PROCEDURE — 83615 LACTATE (LD) (LDH) ENZYME: CPT

## 2020-07-04 PROCEDURE — 94761 N-INVAS EAR/PLS OXIMETRY MLT: CPT

## 2020-07-04 PROCEDURE — 84145 PROCALCITONIN (PCT): CPT

## 2020-07-04 PROCEDURE — 83880 ASSAY OF NATRIURETIC PEPTIDE: CPT

## 2020-07-04 PROCEDURE — 80047 BASIC METABLC PNL IONIZED CA: CPT | Mod: 59

## 2020-07-04 PROCEDURE — 80053 COMPREHEN METABOLIC PANEL: CPT

## 2020-07-04 PROCEDURE — 87077 CULTURE AEROBIC IDENTIFY: CPT

## 2020-07-04 PROCEDURE — 86140 C-REACTIVE PROTEIN: CPT

## 2020-07-04 PROCEDURE — 96374 THER/PROPH/DIAG INJ IV PUSH: CPT

## 2020-07-04 PROCEDURE — 99223 1ST HOSP IP/OBS HIGH 75: CPT | Mod: GC,,, | Performed by: INTERNAL MEDICINE

## 2020-07-04 PROCEDURE — 36600 WITHDRAWAL OF ARTERIAL BLOOD: CPT

## 2020-07-04 PROCEDURE — 82728 ASSAY OF FERRITIN: CPT

## 2020-07-04 PROCEDURE — 87040 BLOOD CULTURE FOR BACTERIA: CPT | Mod: 59

## 2020-07-04 PROCEDURE — 82550 ASSAY OF CK (CPK): CPT

## 2020-07-04 PROCEDURE — 93010 ELECTROCARDIOGRAM REPORT: CPT | Mod: ,,, | Performed by: INTERNAL MEDICINE

## 2020-07-04 PROCEDURE — 85610 PROTHROMBIN TIME: CPT

## 2020-07-04 PROCEDURE — 27000221 HC OXYGEN, UP TO 24 HOURS

## 2020-07-04 PROCEDURE — 99223 PR INITIAL HOSPITAL CARE,LEVL III: ICD-10-PCS | Mod: GC,,, | Performed by: INTERNAL MEDICINE

## 2020-07-04 PROCEDURE — U0002 COVID-19 LAB TEST NON-CDC: HCPCS

## 2020-07-04 PROCEDURE — 84484 ASSAY OF TROPONIN QUANT: CPT

## 2020-07-04 PROCEDURE — 81001 URINALYSIS AUTO W/SCOPE: CPT

## 2020-07-04 PROCEDURE — 99285 EMERGENCY DEPT VISIT HI MDM: CPT | Mod: 25

## 2020-07-04 PROCEDURE — 85025 COMPLETE CBC W/AUTO DIFF WBC: CPT

## 2020-07-04 PROCEDURE — 63600175 PHARM REV CODE 636 W HCPCS: Performed by: EMERGENCY MEDICINE

## 2020-07-04 RX ORDER — AMOXICILLIN 250 MG
1 CAPSULE ORAL 2 TIMES DAILY PRN
Status: DISCONTINUED | OUTPATIENT
Start: 2020-07-04 | End: 2020-07-16 | Stop reason: HOSPADM

## 2020-07-04 RX ORDER — IPRATROPIUM BROMIDE AND ALBUTEROL SULFATE 2.5; .5 MG/3ML; MG/3ML
3 SOLUTION RESPIRATORY (INHALATION) EVERY 6 HOURS PRN
Status: DISCONTINUED | OUTPATIENT
Start: 2020-07-04 | End: 2020-07-08

## 2020-07-04 RX ORDER — FUROSEMIDE 10 MG/ML
80 INJECTION INTRAMUSCULAR; INTRAVENOUS
Status: DISCONTINUED | OUTPATIENT
Start: 2020-07-04 | End: 2020-07-04

## 2020-07-04 RX ORDER — HEPARIN SODIUM 5000 [USP'U]/ML
5000 INJECTION, SOLUTION INTRAVENOUS; SUBCUTANEOUS EVERY 8 HOURS
Status: DISCONTINUED | OUTPATIENT
Start: 2020-07-04 | End: 2020-07-16 | Stop reason: HOSPADM

## 2020-07-04 RX ORDER — FUROSEMIDE 10 MG/ML
40 INJECTION INTRAMUSCULAR; INTRAVENOUS
Status: COMPLETED | OUTPATIENT
Start: 2020-07-04 | End: 2020-07-04

## 2020-07-04 RX ORDER — CEFTRIAXONE 1 G/1
1 INJECTION, POWDER, FOR SOLUTION INTRAMUSCULAR; INTRAVENOUS
Status: DISCONTINUED | OUTPATIENT
Start: 2020-07-04 | End: 2020-07-06

## 2020-07-04 RX ORDER — CEFTRIAXONE 1 G/1
1 INJECTION, POWDER, FOR SOLUTION INTRAMUSCULAR; INTRAVENOUS
Status: DISCONTINUED | OUTPATIENT
Start: 2020-07-04 | End: 2020-07-04

## 2020-07-04 RX ORDER — ACETAMINOPHEN 325 MG/1
650 TABLET ORAL EVERY 4 HOURS PRN
Status: DISCONTINUED | OUTPATIENT
Start: 2020-07-04 | End: 2020-07-16 | Stop reason: HOSPADM

## 2020-07-04 RX ORDER — SODIUM CHLORIDE 0.9 % (FLUSH) 0.9 %
10 SYRINGE (ML) INJECTION
Status: DISCONTINUED | OUTPATIENT
Start: 2020-07-04 | End: 2020-07-16 | Stop reason: HOSPADM

## 2020-07-04 RX ORDER — FUROSEMIDE 10 MG/ML
60 INJECTION INTRAMUSCULAR; INTRAVENOUS
Status: DISCONTINUED | OUTPATIENT
Start: 2020-07-04 | End: 2020-07-06

## 2020-07-04 RX ORDER — TAMSULOSIN HYDROCHLORIDE 0.4 MG/1
0.4 CAPSULE ORAL DAILY
Status: DISCONTINUED | OUTPATIENT
Start: 2020-07-05 | End: 2020-07-16 | Stop reason: HOSPADM

## 2020-07-04 RX ADMIN — FUROSEMIDE 40 MG: 10 INJECTION, SOLUTION INTRAMUSCULAR; INTRAVENOUS at 06:07

## 2020-07-04 RX ADMIN — CEFTRIAXONE SODIUM 1 G: 1 INJECTION, POWDER, FOR SOLUTION INTRAMUSCULAR; INTRAVENOUS at 09:07

## 2020-07-04 RX ADMIN — HEPARIN SODIUM 5000 UNITS: 5000 INJECTION INTRAVENOUS; SUBCUTANEOUS at 09:07

## 2020-07-04 RX ADMIN — AZITHROMYCIN MONOHYDRATE 500 MG: 500 INJECTION, POWDER, LYOPHILIZED, FOR SOLUTION INTRAVENOUS at 09:07

## 2020-07-04 NOTE — ED PROVIDER NOTES
Encounter Date: 7/4/2020       History     Chief Complaint   Patient presents with    Shortness of Breath     hx CHF    Fatigue     since this AM     HPI   Mr. Forman is a 89 y.o. male with CKD, HTN, DM, h/o SDH w/ evacuation, reported CHF here today with fatigue and shortness of breath. Reports over the past few days he has had worsening shortness of breath and fatigue. This morning his breathing worsened. Family thought him to be very labored while breathing today, so they had EMS bring him to ED for evaluation. Has leg swelling. Denies fevers, chills, n/v, abd pain, chest pain, cough, sore throat, sick contacts, numbness, tingling, weakness.     Review of patient's allergies indicates:   Allergen Reactions    Metformin Diarrhea    Sulfa (sulfonamide antibiotics) Other (See Comments)     unknown reactions    Tradjenta [linagliptin] Diarrhea            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    HERNIA REPAIR       Family History   Problem Relation Age of Onset    Other Father         peritonitis    Cerebral aneurysm Mother     Kidney failure Sister 96    Amblyopia Neg Hx     Blindness Neg Hx     Cancer Neg Hx     Cataracts Neg Hx     Diabetes Neg Hx     Glaucoma Neg Hx     Hypertension Neg Hx     Macular degeneration Neg Hx     Retinal detachment Neg Hx     Strabismus Neg Hx     Stroke Neg Hx     Thyroid disease Neg Hx      Social History     Tobacco Use    Smoking status: Never Smoker    Smokeless tobacco: Never Used   Substance  Use Topics    Alcohol use: No    Drug use: Not on file     Review of Systems   Constitutional: Negative for fever.   HENT: Negative for congestion and sore throat.    Respiratory: Positive for shortness of breath. Negative for cough and chest tightness.    Cardiovascular: Positive for leg swelling. Negative for chest pain and palpitations.   Gastrointestinal: Negative for abdominal distention, constipation, nausea and vomiting.   Genitourinary: Negative for dysuria.   Musculoskeletal: Negative for back pain.   Skin: Negative for rash.   Neurological: Negative for weakness.   Hematological: Does not bruise/bleed easily.       Physical Exam     Initial Vitals [07/04/20 1738]   BP Pulse Resp Temp SpO2   127/73 85 16 98.5 °F (36.9 °C) 100 %      MAP       --         Physical Exam    Nursing note and vitals reviewed.  Constitutional: He appears well-developed and well-nourished. He is not diaphoretic. No distress.   HENT:   Head: Normocephalic and atraumatic.   Right Ear: External ear normal.   Left Ear: External ear normal.   Face mask in place.   Neck: Neck supple.   Cardiovascular: Normal rate, regular rhythm, normal heart sounds and intact distal pulses.   Pulmonary/Chest: No respiratory distress. He has no wheezes. He has no rhonchi. He has rales (bibasilar).   Abdominal: Soft. He exhibits no distension. There is no abdominal tenderness. There is no rebound and no guarding.   Musculoskeletal: Edema (mixed pitting and non pitting BLE, severe) present.   Neurological: He is alert and oriented to person, place, and time. GCS score is 15. GCS eye subscore is 4. GCS verbal subscore is 5. GCS motor subscore is 6.   Skin: Skin is warm. Capillary refill takes less than 2 seconds. No rash noted.   No cyanosis   Psychiatric: He has a normal mood and affect.         ED Course   Procedures  Labs Reviewed   CBC W/ AUTO DIFFERENTIAL - Abnormal; Notable for the following components:       Result Value    WBC 20.45 (*)     RBC  4.37 (*)     Hemoglobin 12.3 (*)     Mean Corpuscular Hemoglobin Conc 30.6 (*)     Immature Granulocytes 0.7 (*)     Gran # (ANC) 18.8 (*)     Immature Grans (Abs) 0.15 (*)     Gran% 92.1 (*)     Lymph% 5.0 (*)     Mono% 2.1 (*)     All other components within normal limits    Narrative:     shared lav   COMPREHENSIVE METABOLIC PANEL - Abnormal; Notable for the following components:    Potassium 5.3 (*)     Glucose 215 (*)     BUN, Bld 38 (*)     Creatinine 1.7 (*)     Calcium 8.5 (*)     Albumin 3.3 (*)     ALT 7 (*)     eGFR if  37.4 (*)     eGFR if non  32.4 (*)     All other components within normal limits    Narrative:     shared lav   TROPONIN I - Abnormal; Notable for the following components:    Troponin I 0.153 (*)     All other components within normal limits    Narrative:     shared lav   B-TYPE NATRIURETIC PEPTIDE - Abnormal; Notable for the following components:    BNP 1,150 (*)     All other components within normal limits    Narrative:     shared lav   PROTIME-INR - Abnormal; Notable for the following components:    Prothrombin Time 12.9 (*)     INR 1.3 (*)     All other components within normal limits    Narrative:     shared lav   URINALYSIS, REFLEX TO URINE CULTURE - Abnormal; Notable for the following components:    Appearance, UA Cloudy (*)     Occult Blood UA 1+ (*)     Leukocytes, UA 3+ (*)     All other components within normal limits    Narrative:     Specimen Source->Urine   URINALYSIS MICROSCOPIC - Abnormal; Notable for the following components:    WBC, UA >100 (*)     Bacteria Moderate (*)     Hyaline Casts, UA 6 (*)     All other components within normal limits    Narrative:     Specimen Source->Urine   C-REACTIVE PROTEIN - Abnormal; Notable for the following components:    CRP 84.1 (*)     All other components within normal limits    Narrative:     shared lav  ADD ON CRP [id number] CPK [id number] FREDDY [id number] PER DR. LYNNE TORRES  07/04/2020  21:42     LACTATE DEHYDROGENASE - Abnormal; Notable for the following components:     (*)     All other components within normal limits    Narrative:     shared lav  ADD ON CRP [id number] CPK [id number] FREDDY [id number] PER DR. LYNNE TORRES  07/04/2020  21:42   add on tests pcal and ldh per dr lynne torres order# [id number]  [id number]  07/04/2020  22:22    ISTAT PROCEDURE - Abnormal; Notable for the following components:    POC PO2 56 (*)     POC SATURATED O2 90 (*)     All other components within normal limits   POCT GLUCOSE - Abnormal; Notable for the following components:    POCT Glucose 188 (*)     All other components within normal limits   CULTURE, BLOOD   CULTURE, BLOOD   CULTURE, URINE   SARS-COV-2 RNA AMPLIFICATION, QUAL   C-REACTIVE PROTEIN   CK   FERRITIN   LACTATE DEHYDROGENASE   PROCALCITONIN   SEDIMENTATION RATE   CK    Narrative:     shared lav  ADD ON CRP [id number] CPK [id number] FREDDY [id number] PER DR. LYNNE TORRES  07/04/2020  21:42    FERRITIN    Narrative:     shared lav  ADD ON CRP [id number] CPK [id number] FREDDY [id number] PER DR. LYNNE TORRES  07/04/2020  21:42   add on tests pcal and ldh per dr lynne torres order# [id number]  [id number]  07/04/2020  22:22    POCT GLUCOSE MONITORING CONTINUOUS          Imaging Results          X-Ray Chest AP Portable (Final result)  Result time 07/04/20 18:32:45    Final result by Bruce Patel MD (07/04/20 18:32:45)                 Impression:      No significant change in cardiopulmonary status.  Recommend follow-up if symptoms persist.      Electronically signed by: Bruce Patel  Date:    07/04/2020  Time:    18:32             Narrative:    EXAMINATION:  XR CHEST AP PORTABLE    CLINICAL HISTORY:  CHF;    TECHNIQUE:  Single frontal view of the chest was performed.    COMPARISON:  09/30/2019    FINDINGS:  No significant change in cardiopulmonary status.    Heart is borderline enlarged.    Aortic atherosclerosis.    Minimal interstitial  changes more prominent the upper lobes, similar to the prior study could represent mild scarring.  Minimal infiltrate or edema is felt to be less likely.    No mass or consolidation.  No evidence of pneumothorax.  No significant effusion.    Multiple leads overlie the lower chest.    No acute osseous abnormality.                                 Medical Decision Making:   History:   Old Medical Records: I decided to obtain old medical records.  Old Records Summarized: other records.       <> Summary of Records: Followed in clinics here for multiple medical problems  Independently Interpreted Test(s):   I have ordered and independently interpreted X-rays - see summary below.       <> Summary of X-Ray Reading(s): Bibasilar pulm edema on my read of CXR.  I have ordered and independently interpreted EKG Reading(s) - see summary below       <> Summary of EKG Reading(s): NSR. T wave inversion in lead III that appears new. No STEMI  Clinical Tests:   Lab Tests: Ordered and Reviewed  Radiological Study: Ordered and Reviewed  Medical Tests: Ordered and Reviewed  ED Management:  Vitals normal. Afebrile. Here w/ shortness of breath, fatigue and leg swelling. Clinical exam concerning for acute on chronic heart failure. History concerning for possible ACS or electrolyte derangement. Will obtain CBC, CMP, BNP, troponin, CXR, EKG, and given lasix 40 mg IV push.    Nursing staff reports pt had desaturation to 86% when standing up. Placed on supplemental oxygen.    CXR w/ pulm edema  Labs w/ CKD, elevated BNP and troponin with leukocytosis.  Given elevation of troponin in setting of TWI in lead 3, discussed with cardiology who evaluated.    Cardiology recommends against treatment of ACS at this time and thinks more likely to be related to acute CHF with CKD.  Cardiology to admit to their service. Rocephin and azithromycin given per cards request.  Other:   I have discussed this case with another health care provider.       <> Summary  of the Discussion: cardiology              Attending Attestation:         Attending Critical Care:   Critical Care Times:   ==============================================================  · Total Critical Care Time - exclusive of procedural time: 40 minutes.  ==============================================================  Critical care was necessary to treat or prevent imminent or life-threatening deterioration of the following conditions: congestive heart failure.   Critical care was time spent personally by me on the following activities: obtaining history from patient or relative, examination of patient, ordering lab, x-rays, and/or EKG, development of treatment plan with patient or relative, ordering and performing treatments and interventions, review of old charts, evaluation of patient's response to treatment, discussion with consultants, interpretation of cardiac measurements and re-evaluation of patient's conition.   Critical Care Condition: potentially life-threatening                             Clinical Impression:       ICD-10-CM ICD-9-CM   1. Acute on chronic diastolic congestive heart failure  I50.33 428.33     428.0   2. Shortness of breath  R06.02 786.05   3. Chronic kidney disease, unspecified CKD stage  N18.9 585.9   4. Leukocytosis, unspecified type  D72.829 288.60   5. Hyperkalemia  E87.5 276.7   6. Elevated troponin  R79.89 790.6   7. Hypoxia  R09.02 799.02   8. (HFpEF) heart failure with preserved ejection fraction  I50.30 428.9             ED Disposition Condition    Admit                           Cosme Shabazz MD  07/04/20 [id number]

## 2020-07-05 PROBLEM — J18.9 COMMUNITY ACQUIRED PNEUMONIA OF LEFT LOWER LOBE OF LUNG: Status: ACTIVE | Noted: 2020-07-05

## 2020-07-05 LAB
ALBUMIN SERPL BCP-MCNC: 2.9 G/DL (ref 3.5–5.2)
ALP SERPL-CCNC: 64 U/L (ref 55–135)
ALT SERPL W/O P-5'-P-CCNC: 7 U/L (ref 10–44)
ANION GAP SERPL CALC-SCNC: 11 MMOL/L (ref 8–16)
ASCENDING AORTA: 3.59 CM
AST SERPL-CCNC: 13 U/L (ref 10–40)
AV INDEX (PROSTH): 0.46
AV MEAN GRADIENT: 6 MMHG
AV PEAK GRADIENT: 10 MMHG
AV VALVE AREA: 1.56 CM2
AV VELOCITY RATIO: 0.45
BASOPHILS # BLD AUTO: 0.04 K/UL (ref 0–0.2)
BASOPHILS NFR BLD: 0.3 % (ref 0–1.9)
BILIRUB SERPL-MCNC: 0.3 MG/DL (ref 0.1–1)
BSA FOR ECHO PROCEDURE: 1.93 M2
BUN SERPL-MCNC: 38 MG/DL (ref 10–30)
CALCIUM SERPL-MCNC: 8.4 MG/DL (ref 8.7–10.5)
CHLORIDE SERPL-SCNC: 102 MMOL/L (ref 95–110)
CO2 SERPL-SCNC: 28 MMOL/L (ref 23–29)
CREAT SERPL-MCNC: 1.5 MG/DL (ref 0.5–1.4)
CV ECHO LV RWT: 0.46 CM
DIFFERENTIAL METHOD: ABNORMAL
DOP CALC AO PEAK VEL: 1.56 M/S
DOP CALC AO VTI: 27.9 CM
DOP CALC LVOT AREA: 3.4 CM2
DOP CALC LVOT DIAMETER: 2.08 CM
DOP CALC LVOT PEAK VEL: 0.7 M/S
DOP CALC LVOT STROKE VOLUME: 43.54 CM3
DOP CALCLVOT PEAK VEL VTI: 12.82 CM
E WAVE DECELERATION TIME: 127.15 MSEC
E/A RATIO: 1.63
E/E' RATIO: 26.4 M/S
ECHO LV POSTERIOR WALL: 0.94 CM (ref 0.6–1.1)
EOSINOPHIL # BLD AUTO: 0 K/UL (ref 0–0.5)
EOSINOPHIL NFR BLD: 0.3 % (ref 0–8)
ERYTHROCYTE [DISTWIDTH] IN BLOOD BY AUTOMATED COUNT: 14.1 % (ref 11.5–14.5)
EST. GFR  (AFRICAN AMERICAN): 43.5 ML/MIN/1.73 M^2
EST. GFR  (NON AFRICAN AMERICAN): 37.7 ML/MIN/1.73 M^2
FRACTIONAL SHORTENING: 26 % (ref 28–44)
GLUCOSE SERPL-MCNC: 116 MG/DL (ref 70–110)
HCT VFR BLD AUTO: 34.6 % (ref 40–54)
HGB BLD-MCNC: 10.8 G/DL (ref 14–18)
IMM GRANULOCYTES # BLD AUTO: 0.12 K/UL (ref 0–0.04)
IMM GRANULOCYTES NFR BLD AUTO: 0.8 % (ref 0–0.5)
INTERVENTRICULAR SEPTUM: 1.09 CM (ref 0.6–1.1)
IVRT: 51.38 MSEC
LA MAJOR: 5.81 CM
LA MINOR: 5.83 CM
LA WIDTH: 3.61 CM
LEFT ATRIUM SIZE: 3.94 CM
LEFT ATRIUM VOLUME INDEX: 36.7 ML/M2
LEFT ATRIUM VOLUME: 70.36 CM3
LEFT INTERNAL DIMENSION IN SYSTOLE: 3.03 CM (ref 2.1–4)
LEFT VENTRICLE DIASTOLIC VOLUME INDEX: 38.65 ML/M2
LEFT VENTRICLE DIASTOLIC VOLUME: 74.14 ML
LEFT VENTRICLE MASS INDEX: 70 G/M2
LEFT VENTRICLE SYSTOLIC VOLUME INDEX: 18.7 ML/M2
LEFT VENTRICLE SYSTOLIC VOLUME: 35.93 ML
LEFT VENTRICULAR INTERNAL DIMENSION IN DIASTOLE: 4.1 CM (ref 3.5–6)
LEFT VENTRICULAR MASS: 134.91 G
LV LATERAL E/E' RATIO: 22 M/S
LV SEPTAL E/E' RATIO: 33 M/S
LYMPHOCYTES # BLD AUTO: 1.8 K/UL (ref 1–4.8)
LYMPHOCYTES NFR BLD: 11.7 % (ref 18–48)
MAGNESIUM SERPL-MCNC: 2 MG/DL (ref 1.6–2.6)
MCH RBC QN AUTO: 28.1 PG (ref 27–31)
MCHC RBC AUTO-ENTMCNC: 31.2 G/DL (ref 32–36)
MCV RBC AUTO: 90 FL (ref 82–98)
MONOCYTES # BLD AUTO: 0.5 K/UL (ref 0.3–1)
MONOCYTES NFR BLD: 3.5 % (ref 4–15)
MV PEAK A VEL: 0.81 M/S
MV PEAK E VEL: 1.32 M/S
MV STENOSIS PRESSURE HALF TIME: 36.87 MS
MV VALVE AREA P 1/2 METHOD: 5.97 CM2
NEUTROPHILS # BLD AUTO: 12.4 K/UL (ref 1.8–7.7)
NEUTROPHILS NFR BLD: 83.4 % (ref 38–73)
NRBC BLD-RTO: 0 /100 WBC
PISA TR MAX VEL: 3.79 M/S
PLATELET # BLD AUTO: 271 K/UL (ref 150–350)
PMV BLD AUTO: 10.4 FL (ref 9.2–12.9)
POCT GLUCOSE: 178 MG/DL (ref 70–110)
POTASSIUM SERPL-SCNC: 4.4 MMOL/L (ref 3.5–5.1)
PROT SERPL-MCNC: 6.4 G/DL (ref 6–8.4)
RA MAJOR: 4.77 CM
RA WIDTH: 3.03 CM
RBC # BLD AUTO: 3.84 M/UL (ref 4.6–6.2)
RIGHT VENTRICULAR END-DIASTOLIC DIMENSION: 3.26 CM
RV TISSUE DOPPLER FREE WALL SYSTOLIC VELOCITY 1 (APICAL 4 CHAMBER VIEW): 13.37 CM/S
SINUS: 3.28 CM
SODIUM SERPL-SCNC: 141 MMOL/L (ref 136–145)
STJ: 2.9 CM
TDI LATERAL: 0.06 M/S
TDI SEPTAL: 0.04 M/S
TDI: 0.05 M/S
TR MAX PG: 57 MMHG
TRICUSPID ANNULAR PLANE SYSTOLIC EXCURSION: 1.87 CM
WBC # BLD AUTO: 14.9 K/UL (ref 3.9–12.7)

## 2020-07-05 PROCEDURE — 36415 COLL VENOUS BLD VENIPUNCTURE: CPT

## 2020-07-05 PROCEDURE — 83735 ASSAY OF MAGNESIUM: CPT

## 2020-07-05 PROCEDURE — 63600175 PHARM REV CODE 636 W HCPCS: Performed by: STUDENT IN AN ORGANIZED HEALTH CARE EDUCATION/TRAINING PROGRAM

## 2020-07-05 PROCEDURE — 99231 PR SUBSEQUENT HOSPITAL CARE,LEVL I: ICD-10-PCS | Mod: GC,,, | Performed by: INTERNAL MEDICINE

## 2020-07-05 PROCEDURE — 20600001 HC STEP DOWN PRIVATE ROOM

## 2020-07-05 PROCEDURE — 27000221 HC OXYGEN, UP TO 24 HOURS

## 2020-07-05 PROCEDURE — 87070 CULTURE OTHR SPECIMN AEROBIC: CPT

## 2020-07-05 PROCEDURE — 99231 SBSQ HOSP IP/OBS SF/LOW 25: CPT | Mod: GC,,, | Performed by: INTERNAL MEDICINE

## 2020-07-05 PROCEDURE — 94761 N-INVAS EAR/PLS OXIMETRY MLT: CPT

## 2020-07-05 PROCEDURE — 85025 COMPLETE CBC W/AUTO DIFF WBC: CPT

## 2020-07-05 PROCEDURE — 80053 COMPREHEN METABOLIC PANEL: CPT

## 2020-07-05 PROCEDURE — 87075 CULTR BACTERIA EXCEPT BLOOD: CPT

## 2020-07-05 RX ORDER — CALCIUM CARBONATE 200(500)MG
500 TABLET,CHEWABLE ORAL DAILY PRN
Status: DISCONTINUED | OUTPATIENT
Start: 2020-07-05 | End: 2020-07-16 | Stop reason: HOSPADM

## 2020-07-05 RX ORDER — TALC
6 POWDER (GRAM) TOPICAL NIGHTLY PRN
Status: DISCONTINUED | OUTPATIENT
Start: 2020-07-05 | End: 2020-07-16 | Stop reason: HOSPADM

## 2020-07-05 RX ORDER — ONDANSETRON 8 MG/1
8 TABLET, ORALLY DISINTEGRATING ORAL EVERY 8 HOURS PRN
Status: DISCONTINUED | OUTPATIENT
Start: 2020-07-05 | End: 2020-07-16 | Stop reason: HOSPADM

## 2020-07-05 RX ADMIN — CEFTRIAXONE SODIUM 1 G: 1 INJECTION, POWDER, FOR SOLUTION INTRAMUSCULAR; INTRAVENOUS at 10:07

## 2020-07-05 RX ADMIN — FUROSEMIDE 60 MG: 10 INJECTION, SOLUTION INTRAVENOUS at 10:07

## 2020-07-05 RX ADMIN — HEPARIN SODIUM 5000 UNITS: 5000 INJECTION INTRAVENOUS; SUBCUTANEOUS at 01:07

## 2020-07-05 RX ADMIN — FUROSEMIDE 60 MG: 10 INJECTION, SOLUTION INTRAVENOUS at 01:07

## 2020-07-05 RX ADMIN — AZITHROMYCIN MONOHYDRATE 500 MG: 500 INJECTION, POWDER, LYOPHILIZED, FOR SOLUTION INTRAVENOUS at 10:07

## 2020-07-05 RX ADMIN — HEPARIN SODIUM 5000 UNITS: 5000 INJECTION INTRAVENOUS; SUBCUTANEOUS at 05:07

## 2020-07-05 RX ADMIN — FUROSEMIDE 60 MG: 10 INJECTION, SOLUTION INTRAVENOUS at 11:07

## 2020-07-05 RX ADMIN — HEPARIN SODIUM 5000 UNITS: 5000 INJECTION INTRAVENOUS; SUBCUTANEOUS at 10:07

## 2020-07-05 NOTE — HPI
Mr. Forman, 89 year old male with prior history of HFpEF (indeterminate D on 11/2018), HTN, DM 2 on diet management, CKD III (sCr ~ 1.3 baseline), Dysequilibrium on wheelchair-bound. He presented to ED with symptoms of SOB NYHA III, fatigue and these symptoms were getting worse over the course of one to two weeks.He states he has been more fatigued the last few weeks, not responding as well to his lasix.  Denies fevers, chills, nausea, chest pain, cough, sore throat, sick contacts, numbness, tingling, weakness. Of note, he presented to his PCP last week with symptoms of weakness, dizziness and weak legs. His BNP was checked and it was 467 and repeated today showed 1150. In ED, his labs showed leukocytosis from 10 > 20 with lymphopenia, hypoxia with PO2 56 mmHg. Admitted for volume overloaded and possible CAP.

## 2020-07-05 NOTE — ASSESSMENT & PLAN NOTE
- On diet control with no oral hypoglycemic agent   - Recent Hemoglobin A1C 7.0% last week   - Goal for his POC glucose is 140-180

## 2020-07-05 NOTE — H&P
[facility]  Cardiology  History and Physical     Patient Name: Zbigniew Forman  MRN: [id number]  Admission Date: 7/4/2020  Code Status: DNR   Attending Provider: Cosme Shabazz MD   Primary Care Physician: Blas Morgan Ii, MD  Principal Problem:Acute on chronic diastolic congestive heart failure    Patient information was obtained from patient, past medical records and ER records.     Subjective:     Chief Complaint:  shortness of breath      HPI:  Mr. Forman, 89 year old male with prior history of HFpEF (indeterminate D on 11/2018), HTN, DM 2 on diet management, CKD III (sCr ~ 1.3 baseline), Dysequilibrium on wheelchair-bound. He presented to ED with symptoms of SOB NYHA III, fatigue and these symptoms were getting worse over the course of one to two weeks.He states he has been more fatigued the last few weeks, not responding as well to his lasix.  Denies fevers, chills, nausea, chest pain, cough, sore throat, sick contacts, numbness, tingling, weakness. Of note, he presented to his PCP last week with symptoms of weakness, dizziness and weak legs. His BNP was checked and it was 467 and repeated today showed 1150. In ED, his labs showed leukocytosis from 10 > 20 with lymphopenia, hypoxia with PO2 56 mmHg. Admitted for volume overloaded and possible CAP.     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      Current Outpatient Medications on File Prior to Encounter   Medication Sig    albuterol (VENTOLIN HFA) 90 mcg/actuation inhaler Inhale 2 puffs into the lungs every 6 (six) hours as needed for Wheezing. Rescue    albuterol-ipratropium (DUO-NEB) 2.5 mg-0.5 mg/3 mL nebulizer solution Take 3 mLs by nebulization every 6 (six) hours as needed for Wheezing. Rescue    amLODIPine (NORVASC) 5 MG tablet Take 1 tablet (5 mg total) by mouth once daily.    artificial tears w/ lanolin (AKWA TEARS) 0.5% ophthalmic ointment Apply to affected eye(s) as needed for dryness.    external catheter, male Misc Use condom catheter every night    furosemide (LASIX) 40 MG tablet Take 1 tablet (40 mg total) by mouth once daily.    latanoprost 0.005 % ophthalmic solution Place 1 drop into both eyes every evening.    lisinopril 10 MG tablet Take 1 tablet (10 mg total) by mouth once daily.    senna-docusate 8.6-50 mg (PERICOLACE) 8.6-50 mg per tablet Take 1 tablet by mouth 2 (two) times daily as needed for Constipation.    tamsulosin (FLOMAX) 0.4 mg Cap Take 1 capsule (0.4 mg total) by mouth once daily.     Family History     Problem Relation (Age of Onset)    Cerebral aneurysm Mother    Kidney failure Sister (96)    Other Father        Tobacco Use    Smoking status: Never Smoker    Smokeless tobacco: Never Used   Substance and Sexual Activity    Alcohol use: No    Drug use: Not on file    Sexual activity: Not on file       Review of Systems   Constitution: Positive for malaise/fatigue and weight gain. Negative for chills and fever.   HENT: Negative for congestion.    Eyes: Negative for visual disturbance.   Cardiovascular: Positive for leg swelling. Negative for claudication, dyspnea on exertion, orthopnea, palpitations and syncope.    Respiratory: Positive for shortness of breath. Negative for cough and snoring.    Hematologic/Lymphatic: Does not bruise/bleed easily.   Skin: Negative for rash.   Musculoskeletal: Negative for muscle cramps and myalgias.   Gastrointestinal: Negative for bloating, abdominal pain, constipation, diarrhea and melena.   Genitourinary: Negative for bladder incontinence.   Neurological: Negative for excessive daytime sleepiness, focal weakness and weakness.   Psychiatric/Behavioral: Negative for depression and suicidal ideas.     Objective:     Vital Signs (Most Recent):  Temp: 98.5 °F (36.9 °C) (07/04/20 1738)  Pulse: 89 (07/04/20 2015)  Resp: (!) 23 (07/04/20 2015)  BP: (!) 116/57 (07/04/20 2015)  SpO2: 100 % (07/04/20 2015) Vital Signs (24h Range):  Temp:  [98.5 °F (36.9 °C)] 98.5 °F (36.9 °C)  Pulse:  [85-95] 89  Resp:  [16-25] 23  SpO2:  [93 %-100 %] 100 %  BP: ()/(56-73) 116/57     Weight: 75.3 kg (166 lb 0.1 oz)  Body mass index is 24.51 kg/m².    SpO2: 100 %  O2 Device (Oxygen Therapy): nasal cannula    No intake or output data in the 24 hours ending 07/04/20 2044    Lines/Drains/Airways       Peripheral Intravenous Line                   Peripheral IV - Single Lumen 07/04/20 1803 18 G Right Antecubital less than 1 day         Peripheral IV - Single Lumen 07/04/20 20 G Right Hand less than 1 day                    Physical Exam   Constitutional: He is oriented to person, place, and time. He appears well-developed and well-nourished. No distress.   HENT:   Head: Normocephalic and atraumatic.   Mouth/Throat: Oropharynx is clear and moist.   Eyes: Pupils are equal, round, and reactive to light. Conjunctivae and EOM are normal. No scleral icterus.   Neck: Normal range of motion. Neck supple. No JVD present.   Cardiovascular: Normal rate, regular rhythm, normal heart sounds and intact distal pulses.   No murmur heard.  Pulses:       Radial pulses are 2+ on the right side and 2+ on the left side.    Pulmonary/Chest: Effort normal. No respiratory distress. He has rales.   Symmetrical expansion   Abdominal: Soft. Bowel sounds are normal. There is no hepatosplenomegaly. There is no abdominal tenderness.   Musculoskeletal: Normal range of motion.         General: Edema present.   Neurological: He is alert and oriented to person, place, and time. No cranial nerve deficit.   Skin: Skin is warm and dry. No rash noted. He is not diaphoretic.   Psychiatric: He has a normal mood and affect. Judgment and thought content normal.       Significant Labs:   CMP   Recent Labs   Lab 07/04/20  1803      K 5.3*      CO2 27   *   BUN 38*   CREATININE 1.7*   CALCIUM 8.5*   PROT 7.1   ALBUMIN 3.3*   BILITOT 0.4   ALKPHOS 67   AST 12   ALT 7*   ANIONGAP 10   ESTGFRAFRICA 37.4*   EGFRNONAA 32.4*   , CBC   Recent Labs   Lab 07/04/20  1803   WBC 20.45*   HGB 12.3*   HCT 40.2          Recent Labs   Lab 07/04/20  1803   TROPONINI 0.153*       Significant Imaging: Echocardiogram:   Transthoracic echo (TTE) complete (Cupid Only):   Results for orders placed or performed during the hospital encounter of 11/16/18   Transthoracic echo (TTE) complete   Result Value Ref Range    Ascending aorta 3.68 cm    STJ 2.88 cm    AV mean gradient 6.06 mmHg    Ao peak elvis 1.53 m/s    Ao VTI 29.03 cm    IVRT 0.06 msec    IVS 1.10 0.6 - 1.1 cm    LA size 3.82 cm    Left Atrium Major Axis 6.01 cm    Left Atrium Minor Axis 6.02 cm    LVIDD 4.14 3.5 - 6.0 cm    LVIDS 2.97 2.1 - 4.0 cm    LVOT diameter 2.21 cm    LVOT peak VTI 16.78 cm    PW 1.02 0.6 - 1.1 cm    MV Peak A Elvis 1.12 m/s    E wave decelartion time 161.61 msec    MV Peak E Elvis 1.04 m/s    PV Peak D Elvis 0.35 m/s    PV Peak S Elvis 0.45 m/s    RA Major Axis 5.00 cm    RA Width 4.20 cm    RVDD 3.70 cm    Sinus 3.42 cm    TAPSE 2.09 cm    TR Max Elvis 2.68 m/s    TDI LATERAL 0.07     TDI SEPTAL 0.05     LA WIDTH 4.68 cm    LV Diastolic Volume 75.77 mL    LV Systolic Volume 34.03  mL    RV S' 13.55 m/s    LV LATERAL E/E' RATIO 14.86     LV SEPTAL E/E' RATIO 20.80     FS 28 %    LA volume 91.40 cm3    LV mass 145.66 g    Left Ventricle Relative Wall Thickness 0.49 cm    AV valve area 2.22 cm2    E/A ratio 0.93     Mean e' 0.06     Pulm vein S/D ratio 1.29     LVOT area 3.83 cm2    LVOT stroke volume 64.33 cm3    AV peak gradient 9.36 mmHg    E/E' ratio 17.33     Triscuspid Valve Regurgitation Peak Gradient 28.73 mmHg    BSA 1.94 m2    LV Systolic Volume Index 17.5 mL/m2    LV Diastolic Volume Index 39.06 mL/m2    LA Volume Index 47.1 mL/m2    LV Mass Index 75.1 g/m2    Right Atrial Pressure (from IVC) 3 mmHg    TV rest pulmonary artery pressure 31.73 mmHg    Narrative    · Normal left ventricular systolic function. The estimated ejection   fraction is 60%  · Concentric left ventricular remodeling.  · Severe left atrial enlargement.  · Indeterminate left ventricular diastolic function.  · No wall motion abnormalities.  · Mild right atrial enlargement.  · Normal right ventricular systolic function.  · The ascending aorta is dilated.  · Mild mitral regurgitation.  · Mild tricuspid regurgitation.  · The estimated PA systolic pressure is 32mm Hg  · Normal central venous pressure (3 mm Hg).        Assessment and Plan:     * Acute on chronic diastolic congestive heart failure  - Symptoms of SOB and hypoxia could be related to volume overloaded, however in the setting of leukocytosis and hypoxia, we would cover for CAP.   - Continue Lasix 60 mg IV BID   - Continue ABx for CAP Ceftriaxone and Azithromycin   - Continue Albuterol-ipratropium 2.5 mg-0.5 mg Continue Q 6 PRN  for wheezes  - Repeat TTE to evaluate the LV function given remote last TTE  - Infectious workup with blood cultures, UA, urine cultures, procalcitonin  - Although his COVID is negative, but his lymphopenia raise concern for it   - Check COVID labs -- LD, CRP, ESR, Ferritin, lactic acid  - Venous US of the lower extremities    BPH with  urinary obstruction  - On home tamsulosin 0.4 mg PO daily - Continue  - U/S Renal to rule out obstruction     DNR (do not resuscitate)  - Patient himself and family based on prior encounters   - Addressed in PCP visit as well     Hypertension associated with diabetes  - Lisinopril 10 mg PO Daily - Hold in the setting of JAN and soft BP   - Amlodipine 5 mg PO daily  - Hold in the setting of soft BP     Controlled type 2 diabetes mellitus without complication, without long-term current use of insulin  - On diet control with no oral hypoglycemic agent   - Recent Hemoglobin A1C 7.0% last week   - Goal for his POC glucose is 140-180     VTE Risk Mitigation (From admission, onward)         Ordered     heparin (porcine) injection 5,000 Units  Every 8 hours      07/04/20 2034     IP VTE HIGH RISK PATIENT  Once      07/04/20 2034     Place sequential compression device  Until discontinued      07/04/20 2034     Place sequential compression device  Until discontinued      07/04/20 2034                Garrett Adrian MD  Cardiology   [facility]

## 2020-07-05 NOTE — PLAN OF CARE
Plan of care reviewed with patient/family; all questions and concerns addressed; no distress at present.

## 2020-07-05 NOTE — SUBJECTIVE & OBJECTIVE
Interval History: No acute events overnight. He is alert but non-communicative this morning. Not eating. Staying in bed. On NC 2L.       Objective:     Vital Signs (Most Recent):  Temp: 100.3 °F (37.9 °C) (07/05/20 0736)  Pulse: 104 (07/05/20 0736)  Resp: 18 (07/05/20 0736)  BP: (!) 125/55 (07/05/20 0736)  SpO2: 99 % (07/05/20 0736) Vital Signs (24h Range):  Temp:  [97.9 °F (36.6 °C)-100.3 °F (37.9 °C)] 100.3 °F (37.9 °C)  Pulse:  [] 104  Resp:  [16-25] 18  SpO2:  [91 %-100 %] 99 %  BP: ()/(55-73) 125/55     Weight: 76.5 kg (168 lb 10.4 oz)  Body mass index is 24.91 kg/m².     SpO2: 99 %  O2 Device (Oxygen Therapy): nasal cannula      Intake/Output Summary (Last 24 hours) at 7/5/2020 0826  Last data filed at 7/5/2020 0600  Gross per 24 hour   Intake 250 ml   Output 700 ml   Net -450 ml       Lines/Drains/Airways     Peripheral Intravenous Line                 Peripheral IV - Single Lumen 07/04/20 20 G Right Hand 1 day         Peripheral IV - Single Lumen 07/04/20 1803 18 G Right Antecubital less than 1 day                Physical Exam   Constitutional: He appears well-developed and well-nourished.   HENT:   Head: Normocephalic and atraumatic.   Eyes: Pupils are equal, round, and reactive to light. Conjunctivae are normal.   Neck: Normal range of motion. Neck supple.   Cardiovascular: Normal rate, regular rhythm, S1 normal, S2 normal and normal heart sounds. Exam reveals no gallop and no friction rub.   No murmur heard.  Pulses:       Radial pulses are 2+ on the right side and 2+ on the left side.   Pulmonary/Chest: Effort normal and breath sounds normal. No respiratory distress. He has no wheezes. He has no rales. He exhibits no tenderness.   Abdominal: Soft. Bowel sounds are normal. He exhibits no distension and no mass. There is no abdominal tenderness. There is no rebound and no guarding.   Musculoskeletal:         General: No edema.   Neurological: He is alert.   Skin: Skin is warm and dry.    Psychiatric: He is noncommunicative.       Significant Labs:   ABG:   Recent Labs   Lab 07/04/20 1813   PH 7.440   PCO2 36.1   HCO3 24.5   POCSATURATED 90*   BE 0   , Blood Culture:   Recent Labs   Lab 07/04/20 2045 07/04/20 2104   LABBLOO No Growth to date No Growth to date   , BMP:   Recent Labs   Lab 07/04/20 1803 07/05/20  0557   * 116*    141   K 5.3* 4.4    102   CO2 27 28   BUN 38* 38*   CREATININE 1.7* 1.5*   CALCIUM 8.5* 8.4*   MG  --  2.0   , CMP   Recent Labs   Lab 07/04/20 1803 07/05/20  0557    141   K 5.3* 4.4    102   CO2 27 28   * 116*   BUN 38* 38*   CREATININE 1.7* 1.5*   CALCIUM 8.5* 8.4*   PROT 7.1 6.4   ALBUMIN 3.3* 2.9*   BILITOT 0.4 0.3   ALKPHOS 67 64   AST 12 13   ALT 7* 7*   ANIONGAP 10 11   ESTGFRAFRICA 37.4* 43.5*   EGFRNONAA 32.4* 37.7*   , CBC   Recent Labs   Lab 07/04/20 1803 07/05/20  0557   WBC 20.45* 14.90*   HGB 12.3* 10.8*   HCT 40.2 34.6*    271   , INR   Recent Labs   Lab 07/04/20 1803   INR 1.3*   , Lipid Panel No results for input(s): CHOL, HDL, LDLCALC, TRIG, CHOLHDL in the last 48 hours., Troponin   Recent Labs   Lab 07/04/20  1803   TROPONINI 0.153*   , All pertinent lab results from the last 24 hours have been reviewed. and   Recent Lab Results       07/05/20  0557   07/04/20 2104 07/04/20 2053 07/04/20 2045 07/04/20 1813        Procalcitonin               Albumin 2.9             Alkaline Phosphatase 64             Allens Test         N/A     ALT 7             Anion Gap 11             Appearance, UA       Cloudy       AST 13             Bacteria, UA       Moderate       Baso # 0.04             Basophil% 0.3             Bilirubin (UA)       Negative       BILIRUBIN TOTAL 0.3  Comment:  For infants and newborns, interpretation of results should be based  on gestational age, weight and in agreement with clinical  observations.  Premature Infant recommended reference ranges:  Up to 24 hours.............<8.0  mg/dL  Up to 48 hours............<12.0 mg/dL  3-5 days..................<15.0 mg/dL  6-29 days.................<15.0 mg/dL               Blood Culture, Routine   No Growth to date[P]   No Growth to date[P]       BNP               Site         RR     BUN, Bld 38             Calcium 8.4             Chloride 102             CO2 28             Color, UA       Yellow       CPK               Creatinine 1.5             CRP               DelSys         Room Air     Differential Method Automated             eGFR if  43.5             eGFR if non  37.7  Comment:  Calculation used to obtain the estimated glomerular filtration  rate (eGFR) is the CKD-EPI equation.                Eos # 0.0             Eosinophil% 0.3             Ferritin               Glucose 116             Glucose, UA       Negative       Gran # (ANC) 12.4             Gran% 83.4             Hematocrit 34.6             Hemoglobin 10.8             Hyaline Casts, UA       6       Immature Grans (Abs) 0.12  Comment:  Mild elevation in immature granulocytes is non specific and   can be seen in a variety of conditions including stress response,   acute inflammation, trauma and pregnancy. Correlation with other   laboratory and clinical findings is essential.               Immature Granulocytes 0.8             INR               Ketones, UA       Negative       LD               Leukocytes, UA       3+       Lymph # 1.8             Lymph% 11.7             Magnesium 2.0             MCH 28.1             MCHC 31.2             MCV 90             Microscopic Comment       SEE COMMENT  Comment:  Other formed elements not mentioned in the report are not   present in the microscopic examination.          Mode         SPONT     Mono # 0.5             Mono% 3.5             MPV 10.4             NITRITE UA       Negative       nRBC 0             Occult Blood UA       1+       pH, UA       5.0       Platelets 271             POC BE         0     POC  HCO3         24.5     POC PCO2         36.1     POC PH         7.440     POC PO2         56     POC SATURATED O2         90     POC TCO2         26     POCT Glucose     188         Potassium 4.4             PROTEIN TOTAL 6.4             Protein, UA       Negative  Comment:  Recommend a 24 hour urine protein or a urine   protein/creatinine ratio if globulin induced proteinuria is  clinically suspected.         Protime               RBC 3.84             RBC, UA       1       RDW 14.1             Sample         ARTERIAL     SARS-CoV-2 RNA, Amplification, Qual               Sodium 141             Specific [place], UA       1.010       Specimen UA       Urine, Clean Catch       Squam Epithel, UA       0       Troponin I               WBC, UA       >100       WBC 14.90                              07/04/20  1803        Procalcitonin 17.77  Comment:  A concentration < 0.25 ng/mL represents a low risk bacterial   infection.  Procalcitonin may not be accurate among patients with localized   infection, recent trauma or major surgery, immunosuppressed state,   invasive fungal infection, renal dysfunction. Decisions regarding   initiation or continuation of antibiotic therapy should not be based   solely on procalcitonin levels.       Albumin 3.3     Alkaline Phosphatase 67     Allens Test       ALT 7     Anion Gap 10     Appearance, UA       AST 12     Bacteria, UA       Baso # 0.03     Basophil% 0.1     Bilirubin (UA)       BILIRUBIN TOTAL 0.4  Comment:  For infants and newborns, interpretation of results should be based  on gestational age, weight and in agreement with clinical  observations.  Premature Infant recommended reference ranges:  Up to 24 hours.............<8.0 mg/dL  Up to 48 hours............<12.0 mg/dL  3-5 days..................<15.0 mg/dL  6-29 days.................<15.0 mg/dL       Blood Culture, Routine       BNP 1,150  Comment:  Values of less than 100 pg/ml are consistent with non-CHF populations.      Site       BUN, Bld 38     Calcium 8.5     Chloride 102     CO2 27     Color, UA       CPK 85     Creatinine 1.7     CRP 84.1     DelSys       Differential Method Automated     eGFR if African American 37.4     eGFR if non  32.4  Comment:  Calculation used to obtain the estimated glomerular filtration  rate (eGFR) is the CKD-EPI equation.        Eos # 0.0     Eosinophil% 0.0     Ferritin 100     Glucose 215     Glucose, UA       Gran # (ANC) 18.8     Gran% 92.1     Hematocrit 40.2     Hemoglobin 12.3     Hyaline Casts, UA       Immature Grans (Abs) 0.15  Comment:  Mild elevation in immature granulocytes is non specific and   can be seen in a variety of conditions including stress response,   acute inflammation, trauma and pregnancy. Correlation with other   laboratory and clinical findings is essential.       Immature Granulocytes 0.7     INR 1.3  Comment:  Coumadin Therapy:  2.0 - 3.0 for INR for all indicators except mechanical heart valves  and antiphospholipid syndromes which should use 2.5 - 3.5.       Ketones, UA         Comment:  Results are increased in hemolyzed samples.     Leukocytes, UA       Lymph # 1.0     Lymph% 5.0     Magnesium       MCH 28.1     MCHC 30.6     MCV 92     Microscopic Comment       Mode       Mono # 0.4     Mono% 2.1     MPV 9.9     NITRITE UA       nRBC 0     Occult Blood UA       pH, UA       Platelets 286     POC BE       POC HCO3       POC PCO2       POC PH       POC PO2       POC SATURATED O2       POC TCO2       POCT Glucose       Potassium 5.3  Comment:  *No Visible Hemolysis     PROTEIN TOTAL 7.1     Protein, UA       Protime 12.9     RBC 4.37     RBC, UA       RDW 14.0     Sample       SARS-CoV-2 RNA, Amplification, Qual Negative  Comment:  This test utilizes isothermal nucleic acid amplification   technology to detect the SARS-CoV-2 RdRp nucleic acid segment.   The analytical sensitivity (limit of detection) is 125 genome   equivalents/mL.   A POSITIVE  result implies infection with the SARS-CoV-2 virus;  the patient is presumed to be contagious.    A NEGATIVE result means that SARS-CoV-2 nucleic acids are not  present above the limit of detection. A NEGATIVE result should be   treated as presumptive. It does not rule out the possibility of   COVID-19 and should not be the sole basis for treatment decisions.   If COVID-19 is strongly suspected based on clinical and exposure   history, re-testing using an alternate molecular assay should be   considered.   This test is only for use under the Food and Drug   Administration s Emergency Use Authorization (EUA).   Commercial kits are provided by [employer].   Performance characteristics of the EUA have been independently  verified by [facility] Department of  Pathology and Laboratory Medicine.   _________________________________________________________________  The ID NOW COVID-19 Letter of Authorization, along with the   authorized Fact Sheet for Healthcare Providers, the authorized Fact  Sheet for Patients, and authorized labeling are available on the FDA   website:  www.fda.gov/MedicalDevices/Safety/EmergencySituations/wza280211.htm       Sodium 139     Specific Gravity, UA       Specimen UA       Squam Epithel, UA       Troponin I 0.153  Comment:  The reference interval for Troponin I represents the 99th percentile   cutoff   for our facility and is consistent with 3rd generation assay   performance.       WBC, UA       WBC 20.45           Significant Imaging: CT scan: CT ABDOMEN PELVIS WITH CONTRAST: No results found for this visit on 07/04/20. and CT ABDOMEN PELVIS WITHOUT CONTRAST: No results found for this visit on 07/04/20., Echocardiogram:   2D echo with color flow doppler: No results found for this or any previous visit. and Transthoracic echo (TTE) complete (Cupid Only):   Results for orders placed or performed during the hospital encounter of 11/16/18   Transthoracic echo (TTE) complete    Result Value Ref Range    Ascending aorta 3.68 cm    STJ 2.88 cm    AV mean gradient 6.06 mmHg    Ao peak elvis 1.53 m/s    Ao VTI 29.03 cm    IVRT 0.06 msec    IVS 1.10 0.6 - 1.1 cm    LA size 3.82 cm    Left Atrium Major Axis 6.01 cm    Left Atrium Minor Axis 6.02 cm    LVIDD 4.14 3.5 - 6.0 cm    LVIDS 2.97 2.1 - 4.0 cm    LVOT diameter 2.21 cm    LVOT peak VTI 16.78 cm    PW 1.02 0.6 - 1.1 cm    MV Peak A Elvis 1.12 m/s    E wave decelartion time 161.61 msec    MV Peak E Elvis 1.04 m/s    PV Peak D Elvis 0.35 m/s    PV Peak S Elvis 0.45 m/s    RA Major Axis 5.00 cm    RA Width 4.20 cm    RVDD 3.70 cm    Sinus 3.42 cm    TAPSE 2.09 cm    TR Max Elvis 2.68 m/s    TDI LATERAL 0.07     TDI SEPTAL 0.05     LA WIDTH 4.68 cm    LV Diastolic Volume 75.77 mL    LV Systolic Volume 34.03 mL    RV S' 13.55 m/s    LV LATERAL E/E' RATIO 14.86     LV SEPTAL E/E' RATIO 20.80     FS 28 %    LA volume 91.40 cm3    LV mass 145.66 g    Left Ventricle Relative Wall Thickness 0.49 cm    AV valve area 2.22 cm2    E/A ratio 0.93     Mean e' 0.06     Pulm vein S/D ratio 1.29     LVOT area 3.83 cm2    LVOT stroke volume 64.33 cm3    AV peak gradient 9.36 mmHg    E/E' ratio 17.33     Triscuspid Valve Regurgitation Peak Gradient 28.73 mmHg    BSA 1.94 m2    LV Systolic Volume Index 17.5 mL/m2    LV Diastolic Volume Index 39.06 mL/m2    LA Volume Index 47.1 mL/m2    LV Mass Index 75.1 g/m2    Right Atrial Pressure (from IVC) 3 mmHg    TV rest pulmonary artery pressure 31.73 mmHg    Narrative    · Normal left ventricular systolic function. The estimated ejection   fraction is 60%  · Concentric left ventricular remodeling.  · Severe left atrial enlargement.  · Indeterminate left ventricular diastolic function.  · No wall motion abnormalities.  · Mild right atrial enlargement.  · Normal right ventricular systolic function.  · The ascending aorta is dilated.  · Mild mitral regurgitation.  · Mild tricuspid regurgitation.  · The estimated PA systolic  pressure is 32mm Hg  · Normal central venous pressure (3 mm Hg).       and X-Ray: CXR: X-Ray Chest 1 View (CXR): No results found for this visit on 07/04/20. and X-Ray Chest PA and Lateral (CXR): No results found for this visit on 07/04/20. and KUB: X-Ray Abdomen AP 1 View (KUB): No results found for this visit on 07/04/20.

## 2020-07-05 NOTE — ASSESSMENT & PLAN NOTE
- Symptoms of SOB and hypoxia could be related to volume overloaded, however in the setting of leukocytosis and hypoxia, we would cover for CAP.   - Continue Lasix 60 mg IV BID   - Continue ABx for CAP Ceftriaxone and Azithromycin   - Continue Albuterol-ipratropium 2.5 mg-0.5 mg Continue Q 6 PRN  for wheezes  - Repeat TTE to evaluate the LV function given remote last TTE  - Infectious workup with blood cultures, UA, urine cultures, procalcitonin  - Although his COVID is negative, but his lymphopenia raise concern for it   - Check COVID labs -- LD, CRP, ESR, Ferritin, lactic acid  - Venous US of the lower extremities

## 2020-07-05 NOTE — SUBJECTIVE & OBJECTIVE
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      Current Outpatient Medications on File Prior to Encounter   Medication Sig    albuterol (VENTOLIN HFA) 90 mcg/actuation inhaler Inhale 2 puffs into the lungs every 6 (six) hours as needed for Wheezing. Rescue    albuterol-ipratropium (DUO-NEB) 2.5 mg-0.5 mg/3 mL nebulizer solution Take 3 mLs by nebulization every 6 (six) hours as needed for Wheezing. Rescue    amLODIPine (NORVASC) 5 MG tablet Take 1 tablet (5 mg total) by mouth once daily.    artificial tears w/ lanolin (AKWA TEARS) 0.5% ophthalmic ointment Apply to affected eye(s) as needed for dryness.    external catheter, male Misc Use condom catheter every night    furosemide (LASIX) 40 MG tablet Take 1 tablet (40 mg total) by mouth once daily.    latanoprost 0.005 % ophthalmic solution Place 1 drop into both eyes every evening.    lisinopril 10 MG tablet Take 1 tablet (10 mg total) by mouth once daily.    senna-docusate 8.6-50 mg (PERICOLACE) 8.6-50 mg per tablet Take 1 tablet by mouth 2 (two) times daily as needed for Constipation.    tamsulosin (FLOMAX) 0.4 mg Cap Take 1 capsule (0.4 mg total) by mouth once daily.     Family History      Problem Relation (Age of Onset)    Cerebral aneurysm Mother    Kidney failure Sister (96)    Other Father        Tobacco Use    Smoking status: Never Smoker    Smokeless tobacco: Never Used   Substance and Sexual Activity    Alcohol use: No    Drug use: Not on file    Sexual activity: Not on file     Review of Systems   Constitution: Positive for malaise/fatigue and weight gain. Negative for chills and fever.   HENT: Negative for congestion.    Eyes: Negative for visual disturbance.   Cardiovascular: Positive for leg swelling. Negative for claudication, dyspnea on exertion, orthopnea, palpitations and syncope.   Respiratory: Positive for shortness of breath. Negative for cough and snoring.    Hematologic/Lymphatic: Does not bruise/bleed easily.   Skin: Negative for rash.   Musculoskeletal: Negative for muscle cramps and myalgias.   Gastrointestinal: Negative for bloating, abdominal pain, constipation, diarrhea and melena.   Genitourinary: Negative for bladder incontinence.   Neurological: Negative for excessive daytime sleepiness, focal weakness and weakness.   Psychiatric/Behavioral: Negative for depression and suicidal ideas.     Objective:     Vital Signs (Most Recent):  Temp: 98.5 °F (36.9 °C) (07/04/20 1738)  Pulse: 86 (07/04/20 1945)  Resp: (!) 23 (07/04/20 1945)  BP: (!) 115/57 (07/04/20 1945)  SpO2: 100 % (07/04/20 1945) Vital Signs (24h Range):  Temp:  [98.5 °F (36.9 °C)] 98.5 °F (36.9 °C)  Pulse:  [85-95] 86  Resp:  [16-25] 23  SpO2:  [93 %-100 %] 100 %  BP: ()/(56-73) 115/57     Weight: 75.3 kg (166 lb 0.1 oz)  Body mass index is 24.51 kg/m².    SpO2: 100 %  O2 Device (Oxygen Therapy): nasal cannula    No intake or output data in the 24 hours ending 07/04/20 2002    Lines/Drains/Airways     Peripheral Intravenous Line                 Peripheral IV - Single Lumen 07/04/20 1803 18 G Right Antecubital less than 1 day         Peripheral IV - Single Lumen 07/04/20 20 G Right Hand less than 1 day                 Physical Exam   Constitutional: He is oriented to person, place, and time. He appears well-developed and well-nourished. No distress.   HENT:   Head: Normocephalic and atraumatic.   Mouth/Throat: Oropharynx is clear and moist.   Eyes: Pupils are equal, round, and reactive to light. Conjunctivae and EOM are normal. No scleral icterus.   Neck: Normal range of motion. Neck supple. No JVD present.   Cardiovascular: Normal rate, regular rhythm, normal heart sounds and intact distal pulses.   No murmur heard.  Pulses:       Radial pulses are 2+ on the right side and 2+ on the left side.   Pulmonary/Chest: Effort normal. No respiratory distress. He has rales.   Symmetrical expansion   Abdominal: Soft. Bowel sounds are normal. There is no hepatosplenomegaly. There is no abdominal tenderness.   Musculoskeletal: Normal range of motion.         General: Edema present.   Neurological: He is alert and oriented to person, place, and time. No cranial nerve deficit.   Skin: Skin is warm and dry. No rash noted. He is not diaphoretic.   Psychiatric: He has a normal mood and affect. Judgment and thought content normal.       Significant Labs:   CMP   Recent Labs   Lab 07/04/20  1803      K 5.3*      CO2 27   *   BUN 38*   CREATININE 1.7*   CALCIUM 8.5*   PROT 7.1   ALBUMIN 3.3*   BILITOT 0.4   ALKPHOS 67   AST 12   ALT 7*   ANIONGAP 10   ESTGFRAFRICA 37.4*   EGFRNONAA 32.4*   , CBC   Recent Labs   Lab 07/04/20  1803   WBC 20.45*   HGB 12.3*   HCT 40.2          Recent Labs   Lab 07/04/20  1803   TROPONINI 0.153*       Significant Imaging: Echocardiogram:   Transthoracic echo (TTE) complete (Cupid Only):   Results for orders placed or performed during the hospital encounter of 11/16/18   Transthoracic echo (TTE) complete   Result Value Ref Range    Ascending aorta 3.68 cm    STJ 2.88 cm    AV mean gradient 6.06 mmHg    Ao peak guera 1.53 m/s    Ao VTI 29.03 cm    IVRT 0.06 msec    IVS 1.10 0.6 -  1.1 cm    LA size 3.82 cm    Left Atrium Major Axis 6.01 cm    Left Atrium Minor Axis 6.02 cm    LVIDD 4.14 3.5 - 6.0 cm    LVIDS 2.97 2.1 - 4.0 cm    LVOT diameter 2.21 cm    LVOT peak VTI 16.78 cm    PW 1.02 0.6 - 1.1 cm    MV Peak A Elvis 1.12 m/s    E wave decelartion time 161.61 msec    MV Peak E Elvis 1.04 m/s    PV Peak D Elvis 0.35 m/s    PV Peak S Elvis 0.45 m/s    RA Major Axis 5.00 cm    RA Width 4.20 cm    RVDD 3.70 cm    Sinus 3.42 cm    TAPSE 2.09 cm    TR Max Elvis 2.68 m/s    TDI LATERAL 0.07     TDI SEPTAL 0.05     LA WIDTH 4.68 cm    LV Diastolic Volume 75.77 mL    LV Systolic Volume 34.03 mL    RV S' 13.55 m/s    LV LATERAL E/E' RATIO 14.86     LV SEPTAL E/E' RATIO 20.80     FS 28 %    LA volume 91.40 cm3    LV mass 145.66 g    Left Ventricle Relative Wall Thickness 0.49 cm    AV valve area 2.22 cm2    E/A ratio 0.93     Mean e' 0.06     Pulm vein S/D ratio 1.29     LVOT area 3.83 cm2    LVOT stroke volume 64.33 cm3    AV peak gradient 9.36 mmHg    E/E' ratio 17.33     Triscuspid Valve Regurgitation Peak Gradient 28.73 mmHg    BSA 1.94 m2    LV Systolic Volume Index 17.5 mL/m2    LV Diastolic Volume Index 39.06 mL/m2    LA Volume Index 47.1 mL/m2    LV Mass Index 75.1 g/m2    Right Atrial Pressure (from IVC) 3 mmHg    TV rest pulmonary artery pressure 31.73 mmHg    Narrative    · Normal left ventricular systolic function. The estimated ejection   fraction is 60%  · Concentric left ventricular remodeling.  · Severe left atrial enlargement.  · Indeterminate left ventricular diastolic function.  · No wall motion abnormalities.  · Mild right atrial enlargement.  · Normal right ventricular systolic function.  · The ascending aorta is dilated.  · Mild mitral regurgitation.  · Mild tricuspid regurgitation.  · The estimated PA systolic pressure is 32mm Hg  · Normal central venous pressure (3 mm Hg).

## 2020-07-05 NOTE — ASSESSMENT & PLAN NOTE
- Lisinopril 10 mg PO Daily - Hold in the setting of JAN and soft BP   - Amlodipine 5 mg PO daily  - Hold in the setting of soft BP

## 2020-07-05 NOTE — CONSULTS
[facility]  Cardiology  Consult Note    Patient Name: Zbigniew Forman  MRN: [id number]  Admission Date: 7/4/2020  Hospital Length of Stay: 0 days  Code Status: Prior   Attending Provider: Cosme Shabazz MD   Consulting Provider: Philippe Bravo MD  Primary Care Physician: Blas Morgan Ii, MD  Principal Problem:Acute on chronic diastolic congestive heart failure    Patient information was obtained from patient, past medical records and ER records.     Inpatient consult to Cardiology  Consult performed by: Philippe Bravo MD  Consult ordered by: Cosme Shabazz MD        Subjective:     Chief Complaint:  Fatigue     HPI:   We are consulted for evaluation of heart failure symptoms and elevated troponins in this 89yo man with history of renal failure, HTN, DM2, CVA, and thyroid disease. Possible history of HFpEF, on intermittent lasix.     He lives at home with three geriatric children, all of whom are scared of COVID.  Weight has been ranging 162-168 and has not changed significantly; they give an extra lasix when it goes up more than 3 lbs.    He states he has been more fatigued the last few weeks, not responding as well to his lasix. Had seen PCP who recommended continued oral diuresis and if worsening symptoms presentation to the ED. Aside from fatigue and dyspnea, he has had an intermittent cough which per daughter at bedside responds to lasix. There have been no fevers, no urinary symptoms, nasal congestion abdominal pain. Currently the patient states he feels slightly better.    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      Current Outpatient Medications on File Prior to Encounter   Medication Sig    albuterol (VENTOLIN HFA) 90 mcg/actuation inhaler Inhale 2 puffs into the lungs every 6 (six) hours as needed for Wheezing. Rescue    albuterol-ipratropium (DUO-NEB) 2.5 mg-0.5 mg/3 mL nebulizer solution Take 3 mLs by nebulization every 6 (six) hours as needed for Wheezing. Rescue    amLODIPine (NORVASC) 5 MG tablet Take 1 tablet (5 mg total) by mouth once daily.    artificial tears w/ lanolin (AKWA TEARS) 0.5% ophthalmic ointment Apply to affected eye(s) as needed for dryness.    external catheter, male Misc Use condom catheter every night    furosemide (LASIX) 40 MG tablet Take 1 tablet (40 mg total) by mouth once daily.    latanoprost 0.005 % ophthalmic solution Place 1 drop into both eyes every evening.    lisinopril 10 MG tablet Take 1 tablet (10 mg total) by mouth once daily.    senna-docusate 8.6-50 mg (PERICOLACE) 8.6-50 mg per tablet Take 1 tablet by mouth 2 (two) times daily as needed for Constipation.    tamsulosin (FLOMAX) 0.4 mg Cap Take 1 capsule (0.4 mg total) by mouth once daily.     Family History     Problem Relation (Age of Onset)    Cerebral aneurysm Mother    Kidney failure Sister (96)    Other Father        Tobacco Use    Smoking status: Never Smoker    Smokeless tobacco: Never Used   Substance and Sexual Activity    Alcohol use: No    Drug use: Not on file    Sexual activity: Not on file     Review of Systems   Constitution: Positive for malaise/fatigue and weight gain.  Negative for chills and fever.   HENT: Negative for congestion.    Eyes: Negative for visual disturbance.   Cardiovascular: Positive for leg swelling. Negative for claudication, dyspnea on exertion, orthopnea, palpitations and syncope.   Respiratory: Positive for shortness of breath. Negative for cough and snoring.    Hematologic/Lymphatic: Does not bruise/bleed easily.   Skin: Negative for rash.   Musculoskeletal: Negative for muscle cramps and myalgias.   Gastrointestinal: Negative for bloating, abdominal pain, constipation, diarrhea and melena.   Genitourinary: Negative for bladder incontinence.   Neurological: Negative for excessive daytime sleepiness, focal weakness and weakness.   Psychiatric/Behavioral: Negative for depression and suicidal ideas.     Objective:     Vital Signs (Most Recent):  Temp: 98.5 °F (36.9 °C) (07/04/20 1738)  Pulse: 86 (07/04/20 1945)  Resp: (!) 23 (07/04/20 1945)  BP: (!) 115/57 (07/04/20 1945)  SpO2: 100 % (07/04/20 1945) Vital Signs (24h Range):  Temp:  [98.5 °F (36.9 °C)] 98.5 °F (36.9 °C)  Pulse:  [85-95] 86  Resp:  [16-25] 23  SpO2:  [93 %-100 %] 100 %  BP: ()/(56-73) 115/57     Weight: 75.3 kg (166 lb 0.1 oz)  Body mass index is 24.51 kg/m².    SpO2: 100 %  O2 Device (Oxygen Therapy): nasal cannula    No intake or output data in the 24 hours ending 07/04/20 2002    Lines/Drains/Airways     Peripheral Intravenous Line                 Peripheral IV - Single Lumen 07/04/20 1803 18 G Right Antecubital less than 1 day         Peripheral IV - Single Lumen 07/04/20 20 G Right Hand less than 1 day                Physical Exam   Constitutional: He is oriented to person, place, and time. He appears well-developed and well-nourished. No distress.   HENT:   Head: Normocephalic and atraumatic.   Mouth/Throat: Oropharynx is clear and moist.   Eyes: Pupils are equal, round, and reactive to light. Conjunctivae and EOM are normal. No scleral icterus.   Neck: Normal range of motion. Neck  supple. No JVD present.   Cardiovascular: Normal rate, regular rhythm, normal heart sounds and intact distal pulses.   No murmur heard.  Pulses:       Radial pulses are 2+ on the right side and 2+ on the left side.   Pulmonary/Chest: Effort normal. No respiratory distress. He has rales.   Symmetrical expansion   Abdominal: Soft. Bowel sounds are normal. There is no hepatosplenomegaly. There is no abdominal tenderness.   Musculoskeletal: Normal range of motion.         General: Edema present.   Neurological: He is alert and oriented to person, place, and time. No cranial nerve deficit.   Skin: Skin is warm and dry. No rash noted. He is not diaphoretic.   Psychiatric: He has a normal mood and affect. Judgment and thought content normal.       Significant Labs:   CMP   Recent Labs   Lab 07/04/20  1803      K 5.3*      CO2 27   *   BUN 38*   CREATININE 1.7*   CALCIUM 8.5*   PROT 7.1   ALBUMIN 3.3*   BILITOT 0.4   ALKPHOS 67   AST 12   ALT 7*   ANIONGAP 10   ESTGFRAFRICA 37.4*   EGFRNONAA 32.4*   , CBC   Recent Labs   Lab 07/04/20  1803   WBC 20.45*   HGB 12.3*   HCT 40.2          Recent Labs   Lab 07/04/20  1803   TROPONINI 0.153*       Significant Imaging: Echocardiogram:   Transthoracic echo (TTE) complete (Cupid Only):   Results for orders placed or performed during the hospital encounter of 11/16/18   Transthoracic echo (TTE) complete   Result Value Ref Range    Ascending aorta 3.68 cm    STJ 2.88 cm    AV mean gradient 6.06 mmHg    Ao peak elvis 1.53 m/s    Ao VTI 29.03 cm    IVRT 0.06 msec    IVS 1.10 0.6 - 1.1 cm    LA size 3.82 cm    Left Atrium Major Axis 6.01 cm    Left Atrium Minor Axis 6.02 cm    LVIDD 4.14 3.5 - 6.0 cm    LVIDS 2.97 2.1 - 4.0 cm    LVOT diameter 2.21 cm    LVOT peak VTI 16.78 cm    PW 1.02 0.6 - 1.1 cm    MV Peak A Elvis 1.12 m/s    E wave decelartion time 161.61 msec    MV Peak E Elvis 1.04 m/s    PV Peak D Elvis 0.35 m/s    PV Peak S Elvis 0.45 m/s    RA Major Axis 5.00 cm     RA Width 4.20 cm    RVDD 3.70 cm    Sinus 3.42 cm    TAPSE 2.09 cm    TR Max Elvis 2.68 m/s    TDI LATERAL 0.07     TDI SEPTAL 0.05     LA WIDTH 4.68 cm    LV Diastolic Volume 75.77 mL    LV Systolic Volume 34.03 mL    RV S' 13.55 m/s    LV LATERAL E/E' RATIO 14.86     LV SEPTAL E/E' RATIO 20.80     FS 28 %    LA volume 91.40 cm3    LV mass 145.66 g    Left Ventricle Relative Wall Thickness 0.49 cm    AV valve area 2.22 cm2    E/A ratio 0.93     Mean e' 0.06     Pulm vein S/D ratio 1.29     LVOT area 3.83 cm2    LVOT stroke volume 64.33 cm3    AV peak gradient 9.36 mmHg    E/E' ratio 17.33     Triscuspid Valve Regurgitation Peak Gradient 28.73 mmHg    BSA 1.94 m2    LV Systolic Volume Index 17.5 mL/m2    LV Diastolic Volume Index 39.06 mL/m2    LA Volume Index 47.1 mL/m2    LV Mass Index 75.1 g/m2    Right Atrial Pressure (from IVC) 3 mmHg    TV rest pulmonary artery pressure 31.73 mmHg    Narrative    · Normal left ventricular systolic function. The estimated ejection   fraction is 60%  · Concentric left ventricular remodeling.  · Severe left atrial enlargement.  · Indeterminate left ventricular diastolic function.  · No wall motion abnormalities.  · Mild right atrial enlargement.  · Normal right ventricular systolic function.  · The ascending aorta is dilated.  · Mild mitral regurgitation.  · Mild tricuspid regurgitation.  · The estimated PA systolic pressure is 32mm Hg  · Normal central venous pressure (3 mm Hg).        Assessment and Plan:     * Acute on chronic diastolic congestive heart failure  Acute on chronic HFpEF, needs reassessment with echo. Possible component of exacerbation is an infectious process which would explain the WBC count. Would rule out DVT/PE.    - recommend admission to floors under cardiology, accepted by Dr. Becerril  - empiric coverage with Rocephin/azithro  - infectious workup with blood cultures, UA, urine cultures, procalcitonin  - check COVID labs -- LD, CRP, ESR, Ferritin, lactic  acid  - diuresis with lasix 80mg IV and strict ins and outs, and 1500cc fluid restriction  - Venous US of the lower extremities  - check echocardiogram        VTE Risk Mitigation (From admission, onward)    None          Thank you for your consult.     Philippe Bravo MD  Cardiology   [facility]

## 2020-07-05 NOTE — ASSESSMENT & PLAN NOTE
Likely bacterial pneumonia  - Bcx pending  - On CTX and Azithro (7/4)  - Procal 17 (day 1),repeat procal (day 3)  - Covid Negative but has elevated, possible false negative   - Venous US of the lower extremities to evaluate for DVT. Concern for PE.

## 2020-07-05 NOTE — PROGRESS NOTES
[facility]  Cardiology  Progress Note    Patient Name: Zbigniew Forman  MRN: [id number]  Admission Date: 7/4/2020  Hospital Length of Stay: 1 days  Code Status: DNR   Attending Physician: Geraldine Becerril MD   Primary Care Physician: Blas Morgan Ii, MD  Expected Discharge Date:   Principal Problem:Acute on chronic diastolic congestive heart failure    Subjective:     Hospital Course:   No notes on file    Interval History: No acute events overnight. He is alert but non-communicative this morning. Not eating. Staying in bed. On NC 2L.       Objective:     Vital Signs (Most Recent):  Temp: 100.3 °F (37.9 °C) (07/05/20 0736)  Pulse: 104 (07/05/20 0736)  Resp: 18 (07/05/20 0736)  BP: (!) 125/55 (07/05/20 0736)  SpO2: 99 % (07/05/20 0736) Vital Signs (24h Range):  Temp:  [97.9 °F (36.6 °C)-100.3 °F (37.9 °C)] 100.3 °F (37.9 °C)  Pulse:  [] 104  Resp:  [16-25] 18  SpO2:  [91 %-100 %] 99 %  BP: ()/(55-73) 125/55     Weight: 76.5 kg (168 lb 10.4 oz)  Body mass index is 24.91 kg/m².     SpO2: 99 %  O2 Device (Oxygen Therapy): nasal cannula      Intake/Output Summary (Last 24 hours) at 7/5/2020 0826  Last data filed at 7/5/2020 0600  Gross per 24 hour   Intake 250 ml   Output 700 ml   Net -450 ml       Lines/Drains/Airways     Peripheral Intravenous Line                 Peripheral IV - Single Lumen 07/04/20 20 G Right Hand 1 day         Peripheral IV - Single Lumen 07/04/20 1803 18 G Right Antecubital less than 1 day                Physical Exam   Constitutional: He appears well-developed and well-nourished.   HENT:   Head: Normocephalic and atraumatic.   Eyes: Pupils are equal, round, and reactive to light. Conjunctivae are normal.   Neck: Normal range of motion. Neck supple.   Cardiovascular: Normal rate, regular rhythm, S1 normal, S2 normal and normal heart sounds. Exam reveals no gallop and no friction rub.   No murmur heard.  Pulses:       Radial pulses are 2+ on the right side and  2+ on the left side.   Pulmonary/Chest: Effort normal and breath sounds normal. No respiratory distress. He has no wheezes. He has no rales. He exhibits no tenderness.   Abdominal: Soft. Bowel sounds are normal. He exhibits no distension and no mass. There is no abdominal tenderness. There is no rebound and no guarding.   Musculoskeletal:         General: No edema.   Neurological: He is alert.   Skin: Skin is warm and dry.   Psychiatric: He is noncommunicative.       Significant Labs:   ABG:   Recent Labs   Lab 07/04/20 1813   PH 7.440   PCO2 36.1   HCO3 24.5   POCSATURATED 90*   BE 0   , Blood Culture:   Recent Labs   Lab 07/04/20 2045 07/04/20 2104   LABBLOO No Growth to date No Growth to date   , BMP:   Recent Labs   Lab 07/04/20 1803 07/05/20  0557   * 116*    141   K 5.3* 4.4    102   CO2 27 28   BUN 38* 38*   CREATININE 1.7* 1.5*   CALCIUM 8.5* 8.4*   MG  --  2.0   , CMP   Recent Labs   Lab 07/04/20 1803 07/05/20  0557    141   K 5.3* 4.4    102   CO2 27 28   * 116*   BUN 38* 38*   CREATININE 1.7* 1.5*   CALCIUM 8.5* 8.4*   PROT 7.1 6.4   ALBUMIN 3.3* 2.9*   BILITOT 0.4 0.3   ALKPHOS 67 64   AST 12 13   ALT 7* 7*   ANIONGAP 10 11   ESTGFRAFRICA 37.4* 43.5*   EGFRNONAA 32.4* 37.7*   , CBC   Recent Labs   Lab 07/04/20 1803 07/05/20  0557   WBC 20.45* 14.90*   HGB 12.3* 10.8*   HCT 40.2 34.6*    271   , INR   Recent Labs   Lab 07/04/20 1803   INR 1.3*   , Lipid Panel No results for input(s): CHOL, HDL, LDLCALC, TRIG, CHOLHDL in the last 48 hours., Troponin   Recent Labs   Lab 07/04/20 1803   TROPONINI 0.153*   , All pertinent lab results from the last 24 hours have been reviewed. and   Recent Lab Results       07/05/20  0557   07/04/20 2104 07/04/20 2053 07/04/20 2045 07/04/20  1813        Procalcitonin               Albumin 2.9             Alkaline Phosphatase 64             Allens Test         N/A     ALT 7             Anion Gap 11              Appearance, UA       Cloudy       AST 13             Bacteria, UA       Moderate       Baso # 0.04             Basophil% 0.3             Bilirubin (UA)       Negative       BILIRUBIN TOTAL 0.3  Comment:  For infants and newborns, interpretation of results should be based  on gestational age, weight and in agreement with clinical  observations.  Premature Infant recommended reference ranges:  Up to 24 hours.............<8.0 mg/dL  Up to 48 hours............<12.0 mg/dL  3-5 days..................<15.0 mg/dL  6-29 days.................<15.0 mg/dL               Blood Culture, Routine   No Growth to date[P]   No Growth to date[P]       BNP               Site         RR     BUN, Bld 38             Calcium 8.4             Chloride 102             CO2 28             Color, UA       Yellow       CPK               Creatinine 1.5             CRP               DelSys         Room Air     Differential Method Automated             eGFR if  43.5             eGFR if non  37.7  Comment:  Calculation used to obtain the estimated glomerular filtration  rate (eGFR) is the CKD-EPI equation.                Eos # 0.0             Eosinophil% 0.3             Ferritin               Glucose 116             Glucose, UA       Negative       Gran # (ANC) 12.4             Gran% 83.4             Hematocrit 34.6             Hemoglobin 10.8             Hyaline Casts, UA       6       Immature Grans (Abs) 0.12  Comment:  Mild elevation in immature granulocytes is non specific and   can be seen in a variety of conditions including stress response,   acute inflammation, trauma and pregnancy. Correlation with other   laboratory and clinical findings is essential.               Immature Granulocytes 0.8             INR               Ketones, UA       Negative       LD               Leukocytes, UA       3+       Lymph # 1.8             Lymph% 11.7             Magnesium 2.0             MCH 28.1             MCHC 31.2              MCV 90             Microscopic Comment       SEE COMMENT  Comment:  Other formed elements not mentioned in the report are not   present in the microscopic examination.          Mode         SPONT     Mono # 0.5             Mono% 3.5             MPV 10.4             NITRITE UA       Negative       nRBC 0             Occult Blood UA       1+       pH, UA       5.0       Platelets 271             POC BE         0     POC HCO3         24.5     POC PCO2         36.1     POC PH         7.440     POC PO2         56     POC SATURATED O2         90     POC TCO2         26     POCT Glucose     188         Potassium 4.4             PROTEIN TOTAL 6.4             Protein, UA       Negative  Comment:  Recommend a 24 hour urine protein or a urine   protein/creatinine ratio if globulin induced proteinuria is  clinically suspected.         Protime               RBC 3.84             RBC, UA       1       RDW 14.1             Sample         ARTERIAL     SARS-CoV-2 RNA, Amplification, Qual               Sodium 141             Specific Aromas, UA       1.010       Specimen UA       Urine, Clean Catch       Squam Epithel, UA       0       Troponin I               WBC, UA       >100       WBC 14.90                              07/04/20  1803        Procalcitonin 17.77  Comment:  A concentration < 0.25 ng/mL represents a low risk bacterial   infection.  Procalcitonin may not be accurate among patients with localized   infection, recent trauma or major surgery, immunosuppressed state,   invasive fungal infection, renal dysfunction. Decisions regarding   initiation or continuation of antibiotic therapy should not be based   solely on procalcitonin levels.       Albumin 3.3     Alkaline Phosphatase 67     Allens Test       ALT 7     Anion Gap 10     Appearance, UA       AST 12     Bacteria, UA       Baso # 0.03     Basophil% 0.1     Bilirubin (UA)       BILIRUBIN TOTAL 0.4  Comment:  For infants and newborns, interpretation of results  should be based  on gestational age, weight and in agreement with clinical  observations.  Premature Infant recommended reference ranges:  Up to 24 hours.............<8.0 mg/dL  Up to 48 hours............<12.0 mg/dL  3-5 days..................<15.0 mg/dL  6-29 days.................<15.0 mg/dL       Blood Culture, Routine       BNP 1,150  Comment:  Values of less than 100 pg/ml are consistent with non-CHF populations.     Site       BUN, Bld 38     Calcium 8.5     Chloride 102     CO2 27     Color, UA       CPK 85     Creatinine 1.7     CRP 84.1     DelSys       Differential Method Automated     eGFR if African American 37.4     eGFR if non  32.4  Comment:  Calculation used to obtain the estimated glomerular filtration  rate (eGFR) is the CKD-EPI equation.        Eos # 0.0     Eosinophil% 0.0     Ferritin 100     Glucose 215     Glucose, UA       Gran # (ANC) 18.8     Gran% 92.1     Hematocrit 40.2     Hemoglobin 12.3     Hyaline Casts, UA       Immature Grans (Abs) 0.15  Comment:  Mild elevation in immature granulocytes is non specific and   can be seen in a variety of conditions including stress response,   acute inflammation, trauma and pregnancy. Correlation with other   laboratory and clinical findings is essential.       Immature Granulocytes 0.7     INR 1.3  Comment:  Coumadin Therapy:  2.0 - 3.0 for INR for all indicators except mechanical heart valves  and antiphospholipid syndromes which should use 2.5 - 3.5.       Ketones, UA         Comment:  Results are increased in hemolyzed samples.     Leukocytes, UA       Lymph # 1.0     Lymph% 5.0     Magnesium       MCH 28.1     MCHC 30.6     MCV 92     Microscopic Comment       Mode       Mono # 0.4     Mono% 2.1     MPV 9.9     NITRITE UA       nRBC 0     Occult Blood UA       pH, UA       Platelets 286     POC BE       POC HCO3       POC PCO2       POC PH       POC PO2       POC SATURATED O2       POC TCO2       POCT Glucose       Potassium  5.3  Comment:  *No Visible Hemolysis     PROTEIN TOTAL 7.1     Protein, UA       Protime 12.9     RBC 4.37     RBC, UA       RDW 14.0     Sample       SARS-CoV-2 RNA, Amplification, Qual Negative  Comment:  This test utilizes isothermal nucleic acid amplification   technology to detect the SARS-CoV-2 RdRp nucleic acid segment.   The analytical sensitivity (limit of detection) is 125 genome   equivalents/mL.   A POSITIVE result implies infection with the SARS-CoV-2 virus;  the patient is presumed to be contagious.    A NEGATIVE result means that SARS-CoV-2 nucleic acids are not  present above the limit of detection. A NEGATIVE result should be   treated as presumptive. It does not rule out the possibility of   COVID-19 and should not be the sole basis for treatment decisions.   If COVID-19 is strongly suspected based on clinical and exposure   history, re-testing using an alternate molecular assay should be   considered.   This test is only for use under the Food and Drug   Administration s Emergency Use Authorization (EUA).   Commercial kits are provided by [employer].   Performance characteristics of the EUA have been independently  verified by [facility] Department of  Pathology and Laboratory Medicine.   _________________________________________________________________  The ID NOW COVID-19 Letter of Authorization, along with the   authorized Fact Sheet for Healthcare Providers, the authorized Fact  Sheet for Patients, and authorized labeling are available on the FDA   website:  www.fda.gov/MedicalDevices/Safety/EmergencySituations/zty808111.htm       Sodium 139     Specific Gravity, UA       Specimen UA       Squam Epithel, UA       Troponin I 0.153  Comment:  The reference interval for Troponin I represents the 99th percentile   cutoff   for our facility and is consistent with 3rd generation assay   performance.       WBC, UA       WBC 20.45           Significant Imaging: CT scan: CT ABDOMEN  PELVIS WITH CONTRAST: No results found for this visit on 07/04/20. and CT ABDOMEN PELVIS WITHOUT CONTRAST: No results found for this visit on 07/04/20., Echocardiogram:   2D echo with color flow doppler: No results found for this or any previous visit. and Transthoracic echo (TTE) complete (Cupid Only):   Results for orders placed or performed during the hospital encounter of 11/16/18   Transthoracic echo (TTE) complete   Result Value Ref Range    Ascending aorta 3.68 cm    STJ 2.88 cm    AV mean gradient 6.06 mmHg    Ao peak elvis 1.53 m/s    Ao VTI 29.03 cm    IVRT 0.06 msec    IVS 1.10 0.6 - 1.1 cm    LA size 3.82 cm    Left Atrium Major Axis 6.01 cm    Left Atrium Minor Axis 6.02 cm    LVIDD 4.14 3.5 - 6.0 cm    LVIDS 2.97 2.1 - 4.0 cm    LVOT diameter 2.21 cm    LVOT peak VTI 16.78 cm    PW 1.02 0.6 - 1.1 cm    MV Peak A Elvis 1.12 m/s    E wave decelartion time 161.61 msec    MV Peak E Elvis 1.04 m/s    PV Peak D Elvis 0.35 m/s    PV Peak S Elvis 0.45 m/s    RA Major Axis 5.00 cm    RA Width 4.20 cm    RVDD 3.70 cm    Sinus 3.42 cm    TAPSE 2.09 cm    TR Max Elvis 2.68 m/s    TDI LATERAL 0.07     TDI SEPTAL 0.05     LA WIDTH 4.68 cm    LV Diastolic Volume 75.77 mL    LV Systolic Volume 34.03 mL    RV S' 13.55 m/s    LV LATERAL E/E' RATIO 14.86     LV SEPTAL E/E' RATIO 20.80     FS 28 %    LA volume 91.40 cm3    LV mass 145.66 g    Left Ventricle Relative Wall Thickness 0.49 cm    AV valve area 2.22 cm2    E/A ratio 0.93     Mean e' 0.06     Pulm vein S/D ratio 1.29     LVOT area 3.83 cm2    LVOT stroke volume 64.33 cm3    AV peak gradient 9.36 mmHg    E/E' ratio 17.33     Triscuspid Valve Regurgitation Peak Gradient 28.73 mmHg    BSA 1.94 m2    LV Systolic Volume Index 17.5 mL/m2    LV Diastolic Volume Index 39.06 mL/m2    LA Volume Index 47.1 mL/m2    LV Mass Index 75.1 g/m2    Right Atrial Pressure (from IVC) 3 mmHg    TV rest pulmonary artery pressure 31.73 mmHg    Narrative    · Normal left ventricular systolic  function. The estimated ejection   fraction is 60%  · Concentric left ventricular remodeling.  · Severe left atrial enlargement.  · Indeterminate left ventricular diastolic function.  · No wall motion abnormalities.  · Mild right atrial enlargement.  · Normal right ventricular systolic function.  · The ascending aorta is dilated.  · Mild mitral regurgitation.  · Mild tricuspid regurgitation.  · The estimated PA systolic pressure is 32mm Hg  · Normal central venous pressure (3 mm Hg).       and X-Ray: CXR: X-Ray Chest 1 View (CXR): No results found for this visit on 07/04/20. and X-Ray Chest PA and Lateral (CXR): No results found for this visit on 07/04/20. and KUB: X-Ray Abdomen AP 1 View (KUB): No results found for this visit on 07/04/20.    Assessment and Plan:     * Acute on chronic diastolic congestive heart failure  - Symptoms of SOB and hypoxia could be related to volume overloaded, however in the setting of leukocytosis and hypoxia, we would cover for CAP.   - Continue Lasix 60 mg IV BID   - Continue ABx for CAP Ceftriaxone and Azithromycin   - Continue Albuterol-ipratropium 2.5 mg-0.5 mg Continue Q 6 PRN  for wheezes  - Repeat TTE to evaluate the LV function given remote last TTE      Community acquired pneumonia of left lower lobe of lung  Likely bacterial pneumonia  - Bcx pending  - On CTX and Azithro (7/4)  - Procal 17 (day 1),repeat procal (day 3)  - Covid Negative but has elevated, possible false negative   - Venous US of the lower extremities to evaluate for DVT. Concern for PE.     BPH with urinary obstruction  - On home tamsulosin 0.4 mg PO daily - Continue  - U/S Renal to rule out obstruction     DNR (do not resuscitate)  - Patient himself and family based on prior encounters     Hypertension associated with diabetes  - Lisinopril 10 mg PO Daily - Hold in the setting of JNA and soft BP   - Amlodipine 5 mg PO daily  - Hold in the setting of soft BP     Controlled type 2 diabetes mellitus without  complication, without long-term current use of insulin  - On diet control with no oral hypoglycemic agent   - Recent Hemoglobin A1C 7.0% last week   - Goal for his POC glucose is 140-180       VTE Risk Mitigation (From admission, onward)         Ordered     heparin (porcine) injection 5,000 Units  Every 8 hours      07/04/20 2034     IP VTE HIGH RISK PATIENT  Once      07/04/20 2034     Place sequential compression device  Until discontinued      07/04/20 2034     Place sequential compression device  Until discontinued      07/04/20 2034                Tylor Marte MD  Cardiology  [facility]

## 2020-07-05 NOTE — ASSESSMENT & PLAN NOTE
- Symptoms of SOB and hypoxia could be related to volume overloaded, however in the setting of leukocytosis and hypoxia, we would cover for CAP.   - Continue Lasix 60 mg IV BID   - Continue ABx for CAP Ceftriaxone and Azithromycin   - Continue Albuterol-ipratropium 2.5 mg-0.5 mg Continue Q 6 PRN  for wheezes  - Repeat TTE to evaluate the LV function given remote last TTE

## 2020-07-05 NOTE — NURSING
CT of head ordered d/t pt lethargy, only being able to awake with repeated stimuli and then immediately going back to sleep. Pt transported by [facility] [place] and Claudette, RN. While in CT, pt became agitated and refused to be still for scan. Head wrapped in coban to help still pts head but pt still occasionally moving during scan. Pt transported back to room and is now resting comfortably in bed. Bed alarm on and telesitter at bedside. Cardiology notified that pt was uncooperative during CT and image may be blurred. Will continue to monitor.

## 2020-07-05 NOTE — ASSESSMENT & PLAN NOTE
Acute on chronic HFpEF, needs reassessment with echo. Possible component of exacerbation is an infectious process which would explain the WBC count. Would rule out DVT/PE.    - recommend admission to floors under cardiology, accepted by Dr. Becerril  - empiric coverage with Rocephin/azithro  - infectious workup with blood cultures, UA, urine cultures, procalcitonin  - check COVID labs -- LD, CRP, ESR, Ferritin, lactic acid  - diuresis with lasix 80mg IV and strict ins and outs, and 1500cc fluid restriction  - Venous US of the lower extremities  - check echocardiogram

## 2020-07-05 NOTE — SUBJECTIVE & OBJECTIVE
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      Current Outpatient Medications on File Prior to Encounter   Medication Sig    albuterol (VENTOLIN HFA) 90 mcg/actuation inhaler Inhale 2 puffs into the lungs every 6 (six) hours as needed for Wheezing. Rescue    albuterol-ipratropium (DUO-NEB) 2.5 mg-0.5 mg/3 mL nebulizer solution Take 3 mLs by nebulization every 6 (six) hours as needed for Wheezing. Rescue    amLODIPine (NORVASC) 5 MG tablet Take 1 tablet (5 mg total) by mouth once daily.    artificial tears w/ lanolin (AKWA TEARS) 0.5% ophthalmic ointment Apply to affected eye(s) as needed for dryness.    external catheter, male Misc Use condom catheter every night    furosemide (LASIX) 40 MG tablet Take 1 tablet (40 mg total) by mouth once daily.    latanoprost 0.005 % ophthalmic solution Place 1 drop into both eyes every evening.    lisinopril 10 MG tablet Take 1 tablet (10 mg total) by mouth once daily.    senna-docusate 8.6-50 mg (PERICOLACE) 8.6-50 mg per tablet Take 1 tablet by mouth 2 (two) times daily as needed for Constipation.    tamsulosin (FLOMAX) 0.4 mg Cap Take 1 capsule (0.4 mg total) by mouth once daily.     Family History      Problem Relation (Age of Onset)    Cerebral aneurysm Mother    Kidney failure Sister (96)    Other Father        Tobacco Use    Smoking status: Never Smoker    Smokeless tobacco: Never Used   Substance and Sexual Activity    Alcohol use: No    Drug use: Not on file    Sexual activity: Not on file     Review of Systems   Constitution: Positive for malaise/fatigue and weight gain. Negative for chills and fever.   HENT: Negative for congestion.    Eyes: Negative for visual disturbance.   Cardiovascular: Positive for leg swelling. Negative for claudication, dyspnea on exertion, orthopnea, palpitations and syncope.   Respiratory: Positive for shortness of breath. Negative for cough and snoring.    Hematologic/Lymphatic: Does not bruise/bleed easily.   Skin: Negative for rash.   Musculoskeletal: Negative for muscle cramps and myalgias.   Gastrointestinal: Negative for bloating, abdominal pain, constipation, diarrhea and melena.   Genitourinary: Negative for bladder incontinence.   Neurological: Negative for excessive daytime sleepiness, focal weakness and weakness.   Psychiatric/Behavioral: Negative for depression and suicidal ideas.     Objective:     Vital Signs (Most Recent):  Temp: 98.5 °F (36.9 °C) (07/04/20 1738)  Pulse: 89 (07/04/20 2015)  Resp: (!) 23 (07/04/20 2015)  BP: (!) 116/57 (07/04/20 2015)  SpO2: 100 % (07/04/20 2015) Vital Signs (24h Range):  Temp:  [98.5 °F (36.9 °C)] 98.5 °F (36.9 °C)  Pulse:  [85-95] 89  Resp:  [16-25] 23  SpO2:  [93 %-100 %] 100 %  BP: ()/(56-73) 116/57     Weight: 75.3 kg (166 lb 0.1 oz)  Body mass index is 24.51 kg/m².    SpO2: 100 %  O2 Device (Oxygen Therapy): nasal cannula    No intake or output data in the 24 hours ending 07/04/20 2044    Lines/Drains/Airways     Peripheral Intravenous Line                 Peripheral IV - Single Lumen 07/04/20 1803 18 G Right Antecubital less than 1 day         Peripheral IV - Single Lumen 07/04/20 20 G Right Hand less than 1 day                 Physical Exam   Constitutional: He is oriented to person, place, and time. He appears well-developed and well-nourished. No distress.   HENT:   Head: Normocephalic and atraumatic.   Mouth/Throat: Oropharynx is clear and moist.   Eyes: Pupils are equal, round, and reactive to light. Conjunctivae and EOM are normal. No scleral icterus.   Neck: Normal range of motion. Neck supple. No JVD present.   Cardiovascular: Normal rate, regular rhythm, normal heart sounds and intact distal pulses.   No murmur heard.  Pulses:       Radial pulses are 2+ on the right side and 2+ on the left side.   Pulmonary/Chest: Effort normal. No respiratory distress. He has rales.   Symmetrical expansion   Abdominal: Soft. Bowel sounds are normal. There is no hepatosplenomegaly. There is no abdominal tenderness.   Musculoskeletal: Normal range of motion.         General: Edema present.   Neurological: He is alert and oriented to person, place, and time. No cranial nerve deficit.   Skin: Skin is warm and dry. No rash noted. He is not diaphoretic.   Psychiatric: He has a normal mood and affect. Judgment and thought content normal.       Significant Labs:   CMP   Recent Labs   Lab 07/04/20  1803      K 5.3*      CO2 27   *   BUN 38*   CREATININE 1.7*   CALCIUM 8.5*   PROT 7.1   ALBUMIN 3.3*   BILITOT 0.4   ALKPHOS 67   AST 12   ALT 7*   ANIONGAP 10   ESTGFRAFRICA 37.4*   EGFRNONAA 32.4*   , CBC   Recent Labs   Lab 07/04/20  1803   WBC 20.45*   HGB 12.3*   HCT 40.2          Recent Labs   Lab 07/04/20  1803   TROPONINI 0.153*       Significant Imaging: Echocardiogram:   Transthoracic echo (TTE) complete (Cupid Only):   Results for orders placed or performed during the hospital encounter of 11/16/18   Transthoracic echo (TTE) complete   Result Value Ref Range    Ascending aorta 3.68 cm    STJ 2.88 cm    AV mean gradient 6.06 mmHg    Ao peak guera 1.53 m/s    Ao VTI 29.03 cm    IVRT 0.06 msec    IVS 1.10 0.6 -  1.1 cm    LA size 3.82 cm    Left Atrium Major Axis 6.01 cm    Left Atrium Minor Axis 6.02 cm    LVIDD 4.14 3.5 - 6.0 cm    LVIDS 2.97 2.1 - 4.0 cm    LVOT diameter 2.21 cm    LVOT peak VTI 16.78 cm    PW 1.02 0.6 - 1.1 cm    MV Peak A Elvis 1.12 m/s    E wave decelartion time 161.61 msec    MV Peak E Elvis 1.04 m/s    PV Peak D Elvis 0.35 m/s    PV Peak S Elvis 0.45 m/s    RA Major Axis 5.00 cm    RA Width 4.20 cm    RVDD 3.70 cm    Sinus 3.42 cm    TAPSE 2.09 cm    TR Max Elvis 2.68 m/s    TDI LATERAL 0.07     TDI SEPTAL 0.05     LA WIDTH 4.68 cm    LV Diastolic Volume 75.77 mL    LV Systolic Volume 34.03 mL    RV S' 13.55 m/s    LV LATERAL E/E' RATIO 14.86     LV SEPTAL E/E' RATIO 20.80     FS 28 %    LA volume 91.40 cm3    LV mass 145.66 g    Left Ventricle Relative Wall Thickness 0.49 cm    AV valve area 2.22 cm2    E/A ratio 0.93     Mean e' 0.06     Pulm vein S/D ratio 1.29     LVOT area 3.83 cm2    LVOT stroke volume 64.33 cm3    AV peak gradient 9.36 mmHg    E/E' ratio 17.33     Triscuspid Valve Regurgitation Peak Gradient 28.73 mmHg    BSA 1.94 m2    LV Systolic Volume Index 17.5 mL/m2    LV Diastolic Volume Index 39.06 mL/m2    LA Volume Index 47.1 mL/m2    LV Mass Index 75.1 g/m2    Right Atrial Pressure (from IVC) 3 mmHg    TV rest pulmonary artery pressure 31.73 mmHg    Narrative    · Normal left ventricular systolic function. The estimated ejection   fraction is 60%  · Concentric left ventricular remodeling.  · Severe left atrial enlargement.  · Indeterminate left ventricular diastolic function.  · No wall motion abnormalities.  · Mild right atrial enlargement.  · Normal right ventricular systolic function.  · The ascending aorta is dilated.  · Mild mitral regurgitation.  · Mild tricuspid regurgitation.  · The estimated PA systolic pressure is 32mm Hg  · Normal central venous pressure (3 mm Hg).

## 2020-07-06 ENCOUNTER — TELEPHONE (OUTPATIENT)
Dept: INTERNAL MEDICINE | Facility: CLINIC | Age: 85
End: 2020-07-06

## 2020-07-06 PROBLEM — B96.89 UTI DUE TO KLEBSIELLA SPECIES: Status: ACTIVE | Noted: 2020-07-06

## 2020-07-06 PROBLEM — B96.1 BACTEREMIA DUE TO KLEBSIELLA PNEUMONIAE: Status: ACTIVE | Noted: 2020-07-06

## 2020-07-06 PROBLEM — N39.0 UTI DUE TO KLEBSIELLA SPECIES: Status: ACTIVE | Noted: 2020-07-06

## 2020-07-06 PROBLEM — R78.81 GRAM-NEGATIVE BACTEREMIA: Status: ACTIVE | Noted: 2020-07-06

## 2020-07-06 LAB
ALBUMIN SERPL BCP-MCNC: 2.7 G/DL (ref 3.5–5.2)
ALP SERPL-CCNC: 69 U/L (ref 55–135)
ALT SERPL W/O P-5'-P-CCNC: 12 U/L (ref 10–44)
ANION GAP SERPL CALC-SCNC: 9 MMOL/L (ref 8–16)
AST SERPL-CCNC: 27 U/L (ref 10–40)
BACTERIA UR CULT: ABNORMAL
BASOPHILS # BLD AUTO: 0.03 K/UL (ref 0–0.2)
BASOPHILS NFR BLD: 0.2 % (ref 0–1.9)
BILIRUB SERPL-MCNC: 0.3 MG/DL (ref 0.1–1)
BUN SERPL-MCNC: 42 MG/DL (ref 10–30)
CALCIUM SERPL-MCNC: 8 MG/DL (ref 8.7–10.5)
CHLORIDE SERPL-SCNC: 100 MMOL/L (ref 95–110)
CO2 SERPL-SCNC: 32 MMOL/L (ref 23–29)
CREAT SERPL-MCNC: 1.4 MG/DL (ref 0.5–1.4)
DIFFERENTIAL METHOD: ABNORMAL
EOSINOPHIL # BLD AUTO: 0.1 K/UL (ref 0–0.5)
EOSINOPHIL NFR BLD: 0.9 % (ref 0–8)
ERYTHROCYTE [DISTWIDTH] IN BLOOD BY AUTOMATED COUNT: 14 % (ref 11.5–14.5)
EST. GFR  (AFRICAN AMERICAN): 47.3 ML/MIN/1.73 M^2
EST. GFR  (NON AFRICAN AMERICAN): 40.9 ML/MIN/1.73 M^2
GLUCOSE SERPL-MCNC: 110 MG/DL (ref 70–110)
HCT VFR BLD AUTO: 35.6 % (ref 40–54)
HGB BLD-MCNC: 10.9 G/DL (ref 14–18)
IMM GRANULOCYTES # BLD AUTO: 0.08 K/UL (ref 0–0.04)
IMM GRANULOCYTES NFR BLD AUTO: 0.7 % (ref 0–0.5)
LYMPHOCYTES # BLD AUTO: 2.2 K/UL (ref 1–4.8)
LYMPHOCYTES NFR BLD: 17.8 % (ref 18–48)
MAGNESIUM SERPL-MCNC: 2.1 MG/DL (ref 1.6–2.6)
MCH RBC QN AUTO: 27.8 PG (ref 27–31)
MCHC RBC AUTO-ENTMCNC: 30.6 G/DL (ref 32–36)
MCV RBC AUTO: 91 FL (ref 82–98)
MONOCYTES # BLD AUTO: 0.5 K/UL (ref 0.3–1)
MONOCYTES NFR BLD: 4.5 % (ref 4–15)
NEUTROPHILS # BLD AUTO: 9.2 K/UL (ref 1.8–7.7)
NEUTROPHILS NFR BLD: 75.9 % (ref 38–73)
NRBC BLD-RTO: 0 /100 WBC
PLATELET # BLD AUTO: 263 K/UL (ref 150–350)
PMV BLD AUTO: 10.2 FL (ref 9.2–12.9)
POTASSIUM SERPL-SCNC: 4 MMOL/L (ref 3.5–5.1)
PROCALCITONIN SERPL IA-MCNC: 17.59 NG/ML
PROT SERPL-MCNC: 6 G/DL (ref 6–8.4)
RBC # BLD AUTO: 3.92 M/UL (ref 4.6–6.2)
SODIUM SERPL-SCNC: 141 MMOL/L (ref 136–145)
WBC # BLD AUTO: 12.09 K/UL (ref 3.9–12.7)

## 2020-07-06 PROCEDURE — 94761 N-INVAS EAR/PLS OXIMETRY MLT: CPT

## 2020-07-06 PROCEDURE — 27000221 HC OXYGEN, UP TO 24 HOURS

## 2020-07-06 PROCEDURE — 97535 SELF CARE MNGMENT TRAINING: CPT

## 2020-07-06 PROCEDURE — 25000003 PHARM REV CODE 250: Performed by: STUDENT IN AN ORGANIZED HEALTH CARE EDUCATION/TRAINING PROGRAM

## 2020-07-06 PROCEDURE — 85025 COMPLETE CBC W/AUTO DIFF WBC: CPT

## 2020-07-06 PROCEDURE — 20600001 HC STEP DOWN PRIVATE ROOM

## 2020-07-06 PROCEDURE — 99231 SBSQ HOSP IP/OBS SF/LOW 25: CPT | Mod: GC,,, | Performed by: INTERNAL MEDICINE

## 2020-07-06 PROCEDURE — 80053 COMPREHEN METABOLIC PANEL: CPT

## 2020-07-06 PROCEDURE — 97166 OT EVAL MOD COMPLEX 45 MIN: CPT

## 2020-07-06 PROCEDURE — 83735 ASSAY OF MAGNESIUM: CPT

## 2020-07-06 PROCEDURE — 63600175 PHARM REV CODE 636 W HCPCS: Performed by: STUDENT IN AN ORGANIZED HEALTH CARE EDUCATION/TRAINING PROGRAM

## 2020-07-06 PROCEDURE — 99900035 HC TECH TIME PER 15 MIN (STAT)

## 2020-07-06 PROCEDURE — 99231 PR SUBSEQUENT HOSPITAL CARE,LEVL I: ICD-10-PCS | Mod: GC,,, | Performed by: INTERNAL MEDICINE

## 2020-07-06 PROCEDURE — 87040 BLOOD CULTURE FOR BACTERIA: CPT | Mod: 59

## 2020-07-06 PROCEDURE — 84145 PROCALCITONIN (PCT): CPT

## 2020-07-06 PROCEDURE — 36415 COLL VENOUS BLD VENIPUNCTURE: CPT

## 2020-07-06 PROCEDURE — 63700000 PHARM REV CODE 250 ALT 637 W/O HCPCS: Performed by: STUDENT IN AN ORGANIZED HEALTH CARE EDUCATION/TRAINING PROGRAM

## 2020-07-06 RX ORDER — AZITHROMYCIN 250 MG/1
500 TABLET, FILM COATED ORAL ONCE
Status: COMPLETED | OUTPATIENT
Start: 2020-07-06 | End: 2020-07-06

## 2020-07-06 RX ORDER — FUROSEMIDE 40 MG/1
40 TABLET ORAL DAILY
Status: DISCONTINUED | OUTPATIENT
Start: 2020-07-06 | End: 2020-07-08

## 2020-07-06 RX ADMIN — HEPARIN SODIUM 5000 UNITS: 5000 INJECTION INTRAVENOUS; SUBCUTANEOUS at 05:07

## 2020-07-06 RX ADMIN — ACETAMINOPHEN 650 MG: 325 TABLET ORAL at 09:07

## 2020-07-06 RX ADMIN — HEPARIN SODIUM 5000 UNITS: 5000 INJECTION INTRAVENOUS; SUBCUTANEOUS at 03:07

## 2020-07-06 RX ADMIN — TAMSULOSIN HYDROCHLORIDE 0.4 MG: 0.4 CAPSULE ORAL at 09:07

## 2020-07-06 RX ADMIN — HEPARIN SODIUM 5000 UNITS: 5000 INJECTION INTRAVENOUS; SUBCUTANEOUS at 09:07

## 2020-07-06 RX ADMIN — AZITHROMYCIN 500 MG: 250 TABLET, FILM COATED ORAL at 10:07

## 2020-07-06 RX ADMIN — CEFTRIAXONE 2 G: 2 INJECTION, SOLUTION INTRAVENOUS at 09:07

## 2020-07-06 RX ADMIN — FUROSEMIDE 40 MG: 40 TABLET ORAL at 12:07

## 2020-07-06 NOTE — HOSPITAL COURSE
Admitted to CCU for acute hypoxemic respiratory failure 2/2 ADHF vs CAP. Diuresed with IV lasix with good UOP. Weaned down to home 2L nc. Repeat echo showed EF 55%, grade II dd. Increased home lasix to 80mg daily as patient re-accumulated fluid on home lasix dose. UA suggestive of infection and given suspicion for CAP, started on azithro + CTX. US renal negative for hydronephrosis. US lower extremity negative for DVT. UCx and BCx grew Klebsiella. Repeat BCx from 7/6 with NGTD. Started on CTX with plan for 2 weeks after last negative BCx (tentative end date: 7/21/20). Held ACEi given JAN and bacteremia. Will defer to PCP for resuming. PT/OT consulted, recommended SNF. Discussed results and plan with patient + daughter. Patient + daughter verbalized understanding and agreed to plan. Patient stable for discharge as of 7/10/20. Pt's family refused discharge and is currently appealing. Patient was accepted by [facility] and was discharged on 7/16.

## 2020-07-06 NOTE — TELEPHONE ENCOUNTER
----- Message from Lamberto Kathleen sent at 7/6/2020  7:32 AM CDT -----  Regarding: Advice  Contact: Daughter Anne [phone]  Patient would like to get medical advice.    Comments: daughter of patient calling regarding the patient went to ER on Saturday, was weak, Ct Scan was done and UC, and other test..... would like to discuss, room 312 in Cardiology Dept. Found out patient has slight ear infection.     Please call an advise  Thank you

## 2020-07-06 NOTE — SUBJECTIVE & OBJECTIVE
Interval History: NAEON. Sleeping but responds to verbal stimuli. BCx and UCx growing Klebsiella.     Review of Systems   Constitution: Positive for weight gain. Negative for chills, diaphoresis, fever and weight loss.   HENT: Negative for congestion and sore throat.    Eyes: Negative for visual disturbance.   Cardiovascular: Positive for dyspnea on exertion and leg swelling. Negative for chest pain, orthopnea, palpitations, paroxysmal nocturnal dyspnea and syncope.   Respiratory: Negative for cough, shortness of breath and wheezing.    Skin: Negative for rash.   Musculoskeletal: Negative for joint pain and myalgias.   Gastrointestinal: Negative for abdominal pain, constipation, diarrhea, melena, nausea and vomiting.   Genitourinary: Negative for dysuria, frequency and hematuria.   Neurological: Negative for dizziness, headaches, light-headedness and numbness.   Psychiatric/Behavioral: Negative for altered mental status.     Objective:     Vital Signs (Most Recent):  Temp: 98.3 °F (36.8 °C) (07/06/20 0800)  Pulse: 83 (07/06/20 1110)  Resp: 16 (07/06/20 0800)  BP: 119/64 (07/06/20 0800)  SpO2: 99 % (07/06/20 0800) Vital Signs (24h Range):  Temp:  [98.3 °F (36.8 °C)-99.3 °F (37.4 °C)] 98.3 °F (36.8 °C)  Pulse:  [] 83  Resp:  [16-20] 16  SpO2:  [98 %-99 %] 99 %  BP: (103-120)/(54-65) 119/64     Weight: 76.2 kg (168 lb)  Body mass index is 24.81 kg/m².     SpO2: 99 %  O2 Device (Oxygen Therapy): nasal cannula      Intake/Output Summary (Last 24 hours) at 7/6/2020 1153  Last data filed at 7/6/2020 0600  Gross per 24 hour   Intake 500 ml   Output 1500 ml   Net -1000 ml       Lines/Drains/Airways     Drain            Male External Urinary Catheter 07/05/20 2200 less than 1 day          Peripheral Intravenous Line                 Peripheral IV - Single Lumen 07/04/20 20 G Right Hand 2 days                Physical Exam   Constitutional: He is oriented to person, place, and time. He appears well-developed and  well-nourished. No distress.   HENT:   Head: Normocephalic and atraumatic.   Mouth/Throat: Oropharynx is clear and moist. No oropharyngeal exudate.   Eyes: Pupils are equal, round, and reactive to light. Conjunctivae are normal. No scleral icterus.   Neck: Neck supple. No JVD present. No tracheal deviation present.   Cardiovascular: Normal rate, regular rhythm, normal heart sounds and intact distal pulses.   No murmur heard.  Pulmonary/Chest: Effort normal and breath sounds normal. No respiratory distress. He has no wheezes. He has no rales.   Abdominal: Soft. Bowel sounds are normal. He exhibits no distension. There is abdominal tenderness (suprapubic). There is no rebound.   Musculoskeletal:         General: No edema.   Neurological: He is alert and oriented to person, place, and time. He exhibits normal muscle tone.   Skin: Skin is warm and dry. No rash noted. He is not diaphoretic.   Psychiatric: He has a normal mood and affect. His behavior is normal.       Significant Labs: All pertinent lab results from the last 24 hours have been reviewed.    Significant Imaging: Reviewed.

## 2020-07-06 NOTE — NURSING
Pt currently refusing to get BS taken throughout shift. Uncooperative during vitals as well, family assisted RN to calm pt enough for vitals but still adamantly refusing to have BS taken. Pt educated on importance of getting BS and treating it with SSI as needed, no evidence of learning noted. Will continue to monitor and attempt BS.

## 2020-07-06 NOTE — PLAN OF CARE
Problem: Occupational Therapy Goal  Goal: Occupational Therapy Goal  Description: Goals to be met by: 7/16/20    Patient will increase functional independence with ADLs by performing:    Grooming while standing at sink with Contact Guard Assistance.  Toileting from toilet with Contact Guard Assistance for hygiene and clothing management.   Supine to sit with Minimal Assistance to increase bed mobility independence.  Stand pivot transfers with Contact Guard Assistance to reach bedside chair/commode  Step transfer with Contact Guard Assistance and AD as needed   Toilet transfer to toilet with Contact Guard Assistance.    Outcome: Ongoing, Progressing     OT evaluation complete and POC established. See evaluation note for further details.   Disposition recommendation: SNF    Trisha Alcantar OTR/L  7/6/20

## 2020-07-06 NOTE — ASSESSMENT & PLAN NOTE
HbA1c 7.0, diet controlled    Plan:  - LDSS  - POCT glucose ACHS  - Diabetic diet  - Goal -180 while inpatient

## 2020-07-06 NOTE — PLAN OF CARE
Plan of care discussed with patient. Patient is free of fall/trauma/injury. Denies CP, SOB, or pain/discomfort. CT head completed d/t pts level of lethargy and having to use increased stimuli to arouse him. Being diuresed with 60 mg IVP lasix BID and IV abx to treat UTI. Echo completed at bedside, unable to complete US today ordered due to pt agitation. Pt currently refusing blood sugar checks. All questions addressed. Will continue to monitor

## 2020-07-06 NOTE — PLAN OF CARE
Zbigniew Forman is a 89 y.o. male admitted to [facility] on 2020 for Acute on chronic diastolic congestive heart failure. Zbigniew Forman tolerated evaluation fair today. He is minimally confused during evaluation but overall he is cooperative and willing to participate. Requires max-total A for all bed mobility. Sitting posture is very rounded and/or kyphotic, on supplemental o2 during session satting mid 90's while sitting up. He's able to stand from side of bed to RW with minimal assist but maintains kyphotic posture with flexed hips and knees, tactile cueing for upright standing. Satting 92% while standing for ~45 seconds, takes 2-3 sidesteps to the R with min A of therapist before sitting back down. Per patient (and per daughter via RN) he is typically ambulatory using his rollator at home and can assist with ADL's so he does seem far off his baseline levels of mobility, thus recommendation for SNF upon d/c. Discussed PT role, POC, goals and recommendations (Skilled Nursing Facility) with patient; limited understanding noted. Zbigniew Forman would benefit from acute PT services to promote mobility during this admission and improve return to PLOF.    Problem: Physical Therapy Goal  Goal: Physical Therapy Goal  Description: Goals to be met by: 20     Patient will increase functional independence with mobility by performin. Supine to sit with Stand-by Assistance - Not met  2. Sit to supine with Stand-by Assistance - Not met  3. Sit to stand transfer with Stand-by Assistance to RW - Not met  4. Bed to chair transfer with Contact Guard Assistance using Rolling Walker - Not met  5. Gait  x 25 feet with Contact Guard Assistance using Rolling Walker - Not met  6. Lower extremity exercise program x 10 reps per handout, with supervision - Not met  Outcome: Ongoing, Progressing    Anuel Julio, PT  2020

## 2020-07-06 NOTE — PLAN OF CARE
CM went to the pt's room for DC needs assessment. Pt asleep and difficult to arouse. CM did call the pt's daughter, Kathi [phone] and left  for return call.

## 2020-07-06 NOTE — ASSESSMENT & PLAN NOTE
UCx + BCx on admission grew Klebsiella   US renal negative for hydronephrosis  Started on azithro + CTX for CAP coverage  Currently has condom cath in place    Plan:  - Continue CTX  - F/u speciation  - Repeat BCx  - Bladder scan +/- whiteside  - Continue home tamsulosin

## 2020-07-06 NOTE — PLAN OF CARE
Plan of care discussed with patient.  Patient ambulating with assist of two people, fall precautions in place.Continuing to encourage ambulation with PT. Discussed medications and care. Patient has no questions at this time. Will continue to monitor.     Pt complained of pain this AM, was given Tylenol. Pt received IV antibiotics as well. MD, Gillies wanted a bladder scan to see if any urine retention was happening. Explained the situation to pt and daughter. Daughter stated that that no Catherine or In and Out cath will be put in the pt due to his enlarged prostate. Bladder scan done with 160 residual in the pt.

## 2020-07-06 NOTE — PLAN OF CARE
07/06/20 1451   Discharge Assessment   Assessment Type Discharge Planning Assessment   Confirmed/corrected address and phone number on facesheet? Yes   Assessment information obtained from? Caregiver;Medical Record   Expected Length of Stay (days) 4   Communicated expected length of stay with patient/caregiver yes   Prior to hospitilization cognitive status: Alert/Oriented   Prior to hospitalization functional status: Assistive Equipment;Needs Assistance   Current cognitive status: Alert/Oriented   Current Functional Status: Assistive Equipment;Needs Assistance   Lives With child(susana), adult;spouse   Able to Return to Prior Arrangements yes   Is patient able to care for self after discharge? No   Patient's perception of discharge disposition home or selfcare   Readmission Within the Last 30 Days no previous admission in last 30 days   Patient currently being followed by outpatient case management? No   Patient currently receives any other outside agency services? No   Equipment Currently Used at Home walker, rolling;rollator;grab bar;shower chair;nebulizer;oxygen   Do you have any problems affording any of your prescribed medications? TBD   Is the patient taking medications as prescribed? yes   Does the patient have transportation home? Yes   Transportation Anticipated family or friend will provide   Discharge Plan A Home with family   DME Needed Upon Discharge  none   Admitted with CHF. Lives with wife and daughter. Requires assistance which he received from his son and 2 daughters. Plan is to DC home. May benefit from [facility].

## 2020-07-06 NOTE — ASSESSMENT & PLAN NOTE
89 yo M admitted with acute hypoxemic respiratory failure 2/2 ADHF vs CAP (leukocytosis, procal 17). Started on IV lasix with good UOP + azithro/CTX. Repeat TTE showed EF 55%, grade II dd, posterior MV leaflet failure. US lower extremities negative for DVT.     SpO2 98% on 2L nc  UOP 1.9 L, net 1.4 L    Plan:  - Transition to home PO lasix 40mg  - Continue azithro + CTX  - Daily weights (standing if tolerated)  - Strict I/Os  - Fluid restriction at 1500mL  - Cardiac diet

## 2020-07-06 NOTE — NURSING
Received call from lab, pt has positive blood culture in aerobic bottle, positive for negative rods. Cardiology called x3, 15 min apart. No answer each time. Report given to XIMENA Alvarez. RN notified of positive bc and that MD not able to be notified at this time. RN to continue to attempt to notify cardiology of lab.

## 2020-07-06 NOTE — PT/OT/SLP EVAL
Occupational Therapy   Evaluation    Name: Zbginiew Forman  MRN: [id number]  Admitting Diagnosis:  Acute on chronic diastolic congestive heart failure      Recommendations:     Discharge Recommendations: nursing facility, skilled  Discharge Equipment Recommendations:  none  Barriers to discharge:  None    Assessment:     Zbigniew Forman is a 89 y.o. male with a medical diagnosis of Acute on chronic diastolic congestive heart failure.  He presents with the following performance deficits affecting function: weakness, impaired balance, decreased safety awareness, impaired endurance, impaired self care skills, impaired functional mobilty, decreased lower extremity function, decreased upper extremity function, decreased coordination. OT evaluation complete and POC established. OT intervention to address listed functional deficits, provide patient/caregiver education, reduce fall risk, and maximize (I) and safety with self care independence. Once medically stable, recommending pt discharge to skilled nursing facility. While in house, pt will benefit from skilled OT 3x/wk to address the deficits affecting his occupational performance.     Rehab Prognosis: Good; patient would benefit from acute skilled OT services to address these deficits and reach maximum level of function.       Plan:     Patient to be seen 3 x/week to address the above listed problems via self-care/home management, therapeutic activities, therapeutic exercises, neuromuscular re-education  · Plan of Care Expires: 08/04/20  · Plan of Care Reviewed with: patient    Subjective     Chief Complaint: None   Patient/Family Comments/goals: Pt agreeable to OT/PT evaluation and OT POC.    Occupational Profile:  Living Environment: Pt lives with daughter in a 2SH with 0 KARINA. Chair lift to reach 2nd floor; however, bed and bath are on the 1st floor. Bathroom set up: Walk in shower with chair and grab bars   Previous level of function: Per RN, pt utilizing rollator  "for mobility and requiring A for ADLs. Per pt, pt required A for transfers in and out of the shower and on and off the commode. Pt performed bathing routing with SPV and required max A for LB dressing.   Roles and Routines: Watching TV, "sitting and looking at the walls"  Equipment Used at Home:  walker, rolling, shower chair, grab bar, wheelchair, nebulizer, oxygen  Assistance upon Discharge: Pt will have assistance from son and daughter upon discharge.     *Pt is a poor hx    Pain/Comfort:  · Pain Rating 1: (pt did not rate; c/o pain but did not localize)  · Pain Addressed 1: Distraction, Cessation of Activity, Reposition    Patients cultural, spiritual, Cheondoism conflicts given the current situation: no    Objective:     Communicated with: RN prior to session.  Patient found HOB elevated with Condom Catheter, telemetry, pulse ox (continuous), oxygen upon OT entry to room.    General Precautions: Standard,     Orthopedic Precautions:N/A   Braces: N/A     Occupational Performance:    Bed Mobility:    · Patient completed Rolling/Turning to Left with  maximal assistance  · Patient completed Scooting/Bridging with maximal assistance  · Patient completed Supine to Sit with maximal assistance  · Patient completed Sit to Supine with maximal assistance    Functional Mobility/Transfers:  · Patient completed Sit <> Stand Transfer from bed with minimum assistance  with  Rolling walker   · Functional Mobility: See PT note for details     Activities of Daily Living:  · Grooming: linens adjusted for skin protection   · Lower Body Dressing: total assistance to don/doff socks EOB  · Toileting: dependence with use of condom cath for voiding  · Feeding: Max A for medication administration with HOB elevated     Cognitive/Visual Perceptual:  Cognitive/Psychosocial Skills:     -       Oriented to: Person and Place   -       Follows Commands/attention:Easily distracted and Follows one-step commands  -       Communication: " clear/fluent  -       Memory: not formally assessed; decreased orientation  -       Safety awareness/insight to disability: impaired   -       Mood/Affect/Coping skills/emotional control: Lethargic  Visual/Perceptual:      -wears glasses; not donned during session     Physical Exam:  Balance:    -       EOB: SBA  Standing: CGA- min A  Postural examination/scapula alignment:    -       Rounded shoulders  -       Forward head  -       Kyphosis  Skin integrity: Dry  Edema:  None noted  Sensation:    -       Intact  Upper Extremity Range of Motion:     -       Right Upper Extremity: WFL  -       Left Upper Extremity: WFL  Upper Extremity Strength:    -       Right Upper Extremity: WFL  -       Left Upper Extremity: WFL   Strength:    -       Right Upper Extremity: WFL  -       Left Upper Extremity: Difficulty with finger extension  Fine Motor Coordination:    -       Intact  Right hand, finger to nose  -       Impaired  Left hand, finger to nose     AMPAC 6 Click ADL:  AMPAC Total Score: 11    Treatment & Education:  - Role of OT/ OT POC  - Self care safety/ independence  - Functional transfer/ mobility safety  - Bed mobility safety  - Importance of sitting EOB during evaluation   Education:    Patient left HOB elevated with all lines intact, call button in reach, RN notified and RN present    GOALS:   Multidisciplinary Problems     Occupational Therapy Goals        Problem: Occupational Therapy Goal    Goal Priority Disciplines Outcome Interventions   Occupational Therapy Goal     OT, PT/OT Ongoing, Progressing    Description: Goals to be met by: 7/16/20    Patient will increase functional independence with ADLs by performing:    Grooming while standing at sink with Contact Guard Assistance.  Toileting from toilet with Contact Guard Assistance for hygiene and clothing management.   Supine to sit with Minimal Assistance to increase bed mobility independence.  Stand pivot transfers with Contact Guard Assistance to  reach bedside chair/commode  Step transfer with Contact Guard Assistance and AD as needed   Toilet transfer to toilet with Contact Guard Assistance.                     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

## 2020-07-06 NOTE — PROGRESS NOTES
[facility]  Cardiology  Progress Note    Patient Name: Zbigniew Forman  MRN: [id number]  Admission Date: 7/4/2020  Hospital Length of Stay: 2 days  Code Status: DNR   Attending Physician: Geraldine Becerril MD   Primary Care Physician: Blas Morgan Ii, MD  Expected Discharge Date: 7/8/2020  Principal Problem:Acute on chronic diastolic congestive heart failure    Subjective:     Hospital Course:   Admitted to CCU for acute hypoxemic respiratory failure 2/2 ADHF vs CAP. Diuresed with IV lasix with good UOP. Repeat echo showed EF 55%, grade II dd. UA suggestive of infection and given suspicion for CAP, started on azithro + CTX. US renal negative for hydronephrosis. US lower extremity negative for DVT. UCx and BCx grew Klebsiella.     Interval History: NAEON. Sleeping but responds to verbal stimuli. BCx and UCx growing Klebsiella.     Review of Systems   Constitution: Positive for weight gain. Negative for chills, diaphoresis, fever and weight loss.   HENT: Negative for congestion and sore throat.    Eyes: Negative for visual disturbance.   Cardiovascular: Positive for dyspnea on exertion and leg swelling. Negative for chest pain, orthopnea, palpitations, paroxysmal nocturnal dyspnea and syncope.   Respiratory: Negative for cough, shortness of breath and wheezing.    Skin: Negative for rash.   Musculoskeletal: Negative for joint pain and myalgias.   Gastrointestinal: Negative for abdominal pain, constipation, diarrhea, melena, nausea and vomiting.   Genitourinary: Negative for dysuria, frequency and hematuria.   Neurological: Negative for dizziness, headaches, light-headedness and numbness.   Psychiatric/Behavioral: Negative for altered mental status.     Objective:     Vital Signs (Most Recent):  Temp: 98.3 °F (36.8 °C) (07/06/20 0800)  Pulse: 83 (07/06/20 1110)  Resp: 16 (07/06/20 0800)  BP: 119/64 (07/06/20 0800)  SpO2: 99 % (07/06/20 0800) Vital Signs (24h Range):  Temp:  [98.3 °F (36.8  °C)-99.3 °F (37.4 °C)] 98.3 °F (36.8 °C)  Pulse:  [] 83  Resp:  [16-20] 16  SpO2:  [98 %-99 %] 99 %  BP: (103-120)/(54-65) 119/64     Weight: 76.2 kg (168 lb)  Body mass index is 24.81 kg/m².     SpO2: 99 %  O2 Device (Oxygen Therapy): nasal cannula      Intake/Output Summary (Last 24 hours) at 7/6/2020 1153  Last data filed at 7/6/2020 0600  Gross per 24 hour   Intake 500 ml   Output 1500 ml   Net -1000 ml       Lines/Drains/Airways     Drain            Male External Urinary Catheter 07/05/20 2200 less than 1 day          Peripheral Intravenous Line                 Peripheral IV - Single Lumen 07/04/20 20 G Right Hand 2 days                Physical Exam   Constitutional: He is oriented to person, place, and time. He appears well-developed and well-nourished. No distress.   HENT:   Head: Normocephalic and atraumatic.   Mouth/Throat: Oropharynx is clear and moist. No oropharyngeal exudate.   Eyes: Pupils are equal, round, and reactive to light. Conjunctivae are normal. No scleral icterus.   Neck: Neck supple. No JVD present. No tracheal deviation present.   Cardiovascular: Normal rate, regular rhythm, normal heart sounds and intact distal pulses.   No murmur heard.  Pulmonary/Chest: Effort normal and breath sounds normal. No respiratory distress. He has no wheezes. He has no rales.   Abdominal: Soft. Bowel sounds are normal. He exhibits no distension. There is abdominal tenderness (suprapubic). There is no rebound.   Musculoskeletal:         General: No edema.   Neurological: He is alert and oriented to person, place, and time. He exhibits normal muscle tone.   Skin: Skin is warm and dry. No rash noted. He is not diaphoretic.   Psychiatric: He has a normal mood and affect. His behavior is normal.       Significant Labs: All pertinent lab results from the last 24 hours have been reviewed.    Significant Imaging: Reviewed.    Assessment and Plan:     * Acute on chronic diastolic congestive heart  failure  Community acquired pneumonia of left lower lobe of lung  Acute hypoxemic respiratory failure  89 yo M admitted with acute hypoxemic respiratory failure 2/2 ADHF vs CAP (leukocytosis, procal 17). Started on IV lasix with good UOP + azithro/CTX. Repeat TTE showed EF 55%, grade II dd, posterior MV leaflet failure. US lower extremities negative for DVT.     SpO2 98% on 2L nc  UOP 1.9 L, net 1.4 L    Plan:  - Transition to home PO lasix 40mg  - Continue azithro + CTX  - Daily weights (standing if tolerated)  - Strict I/Os  - Fluid restriction at 1500mL  - Cardiac diet    Bacteremia due to Klebsiella pneumoniae  BPH with urinary obstruction  UTI due to Klebsiella species  UCx + BCx on admission grew Klebsiella   US renal negative for hydronephrosis  Started on azithro + CTX for CAP coverage  Currently has condom cath in place    Plan:  - Continue CTX  - F/u speciation  - Repeat BCx  - Bladder scan +/- whiteside  - Continue home tamsulosin     DNR (do not resuscitate)  Patient himself and family based on prior encounters     Hypertension associated with diabetes  Holding home lisinopril 10mg + amlodipine 5mg in setting of JAN on soft BP    Controlled type 2 diabetes mellitus without complication, without long-term current use of insulin  HbA1c 7.0, diet controlled    Plan:  - LDSS  - POCT glucose ACHS  - Diabetic diet  - Goal -180 while inpatient      VTE Risk Mitigation (From admission, onward)         Ordered     heparin (porcine) injection 5,000 Units  Every 8 hours      07/04/20 2034     IP VTE HIGH RISK PATIENT  Once      07/04/20 2034     Place sequential compression device  Until discontinued      07/04/20 2034     Place sequential compression device  Until discontinued      07/04/20 2034                Ceasar Walker MD  Cardiology  [facility]

## 2020-07-06 NOTE — NURSING
"MD, Gillies, notified that bladder scan was done on pt. Residual was 160 mL in the bladder. MD also notified that pt's daughter refused a Catherine catheter due to "prostate being too large."    Order for Catherine to be DC. Order to be discontinued approved by MD Walker.   "

## 2020-07-07 PROBLEM — R53.81 DEBILITY: Status: ACTIVE | Noted: 2020-07-07

## 2020-07-07 LAB
ALBUMIN SERPL BCP-MCNC: 2.5 G/DL (ref 3.5–5.2)
ALP SERPL-CCNC: 65 U/L (ref 55–135)
ALT SERPL W/O P-5'-P-CCNC: 11 U/L (ref 10–44)
ANION GAP SERPL CALC-SCNC: 10 MMOL/L (ref 8–16)
AST SERPL-CCNC: 17 U/L (ref 10–40)
BACTERIA BLD CULT: ABNORMAL
BASOPHILS # BLD AUTO: 0.02 K/UL (ref 0–0.2)
BASOPHILS NFR BLD: 0.2 % (ref 0–1.9)
BILIRUB SERPL-MCNC: 0.3 MG/DL (ref 0.1–1)
BUN SERPL-MCNC: 38 MG/DL (ref 10–30)
CALCIUM SERPL-MCNC: 8 MG/DL (ref 8.7–10.5)
CHLORIDE SERPL-SCNC: 103 MMOL/L (ref 95–110)
CO2 SERPL-SCNC: 31 MMOL/L (ref 23–29)
CREAT SERPL-MCNC: 1.2 MG/DL (ref 0.5–1.4)
DIFFERENTIAL METHOD: ABNORMAL
EOSINOPHIL # BLD AUTO: 0.1 K/UL (ref 0–0.5)
EOSINOPHIL NFR BLD: 1.3 % (ref 0–8)
ERYTHROCYTE [DISTWIDTH] IN BLOOD BY AUTOMATED COUNT: 13.7 % (ref 11.5–14.5)
EST. GFR  (AFRICAN AMERICAN): 57 ML/MIN/1.73 M^2
EST. GFR  (NON AFRICAN AMERICAN): 49.3 ML/MIN/1.73 M^2
GLUCOSE SERPL-MCNC: 125 MG/DL (ref 70–110)
HCT VFR BLD AUTO: 36.2 % (ref 40–54)
HGB BLD-MCNC: 10.8 G/DL (ref 14–18)
IMM GRANULOCYTES # BLD AUTO: 0.03 K/UL (ref 0–0.04)
IMM GRANULOCYTES NFR BLD AUTO: 0.3 % (ref 0–0.5)
LYMPHOCYTES # BLD AUTO: 1.6 K/UL (ref 1–4.8)
LYMPHOCYTES NFR BLD: 18 % (ref 18–48)
MAGNESIUM SERPL-MCNC: 2.1 MG/DL (ref 1.6–2.6)
MCH RBC QN AUTO: 28.2 PG (ref 27–31)
MCHC RBC AUTO-ENTMCNC: 29.8 G/DL (ref 32–36)
MCV RBC AUTO: 95 FL (ref 82–98)
MONOCYTES # BLD AUTO: 0.4 K/UL (ref 0.3–1)
MONOCYTES NFR BLD: 4.7 % (ref 4–15)
NEUTROPHILS # BLD AUTO: 6.7 K/UL (ref 1.8–7.7)
NEUTROPHILS NFR BLD: 75.5 % (ref 38–73)
NRBC BLD-RTO: 0 /100 WBC
PLATELET # BLD AUTO: 259 K/UL (ref 150–350)
PMV BLD AUTO: 10 FL (ref 9.2–12.9)
POTASSIUM SERPL-SCNC: 3.9 MMOL/L (ref 3.5–5.1)
PROT SERPL-MCNC: 5.9 G/DL (ref 6–8.4)
RBC # BLD AUTO: 3.83 M/UL (ref 4.6–6.2)
SODIUM SERPL-SCNC: 144 MMOL/L (ref 136–145)
WBC # BLD AUTO: 8.94 K/UL (ref 3.9–12.7)

## 2020-07-07 PROCEDURE — 94799 UNLISTED PULMONARY SVC/PX: CPT

## 2020-07-07 PROCEDURE — 63600175 PHARM REV CODE 636 W HCPCS: Performed by: STUDENT IN AN ORGANIZED HEALTH CARE EDUCATION/TRAINING PROGRAM

## 2020-07-07 PROCEDURE — 97530 THERAPEUTIC ACTIVITIES: CPT

## 2020-07-07 PROCEDURE — 86580 TB INTRADERMAL TEST: CPT | Performed by: INTERNAL MEDICINE

## 2020-07-07 PROCEDURE — 99900035 HC TECH TIME PER 15 MIN (STAT)

## 2020-07-07 PROCEDURE — 25000003 PHARM REV CODE 250: Performed by: STUDENT IN AN ORGANIZED HEALTH CARE EDUCATION/TRAINING PROGRAM

## 2020-07-07 PROCEDURE — 36415 COLL VENOUS BLD VENIPUNCTURE: CPT

## 2020-07-07 PROCEDURE — 99231 PR SUBSEQUENT HOSPITAL CARE,LEVL I: ICD-10-PCS | Mod: GC,,, | Performed by: INTERNAL MEDICINE

## 2020-07-07 PROCEDURE — 97535 SELF CARE MNGMENT TRAINING: CPT

## 2020-07-07 PROCEDURE — 83735 ASSAY OF MAGNESIUM: CPT

## 2020-07-07 PROCEDURE — 80053 COMPREHEN METABOLIC PANEL: CPT

## 2020-07-07 PROCEDURE — 94761 N-INVAS EAR/PLS OXIMETRY MLT: CPT

## 2020-07-07 PROCEDURE — 30200315 PPD INTRADERMAL TEST REV CODE 302: Performed by: INTERNAL MEDICINE

## 2020-07-07 PROCEDURE — 20600001 HC STEP DOWN PRIVATE ROOM

## 2020-07-07 PROCEDURE — 63600175 PHARM REV CODE 636 W HCPCS: Performed by: INTERNAL MEDICINE

## 2020-07-07 PROCEDURE — 85025 COMPLETE CBC W/AUTO DIFF WBC: CPT

## 2020-07-07 PROCEDURE — 99231 SBSQ HOSP IP/OBS SF/LOW 25: CPT | Mod: GC,,, | Performed by: INTERNAL MEDICINE

## 2020-07-07 PROCEDURE — 27000221 HC OXYGEN, UP TO 24 HOURS

## 2020-07-07 RX ORDER — FUROSEMIDE 10 MG/ML
40 INJECTION INTRAMUSCULAR; INTRAVENOUS ONCE
Status: COMPLETED | OUTPATIENT
Start: 2020-07-07 | End: 2020-07-07

## 2020-07-07 RX ADMIN — FUROSEMIDE 40 MG: 40 TABLET ORAL at 08:07

## 2020-07-07 RX ADMIN — TAMSULOSIN HYDROCHLORIDE 0.4 MG: 0.4 CAPSULE ORAL at 08:07

## 2020-07-07 RX ADMIN — FUROSEMIDE 40 MG: 10 INJECTION, SOLUTION INTRAVENOUS at 02:07

## 2020-07-07 RX ADMIN — CEFTRIAXONE 2 G: 2 INJECTION, SOLUTION INTRAVENOUS at 08:07

## 2020-07-07 RX ADMIN — HEPARIN SODIUM 5000 UNITS: 5000 INJECTION INTRAVENOUS; SUBCUTANEOUS at 06:07

## 2020-07-07 RX ADMIN — HEPARIN SODIUM 5000 UNITS: 5000 INJECTION INTRAVENOUS; SUBCUTANEOUS at 02:07

## 2020-07-07 RX ADMIN — HEPARIN SODIUM 5000 UNITS: 5000 INJECTION INTRAVENOUS; SUBCUTANEOUS at 09:07

## 2020-07-07 RX ADMIN — TUBERCULIN PURIFIED PROTEIN DERIVATIVE 5 UNITS: 5 INJECTION, SOLUTION INTRADERMAL at 02:07

## 2020-07-07 RX ADMIN — HYPROMELLOSE 2910: 5 SOLUTION OPHTHALMIC at 08:07

## 2020-07-07 NOTE — PLAN OF CARE
Plan of care discussed with patient.  Patient ambulating with assist of 2-3 people and a walker, fall precautions in place. Patient has no complaints of pain. Discussed medications and care. Patient has no questions at this time. Will continue to monitor.     Pt has bed alarm on and a AvaSys in his room. Pt has tried to get out of bed today twice. Pt was taught back into laying down back in the bed. IV antibiotics still ordered. Lasix IVP of 40 mg was given to help diurese. Pt was given TB skin test today. Green marker surrounds the spot on R FA. No reactions thus far. Pt is mor oriented than yesterday.

## 2020-07-07 NOTE — PT/OT/SLP PROGRESS
"Occupational Therapy   Treatment    Name: Zbigniew Forman  MRN: [id number]  Admitting Diagnosis:  Acute on chronic diastolic congestive heart failure       Recommendations:     Discharge Recommendations: nursing facility, skilled  Discharge Equipment Recommendations:  none  Barriers to discharge:  None    Assessment:     Zbigniew Forman is a 89 y.o. male with a medical diagnosis of Acute on chronic diastolic congestive heart failure.  He presents with the following performance deficits affecting function: weakness, impaired balance, decreased safety awareness, impaired endurance, impaired self care skills, impaired cardiopulmonary response to activity, gait instability, decreased upper extremity function, decreased lower extremity function. Pt tolerated session well this date as noted in his ability to tolerate multiple functional tasks. Pt performed STS transfers ranging from min of 2 persons with RW to mod A of 1 person with no AD. Pt continues to require increased assistance for ADLs due to impaired BUE function and weakness. OT POC remains appropriate for patient on this date. Pt will continue to benefit from skilled OT to address the deficits affecting his occupational performance.     Rehab Prognosis:  Good; patient would benefit from acute skilled OT services to address these deficits and reach maximum level of function.       Plan:     Patient to be seen 3 x/week to address the above listed problems via self-care/home management, therapeutic activities, therapeutic exercises  · Plan of Care Expires: 08/04/20  · Plan of Care Reviewed with: patient    Subjective     Pain/Comfort: "Ya'll convinced me to do it." (pt's response jokingly to sit on the EOB)  · Pain Rating 1: (pt reported of pain all over; pt did not rate)  · Pain Addressed 1: Distraction, Reposition  · Pain Rating Post-Intervention 1: 0/10    Objective:     Communicated with: RN prior to session.  Patient found HOB elevated with Condom Catheter, " telemetry, pulse ox (continuous) upon OT entry to room.    General Precautions: Standard, fall, hearing impaired, vision impaired   Orthopedic Precautions:N/A   Braces: N/A     Occupational Performance:     Bed Mobility:    · Patient completed Rolling/Turning to Left with  maximal assistance and with side rail  · Patient completed Scooting/Bridging with maximal assistance for anterior hip scooting  · Patient completed Supine to Sit with maximal assistance for trunk elevation and LB placement over bed  · Patient completed Sit to Supine with maximal assistance for LB and trunk placement into bed     Functional Mobility/Transfers:  · Patient completed Sit <> Stand Transfer from bed with minimum assistance and of 2 persons  with  rolling walker   · x5 trials completed from the bed ranging from min-mod A depending on level of fatigue  · 1 trail completed without RW requiring max A to reach full upright standing  · Increased posterior leaning in standing noted; cues for cervical extension and forward gaze  · Functional Mobility: Pt took ~3 steps EOB with mod A and RW. No LOB noted. No RBs required. Forward steps limited due to posterior lean.    Activities of Daily Living:  · Grooming: stand by assistance for balance while sitting EOB to wipe face; max A for thoroughness of task      [facility] 6 Click ADL: 10    Treatment & Education:  - Role of OT/ OT POC  - Self care safety/ independence  - Functional transfer/ mobility safety  - Bed mobility safety  - Pursed lip breathing  - Energy conservation techniques such as taking rest breaks as needed; 02 decreased to mid 80s and with time increased to 96% with 2 L of 02   - Functional performance this date   - Pt sat EOB with SBA for ~10 mins while completing self care routine. Pt performed multiple sit<>stand transfers to build overall strength and prepare for household distance functional mobility.     Additional staff present: Tech to provide transfer assistance     Patient left  HOB elevated with all lines intact, call button in reach, bed alarm on, RN notified and daughter presentEducation:      GOALS:   Multidisciplinary Problems     Occupational Therapy Goals        Problem: Occupational Therapy Goal    Goal Priority Disciplines Outcome Interventions   Occupational Therapy Goal     OT, PT/OT Ongoing, Progressing    Description: Goals to be met by: 7/16/20    Patient will increase functional independence with ADLs by performing:    Grooming while standing at sink with Contact Guard Assistance.  Toileting from toilet with Contact Guard Assistance for hygiene and clothing management.   Supine to sit with Minimal Assistance to increase bed mobility independence.  Stand pivot transfers with Contact Guard Assistance to reach bedside chair/commode  Step transfer with Contact Guard Assistance and AD as needed   Toilet transfer to toilet with Contact Guard Assistance.                     Time Tracking:     OT Date of Treatment: 07/07/20  OT Start Time: 1036  OT Stop Time: 1114  OT Total Time (min): 38 min    Billable Minutes:Self Care/Home Management 15  Therapeutic Activity 23    Trisha Alcantar OT  7/7/2020

## 2020-07-07 NOTE — PLAN OF CARE
07/07/20 1322   Post-Acute Status   Post-Acute Authorization Placement   Post-Acute Placement Status Pending Post-Acute Clinical Review     Under review at [facility] and [facility].    SW completed LOCET via phone and faxed PASRR to [facility] (fx. [phone]) to obtain the 142 for NH admission.    Delores Randolph LMSW  [facility]  [id number]

## 2020-07-07 NOTE — PLAN OF CARE
Problem: Occupational Therapy Goal  Goal: Occupational Therapy Goal  Description: Goals to be met by: 7/16/20    Patient will increase functional independence with ADLs by performing:    Grooming while standing at sink with Contact Guard Assistance.  Toileting from toilet with Contact Guard Assistance for hygiene and clothing management.   Supine to sit with Minimal Assistance to increase bed mobility independence.  Stand pivot transfers with Contact Guard Assistance to reach bedside chair/commode  Step transfer with Contact Guard Assistance and AD as needed   Toilet transfer to toilet with Contact Guard Assistance.    Outcome: Ongoing, Progressing     OT POC remains appropriate for patient on this date. Pt will continue to benefit from skilled OT to address the deficits affecting his occupational performance.      Trisha Alcantar OTR/L  7/7/20

## 2020-07-07 NOTE — PLAN OF CARE
Plan of care reviewed with patient/family; all questions and concerns addressed; no distress at present. Pt refuses to allow CBG due to finger stick.  Spoke with daughter Anne Platt and she requested information shared that family and pt does not want whiteside inserted under any instance due to BPH

## 2020-07-07 NOTE — PLAN OF CARE
07/07/20 0853   Discharge Reassessment   Assessment Type Discharge Planning Reassessment   Discharge Plan A Skilled Nursing Facility   Discharge Plan B Home with family;Home Health

## 2020-07-07 NOTE — PROGRESS NOTES
[facility]  Cardiology  Progress Note    Patient Name: Zbigniew Forman  MRN: [id number]  Admission Date: 7/4/2020  Hospital Length of Stay: 3 days  Code Status: DNR   Attending Physician: Geraldine Becerril MD   Primary Care Physician: Blas Morgan Ii, MD  Expected Discharge Date: 7/8/2020  Principal Problem:Acute on chronic diastolic congestive heart failure    Subjective:     Hospital Course:   Admitted to CCU for acute hypoxemic respiratory failure 2/2 ADHF vs CAP. Diuresed with IV lasix with good UOP. Repeat echo showed EF 55%, grade II dd. UA suggestive of infection and given suspicion for CAP, started on azithro + CTX. US renal negative for hydronephrosis. US lower extremity negative for DVT. UCx and BCx grew Klebsiella.     Interval History: NAEON. Alert and interactive. Complaining of prostate pain.     Review of Systems   Constitution: Positive for weight gain. Negative for chills, diaphoresis, fever and weight loss.   HENT: Negative for congestion and sore throat.    Eyes: Negative for visual disturbance.   Cardiovascular: Positive for dyspnea on exertion and leg swelling. Negative for chest pain, orthopnea, palpitations, paroxysmal nocturnal dyspnea and syncope.   Respiratory: Negative for cough, shortness of breath and wheezing.    Skin: Negative for rash.   Musculoskeletal: Negative for joint pain and myalgias.   Gastrointestinal: Negative for abdominal pain, constipation, diarrhea, melena, nausea and vomiting.   Genitourinary: Negative for dysuria, frequency and hematuria.   Neurological: Negative for dizziness, headaches, light-headedness and numbness.   Psychiatric/Behavioral: Negative for altered mental status.     Objective:     Vital Signs (Most Recent):  Temp: 98.2 °F (36.8 °C) (07/07/20 0330)  Pulse: 81 (07/07/20 0714)  Resp: 17 (07/07/20 0713)  BP: 118/68 (07/07/20 0330)  SpO2: 99 % (07/07/20 0713) Vital Signs (24h Range):  Temp:  [97.5 °F (36.4 °C)-98.2 °F (36.8 °C)]  98.2 °F (36.8 °C)  Pulse:  [75-92] 81  Resp:  [14-20] 17  SpO2:  [97 %-100 %] 99 %  BP: (118-132)/(56-68) 118/68     Weight: 76.2 kg (168 lb)  Body mass index is 24.81 kg/m².     SpO2: 99 %  O2 Device (Oxygen Therapy): nasal cannula      Intake/Output Summary (Last 24 hours) at 7/7/2020 0905  Last data filed at 7/7/2020 0600  Gross per 24 hour   Intake 640 ml   Output 750 ml   Net -110 ml       Lines/Drains/Airways     Drain            Male External Urinary Catheter 07/05/20 2200 1 day          Peripheral Intravenous Line                 Peripheral IV - Single Lumen 07/04/20 20 G Right Hand 3 days                Physical Exam   Constitutional: He is oriented to person, place, and time. He appears well-developed and well-nourished. No distress.   HENT:   Head: Normocephalic and atraumatic.   Mouth/Throat: Oropharynx is clear and moist. No oropharyngeal exudate.   Eyes: Pupils are equal, round, and reactive to light. Conjunctivae are normal. No scleral icterus.   Neck: Neck supple. No JVD present. No tracheal deviation present.   Cardiovascular: Normal rate, regular rhythm, normal heart sounds and intact distal pulses.   No murmur heard.  Pulmonary/Chest: Effort normal and breath sounds normal. No respiratory distress. He has no wheezes. He has no rales.   Abdominal: Soft. Bowel sounds are normal. He exhibits no distension. There is abdominal tenderness (suprapubic). There is no rebound.   Musculoskeletal:         General: No edema.   Neurological: He is alert and oriented to person, place, and time. He exhibits normal muscle tone.   Skin: Skin is warm and dry. No rash noted. He is not diaphoretic.   Psychiatric: He has a normal mood and affect. His behavior is normal.       Significant Labs: All pertinent lab results from the last 24 hours have been reviewed.    Significant Imaging: Reviewed.    Assessment and Plan:     * Acute on chronic diastolic congestive heart failure  Community acquired pneumonia of left lower  lobe of lung  Acute hypoxemic respiratory failure  89 yo M admitted with acute hypoxemic respiratory failure 2/2 ADHF vs CAP (leukocytosis, procal 17). Started on IV lasix with good UOP + azithro/CTX. Repeat TTE showed EF 55%, grade II dd, posterior MV leaflet failure. US lower extremities negative for DVT.     SpO2 98% on 2L nc  UOP 1.9 L, net 1.4 L    Plan:  - Transition to home PO lasix 40mg  - BNP ordered  - Repeat CXR tomorrow  - S/p azithro, continue CTX and transition to PO on discharge  - Daily weights (standing if tolerated)  - Strict I/Os  - Fluid restriction at 1500mL  - Cardiac diet    BPH with urinary obstruction  Bacteremia due to Klebsiella pneumoniae  UTI due to Klebsiella species  UCx + BCx on admission grew Klebsiella   US renal negative for hydronephrosis  Started on azithro + CTX for CAP coverage  Currently has condom cath in place (patient and family refused whiteside)    Plan:  - Continue CTX, transition to PO on discharge  - Repeat BCx  - Bladder scan +/- whiteside  - Continue home tamsulosin    Debility  PT/OT consulted, recommending SNF  Will discuss to with family whether they want SNF vs     DNR (do not resuscitate)  Patient himself and family based on prior encounters     Hypertension associated with diabetes  Holding home lisinopril 10mg + amlodipine 5mg in setting of JAN on soft BP    Controlled type 2 diabetes mellitus without complication, without long-term current use of insulin  HbA1c 7.0, diet controlled    Plan:  - LDSS  - POCT glucose ACHS  - Diabetic diet  - Goal -180 while inpatient        VTE Risk Mitigation (From admission, onward)         Ordered     heparin (porcine) injection 5,000 Units  Every 8 hours      07/04/20 2034     IP VTE HIGH RISK PATIENT  Once      07/04/20 2034     Place sequential compression device  Until discontinued      07/04/20 2034                Ceasar Walker MD  Cardiology  [facility]

## 2020-07-07 NOTE — PLAN OF CARE
[facility]     Department of Hospital Medicine     [address] [place] [address]     [phone] after hours  [phone] fax       NURSING HOME ORDERS    07/16/2020    Admit to Nursing Home:      Skilled Bed                                                  Diagnoses:  Active Hospital Problems    Diagnosis  POA    BPH with urinary obstruction [N40.1, N13.8]  Yes     Priority: 4     Bacteremia due to Klebsiella pneumoniae [R78.81]  Yes     Priority: 6     UTI due to Klebsiella species [N39.0, B96.1]  Yes     Priority: 7     Chronic hypoxemic respiratory failure [J96.11]  Yes    Debility [R53.81]  Yes    Heart failure with preserved ejection fraction [I50.30]  Yes    DNR (do not resuscitate) [Z66]  Yes    Hypertension associated with diabetes [E11.59, I10]  Yes    Controlled type 2 diabetes mellitus without complication, without long-term current use of insulin [E11.9]  Yes      Resolved Hospital Problems    Diagnosis Date Resolved POA    *Acute on chronic diastolic congestive heart failure [I50.33] 07/10/2020 Yes     Priority: 1 - High    Acute and chronic respiratory failure with hypoxia [J96.21] 07/10/2020 Yes     Priority: 3        Patient is homebound due to:  Acute on chronic diastolic congestive heart failure    Allergies:  Review of patient's allergies indicates:   Allergen Reactions    Metformin Diarrhea    Sulfa (sulfonamide antibiotics) Other (See Comments)     unknown reactions    Tradjenta [linagliptin] Diarrhea              Vitals:       Every shift (Skilled Nursing patients)     Home O2 with 2L nc to maintain SpO2 > 92%    Diet: cardiac with 1.5L fluid restriction     Acitivities:      - Up in a chair each morning as tolerated   - Ambulate with assistance to bathroom   - Scheduled walks once each shift (every 8 hours)   - May ambulate independently   - May use walker, cane, or self-propelled wheelchair    LABS:  Per facility  protocol   CMP, CBC each weekly for 3 months    Nursing Precautions:     - Aspiration precautions:             - Total assistance with meals            -  Upright 90 degrees befor during and after meals             -  Suction at bedside          - Fall precautions per nursing home protocol   - Seizure precaution per detention protocol   - Decubitus precautions:        -  for positioning   - Pressure reducing foam mattress   - Turn patient every two hours. Use wedge pillows to anchor patient    CONSULTS:      Physical Therapy to evaluate and treat     Occupational Therapy to evaluate and treat     Nutrition to evaluate and recommend diet      MISCELLANEOUS CARE:       Routine Skin for Bedridden Patients:  Apply moisture barrier cream to all    skin folds and wet areas in perineal area daily and after baths and                           all bowel movements.    Heart Failure Home Health Instructions:     SN to instruct on the following:    Instruct on the definition of CHF.   Instruct on the signs/sympoms of CHF to be reported.   Instruct on and monitor daily weights.   Instruct on factors that cause exacerbation.   Instruct on action, dose, schedule, and side effects of medications.   Instruct on diet as prescribed.   Instruct on activity allowed.   Instruct on life-style modifications for life long management of CHF   SN to assess compliance with daily weights, diet, medications, fluid retention,    safety precautions, activities permitted and life-style modifications.   Additional 1-2 SN visits per week as needed for signs and symptoms     of CHF exacerbation.    DIABETES CARE:      Check blood sugar:       Fingerstick blood sugar AC and HS      Report CBG < 60 or > 400 to physician.     **LOW CORRECTION DOSE**   Blood Glucose   mg/dL                  Pre-meal                Bedtime   151-200                0 unit                      0 unit   201-250                2 units                    1 unit  "  251-300                3 units                    1 unit   301-350                4 units                    2 units   >350                     5 units                    3 units   Administer subcutaneously if needed at times designated by monitoring schedule.    DO NOT HOLD correction dose insulin for patients who are  NPO.   "HIGH ALERT MEDICATION" - Administer with meals or TF/TPN.       Medications: Discontinue all previous medication orders, if any. See new list below.     Zbigniew Forman   Home Medication Instructions SEVERO:[id number]    Printed on:07/15/20 1257   Medication Information                      albuterol-ipratropium (DUO-NEB) 2.5 mg-0.5 mg/3 mL nebulizer solution  Take 3 mLs by nebulization every 6 (six) hours as needed for Wheezing. Rescue             amLODIPine (NORVASC) 5 MG tablet  Take 1 tablet (5 mg total) by mouth once daily.             artificial tears w/ lanolin (AKWA TEARS) 0.5% ophthalmic ointment  Apply to affected eye(s) as needed for dryness.             cefTRIAXone (ROCEPHIN) 2 g/50 mL PgBk IVPB  Inject 50 mLs (2 g total) into the vein once daily. for 12 days             external catheter, male Misc  Use condom catheter every night             furosemide (LASIX) 20 MG tablet  Take 3 tablets (60 mg total) by mouth once daily.             insulin detemir U-100 (LEVEMIR FLEXTOUCH) 100 unit/mL (3 mL) SubQ InPn pen  Inject 7 Units into the skin once daily.             latanoprost 0.005 % ophthalmic solution  Place 1 drop into both eyes every evening.             lisinopriL 10 MG tablet  Take 1 tablet (10 mg total) by mouth once daily. HOLD UNTIL SEE PCP             senna-docusate 8.6-50 mg (PERICOLACE) 8.6-50 mg per tablet  Take 1 tablet by mouth 2 (two) times daily as needed for Constipation.             tamsulosin (FLOMAX) 0.4 mg Cap  Take 1 capsule (0.4 mg total) by mouth once daily.               Antibiotics:  - Ceftriaxone 2g every 24 hours (received dose on 7/16, last dose on " 7/21/20)    _________________________________  Ceasar Walker MD  Medical Resident  [facility]  Pager: [phone]

## 2020-07-07 NOTE — ASSESSMENT & PLAN NOTE
UCx + BCx on admission grew Klebsiella   US renal negative for hydronephrosis  Started on azithro + CTX for CAP coverage  Currently has condom cath in place    Plan:  - Continue CTX, transition to PO on discharge  - Repeat BCx  - Bladder scan +/- whiteside  - Continue home tamsulosin

## 2020-07-07 NOTE — ASSESSMENT & PLAN NOTE
89 yo M admitted with acute hypoxemic respiratory failure 2/2 ADHF vs CAP (leukocytosis, procal 17). Started on IV lasix with good UOP + azithro/CTX. Repeat TTE showed EF 55%, grade II dd, posterior MV leaflet failure. US lower extremities negative for DVT.     SpO2 98% on 2L nc  UOP 1.9 L, net 1.4 L    Plan:  - Transition to home PO lasix 40mg  - BNP ordered  - Repeat CXR tomorrow  - S/p azithro, continue CTX and transition to PO on discharge  - Daily weights (standing if tolerated)  - Strict I/Os  - Fluid restriction at 1500mL  - Cardiac diet

## 2020-07-07 NOTE — SUBJECTIVE & OBJECTIVE
Interval History: NAEON. Alert and interactive. Complaining of prostate pain.     Review of Systems   Constitution: Positive for weight gain. Negative for chills, diaphoresis, fever and weight loss.   HENT: Negative for congestion and sore throat.    Eyes: Negative for visual disturbance.   Cardiovascular: Positive for dyspnea on exertion and leg swelling. Negative for chest pain, orthopnea, palpitations, paroxysmal nocturnal dyspnea and syncope.   Respiratory: Negative for cough, shortness of breath and wheezing.    Skin: Negative for rash.   Musculoskeletal: Negative for joint pain and myalgias.   Gastrointestinal: Negative for abdominal pain, constipation, diarrhea, melena, nausea and vomiting.   Genitourinary: Negative for dysuria, frequency and hematuria.   Neurological: Negative for dizziness, headaches, light-headedness and numbness.   Psychiatric/Behavioral: Negative for altered mental status.     Objective:     Vital Signs (Most Recent):  Temp: 98.2 °F (36.8 °C) (07/07/20 0330)  Pulse: 81 (07/07/20 0714)  Resp: 17 (07/07/20 0713)  BP: 118/68 (07/07/20 0330)  SpO2: 99 % (07/07/20 0713) Vital Signs (24h Range):  Temp:  [97.5 °F (36.4 °C)-98.2 °F (36.8 °C)] 98.2 °F (36.8 °C)  Pulse:  [75-92] 81  Resp:  [14-20] 17  SpO2:  [97 %-100 %] 99 %  BP: (118-132)/(56-68) 118/68     Weight: 76.2 kg (168 lb)  Body mass index is 24.81 kg/m².     SpO2: 99 %  O2 Device (Oxygen Therapy): nasal cannula      Intake/Output Summary (Last 24 hours) at 7/7/2020 0905  Last data filed at 7/7/2020 0600  Gross per 24 hour   Intake 640 ml   Output 750 ml   Net -110 ml       Lines/Drains/Airways     Drain            Male External Urinary Catheter 07/05/20 2200 1 day          Peripheral Intravenous Line                 Peripheral IV - Single Lumen 07/04/20 20 G Right Hand 3 days                Physical Exam   Constitutional: He is oriented to person, place, and time. He appears well-developed and well-nourished. No distress.   HENT:    Head: Normocephalic and atraumatic.   Mouth/Throat: Oropharynx is clear and moist. No oropharyngeal exudate.   Eyes: Pupils are equal, round, and reactive to light. Conjunctivae are normal. No scleral icterus.   Neck: Neck supple. No JVD present. No tracheal deviation present.   Cardiovascular: Normal rate, regular rhythm, normal heart sounds and intact distal pulses.   No murmur heard.  Pulmonary/Chest: Effort normal and breath sounds normal. No respiratory distress. He has no wheezes. He has no rales.   Abdominal: Soft. Bowel sounds are normal. He exhibits no distension. There is abdominal tenderness (suprapubic). There is no rebound.   Musculoskeletal:         General: No edema.   Neurological: He is alert and oriented to person, place, and time. He exhibits normal muscle tone.   Skin: Skin is warm and dry. No rash noted. He is not diaphoretic.   Psychiatric: He has a normal mood and affect. His behavior is normal.       Significant Labs: All pertinent lab results from the last 24 hours have been reviewed.    Significant Imaging: Reviewed.

## 2020-07-07 NOTE — PLAN OF CARE
07/07/20 1233   Post-Acute Status   Post-Acute Authorization Placement   Post-Acute Placement Status Referrals Sent     SW sent referrals to multiple SNF providers via .  SW informed [facility] that they are first choice.    Delores Randolph LMSW  [facility]  [phone]

## 2020-07-08 LAB
ALBUMIN SERPL BCP-MCNC: 2.8 G/DL (ref 3.5–5.2)
ALP SERPL-CCNC: 67 U/L (ref 55–135)
ALT SERPL W/O P-5'-P-CCNC: 10 U/L (ref 10–44)
ANION GAP SERPL CALC-SCNC: 10 MMOL/L (ref 8–16)
AST SERPL-CCNC: 12 U/L (ref 10–40)
BASOPHILS # BLD AUTO: 0.03 K/UL (ref 0–0.2)
BASOPHILS NFR BLD: 0.3 % (ref 0–1.9)
BILIRUB SERPL-MCNC: 0.4 MG/DL (ref 0.1–1)
BUN SERPL-MCNC: 38 MG/DL (ref 10–30)
CALCIUM SERPL-MCNC: 8.5 MG/DL (ref 8.7–10.5)
CHLORIDE SERPL-SCNC: 101 MMOL/L (ref 95–110)
CO2 SERPL-SCNC: 32 MMOL/L (ref 23–29)
CREAT SERPL-MCNC: 1.2 MG/DL (ref 0.5–1.4)
DIFFERENTIAL METHOD: ABNORMAL
EOSINOPHIL # BLD AUTO: 0 K/UL (ref 0–0.5)
EOSINOPHIL NFR BLD: 0.3 % (ref 0–8)
ERYTHROCYTE [DISTWIDTH] IN BLOOD BY AUTOMATED COUNT: 13.4 % (ref 11.5–14.5)
EST. GFR  (AFRICAN AMERICAN): 57 ML/MIN/1.73 M^2
EST. GFR  (NON AFRICAN AMERICAN): 49.3 ML/MIN/1.73 M^2
GLUCOSE SERPL-MCNC: 213 MG/DL (ref 70–110)
HCT VFR BLD AUTO: 37.7 % (ref 40–54)
HGB BLD-MCNC: 11.8 G/DL (ref 14–18)
IMM GRANULOCYTES # BLD AUTO: 0.05 K/UL (ref 0–0.04)
IMM GRANULOCYTES NFR BLD AUTO: 0.4 % (ref 0–0.5)
LYMPHOCYTES # BLD AUTO: 1.8 K/UL (ref 1–4.8)
LYMPHOCYTES NFR BLD: 15.8 % (ref 18–48)
MAGNESIUM SERPL-MCNC: 2.1 MG/DL (ref 1.6–2.6)
MCH RBC QN AUTO: 28.6 PG (ref 27–31)
MCHC RBC AUTO-ENTMCNC: 31.3 G/DL (ref 32–36)
MCV RBC AUTO: 91 FL (ref 82–98)
MONOCYTES # BLD AUTO: 0.5 K/UL (ref 0.3–1)
MONOCYTES NFR BLD: 4.5 % (ref 4–15)
NEUTROPHILS # BLD AUTO: 9.2 K/UL (ref 1.8–7.7)
NEUTROPHILS NFR BLD: 78.7 % (ref 38–73)
NRBC BLD-RTO: 0 /100 WBC
PLATELET # BLD AUTO: 264 K/UL (ref 150–350)
PMV BLD AUTO: 10.1 FL (ref 9.2–12.9)
POCT GLUCOSE: 226 MG/DL (ref 70–110)
POCT GLUCOSE: 247 MG/DL (ref 70–110)
POTASSIUM SERPL-SCNC: 5 MMOL/L (ref 3.5–5.1)
PROT SERPL-MCNC: 6.7 G/DL (ref 6–8.4)
RBC # BLD AUTO: 4.13 M/UL (ref 4.6–6.2)
SODIUM SERPL-SCNC: 143 MMOL/L (ref 136–145)
WBC # BLD AUTO: 11.67 K/UL (ref 3.9–12.7)

## 2020-07-08 PROCEDURE — 83735 ASSAY OF MAGNESIUM: CPT

## 2020-07-08 PROCEDURE — 85025 COMPLETE CBC W/AUTO DIFF WBC: CPT

## 2020-07-08 PROCEDURE — 36415 COLL VENOUS BLD VENIPUNCTURE: CPT

## 2020-07-08 PROCEDURE — 25000242 PHARM REV CODE 250 ALT 637 W/ HCPCS: Performed by: STUDENT IN AN ORGANIZED HEALTH CARE EDUCATION/TRAINING PROGRAM

## 2020-07-08 PROCEDURE — 63600175 PHARM REV CODE 636 W HCPCS: Performed by: STUDENT IN AN ORGANIZED HEALTH CARE EDUCATION/TRAINING PROGRAM

## 2020-07-08 PROCEDURE — 94640 AIRWAY INHALATION TREATMENT: CPT

## 2020-07-08 PROCEDURE — 20600001 HC STEP DOWN PRIVATE ROOM

## 2020-07-08 PROCEDURE — 99231 PR SUBSEQUENT HOSPITAL CARE,LEVL I: ICD-10-PCS | Mod: GC,,, | Performed by: INTERNAL MEDICINE

## 2020-07-08 PROCEDURE — 25000242 PHARM REV CODE 250 ALT 637 W/ HCPCS: Performed by: INTERNAL MEDICINE

## 2020-07-08 PROCEDURE — 25000003 PHARM REV CODE 250: Performed by: STUDENT IN AN ORGANIZED HEALTH CARE EDUCATION/TRAINING PROGRAM

## 2020-07-08 PROCEDURE — 94761 N-INVAS EAR/PLS OXIMETRY MLT: CPT

## 2020-07-08 PROCEDURE — 99231 SBSQ HOSP IP/OBS SF/LOW 25: CPT | Mod: GC,,, | Performed by: INTERNAL MEDICINE

## 2020-07-08 PROCEDURE — 99900035 HC TECH TIME PER 15 MIN (STAT)

## 2020-07-08 PROCEDURE — 80053 COMPREHEN METABOLIC PANEL: CPT

## 2020-07-08 PROCEDURE — 27000221 HC OXYGEN, UP TO 24 HOURS

## 2020-07-08 RX ORDER — FUROSEMIDE 10 MG/ML
80 INJECTION INTRAMUSCULAR; INTRAVENOUS
Status: DISCONTINUED | OUTPATIENT
Start: 2020-07-08 | End: 2020-07-08

## 2020-07-08 RX ORDER — FUROSEMIDE 10 MG/ML
80 INJECTION INTRAMUSCULAR; INTRAVENOUS 2 TIMES DAILY
Status: DISCONTINUED | OUTPATIENT
Start: 2020-07-08 | End: 2020-07-10

## 2020-07-08 RX ORDER — IBUPROFEN 200 MG
24 TABLET ORAL
Status: DISCONTINUED | OUTPATIENT
Start: 2020-07-08 | End: 2020-07-16 | Stop reason: HOSPADM

## 2020-07-08 RX ORDER — INSULIN ASPART 100 [IU]/ML
0-5 INJECTION, SOLUTION INTRAVENOUS; SUBCUTANEOUS
Status: DISCONTINUED | OUTPATIENT
Start: 2020-07-08 | End: 2020-07-16 | Stop reason: HOSPADM

## 2020-07-08 RX ORDER — IPRATROPIUM BROMIDE AND ALBUTEROL SULFATE 2.5; .5 MG/3ML; MG/3ML
3 SOLUTION RESPIRATORY (INHALATION)
Status: DISCONTINUED | OUTPATIENT
Start: 2020-07-08 | End: 2020-07-16 | Stop reason: HOSPADM

## 2020-07-08 RX ORDER — GLUCAGON 1 MG
1 KIT INJECTION
Status: DISCONTINUED | OUTPATIENT
Start: 2020-07-08 | End: 2020-07-16 | Stop reason: HOSPADM

## 2020-07-08 RX ORDER — IBUPROFEN 200 MG
16 TABLET ORAL
Status: DISCONTINUED | OUTPATIENT
Start: 2020-07-08 | End: 2020-07-16 | Stop reason: HOSPADM

## 2020-07-08 RX ADMIN — TAMSULOSIN HYDROCHLORIDE 0.4 MG: 0.4 CAPSULE ORAL at 08:07

## 2020-07-08 RX ADMIN — CEFTRIAXONE 2 G: 2 INJECTION, SOLUTION INTRAVENOUS at 08:07

## 2020-07-08 RX ADMIN — HYPROMELLOSE 2910: 5 SOLUTION OPHTHALMIC at 08:07

## 2020-07-08 RX ADMIN — IPRATROPIUM BROMIDE AND ALBUTEROL SULFATE 3 ML: .5; 3 SOLUTION RESPIRATORY (INHALATION) at 07:07

## 2020-07-08 RX ADMIN — HEPARIN SODIUM 5000 UNITS: 5000 INJECTION INTRAVENOUS; SUBCUTANEOUS at 09:07

## 2020-07-08 RX ADMIN — HEPARIN SODIUM 5000 UNITS: 5000 INJECTION INTRAVENOUS; SUBCUTANEOUS at 05:07

## 2020-07-08 RX ADMIN — INSULIN ASPART 2 UNITS: 100 INJECTION, SOLUTION INTRAVENOUS; SUBCUTANEOUS at 04:07

## 2020-07-08 RX ADMIN — HEPARIN SODIUM 5000 UNITS: 5000 INJECTION INTRAVENOUS; SUBCUTANEOUS at 02:07

## 2020-07-08 RX ADMIN — IPRATROPIUM BROMIDE AND ALBUTEROL SULFATE 3 ML: .5; 3 SOLUTION RESPIRATORY (INHALATION) at 12:07

## 2020-07-08 RX ADMIN — FUROSEMIDE 80 MG: 10 INJECTION, SOLUTION INTRAMUSCULAR; INTRAVENOUS at 09:07

## 2020-07-08 RX ADMIN — IPRATROPIUM BROMIDE AND ALBUTEROL SULFATE 3 ML: .5; 3 SOLUTION RESPIRATORY (INHALATION) at 01:07

## 2020-07-08 RX ADMIN — INSULIN ASPART 1 UNITS: 100 INJECTION, SOLUTION INTRAVENOUS; SUBCUTANEOUS at 09:07

## 2020-07-08 NOTE — PHYSICIAN QUERY
PT Name: Zbigniew Forman  MR #: [id number]     Physician Query Form - Documentation Clarification      CDS/: Claudia Mackay RN, CCDS              Contact information: [email or web address]    This form is a permanent document in the medical record.     Query Date: July 8, 2020    By submitting this query, we are merely seeking further clarification of documentation. Please utilize your independent clinical judgment when addressing the question(s) below.    The Medical record reflects the following:    Supporting Clinical Findings Location in Medical Record     Hypertension associated with Diabetes    Controlled type 2 diabetes mellitus without complication, without long-term current use of insulin   7/4 h/p, 7/5-7/8 prog notes    7/4 h/p, 7/5-7/8 prog notes     Lisinopril 10 mg PO Daily - Hold in the setting of JAN and soft BP   - Amlodipine 5 mg PO daily  - Hold in the setting of soft BP        7/4 h/p                                                                            Doctor, Please clarify the meaning of Hypertension associated with Diabetes.    Provider Use Only      [  ] Hypertension is a manifestation of diabetes (secondary HTN)    [  ] Hypertension is not a manifestation of diabetes (essential HTN)    [  ] Other meaning (please specify):________________    [  ] Clinically undetermined                                                                                                                              [  ] Clinically Undetermined

## 2020-07-08 NOTE — PROGRESS NOTES
[facility]  Cardiology  Progress Note    Patient Name: Zbigniew Forman  MRN: [id number]  Admission Date: 7/4/2020  Hospital Length of Stay: 4 days  Code Status: DNR   Attending Physician: Geraldine Becerril MD   Primary Care Physician: Blas Morgan Ii, MD  Expected Discharge Date: 7/8/2020  Principal Problem:Acute on chronic diastolic congestive heart failure    Subjective:     Hospital Course:   Admitted to CCU for acute hypoxemic respiratory failure 2/2 ADHF vs CAP. Diuresed with IV lasix with good UOP. Repeat echo showed EF 55%, grade II dd. UA suggestive of infection and given suspicion for CAP, started on azithro + CTX. US renal negative for hydronephrosis. US lower extremity negative for DVT. UCx and BCx grew Klebsiella.     Interval History: NAEON. On 2L nc. Tachypneic this morning. UOP 700cc after PO lasix 40mg + IV lasix 40mg. CXR appears congested.     Review of Systems   Constitution: Positive for weight gain. Negative for chills, diaphoresis, fever and weight loss.   HENT: Negative for congestion and sore throat.    Eyes: Negative for visual disturbance.   Cardiovascular: Positive for dyspnea on exertion and leg swelling. Negative for chest pain, orthopnea, palpitations, paroxysmal nocturnal dyspnea and syncope.   Respiratory: Negative for cough, shortness of breath and wheezing.    Skin: Negative for rash.   Musculoskeletal: Negative for joint pain and myalgias.   Gastrointestinal: Negative for abdominal pain, constipation, diarrhea, melena, nausea and vomiting.   Genitourinary: Negative for dysuria, frequency and hematuria.   Neurological: Negative for dizziness, headaches, light-headedness and numbness.   Psychiatric/Behavioral: Negative for altered mental status.     Objective:     Vital Signs (Most Recent):  Temp: 97.6 °F (36.4 °C) (07/08/20 0737)  Pulse: 94 (07/08/20 0737)  Resp: 20 (07/08/20 0737)  BP: (!) 145/65 (07/08/20 0737)  SpO2: 99 % (07/08/20 0737) Vital Signs (24h  Range):  Temp:  [97.6 °F (36.4 °C)-98.4 °F (36.9 °C)] 97.6 °F (36.4 °C)  Pulse:  [] 94  Resp:  [16-22] 20  SpO2:  [91 %-100 %] 99 %  BP: (139-145)/(65-79) 145/65     Weight: 76.2 kg (168 lb)  Body mass index is 24.81 kg/m².     SpO2: 99 %  O2 Device (Oxygen Therapy): nasal cannula      Intake/Output Summary (Last 24 hours) at 7/8/2020 0906  Last data filed at 7/8/2020 0500  Gross per 24 hour   Intake 300 ml   Output 700 ml   Net -400 ml       Lines/Drains/Airways     Drain            Male External Urinary Catheter 07/05/20 2200 2 days          Peripheral Intravenous Line                 Peripheral IV - Single Lumen 07/04/20 20 G Right Hand 4 days                Physical Exam   Constitutional: He is oriented to person, place, and time. He appears well-developed and well-nourished. No distress.   HENT:   Head: Normocephalic and atraumatic.   Mouth/Throat: Oropharynx is clear and moist. No oropharyngeal exudate.   Eyes: Pupils are equal, round, and reactive to light. Conjunctivae are normal. No scleral icterus.   Neck: Neck supple. No JVD present. No tracheal deviation present.   Cardiovascular: Normal rate, regular rhythm, normal heart sounds and intact distal pulses.   No murmur heard.  Pulmonary/Chest: Effort normal and breath sounds normal. No respiratory distress. He has no wheezes. He has no rales.   Abdominal: Soft. Bowel sounds are normal. He exhibits no distension. There is abdominal tenderness (suprapubic). There is no rebound.   Musculoskeletal:         General: No edema.   Neurological: He is alert and oriented to person, place, and time. He exhibits normal muscle tone.   Skin: Skin is warm and dry. No rash noted. He is not diaphoretic.   Psychiatric: He has a normal mood and affect. His behavior is normal.       Significant Labs: All pertinent lab results from the last 24 hours have been reviewed.    Significant Imaging: Reviewed.    Assessment and Plan:     * Acute on chronic diastolic congestive  heart failure  Acute hypoxemic respiratory failure  89 yo M admitted with acute hypoxemic respiratory failure 2/2 ADHF vs CAP (leukocytosis, procal 17). Started on IV lasix with good UOP + azithro/CTX. Repeat TTE showed EF 55%, grade II dd, posterior MV leaflet failure. US lower extremities negative for DVT.     SpO2 98% on 2L nc  UOP 700cc after PO lasix 40mg + IV lasix 40mg  Repeat CXR looks like worsening congestion    Plan:  - IV lasix 80mg BID  - S/p CAP coverage for possible PNA  - Daily weights (standing if tolerated)  - Strict I/Os  - Fluid restriction at 1500mL  - Cardiac diet    BPH with urinary obstruction  Bacteremia due to Klebsiella pneumoniae  UTI due to Klebsiella species  UCx + BCx on admission grew Klebsiella   US renal negative for hydronephrosis  Started on azithro + CTX for CAP coverage  Currently has condom cath in place (whiteside refused per patient and family)  Repeat BCx from 7/6 with NGTD    Plan:  - Continue CTX x 14 days (tentative end date: 7/17/20)  - Continue home tamsulosin    Debility  PT/OT consulted, recommending SNF    DNR (do not resuscitate)  Patient himself and family based on prior encounters     Hypertension associated with diabetes  Holding home lisinopril 10mg + amlodipine 5mg in setting of JAN on soft BP    Controlled type 2 diabetes mellitus without complication, without long-term current use of insulin  HbA1c 7.0, diet controlled    Plan:  - LDSS  - POCT glucose ACHS  - Diabetic diet  - Goal -180 while inpatient      VTE Risk Mitigation (From admission, onward)         Ordered     heparin (porcine) injection 5,000 Units  Every 8 hours      07/04/20 2034     IP VTE HIGH RISK PATIENT  Once      07/04/20 2034     Place sequential compression device  Until discontinued      07/04/20 2034                Ceasar Walker MD  Cardiology  [facility]

## 2020-07-08 NOTE — ASSESSMENT & PLAN NOTE
89 yo M admitted with acute hypoxemic respiratory failure 2/2 ADHF vs CAP (leukocytosis, procal 17). Started on IV lasix with good UOP + azithro/CTX. Repeat TTE showed EF 55%, grade II dd, posterior MV leaflet failure. US lower extremities negative for DVT.     SpO2 98% on 2L nc  UOP 700cc after PO lasix 40mg + IV lasix 40mg  Repeat CXR looks like worsening congestion    Plan:  - IV lasix 80mg BID  - S/p CAP coverage for possible PNA  - Daily weights (standing if tolerated)  - Strict I/Os  - Fluid restriction at 1500mL  - Cardiac diet

## 2020-07-08 NOTE — ASSESSMENT & PLAN NOTE
UCx + BCx on admission grew Klebsiella   US renal negative for hydronephrosis  Started on azithro + CTX for CAP coverage  Currently has condom cath in place (whiteside refused per patient and family)  Repeat BCx from 7/6 with NGTD    Plan:  - Continue CTX x 14 days (tentative end date: 7/17/20)  - Continue home tamsulosin

## 2020-07-08 NOTE — PLAN OF CARE
Plan of care discussed with patient. Patient is free of fall/trauma/injury. Denies CP, SOB, or pain/discomfort. All questions addressed. IV abx administered per order. IVP lasix administered per order. BG monitored per order. Will continue to monitor

## 2020-07-08 NOTE — SUBJECTIVE & OBJECTIVE
Interval History: NAEON. On 2L nc. Tachypneic this morning. UOP 700cc after PO lasix 40mg + IV lasix 40mg. CXR appears congested.     Review of Systems   Constitution: Positive for weight gain. Negative for chills, diaphoresis, fever and weight loss.   HENT: Negative for congestion and sore throat.    Eyes: Negative for visual disturbance.   Cardiovascular: Positive for dyspnea on exertion and leg swelling. Negative for chest pain, orthopnea, palpitations, paroxysmal nocturnal dyspnea and syncope.   Respiratory: Negative for cough, shortness of breath and wheezing.    Skin: Negative for rash.   Musculoskeletal: Negative for joint pain and myalgias.   Gastrointestinal: Negative for abdominal pain, constipation, diarrhea, melena, nausea and vomiting.   Genitourinary: Negative for dysuria, frequency and hematuria.   Neurological: Negative for dizziness, headaches, light-headedness and numbness.   Psychiatric/Behavioral: Negative for altered mental status.     Objective:     Vital Signs (Most Recent):  Temp: 97.6 °F (36.4 °C) (07/08/20 0737)  Pulse: 94 (07/08/20 0737)  Resp: 20 (07/08/20 0737)  BP: (!) 145/65 (07/08/20 0737)  SpO2: 99 % (07/08/20 0737) Vital Signs (24h Range):  Temp:  [97.6 °F (36.4 °C)-98.4 °F (36.9 °C)] 97.6 °F (36.4 °C)  Pulse:  [] 94  Resp:  [16-22] 20  SpO2:  [91 %-100 %] 99 %  BP: (139-145)/(65-79) 145/65     Weight: 76.2 kg (168 lb)  Body mass index is 24.81 kg/m².     SpO2: 99 %  O2 Device (Oxygen Therapy): nasal cannula      Intake/Output Summary (Last 24 hours) at 7/8/2020 0906  Last data filed at 7/8/2020 0500  Gross per 24 hour   Intake 300 ml   Output 700 ml   Net -400 ml       Lines/Drains/Airways     Drain            Male External Urinary Catheter 07/05/20 2200 2 days          Peripheral Intravenous Line                 Peripheral IV - Single Lumen 07/04/20 20 G Right Hand 4 days                Physical Exam   Constitutional: He is oriented to person, place, and time. He appears  well-developed and well-nourished. No distress.   HENT:   Head: Normocephalic and atraumatic.   Mouth/Throat: Oropharynx is clear and moist. No oropharyngeal exudate.   Eyes: Pupils are equal, round, and reactive to light. Conjunctivae are normal. No scleral icterus.   Neck: Neck supple. No JVD present. No tracheal deviation present.   Cardiovascular: Normal rate, regular rhythm, normal heart sounds and intact distal pulses.   No murmur heard.  Pulmonary/Chest: Effort normal and breath sounds normal. No respiratory distress. He has no wheezes. He has no rales.   Abdominal: Soft. Bowel sounds are normal. He exhibits no distension. There is abdominal tenderness (suprapubic). There is no rebound.   Musculoskeletal:         General: No edema.   Neurological: He is alert and oriented to person, place, and time. He exhibits normal muscle tone.   Skin: Skin is warm and dry. No rash noted. He is not diaphoretic.   Psychiatric: He has a normal mood and affect. His behavior is normal.       Significant Labs: All pertinent lab results from the last 24 hours have been reviewed.    Significant Imaging: Reviewed.

## 2020-07-08 NOTE — PLAN OF CARE
07/08/20 1051   Post-Acute Status   Post-Acute Authorization Placement   Post-Acute Placement Status Awaiting Internal Medical Clearance     Per Zahida with [facility] pt accepted and bed available today.  Per treatment team pt will not be ready to discharge until tomorrow, but Zahida reported she will have a bed available tomorrow as well.  Zahida to submit for authorization.  Will continue to follow.    Delores Randolph LMSW  [facility]  [id number]

## 2020-07-09 ENCOUNTER — TELEPHONE (OUTPATIENT)
Dept: INTERNAL MEDICINE | Facility: CLINIC | Age: 85
End: 2020-07-09

## 2020-07-09 LAB
ALBUMIN SERPL BCP-MCNC: 2.8 G/DL (ref 3.5–5.2)
ALLENS TEST: ABNORMAL
ALP SERPL-CCNC: 65 U/L (ref 55–135)
ALT SERPL W/O P-5'-P-CCNC: 10 U/L (ref 10–44)
ANION GAP SERPL CALC-SCNC: 10 MMOL/L (ref 8–16)
AST SERPL-CCNC: 12 U/L (ref 10–40)
BACTERIA BLD CULT: NORMAL
BASOPHILS # BLD AUTO: 0.02 K/UL (ref 0–0.2)
BASOPHILS NFR BLD: 0.2 % (ref 0–1.9)
BILIRUB SERPL-MCNC: 0.4 MG/DL (ref 0.1–1)
BUN SERPL-MCNC: 36 MG/DL (ref 10–30)
CALCIUM SERPL-MCNC: 8.7 MG/DL (ref 8.7–10.5)
CHLORIDE SERPL-SCNC: 100 MMOL/L (ref 95–110)
CO2 SERPL-SCNC: 35 MMOL/L (ref 23–29)
CREAT SERPL-MCNC: 1.1 MG/DL (ref 0.5–1.4)
DELSYS: ABNORMAL
DIFFERENTIAL METHOD: ABNORMAL
EOSINOPHIL # BLD AUTO: 0 K/UL (ref 0–0.5)
EOSINOPHIL NFR BLD: 0.2 % (ref 0–8)
ERYTHROCYTE [DISTWIDTH] IN BLOOD BY AUTOMATED COUNT: 13.3 % (ref 11.5–14.5)
EST. GFR  (AFRICAN AMERICAN): >60 ML/MIN/1.73 M^2
EST. GFR  (NON AFRICAN AMERICAN): 54.8 ML/MIN/1.73 M^2
FLOW: 2
GLUCOSE SERPL-MCNC: 238 MG/DL (ref 70–110)
HCO3 UR-SCNC: 38.4 MMOL/L (ref 24–28)
HCT VFR BLD AUTO: 39.1 % (ref 40–54)
HGB BLD-MCNC: 11.8 G/DL (ref 14–18)
IMM GRANULOCYTES # BLD AUTO: 0.07 K/UL (ref 0–0.04)
IMM GRANULOCYTES NFR BLD AUTO: 0.7 % (ref 0–0.5)
LYMPHOCYTES # BLD AUTO: 1.6 K/UL (ref 1–4.8)
LYMPHOCYTES NFR BLD: 15.8 % (ref 18–48)
MAGNESIUM SERPL-MCNC: 2 MG/DL (ref 1.6–2.6)
MCH RBC QN AUTO: 27.9 PG (ref 27–31)
MCHC RBC AUTO-ENTMCNC: 30.2 G/DL (ref 32–36)
MCV RBC AUTO: 92 FL (ref 82–98)
MODE: ABNORMAL
MONOCYTES # BLD AUTO: 0.5 K/UL (ref 0.3–1)
MONOCYTES NFR BLD: 5.3 % (ref 4–15)
NEUTROPHILS # BLD AUTO: 7.9 K/UL (ref 1.8–7.7)
NEUTROPHILS NFR BLD: 77.8 % (ref 38–73)
NRBC BLD-RTO: 0 /100 WBC
PCO2 BLDA: 60.9 MMHG (ref 35–45)
PH SMN: 7.41 [PH] (ref 7.35–7.45)
PLATELET # BLD AUTO: 263 K/UL (ref 150–350)
PMV BLD AUTO: 10.1 FL (ref 9.2–12.9)
PO2 BLDA: 23 MMHG (ref 40–60)
POC BE: 14 MMOL/L
POC SATURATED O2: 38 % (ref 95–100)
POC TCO2: 40 MMOL/L (ref 24–29)
POCT GLUCOSE: 228 MG/DL (ref 70–110)
POCT GLUCOSE: 245 MG/DL (ref 70–110)
POTASSIUM SERPL-SCNC: 3.9 MMOL/L (ref 3.5–5.1)
PROT SERPL-MCNC: 6.6 G/DL (ref 6–8.4)
RBC # BLD AUTO: 4.23 M/UL (ref 4.6–6.2)
SAMPLE: ABNORMAL
SITE: ABNORMAL
SODIUM SERPL-SCNC: 145 MMOL/L (ref 136–145)
SP02: 91
TB INDURATION 48 - 72 HR READ: 0 MM
WBC # BLD AUTO: 10.08 K/UL (ref 3.9–12.7)

## 2020-07-09 PROCEDURE — 94761 N-INVAS EAR/PLS OXIMETRY MLT: CPT

## 2020-07-09 PROCEDURE — C1751 CATH, INF, PER/CENT/MIDLINE: HCPCS

## 2020-07-09 PROCEDURE — 94640 AIRWAY INHALATION TREATMENT: CPT

## 2020-07-09 PROCEDURE — 63600175 PHARM REV CODE 636 W HCPCS: Performed by: STUDENT IN AN ORGANIZED HEALTH CARE EDUCATION/TRAINING PROGRAM

## 2020-07-09 PROCEDURE — 85025 COMPLETE CBC W/AUTO DIFF WBC: CPT

## 2020-07-09 PROCEDURE — 20600001 HC STEP DOWN PRIVATE ROOM

## 2020-07-09 PROCEDURE — 76937 US GUIDE VASCULAR ACCESS: CPT

## 2020-07-09 PROCEDURE — 63600175 PHARM REV CODE 636 W HCPCS: Performed by: INTERNAL MEDICINE

## 2020-07-09 PROCEDURE — 99231 SBSQ HOSP IP/OBS SF/LOW 25: CPT | Mod: GC,,, | Performed by: INTERNAL MEDICINE

## 2020-07-09 PROCEDURE — 80053 COMPREHEN METABOLIC PANEL: CPT

## 2020-07-09 PROCEDURE — 25000003 PHARM REV CODE 250: Performed by: STUDENT IN AN ORGANIZED HEALTH CARE EDUCATION/TRAINING PROGRAM

## 2020-07-09 PROCEDURE — 25000242 PHARM REV CODE 250 ALT 637 W/ HCPCS: Performed by: INTERNAL MEDICINE

## 2020-07-09 PROCEDURE — C9399 UNCLASSIFIED DRUGS OR BIOLOG: HCPCS | Performed by: STUDENT IN AN ORGANIZED HEALTH CARE EDUCATION/TRAINING PROGRAM

## 2020-07-09 PROCEDURE — 99900035 HC TECH TIME PER 15 MIN (STAT)

## 2020-07-09 PROCEDURE — 99231 PR SUBSEQUENT HOSPITAL CARE,LEVL I: ICD-10-PCS | Mod: GC,,, | Performed by: INTERNAL MEDICINE

## 2020-07-09 PROCEDURE — 82803 BLOOD GASES ANY COMBINATION: CPT

## 2020-07-09 PROCEDURE — 27000221 HC OXYGEN, UP TO 24 HOURS

## 2020-07-09 PROCEDURE — 36410 VNPNXR 3YR/> PHY/QHP DX/THER: CPT

## 2020-07-09 PROCEDURE — 25000003 PHARM REV CODE 250: Performed by: INTERNAL MEDICINE

## 2020-07-09 PROCEDURE — 83735 ASSAY OF MAGNESIUM: CPT

## 2020-07-09 PROCEDURE — 36415 COLL VENOUS BLD VENIPUNCTURE: CPT

## 2020-07-09 RX ORDER — ACETAZOLAMIDE 500 MG/5ML
500 INJECTION, POWDER, LYOPHILIZED, FOR SOLUTION INTRAVENOUS EVERY 12 HOURS
Status: DISCONTINUED | OUTPATIENT
Start: 2020-07-09 | End: 2020-07-09

## 2020-07-09 RX ADMIN — IPRATROPIUM BROMIDE AND ALBUTEROL SULFATE 3 ML: .5; 3 SOLUTION RESPIRATORY (INHALATION) at 02:07

## 2020-07-09 RX ADMIN — HEPARIN SODIUM 5000 UNITS: 5000 INJECTION INTRAVENOUS; SUBCUTANEOUS at 05:07

## 2020-07-09 RX ADMIN — ACETAZOLAMIDE 500 MG: 500 INJECTION, POWDER, LYOPHILIZED, FOR SOLUTION INTRAVENOUS at 01:07

## 2020-07-09 RX ADMIN — HEPARIN SODIUM 5000 UNITS: 5000 INJECTION INTRAVENOUS; SUBCUTANEOUS at 10:07

## 2020-07-09 RX ADMIN — CEFTRIAXONE 2 G: 2 INJECTION, SOLUTION INTRAVENOUS at 08:07

## 2020-07-09 RX ADMIN — FUROSEMIDE 80 MG: 10 INJECTION, SOLUTION INTRAMUSCULAR; INTRAVENOUS at 08:07

## 2020-07-09 RX ADMIN — IPRATROPIUM BROMIDE AND ALBUTEROL SULFATE 3 ML: .5; 3 SOLUTION RESPIRATORY (INHALATION) at 07:07

## 2020-07-09 RX ADMIN — INSULIN ASPART 2 UNITS: 100 INJECTION, SOLUTION INTRAVENOUS; SUBCUTANEOUS at 04:07

## 2020-07-09 RX ADMIN — ACETAZOLAMIDE 500 MG: 500 INJECTION, POWDER, LYOPHILIZED, FOR SOLUTION INTRAVENOUS at 08:07

## 2020-07-09 RX ADMIN — INSULIN ASPART 2 UNITS: 100 INJECTION, SOLUTION INTRAVENOUS; SUBCUTANEOUS at 08:07

## 2020-07-09 RX ADMIN — HEPARIN SODIUM 5000 UNITS: 5000 INJECTION INTRAVENOUS; SUBCUTANEOUS at 01:07

## 2020-07-09 RX ADMIN — INSULIN DETEMIR 3 UNITS: 100 INJECTION, SOLUTION SUBCUTANEOUS at 01:07

## 2020-07-09 RX ADMIN — IPRATROPIUM BROMIDE AND ALBUTEROL SULFATE 3 ML: .5; 3 SOLUTION RESPIRATORY (INHALATION) at 08:07

## 2020-07-09 NOTE — ASSESSMENT & PLAN NOTE
89 yo M admitted with acute hypoxemic respiratory failure 2/2 ADHF vs CAP (leukocytosis, procal 17). Started on IV lasix with good UOP + azithro/CTX. Repeat TTE showed EF 55%, grade II dd, posterior MV leaflet failure. US lower extremities negative for DVT.     SpO2 98% on 2L nc, desats when off O2  Good UOP with IV lasix  Repeat CXR looks like worsening congestion  Patient refused NRB + BiPAP    Plan:  - IV lasix 80mg BID  - S/p CAP coverage for possible PNA  - Daily weights (standing if tolerated)  - Strict I/Os  - Fluid restriction at 1500mL  - Cardiac diet

## 2020-07-09 NOTE — TELEPHONE ENCOUNTER
----- Message from Vivien Richards sent at 7/9/2020 12:37 PM CDT -----  Contact: Anne/ daughter [phone]  Patient's daughter called because her father is being discharged from [facility] and [place] him to go to the [facility] facility on [facility]. Please call daughter asap. She is very upset because they are near the [facility] facility and [place].

## 2020-07-09 NOTE — PROGRESS NOTES
[facility]  Cardiology  Progress Note    Patient Name: Zbigniew Forman  MRN: [id number]  Admission Date: 7/4/2020  Hospital Length of Stay: 5 days  Code Status: DNR   Attending Physician: Geraldine Becerril MD   Primary Care Physician: Blas Morgan Ii, MD  Expected Discharge Date: 7/9/2020  Principal Problem:Acute on chronic diastolic congestive heart failure    Subjective:     Hospital Course:   Admitted to CCU for acute hypoxemic respiratory failure 2/2 ADHF vs CAP. Diuresed with IV lasix with good UOP. Repeat echo showed EF 55%, grade II dd. UA suggestive of infection and given suspicion for CAP, started on azithro + CTX. US renal negative for hydronephrosis. US lower extremity negative for DVT. UCx and BCx grew Klebsiella.     Interval History: Good UOP with IV lasix over last 24 hrs (multiple unmeasured). This morning, patient was hypoxic on pre-rounds to 70s that was easily corrected with 2L nc. Patient refused NRB. VBG showed ph 7.4, pCO2 60.9, HCO3 38.4. Refused BiPAP.     Review of Systems   Constitution: Positive for weight gain. Negative for chills, diaphoresis, fever and weight loss.   HENT: Negative for congestion and sore throat.    Eyes: Negative for visual disturbance.   Cardiovascular: Positive for dyspnea on exertion and leg swelling. Negative for chest pain, orthopnea, palpitations, paroxysmal nocturnal dyspnea and syncope.   Respiratory: Negative for cough, shortness of breath and wheezing.    Skin: Negative for rash.   Musculoskeletal: Negative for joint pain and myalgias.   Gastrointestinal: Negative for abdominal pain, constipation, diarrhea, melena, nausea and vomiting.   Genitourinary: Negative for dysuria, frequency and hematuria.   Neurological: Negative for dizziness, headaches, light-headedness and numbness.   Psychiatric/Behavioral: Negative for altered mental status.     Objective:     Vital Signs (Most Recent):  Temp: 97.8 °F (36.6 °C) (07/09/20 1219)  Pulse: 83  (07/09/20 1219)  Resp: 17 (07/09/20 1219)  BP: 126/60 (07/09/20 1219)  SpO2: 99 % (07/09/20 1219) Vital Signs (24h Range):  Temp:  [97.8 °F (36.6 °C)-98.7 °F (37.1 °C)] 97.8 °F (36.6 °C)  Pulse:  [] 83  Resp:  [16-22] 17  SpO2:  [70 %-100 %] 99 %  BP: (122-150)/(60-73) 126/60     Weight: 76.2 kg (168 lb)  Body mass index is 24.81 kg/m².     SpO2: 99 %  O2 Device (Oxygen Therapy): nasal cannula      Intake/Output Summary (Last 24 hours) at 7/9/2020 1350  Last data filed at 7/9/2020 1100  Gross per 24 hour   Intake 238 ml   Output 1625 ml   Net -1387 ml       Lines/Drains/Airways     Drain            Male External Urinary Catheter 07/05/20 2200 3 days          Peripheral Intravenous Line                 Peripheral IV - Single Lumen 07/04/20 20 G Right Hand 5 days         Midline Catheter Insertion/Assessment  - Single Lumen 07/09/20 1030 Left brachial vein 18g x 10cm less than 1 day                Physical Exam   Constitutional: He is oriented to person, place, and time. He appears well-developed and well-nourished. No distress.   HENT:   Head: Normocephalic and atraumatic.   Mouth/Throat: Oropharynx is clear and moist. No oropharyngeal exudate.   Eyes: Pupils are equal, round, and reactive to light. Conjunctivae are normal. No scleral icterus.   Neck: Neck supple. No JVD present. No tracheal deviation present.   Cardiovascular: Normal rate, regular rhythm, normal heart sounds and intact distal pulses.   No murmur heard.  Pulmonary/Chest: Effort normal and breath sounds normal. No respiratory distress. He has no wheezes. He has no rales.   Abdominal: Soft. Bowel sounds are normal. He exhibits no distension. There is abdominal tenderness (suprapubic). There is no rebound.   Musculoskeletal:         General: No edema.   Neurological: He is alert and oriented to person, place, and time. He exhibits normal muscle tone.   Skin: Skin is warm and dry. No rash noted. He is not diaphoretic.   Psychiatric: He has a normal  mood and affect. His behavior is normal.       Significant Labs: All pertinent lab results from the last 24 hours have been reviewed.    Significant Imaging: Reviewed.    Assessment and Plan:     * Acute on chronic diastolic congestive heart failure  Acute hypoxemic respiratory failure  89 yo M admitted with acute hypoxemic respiratory failure 2/2 ADHF vs CAP (leukocytosis, procal 17). Started on IV lasix with good UOP + azithro/CTX. Repeat TTE showed EF 55%, grade II dd, posterior MV leaflet failure. US lower extremities negative for DVT.     SpO2 98% on 2L nc, desats when off O2  Good UOP with IV lasix  Repeat CXR looks like worsening congestion  Patient refused NRB + BiPAP    Plan:  - IV lasix 80mg BID  - S/p CAP coverage for possible PNA  - Daily weights (standing if tolerated)  - Strict I/Os  - Fluid restriction at 1500mL  - Cardiac diet    BPH with urinary obstruction  Bacteremia due to Klebsiella pneumoniae  UTI due to Klebsiella species  UCx + BCx on admission grew Klebsiella   US renal negative for hydronephrosis  Started on azithro + CTX for CAP coverage  Currently has condom cath in place (whiteside refused per patient and family)  Repeat BCx from 7/6 with NGTD    Plan:  - Continue CTX x 14 days (tentative end date: 7/21/20)  - Continue home tamsulosin    Debility  PT/OT consulted, recommending SNF    DNR (do not resuscitate)  Patient himself and family based on prior encounters     Hypertension associated with diabetes  Holding home lisinopril 10mg + amlodipine 5mg in setting of JAN on soft BP    Controlled type 2 diabetes mellitus without complication, without long-term current use of insulin  HbA1c 7.0, diet controlled    Plan:  - LDSS  - POCT glucose ACHS  - Diabetic diet  - Goal -180 while inpatient        VTE Risk Mitigation (From admission, onward)         Ordered     heparin (porcine) injection 5,000 Units  Every 8 hours      07/04/20 2034     IP VTE HIGH RISK PATIENT  Once      07/04/20 2034      Place sequential compression device  Until discontinued      07/04/20 2034                Ceasar Walker MD  Cardiology  [facility]

## 2020-07-09 NOTE — PLAN OF CARE
MACEY met with pt's daughter Anne Platt to discuss discharge planning.  MACEY explained that although pt had been accepted to [facility], as of this morning [facility] is no longer taking any new pts until further notice but that pt has also been accepted to [facility].  Anne Platt stated that she does not want pt to go to a facility on the [facility] but instead wants him to stay in the [facility] system.  SW explained that more referrals can be sent out but that by the time pt is medically ready to discharge, which could be tomorrow, pt will either have to go to an accepting facility, go home, or family will be financially responsible for the remainder of the days that family stays in the hospital.  Anne Platt stated that in that case they might elect to keep pt in the hospital.  MACEY relayed this to [facility] to get IMM signed.  SW also sent additional SNF referrals.  Will continue to follow.    UPDATE 3:00 PM  MACEY informed by Sam with [facility] that she spoke with pt's daughter Anne Platt about admissions and that Anne Platt had informed her that she does not want pt to go to any other facility but [facility].    Delores Randolph, ADRIAN  [facility]  [phone]

## 2020-07-09 NOTE — TELEPHONE ENCOUNTER
----- Message from Maru Hardy sent at 7/9/2020 12:56 PM CDT -----  Contact: Pt daughter [phone]--  Needs Advice    Reason for call:--Treatments--        Communication Preference:--Anne-Daughter--[phone]    Additional Information:Pt daughter calling to speak with the doctor only regarding pt treatments and  to let him know that pt in the hospital. Please call to advise.

## 2020-07-09 NOTE — SUBJECTIVE & OBJECTIVE
Interval History: Good UOP with IV lasix over last 24 hrs (multiple unmeasured). This morning, patient was hypoxic on pre-rounds to 70s that was easily corrected with 2L nc. Patient refused NRB. VBG showed ph 7.4, pCO2 60.9, HCO3 38.4. Refused BiPAP.     Review of Systems   Constitution: Positive for weight gain. Negative for chills, diaphoresis, fever and weight loss.   HENT: Negative for congestion and sore throat.    Eyes: Negative for visual disturbance.   Cardiovascular: Positive for dyspnea on exertion and leg swelling. Negative for chest pain, orthopnea, palpitations, paroxysmal nocturnal dyspnea and syncope.   Respiratory: Negative for cough, shortness of breath and wheezing.    Skin: Negative for rash.   Musculoskeletal: Negative for joint pain and myalgias.   Gastrointestinal: Negative for abdominal pain, constipation, diarrhea, melena, nausea and vomiting.   Genitourinary: Negative for dysuria, frequency and hematuria.   Neurological: Negative for dizziness, headaches, light-headedness and numbness.   Psychiatric/Behavioral: Negative for altered mental status.     Objective:     Vital Signs (Most Recent):  Temp: 97.8 °F (36.6 °C) (07/09/20 1219)  Pulse: 83 (07/09/20 1219)  Resp: 17 (07/09/20 1219)  BP: 126/60 (07/09/20 1219)  SpO2: 99 % (07/09/20 1219) Vital Signs (24h Range):  Temp:  [97.8 °F (36.6 °C)-98.7 °F (37.1 °C)] 97.8 °F (36.6 °C)  Pulse:  [] 83  Resp:  [16-22] 17  SpO2:  [70 %-100 %] 99 %  BP: (122-150)/(60-73) 126/60     Weight: 76.2 kg (168 lb)  Body mass index is 24.81 kg/m².     SpO2: 99 %  O2 Device (Oxygen Therapy): nasal cannula      Intake/Output Summary (Last 24 hours) at 7/9/2020 1350  Last data filed at 7/9/2020 1100  Gross per 24 hour   Intake 238 ml   Output 1625 ml   Net -1387 ml       Lines/Drains/Airways     Drain            Male External Urinary Catheter 07/05/20 2200 3 days          Peripheral Intravenous Line                 Peripheral IV - Single Lumen 07/04/20 20 G  Right Hand 5 days         Midline Catheter Insertion/Assessment  - Single Lumen 07/09/20 1030 Left brachial vein 18g x 10cm less than 1 day                Physical Exam   Constitutional: He is oriented to person, place, and time. He appears well-developed and well-nourished. No distress.   HENT:   Head: Normocephalic and atraumatic.   Mouth/Throat: Oropharynx is clear and moist. No oropharyngeal exudate.   Eyes: Pupils are equal, round, and reactive to light. Conjunctivae are normal. No scleral icterus.   Neck: Neck supple. No JVD present. No tracheal deviation present.   Cardiovascular: Normal rate, regular rhythm, normal heart sounds and intact distal pulses.   No murmur heard.  Pulmonary/Chest: Effort normal and breath sounds normal. No respiratory distress. He has no wheezes. He has no rales.   Abdominal: Soft. Bowel sounds are normal. He exhibits no distension. There is abdominal tenderness (suprapubic). There is no rebound.   Musculoskeletal:         General: No edema.   Neurological: He is alert and oriented to person, place, and time. He exhibits normal muscle tone.   Skin: Skin is warm and dry. No rash noted. He is not diaphoretic.   Psychiatric: He has a normal mood and affect. His behavior is normal.       Significant Labs: All pertinent lab results from the last 24 hours have been reviewed.    Significant Imaging: Reviewed.

## 2020-07-09 NOTE — TELEPHONE ENCOUNTER
----- Message from Rhonda Ya sent at 7/9/2020  9:03 AM CDT -----  Contact: [facility]   [phone]  Pharmacy is calling to clarify an RX.  RX name:  cefTRIAXone (ROCEPHIN) 2 g/50 mL D5W IVPB  What do they need to clarify:  Need to verify if this is the correct location or if the patient is in the hospital?  Comments:

## 2020-07-09 NOTE — PLAN OF CARE
Problem: Adult Inpatient Plan of Care  Goal: Plan of Care Review  Outcome: Ongoing, Progressing     Pt free of falls/traumas/injuries. Skin remains clean, dry, and intact. Pt on lasix IVP 8- mg BID. Pt diuresing. TB test right FA to be read 7/9/2020 at 4 pm. Telesitter for safety as pt is disoriented. Pt re-educated on importance of measuring accurate intake and out put; pt verbalized and demonstrates understanding. Reviewed plan of care with pt; and pt verbalized understanding.  Pt AAOX self, intermittently disoriented to all else, VSS, and in no distress will continue to monitor.

## 2020-07-09 NOTE — TELEPHONE ENCOUNTER
----- Message from Rhonda Ya sent at 7/9/2020  9:03 AM CDT -----  Contact: [facility]   [phone]  Pharmacy is calling to clarify an RX.  RX name:  cefTRIAXone (ROCEPHIN) 2 g/50 mL D5W IVPB  What do they need to clarify:  Need to verify if this is the correct location or if the patient is in the hospital?  Comments:

## 2020-07-09 NOTE — TELEPHONE ENCOUNTER
Pt daughter Anne states that the family wants pt discharged to [facility] facility here. Family states that they were informed that [facility] is not accepting anymore patients.    Ruchi

## 2020-07-09 NOTE — PT/OT/SLP PROGRESS
Physical Therapy      Patient Name:  Zbigniew Forman   MRN:  [id number]  Admitting Diagnosis:  Acute on chronic diastolic congestive heart failure   Recent Surgery: * No surgery found *    Admit Date: 7/4/2020  Length of Stay: 5 days    Patient not seen today due to Other (Comment)(pt agitated and confused this AM with SPO2 to 70s). Zbigniew Forman's plan of care and PT goals reviewed on this date and remain appropriate. Will follow-up for progressive mobility tomorrow, or as appropriate.    Lynn Bailey, PT, DPT  7/9/2020   Pager: [phone]

## 2020-07-09 NOTE — CONSULTS
Single lumen 18g x 10cm midline placed to left brachial vein. Max dwell date 8/7/2020, Lot# [id number].  Needle advanced into the vessel under real time ultrasound guidance.  Image recorded and saved.

## 2020-07-09 NOTE — ASSESSMENT & PLAN NOTE
UCx + BCx on admission grew Klebsiella   US renal negative for hydronephrosis  Started on azithro + CTX for CAP coverage  Currently has condom cath in place (whiteside refused per patient and family)  Repeat BCx from 7/6 with NGTD    Plan:  - Continue CTX x 14 days (tentative end date: 7/21/20)  - Continue home tamsulosin

## 2020-07-09 NOTE — RESPIRATORY THERAPY
Attempted to place pt on bipap. Pt did not tolerate. MD notified, placed back on nasal cannula at 2L maintaining sat at 95%

## 2020-07-09 NOTE — PLAN OF CARE
Plan of care discussed with patient. Patient is free of fall/trauma/injury. Denies CP, SOB, or pain/discomfort. Pt more aggitaited today. Pt refused PO flomax and eyedrops. Started Diamox IV per order. IV abx administered per order. Midline placed per midline team and order. All questions addressed. Will continue to monitor

## 2020-07-09 NOTE — PHYSICIAN QUERY
PT Name: Zbigniew Forman  MR #: [id number]     Physician Query Form - Documentation Clarification      CDS/: Claudia Mackay RN, CCDS              Contact information: [email or web address]    This form is a permanent document in the medical record.     Query Date: July 9, 2020    By submitting this query, we are merely seeking further clarification of documentation. Please utilize your independent clinical judgment when addressing the question(s) below.    The Medical record reflects the following:    Supporting Clinical Findings Location in Medical Record     Hypertension associated with Diabetes    Controlled type 2 diabetes mellitus without complication, without long-term current use of insulin   7/4 h/p, 7/5-7/8 prog notes    7/4 h/p, 7/5-7/8 prog notes     Lisinopril 10 mg PO Daily - Hold in the setting of JAN and soft BP   - Amlodipine 5 mg PO daily  - Hold in the setting of soft BP        7/4 h/p                                                                            Doctor, Please clarify the meaning of Hypertension associated with Diabetes.    Provider Use Only      [  ] Hypertension is a manifestation of diabetes (secondary HTN)    [ x ] Hypertension is not a manifestation of diabetes (essential HTN)    [  ] Other meaning (please specify):________________    [  ] Clinically undetermined                                                                                                                              [  ] Clinically Undetermined

## 2020-07-10 PROBLEM — J96.21 ACUTE AND CHRONIC RESPIRATORY FAILURE WITH HYPOXIA: Status: RESOLVED | Noted: 2018-11-17 | Resolved: 2020-07-10

## 2020-07-10 PROBLEM — J96.21 ACUTE AND CHRONIC RESPIRATORY FAILURE WITH HYPOXIA: Status: ACTIVE | Noted: 2018-11-17

## 2020-07-10 PROBLEM — J96.11 CHRONIC HYPOXEMIC RESPIRATORY FAILURE: Status: ACTIVE | Noted: 2020-07-10

## 2020-07-10 PROBLEM — I50.33 ACUTE ON CHRONIC DIASTOLIC CONGESTIVE HEART FAILURE: Status: RESOLVED | Noted: 2020-07-04 | Resolved: 2020-07-10

## 2020-07-10 LAB
ALBUMIN SERPL BCP-MCNC: 3 G/DL (ref 3.5–5.2)
ALP SERPL-CCNC: 65 U/L (ref 55–135)
ALT SERPL W/O P-5'-P-CCNC: 12 U/L (ref 10–44)
ANION GAP SERPL CALC-SCNC: 13 MMOL/L (ref 8–16)
AST SERPL-CCNC: 15 U/L (ref 10–40)
BASOPHILS # BLD AUTO: 0.04 K/UL (ref 0–0.2)
BASOPHILS NFR BLD: 0.3 % (ref 0–1.9)
BILIRUB SERPL-MCNC: 0.4 MG/DL (ref 0.1–1)
BUN SERPL-MCNC: 41 MG/DL (ref 10–30)
CALCIUM SERPL-MCNC: 9.2 MG/DL (ref 8.7–10.5)
CHLORIDE SERPL-SCNC: 101 MMOL/L (ref 95–110)
CO2 SERPL-SCNC: 33 MMOL/L (ref 23–29)
CREAT SERPL-MCNC: 1.4 MG/DL (ref 0.5–1.4)
DIFFERENTIAL METHOD: ABNORMAL
EOSINOPHIL # BLD AUTO: 0 K/UL (ref 0–0.5)
EOSINOPHIL NFR BLD: 0.2 % (ref 0–8)
ERYTHROCYTE [DISTWIDTH] IN BLOOD BY AUTOMATED COUNT: 13.3 % (ref 11.5–14.5)
EST. GFR  (AFRICAN AMERICAN): 47.3 ML/MIN/1.73 M^2
EST. GFR  (NON AFRICAN AMERICAN): 40.9 ML/MIN/1.73 M^2
GLUCOSE SERPL-MCNC: 216 MG/DL (ref 70–110)
HCT VFR BLD AUTO: 43 % (ref 40–54)
HGB BLD-MCNC: 12.8 G/DL (ref 14–18)
IMM GRANULOCYTES # BLD AUTO: 0.09 K/UL (ref 0–0.04)
IMM GRANULOCYTES NFR BLD AUTO: 0.8 % (ref 0–0.5)
LYMPHOCYTES # BLD AUTO: 1.8 K/UL (ref 1–4.8)
LYMPHOCYTES NFR BLD: 15.2 % (ref 18–48)
MAGNESIUM SERPL-MCNC: 2.1 MG/DL (ref 1.6–2.6)
MCH RBC QN AUTO: 27.6 PG (ref 27–31)
MCHC RBC AUTO-ENTMCNC: 29.8 G/DL (ref 32–36)
MCV RBC AUTO: 93 FL (ref 82–98)
MONOCYTES # BLD AUTO: 0.5 K/UL (ref 0.3–1)
MONOCYTES NFR BLD: 4 % (ref 4–15)
NEUTROPHILS # BLD AUTO: 9.4 K/UL (ref 1.8–7.7)
NEUTROPHILS NFR BLD: 79.5 % (ref 38–73)
NRBC BLD-RTO: 0 /100 WBC
PLATELET # BLD AUTO: 274 K/UL (ref 150–350)
PMV BLD AUTO: 10.2 FL (ref 9.2–12.9)
POCT GLUCOSE: 213 MG/DL (ref 70–110)
POCT GLUCOSE: 307 MG/DL (ref 70–110)
POTASSIUM SERPL-SCNC: 3.7 MMOL/L (ref 3.5–5.1)
PROT SERPL-MCNC: 7.2 G/DL (ref 6–8.4)
RBC # BLD AUTO: 4.64 M/UL (ref 4.6–6.2)
SODIUM SERPL-SCNC: 147 MMOL/L (ref 136–145)
WBC # BLD AUTO: 11.8 K/UL (ref 3.9–12.7)

## 2020-07-10 PROCEDURE — 94761 N-INVAS EAR/PLS OXIMETRY MLT: CPT

## 2020-07-10 PROCEDURE — 85025 COMPLETE CBC W/AUTO DIFF WBC: CPT

## 2020-07-10 PROCEDURE — 80053 COMPREHEN METABOLIC PANEL: CPT

## 2020-07-10 PROCEDURE — 27000221 HC OXYGEN, UP TO 24 HOURS

## 2020-07-10 PROCEDURE — 25000003 PHARM REV CODE 250: Performed by: STUDENT IN AN ORGANIZED HEALTH CARE EDUCATION/TRAINING PROGRAM

## 2020-07-10 PROCEDURE — 20600001 HC STEP DOWN PRIVATE ROOM

## 2020-07-10 PROCEDURE — 36415 COLL VENOUS BLD VENIPUNCTURE: CPT

## 2020-07-10 PROCEDURE — 99239 PR HOSPITAL DISCHARGE DAY,>30 MIN: ICD-10-PCS | Mod: GC,,, | Performed by: INTERNAL MEDICINE

## 2020-07-10 PROCEDURE — 63600175 PHARM REV CODE 636 W HCPCS: Performed by: STUDENT IN AN ORGANIZED HEALTH CARE EDUCATION/TRAINING PROGRAM

## 2020-07-10 PROCEDURE — 63600175 PHARM REV CODE 636 W HCPCS: Performed by: INTERNAL MEDICINE

## 2020-07-10 PROCEDURE — 25000003 PHARM REV CODE 250: Performed by: INTERNAL MEDICINE

## 2020-07-10 PROCEDURE — 25000242 PHARM REV CODE 250 ALT 637 W/ HCPCS: Performed by: INTERNAL MEDICINE

## 2020-07-10 PROCEDURE — 94640 AIRWAY INHALATION TREATMENT: CPT

## 2020-07-10 PROCEDURE — C9399 UNCLASSIFIED DRUGS OR BIOLOG: HCPCS | Performed by: STUDENT IN AN ORGANIZED HEALTH CARE EDUCATION/TRAINING PROGRAM

## 2020-07-10 PROCEDURE — 99900035 HC TECH TIME PER 15 MIN (STAT)

## 2020-07-10 PROCEDURE — 83735 ASSAY OF MAGNESIUM: CPT

## 2020-07-10 PROCEDURE — 99239 HOSP IP/OBS DSCHRG MGMT >30: CPT | Mod: GC,,, | Performed by: INTERNAL MEDICINE

## 2020-07-10 RX ORDER — FUROSEMIDE 40 MG/1
40 TABLET ORAL DAILY
Status: DISCONTINUED | OUTPATIENT
Start: 2020-07-10 | End: 2020-07-10

## 2020-07-10 RX ORDER — LISINOPRIL 10 MG/1
10 TABLET ORAL DAILY
Qty: 90 TABLET | Refills: 3 | Status: ON HOLD
Start: 2020-07-10 | End: 2020-07-26 | Stop reason: HOSPADM

## 2020-07-10 RX ORDER — FUROSEMIDE 40 MG/1
80 TABLET ORAL DAILY
Qty: 90 TABLET | Refills: 3
Start: 2020-07-10 | End: 2020-07-13 | Stop reason: HOSPADM

## 2020-07-10 RX ORDER — FUROSEMIDE 80 MG/1
80 TABLET ORAL DAILY
Status: DISCONTINUED | OUTPATIENT
Start: 2020-07-10 | End: 2020-07-11

## 2020-07-10 RX ADMIN — HEPARIN SODIUM 5000 UNITS: 5000 INJECTION INTRAVENOUS; SUBCUTANEOUS at 08:07

## 2020-07-10 RX ADMIN — ACETAZOLAMIDE 500 MG: 500 INJECTION, POWDER, LYOPHILIZED, FOR SOLUTION INTRAVENOUS at 08:07

## 2020-07-10 RX ADMIN — INSULIN DETEMIR 3 UNITS: 100 INJECTION, SOLUTION SUBCUTANEOUS at 08:07

## 2020-07-10 RX ADMIN — INSULIN ASPART 2 UNITS: 100 INJECTION, SOLUTION INTRAVENOUS; SUBCUTANEOUS at 10:07

## 2020-07-10 RX ADMIN — IPRATROPIUM BROMIDE AND ALBUTEROL SULFATE 3 ML: .5; 3 SOLUTION RESPIRATORY (INHALATION) at 07:07

## 2020-07-10 RX ADMIN — INSULIN ASPART 1 UNITS: 100 INJECTION, SOLUTION INTRAVENOUS; SUBCUTANEOUS at 08:07

## 2020-07-10 RX ADMIN — HEPARIN SODIUM 5000 UNITS: 5000 INJECTION INTRAVENOUS; SUBCUTANEOUS at 02:07

## 2020-07-10 RX ADMIN — CEFTRIAXONE 2 G: 2 INJECTION, SOLUTION INTRAVENOUS at 08:07

## 2020-07-10 RX ADMIN — FUROSEMIDE 80 MG: 80 TABLET ORAL at 09:07

## 2020-07-10 RX ADMIN — IPRATROPIUM BROMIDE AND ALBUTEROL SULFATE 3 ML: .5; 3 SOLUTION RESPIRATORY (INHALATION) at 08:07

## 2020-07-10 NOTE — PLAN OF CARE
This CM and SW, Delores Randolph LMSW, went to the pt's room to discuss the plan for DC. The daughter is still wanting [facility] and not receptive to discussion about other SNF. CHELSEA and SW advised that [facility] is not accepting any patients until further notice. Team advised that there are several SNF that have accepted him. Her first comment was that these places are not clean. Then she said she wanted a place where he would receive therapy. Team advised that that is what all of these placed do. Then she asked who would give him his meds. Team advised that these facilities all have nursing, PT, OT and ST and they would meet his needs. She then stated she was going to appeal the DC. CM did print copy of Medicare Message form and gave it to the daughter. CHELSEA and Marcos also explained that they might be responsible for cost of continued stay as the Dr had written the DC. HINN was signed.

## 2020-07-10 NOTE — PLAN OF CARE
07/10/20 1331   Post-Acute Status   Post-Acute Authorization Placement   Post-Acute Placement Status Family Barriers   Discharge Delays (!) Patient and Family Barriers     Family choosing to appeal pt's discharge.   provided pt's daughter Anne Platt with copy of HINN letter and list of facilities to which pt has been accepted per Anne Ann's request.    Delores Randolph LMSW  [facility]  [phone]

## 2020-07-10 NOTE — PLAN OF CARE
Detailed Notice of Discharge filled out and copy handed to KATHY Weston. Original placed in the chart.

## 2020-07-10 NOTE — PLAN OF CARE
CM received call from [employer] to advised that appeal is filed for Mr Pinzon  Case ID # [id number].   Appeal will be faxed to [phone]

## 2020-07-10 NOTE — PROGRESS NOTES
[facility]  Cardiology  Progress Note    Patient Name: Zbigniew Forman  MRN: [id number]  Admission Date: 7/4/2020  Hospital Length of Stay: 6 days  Code Status: DNR   Attending Physician: Geraldine Becerril MD   Primary Care Physician: Blas Morgan Ii, MD  Expected Discharge Date: 7/10/2020  Principal Problem:Bacteremia due to Klebsiella pneumoniae    Subjective:     Hospital Course:   Admitted to CCU for acute hypoxemic respiratory failure 2/2 ADHF vs CAP. Diuresed with IV lasix with good UOP. Weaned down to home 2L nc. Repeat echo showed EF 55%, grade II dd. Increased home lasix to 80mg daily as patient re-accumulated fluid on home lasix dose. UA suggestive of infection and given suspicion for CAP, started on azithro + CTX. US renal negative for hydronephrosis. US lower extremity negative for DVT. UCx and BCx grew Klebsiella. Repeat BCx from 7/6 with NGTD. Started on CTX with plan for 2 weeks after last negative BCx (tentative end date: 7/21/20). Held ACEi given JAN and bacteremia. Will defer to PCP for resuming. PT/OT consulted, recommended SNF. Discussed results and plan with patient + daughter. Patient + daughter verbalized understanding and agreed to plan. Patient stable for discharge. Pt's family refused discharge and is currently appealing as they prefer [facility].    Interval History: NAEON. Good UOP with IV lasix, transitioning to PO.    Review of Systems   Constitution: Positive for weight gain. Negative for chills, diaphoresis, fever and weight loss.   HENT: Negative for congestion and sore throat.    Eyes: Negative for visual disturbance.   Cardiovascular: Positive for dyspnea on exertion and leg swelling. Negative for chest pain, orthopnea, palpitations, paroxysmal nocturnal dyspnea and syncope.   Respiratory: Negative for cough, shortness of breath and wheezing.    Skin: Negative for rash.   Musculoskeletal: Negative for joint pain and myalgias.   Gastrointestinal: Negative for  abdominal pain, constipation, diarrhea, melena, nausea and vomiting.   Genitourinary: Negative for dysuria, frequency and hematuria.   Neurological: Negative for dizziness, headaches, light-headedness and numbness.   Psychiatric/Behavioral: Negative for altered mental status.     Objective:     Vital Signs (Most Recent):  Temp: 97.6 °F (36.4 °C) (07/10/20 1207)  Pulse: 76 (07/10/20 1545)  Resp: 16 (07/10/20 1207)  BP: 138/65 (07/10/20 1207)  SpO2: 98 % (07/10/20 1207) Vital Signs (24h Range):  Temp:  [96.8 °F (36 °C)-98.4 °F (36.9 °C)] 97.6 °F (36.4 °C)  Pulse:  [] 76  Resp:  [16-20] 16  SpO2:  [95 %-100 %] 98 %  BP: (125-141)/(62-77) 138/65     Weight: 76.2 kg (168 lb)  Body mass index is 24.81 kg/m².     SpO2: 98 %  O2 Device (Oxygen Therapy): nasal cannula      Intake/Output Summary (Last 24 hours) at 7/10/2020 1603  Last data filed at 7/10/2020 1430  Gross per 24 hour   Intake 358 ml   Output 950 ml   Net -592 ml       Lines/Drains/Airways     Drain            Male External Urinary Catheter 07/05/20 2200 4 days          Peripheral Intravenous Line                 Peripheral IV - Single Lumen 07/04/20 20 G Right Hand 6 days         Midline Catheter Insertion/Assessment  - Single Lumen 07/09/20 1030 Left brachial vein 18g x 10cm 1 day                Physical Exam   Constitutional: He is oriented to person, place, and time. He appears well-developed and well-nourished. No distress.   HENT:   Head: Normocephalic and atraumatic.   Mouth/Throat: Oropharynx is clear and moist. No oropharyngeal exudate.   Eyes: Pupils are equal, round, and reactive to light. Conjunctivae are normal. No scleral icterus.   Neck: Neck supple. No JVD present. No tracheal deviation present.   Cardiovascular: Normal rate, regular rhythm, normal heart sounds and intact distal pulses.   No murmur heard.  Pulmonary/Chest: Effort normal and breath sounds normal. No respiratory distress. He has no wheezes. He has no rales.   Abdominal: Soft.  Bowel sounds are normal. He exhibits no distension. There is abdominal tenderness (suprapubic). There is no rebound.   Musculoskeletal:         General: No edema.   Neurological: He is alert and oriented to person, place, and time. He exhibits normal muscle tone.   Skin: Skin is warm and dry. No rash noted. He is not diaphoretic.   Psychiatric: He has a normal mood and affect. His behavior is normal.       Significant Labs: All pertinent lab results from the last 24 hours have been reviewed.    Significant Imaging: Reviewed.    Assessment and Plan:     * Bacteremia due to Klebsiella pneumoniae  BPH with urinary obstruction  UTI due to Klebsiella species  UCx + BCx on admission grew Klebsiella   US renal negative for hydronephrosis  Started on azithro + CTX for CAP coverage  Currently has condom cath in place (whiteside refused per patient and family)  Repeat BCx from 7/6 with NGTD    Plan:  - Continue CTX x 14 days (tentative end date: 7/21/20)  - Continue home tamsulosin    Chronic hypoxemic respiratory failure  Heart failure with preserved ejection fraction  TTE showed EF 55%, grade II dd  On home O2 (2L nc continuous)  On lasix 40mg daily at home    Plan:  - Increase home dose to 80mg daily  - Daily weights (standing if tolerated)  - Strict I/Os  - Fluid restriction at 1500mL  - Cardiac diet    Debility  PT/OT consulted, recommending SNF    DNR (do not resuscitate)  Patient himself and family based on prior encounters     Hypertension associated with diabetes  Holding home lisinopril 10mg + amlodipine 5mg in setting of JAN on soft BP    Controlled type 2 diabetes mellitus without complication, without long-term current use of insulin  HbA1c 7.0, diet controlled    Plan:  - LDSS  - POCT glucose ACHS  - Diabetic diet  - Goal -180 while inpatient        VTE Risk Mitigation (From admission, onward)         Ordered     heparin (porcine) injection 5,000 Units  Every 8 hours      07/04/20 2034     IP VTE HIGH RISK  PATIENT  Once      07/04/20 2034     Place sequential compression device  Until discontinued      07/04/20 2034                Ceasar Walker MD  Cardiology  [facility]

## 2020-07-10 NOTE — ASSESSMENT & PLAN NOTE
TTE showed EF 55%, grade II dd  On home O2 (2L nc continuous)  On lasix 40mg daily at home    Plan:  - Increase home dose to 80mg daily  - Daily weights (standing if tolerated)  - Strict I/Os  - Fluid restriction at 1500mL  - Cardiac diet

## 2020-07-10 NOTE — PT/OT/SLP PROGRESS
Physical Therapy      Patient Name:  Zbigniew Forman   MRN:  [id number]    Patient not seen today secondary to (pt. scheduled for hospital discharge today). Will follow-up over weekend, if not discharged.    Taurus Pyle, PT   7/10/2020

## 2020-07-10 NOTE — PLAN OF CARE
Plan of care discussed with patient. Patient is free of fall/trauma/injury. Denies CP, SOB, or pain/discomfort. Pt still intermittently disorientated to time, place and situation. Receiving Rocephin q24 for infection. Being diuresed with 80 mg PO lasix daily. Plan was for pt to d/c to SNF today, however family appealed d/c at this time. Pt still intermittently refusing BS and becoming combative whenever attempted. All questions addressed. Will continue to monitor.

## 2020-07-10 NOTE — PLAN OF CARE
MACEY and CHELSEA Ibarra attempted to call pt's daughter Anne Platt to discuss discharge but phone went straight to voicemail.  MACEY and CHELSEA Ibarra then called and spoke with pt's daughter Joann and explained that pt is medically ready to discharge and therefore decision about placement vs home vs paying $809.00/night for pt to remain in the hospital needs to be made now.  Joann stated that she would relay this message to Anne Platt.    Delores Randolph, ADRIAN  [facility]  [id number]

## 2020-07-10 NOTE — DISCHARGE SUMMARY
[facility]  Cardiology  Discharge Summary      Patient Name: Zbigniew Forman  MRN: [id number]  Admission Date: 7/4/2020  Hospital Length of Stay: 6 days  Discharge Date and Time:  07/10/2020 8:45 AM  Attending Physician: Geraldine Becerril MD    Discharging Provider: Ceasar Walker MD  Primary Care Physician: Blas Morgan Ii, MD    HPI:   Mr. Forman, 89 year old male with prior history of HFpEF (indeterminate D on 11/2018), HTN, DM 2 on diet management, CKD III (sCr ~ 1.3 baseline), Dysequilibrium on wheelchair-bound. He presented to ED with symptoms of SOB NYHA III, fatigue and these symptoms were getting worse over the course of one to two weeks.He states he has been more fatigued the last few weeks, not responding as well to his lasix.  Denies fevers, chills, nausea, chest pain, cough, sore throat, sick contacts, numbness, tingling, weakness. Of note, he presented to his PCP last week with symptoms of weakness, dizziness and weak legs. His BNP was checked and it was 467 and repeated today showed 1150. In ED, his labs showed leukocytosis from 10 > 20 with lymphopenia, hypoxia with PO2 56 mmHg. Admitted for volume overloaded and possible CAP.     * No surgery found *     Indwelling Lines/Drains at time of discharge:  Lines/Drains/Airways     Drain            Male External Urinary Catheter 07/05/20 2200 4 days                Hospital Course:  Admitted to CCU for acute hypoxemic respiratory failure 2/2 ADHF vs CAP. Diuresed with IV lasix with good UOP. Weaned down to home 2L nc. Repeat echo showed EF 55%, grade II dd. Increased home lasix to 80mg daily as patient re-accumulated fluid on home lasix dose. UA suggestive of infection and given suspicion for CAP, started on azithro + CTX. US renal negative for hydronephrosis. US lower extremity negative for DVT. UCx and BCx grew Klebsiella. Repeat BCx from 7/6 with NGTD. Started on CTX with plan for 2 weeks after last negative BCx (tentative end  date: 7/21/20). Held ACEi given JAN and bacteremia. Will defer to PCP for resuming. PT/OT consulted, recommended SNF. Discussed results and plan with patient + daughter. Patient + daughter verbalized understanding and agreed to plan. Patient stable for discharge.    Vitals:    07/10/20 0804   BP: 125/72   Pulse: 85   Resp: 18   Temp: 98.1     Physical Exam   Constitutional: He is oriented to person, place, and time. He appears well-developed and well-nourished. No distress.   HENT:   Head: Normocephalic and atraumatic.   Mouth/Throat: Oropharynx is clear and moist. No oropharyngeal exudate.   Eyes: Pupils are equal, round, and reactive to light. Conjunctivae are normal. No scleral icterus.   Neck: Neck supple. No JVD present. No tracheal deviation present.   Cardiovascular: Normal rate, regular rhythm, normal heart sounds and intact distal pulses.   No murmur heard.  Pulmonary/Chest: Effort normal and breath sounds normal. No respiratory distress. He has no wheezes. He has no rales.   Abdominal: Soft. Bowel sounds are normal. He exhibits no distension. There is abdominal tenderness (suprapubic). There is no rebound.   Musculoskeletal:         General: No edema.   Neurological: He is alert and oriented to person, place, and time. He exhibits normal muscle tone.   Skin: Skin is warm and dry. No rash noted. He is not diaphoretic.   Psychiatric: He has a normal mood and affect. His behavior is normal.      Consults:   Consults (From admission, onward)        Status Ordering Provider     Inpatient consult to Cardiology  Once     Provider:  (Not yet assigned)    Completed LYNNE TORRES     Inpatient consult to Midline team  Once     Provider:  (Not yet assigned)    Completed GEOVANY BAKER     Inpatient consult to Social Work  Once     Provider:  (Not yet assigned)    Acknowledged AUGUSTO TRAVIS          Significant Diagnostic Studies: Labs: All labs within the past 24 hours have been reviewed    Pending  Diagnostic Studies:     None          Final Active Diagnoses:    Diagnosis Date Noted POA    BPH with urinary obstruction [N40.1, N13.8] 11/01/2019 Yes    Bacteremia due to Klebsiella pneumoniae [R78.81] 07/06/2020 Yes    UTI due to Klebsiella species [N39.0, B96.1] 07/06/2020 Yes    Chronic hypoxemic respiratory failure [J96.11] 07/10/2020 Yes    Debility [R53.81] 07/07/2020 Yes    Heart failure with preserved ejection fraction [I50.30] 11/27/2018 Yes    DNR (do not resuscitate) [Z66] 11/17/2018 Yes    Hypertension associated with diabetes [E11.59, I10] 01/12/2016 Yes    Controlled type 2 diabetes mellitus without complication, without long-term current use of insulin [E11.9]  Yes      Problems Resolved During this Admission:    Diagnosis Date Noted Date Resolved POA    PRINCIPAL PROBLEM:  Acute on chronic diastolic congestive heart failure [I50.33] 07/04/2020 07/10/2020 Yes    Acute and chronic respiratory failure with hypoxia [J96.21] 11/17/2018 07/10/2020 Yes     No new Assessment & Plan notes have been filed under this hospital service since the last note was generated.  Service: Cardiology      Discharged Condition: stable    Disposition: Skilled Nursing Facility    Follow Up:  Follow-up Information     Blas Morgan Ii, MD In 1 week.    Specialty: Internal Medicine  Contact information:  [facility] [id number] [phone]                 Patient Instructions:   No discharge procedures on file.  Medications:  Reconciled Home Medications:      Medication List      START taking these medications    cefTRIAXone 2 g/50 mL Pgbk IVPB  Commonly known as: ROCEPHIN  Inject 50 mLs (2 g total) into the vein once daily. for 12 days        CHANGE how you take these medications    furosemide 40 MG tablet  Commonly known as: LASIX  Take 2 tablets (80 mg total) by mouth once daily.  What changed: how much to take     lisinopriL 10 MG tablet  Take 1 tablet (10 mg total) by mouth once daily.  HOLD UNTIL SEE PCP  What changed: additional instructions        CONTINUE taking these medications    albuterol-ipratropium 2.5 mg-0.5 mg/3 mL nebulizer solution  Commonly known as: DUO-NEB  Take 3 mLs by nebulization every 6 (six) hours as needed for Wheezing. Rescue     amLODIPine 5 MG tablet  Commonly known as: NORVASC  Take 1 tablet (5 mg total) by mouth once daily.     artificial tears with lanolin Oint 0.5% ophthalmic ointment  Commonly known as: AKWA TEARS  Apply to affected eye(s) as needed for dryness.     external catheter, male Misc  Use condom catheter every night     latanoprost 0.005 % ophthalmic solution  Place 1 drop into both eyes every evening.     senna-docusate 8.6-50 mg 8.6-50 mg per tablet  Commonly known as: PERICOLACE  Take 1 tablet by mouth 2 (two) times daily as needed for Constipation.     tamsulosin 0.4 mg Cap  Commonly known as: FLOMAX  Take 1 capsule (0.4 mg total) by mouth once daily.        STOP taking these medications    albuterol 90 mcg/actuation inhaler  Commonly known as: VENTOLIN HFA            Time spent on the discharge of patient: 35 minutes    Ceasar Walker MD  Cardiology  [facility]

## 2020-07-10 NOTE — PLAN OF CARE
Problem: Adult Inpatient Plan of Care  Goal: Plan of Care Review  Outcome: Ongoing, Progressing   Pt free of falls/traumas/injuries. Skin remains clean, dry, and intact. Pt on lasix IVP 80 mg BID. IVPB diamox added. Pt diuresing. TB test right FA negative.  Telesitter for safety as pt is disoriented. Pt re-educated on importance of measuring accurate intake and out put; pt verbalized and demonstrates understanding. Reviewed plan of care with pt and possible d/c to SNF, unclear if pt is understood as pt is confused.  Pt AAOX self, intermittently disoriented to all else, VSS, and in no distress will continue to monitor.

## 2020-07-10 NOTE — SUBJECTIVE & OBJECTIVE
Interval History: NAEON. Good UOP with IV lasix, transitioning to PO.    Review of Systems   Constitution: Positive for weight gain. Negative for chills, diaphoresis, fever and weight loss.   HENT: Negative for congestion and sore throat.    Eyes: Negative for visual disturbance.   Cardiovascular: Positive for dyspnea on exertion and leg swelling. Negative for chest pain, orthopnea, palpitations, paroxysmal nocturnal dyspnea and syncope.   Respiratory: Negative for cough, shortness of breath and wheezing.    Skin: Negative for rash.   Musculoskeletal: Negative for joint pain and myalgias.   Gastrointestinal: Negative for abdominal pain, constipation, diarrhea, melena, nausea and vomiting.   Genitourinary: Negative for dysuria, frequency and hematuria.   Neurological: Negative for dizziness, headaches, light-headedness and numbness.   Psychiatric/Behavioral: Negative for altered mental status.     Objective:     Vital Signs (Most Recent):  Temp: 97.6 °F (36.4 °C) (07/10/20 1207)  Pulse: 76 (07/10/20 1545)  Resp: 16 (07/10/20 1207)  BP: 138/65 (07/10/20 1207)  SpO2: 98 % (07/10/20 1207) Vital Signs (24h Range):  Temp:  [96.8 °F (36 °C)-98.4 °F (36.9 °C)] 97.6 °F (36.4 °C)  Pulse:  [] 76  Resp:  [16-20] 16  SpO2:  [95 %-100 %] 98 %  BP: (125-141)/(62-77) 138/65     Weight: 76.2 kg (168 lb)  Body mass index is 24.81 kg/m².     SpO2: 98 %  O2 Device (Oxygen Therapy): nasal cannula      Intake/Output Summary (Last 24 hours) at 7/10/2020 1603  Last data filed at 7/10/2020 1430  Gross per 24 hour   Intake 358 ml   Output 950 ml   Net -592 ml       Lines/Drains/Airways     Drain            Male External Urinary Catheter 07/05/20 2200 4 days          Peripheral Intravenous Line                 Peripheral IV - Single Lumen 07/04/20 20 G Right Hand 6 days         Midline Catheter Insertion/Assessment  - Single Lumen 07/09/20 1030 Left brachial vein 18g x 10cm 1 day                Physical Exam   Constitutional: He is  oriented to person, place, and time. He appears well-developed and well-nourished. No distress.   HENT:   Head: Normocephalic and atraumatic.   Mouth/Throat: Oropharynx is clear and moist. No oropharyngeal exudate.   Eyes: Pupils are equal, round, and reactive to light. Conjunctivae are normal. No scleral icterus.   Neck: Neck supple. No JVD present. No tracheal deviation present.   Cardiovascular: Normal rate, regular rhythm, normal heart sounds and intact distal pulses.   No murmur heard.  Pulmonary/Chest: Effort normal and breath sounds normal. No respiratory distress. He has no wheezes. He has no rales.   Abdominal: Soft. Bowel sounds are normal. He exhibits no distension. There is abdominal tenderness (suprapubic). There is no rebound.   Musculoskeletal:         General: No edema.   Neurological: He is alert and oriented to person, place, and time. He exhibits normal muscle tone.   Skin: Skin is warm and dry. No rash noted. He is not diaphoretic.   Psychiatric: He has a normal mood and affect. His behavior is normal.       Significant Labs: All pertinent lab results from the last 24 hours have been reviewed.    Significant Imaging: Reviewed.

## 2020-07-11 LAB
ALBUMIN SERPL BCP-MCNC: 2.8 G/DL (ref 3.5–5.2)
ALLENS TEST: ABNORMAL
ALP SERPL-CCNC: 64 U/L (ref 55–135)
ALT SERPL W/O P-5'-P-CCNC: 11 U/L (ref 10–44)
ANION GAP SERPL CALC-SCNC: 12 MMOL/L (ref 8–16)
AST SERPL-CCNC: 11 U/L (ref 10–40)
BACTERIA BLD CULT: NORMAL
BACTERIA BLD CULT: NORMAL
BASOPHILS # BLD AUTO: 0.05 K/UL (ref 0–0.2)
BASOPHILS NFR BLD: 0.5 % (ref 0–1.9)
BILIRUB SERPL-MCNC: 0.3 MG/DL (ref 0.1–1)
BUN SERPL-MCNC: 55 MG/DL (ref 10–30)
CALCIUM SERPL-MCNC: 9 MG/DL (ref 8.7–10.5)
CHLORIDE SERPL-SCNC: 102 MMOL/L (ref 95–110)
CO2 SERPL-SCNC: 34 MMOL/L (ref 23–29)
CREAT SERPL-MCNC: 1.8 MG/DL (ref 0.5–1.4)
DELSYS: ABNORMAL
DIFFERENTIAL METHOD: ABNORMAL
EOSINOPHIL # BLD AUTO: 0.1 K/UL (ref 0–0.5)
EOSINOPHIL NFR BLD: 0.9 % (ref 0–8)
ERYTHROCYTE [DISTWIDTH] IN BLOOD BY AUTOMATED COUNT: 13.5 % (ref 11.5–14.5)
EST. GFR  (AFRICAN AMERICAN): 34.9 ML/MIN/1.73 M^2
EST. GFR  (NON AFRICAN AMERICAN): 30.2 ML/MIN/1.73 M^2
FIO2: 24
FLOW: 1
GLUCOSE SERPL-MCNC: 257 MG/DL (ref 70–110)
HCO3 UR-SCNC: 35.6 MMOL/L (ref 24–28)
HCT VFR BLD AUTO: 41 % (ref 40–54)
HGB BLD-MCNC: 12.2 G/DL (ref 14–18)
IMM GRANULOCYTES # BLD AUTO: 0.07 K/UL (ref 0–0.04)
IMM GRANULOCYTES NFR BLD AUTO: 0.7 % (ref 0–0.5)
LYMPHOCYTES # BLD AUTO: 2.2 K/UL (ref 1–4.8)
LYMPHOCYTES NFR BLD: 22.6 % (ref 18–48)
MAGNESIUM SERPL-MCNC: 2.3 MG/DL (ref 1.6–2.6)
MCH RBC QN AUTO: 27.9 PG (ref 27–31)
MCHC RBC AUTO-ENTMCNC: 29.8 G/DL (ref 32–36)
MCV RBC AUTO: 94 FL (ref 82–98)
MODE: ABNORMAL
MONOCYTES # BLD AUTO: 0.4 K/UL (ref 0.3–1)
MONOCYTES NFR BLD: 4.3 % (ref 4–15)
NEUTROPHILS # BLD AUTO: 7 K/UL (ref 1.8–7.7)
NEUTROPHILS NFR BLD: 71 % (ref 38–73)
NRBC BLD-RTO: 0 /100 WBC
PCO2 BLDA: 61.9 MMHG (ref 35–45)
PH SMN: 7.37 [PH] (ref 7.35–7.45)
PLATELET # BLD AUTO: 309 K/UL (ref 150–350)
PMV BLD AUTO: 10.1 FL (ref 9.2–12.9)
PO2 BLDA: 27 MMHG (ref 40–60)
POC BE: 10 MMOL/L
POC SATURATED O2: 45 % (ref 95–100)
POC TCO2: 38 MMOL/L (ref 24–29)
POCT GLUCOSE: 217 MG/DL (ref 70–110)
POCT GLUCOSE: 240 MG/DL (ref 70–110)
POCT GLUCOSE: 261 MG/DL (ref 70–110)
POCT GLUCOSE: 286 MG/DL (ref 70–110)
POTASSIUM SERPL-SCNC: 3.5 MMOL/L (ref 3.5–5.1)
PROT SERPL-MCNC: 6.8 G/DL (ref 6–8.4)
RBC # BLD AUTO: 4.38 M/UL (ref 4.6–6.2)
SAMPLE: ABNORMAL
SITE: ABNORMAL
SODIUM SERPL-SCNC: 148 MMOL/L (ref 136–145)
WBC # BLD AUTO: 9.84 K/UL (ref 3.9–12.7)

## 2020-07-11 PROCEDURE — 25000242 PHARM REV CODE 250 ALT 637 W/ HCPCS: Performed by: INTERNAL MEDICINE

## 2020-07-11 PROCEDURE — 36415 COLL VENOUS BLD VENIPUNCTURE: CPT

## 2020-07-11 PROCEDURE — 63600175 PHARM REV CODE 636 W HCPCS: Performed by: STUDENT IN AN ORGANIZED HEALTH CARE EDUCATION/TRAINING PROGRAM

## 2020-07-11 PROCEDURE — 85025 COMPLETE CBC W/AUTO DIFF WBC: CPT

## 2020-07-11 PROCEDURE — 94640 AIRWAY INHALATION TREATMENT: CPT

## 2020-07-11 PROCEDURE — 80053 COMPREHEN METABOLIC PANEL: CPT

## 2020-07-11 PROCEDURE — 99231 SBSQ HOSP IP/OBS SF/LOW 25: CPT | Mod: GC,,, | Performed by: INTERNAL MEDICINE

## 2020-07-11 PROCEDURE — 83735 ASSAY OF MAGNESIUM: CPT

## 2020-07-11 PROCEDURE — 99900035 HC TECH TIME PER 15 MIN (STAT)

## 2020-07-11 PROCEDURE — 27000221 HC OXYGEN, UP TO 24 HOURS

## 2020-07-11 PROCEDURE — 25000003 PHARM REV CODE 250: Performed by: INTERNAL MEDICINE

## 2020-07-11 PROCEDURE — 94761 N-INVAS EAR/PLS OXIMETRY MLT: CPT

## 2020-07-11 PROCEDURE — 82803 BLOOD GASES ANY COMBINATION: CPT

## 2020-07-11 PROCEDURE — 99231 PR SUBSEQUENT HOSPITAL CARE,LEVL I: ICD-10-PCS | Mod: GC,,, | Performed by: INTERNAL MEDICINE

## 2020-07-11 PROCEDURE — 25000003 PHARM REV CODE 250: Performed by: STUDENT IN AN ORGANIZED HEALTH CARE EDUCATION/TRAINING PROGRAM

## 2020-07-11 PROCEDURE — 20600001 HC STEP DOWN PRIVATE ROOM

## 2020-07-11 RX ORDER — POTASSIUM CHLORIDE 20 MEQ/1
40 TABLET, EXTENDED RELEASE ORAL ONCE
Status: COMPLETED | OUTPATIENT
Start: 2020-07-11 | End: 2020-07-11

## 2020-07-11 RX ADMIN — HEPARIN SODIUM 5000 UNITS: 5000 INJECTION INTRAVENOUS; SUBCUTANEOUS at 05:07

## 2020-07-11 RX ADMIN — HEPARIN SODIUM 5000 UNITS: 5000 INJECTION INTRAVENOUS; SUBCUTANEOUS at 09:07

## 2020-07-11 RX ADMIN — CEFTRIAXONE 2 G: 2 INJECTION, SOLUTION INTRAVENOUS at 10:07

## 2020-07-11 RX ADMIN — INSULIN ASPART 2 UNITS: 100 INJECTION, SOLUTION INTRAVENOUS; SUBCUTANEOUS at 08:07

## 2020-07-11 RX ADMIN — INSULIN ASPART 1 UNITS: 100 INJECTION, SOLUTION INTRAVENOUS; SUBCUTANEOUS at 09:07

## 2020-07-11 RX ADMIN — IPRATROPIUM BROMIDE AND ALBUTEROL SULFATE 3 ML: .5; 3 SOLUTION RESPIRATORY (INHALATION) at 07:07

## 2020-07-11 RX ADMIN — INSULIN DETEMIR 3 UNITS: 100 INJECTION, SOLUTION SUBCUTANEOUS at 08:07

## 2020-07-11 RX ADMIN — DOCUSATE SODIUM 50MG AND SENNOSIDES 8.6MG 1 TABLET: 8.6; 5 TABLET, FILM COATED ORAL at 10:07

## 2020-07-11 RX ADMIN — POTASSIUM CHLORIDE 40 MEQ: 1500 TABLET, EXTENDED RELEASE ORAL at 09:07

## 2020-07-11 RX ADMIN — IPRATROPIUM BROMIDE AND ALBUTEROL SULFATE 3 ML: .5; 3 SOLUTION RESPIRATORY (INHALATION) at 12:07

## 2020-07-11 RX ADMIN — INSULIN ASPART 3 UNITS: 100 INJECTION, SOLUTION INTRAVENOUS; SUBCUTANEOUS at 11:07

## 2020-07-11 RX ADMIN — HEPARIN SODIUM 5000 UNITS: 5000 INJECTION INTRAVENOUS; SUBCUTANEOUS at 01:07

## 2020-07-11 RX ADMIN — INSULIN ASPART 2 UNITS: 100 INJECTION, SOLUTION INTRAVENOUS; SUBCUTANEOUS at 04:07

## 2020-07-11 RX ADMIN — HYPROMELLOSE 2910: 5 SOLUTION OPHTHALMIC at 10:07

## 2020-07-11 RX ADMIN — IPRATROPIUM BROMIDE AND ALBUTEROL SULFATE 3 ML: .5; 3 SOLUTION RESPIRATORY (INHALATION) at 08:07

## 2020-07-11 RX ADMIN — TAMSULOSIN HYDROCHLORIDE 0.4 MG: 0.4 CAPSULE ORAL at 10:07

## 2020-07-11 NOTE — PROGRESS NOTES
"Called into pt's room per daughter.  Daughter expressing significant concern over the monitor in the room being off, stating, "This monitor has been on the entire time he's been here! I've been here every day and it's always been on, and now it's off!"  Explained to pt's family that pt is not in ICU and therefore bedside monitoring is not necessarily continuous.  Informed her that pt remains on wireless telemetry that transfers to nurses station and telemetry room where it is being continuously monitored by techs.  Explained further that pt repeatedly removes pulse oximetry sticker from his fingers, so we are not able to do continuous pulse oximetry monitoring.  Continuous pulse oximetry monitoring only ordered if pt hypoxic or on >3L of oxygen; pt currently on 1L O2 via NC.      Pt's daughter demanding that we bring pt some orange juice and heat up his breakfast.  She expressed concerns over him not eating anything.  Notified her that RN attempted to feed pt this morning, but he refused.  Informed her that he ate a few bites of pudding with his medications and drank half an orange juice per his preference at that time; the rest of the orange juice at the bedside.  Explained that we want to avoid excessive sugary drinks d/t diabetes and current BG readings in the 200s.  Also explained that lunch would be at the bedside shortly (trays were in the hallway being delivered).  Daughter requesting to have breakfast heated; tray reheated for her.      Family reporting that pt, "probably needs to be changed" and requested to have him cleaned up.  RN and PCT at the bedside at this time.  PCT, Elotn, explained that she had already given pt a full bath and fresh linens earlier this morning with assistance from another PCT.  RN reinforced that pt had received a bath and was given fresh linens this morning.  Condom cath was replaced by RN during AM assessment.  Pt not soiled at this time.      Daughter informed RN that " catheter bag was full and needed to be emptied.  Condom catheter back emptied at this time per family request.  However, only 175cc emptied.  UO charted.  Explained to pt's family that nursing and PCT staff are and will continue to manage patient's care according to policies and protocols.  Informed her that he is being monitored regularly per orders.  Pt and family updated with POC.  Whiteboard already updated during AM assessment.  Will continue to monitor.

## 2020-07-11 NOTE — PLAN OF CARE
Patient remains free from fall and injury. Does not complain of pain at this time. Patient is in normal sinus rhythm on continuous cardiac monitoring; no S/S of cardiac distress noted. Telesitter at bedside. Patient diuresing. 2 units of insulin administered for  per sliding scale. Bed is in lowest locked position. Call light within reach. Plan of care reviewed with patient. Will continue to monitor.

## 2020-07-11 NOTE — PLAN OF CARE
Pt free of falls/traumas/injuries. Denies c/o SOB; O2Sats remain stable on 1L O2 via NC.  Pt denying any c/o discomfort.  Generalized skin remains CDI; no edema noted.  Pt being treated with Rocephin IVPB daily.  Blood cultures remain NGTD.  Condom cath in use; pt voiding well.  Blood glucose managed with supplemental insulin.  PT/OT following.  Plan for SNF placement once family chooses facility.  Daughter at the bedside.  Pt and family tolerating plan of care.

## 2020-07-11 NOTE — PROGRESS NOTES
[facility]  Cardiology  Progress Note    Patient Name: Zbigniew Forman  MRN: [id number]  Admission Date: 7/4/2020  Hospital Length of Stay: 7 days  Code Status: DNR   Attending Physician: Geraldine Becerril MD   Primary Care Physician: Blas Morgan Ii, MD  Expected Discharge Date: 7/10/2020  Principal Problem:Bacteremia due to Klebsiella pneumoniae    Subjective:     Hospital Course:   Admitted to CCU for acute hypoxemic respiratory failure 2/2 ADHF vs CAP. Diuresed with IV lasix with good UOP. Weaned down to home 2L nc. Repeat echo showed EF 55%, grade II dd. Increased home lasix to 80mg daily as patient re-accumulated fluid on home lasix dose. UA suggestive of infection and given suspicion for CAP, started on azithro + CTX. US renal negative for hydronephrosis. US lower extremity negative for DVT. UCx and BCx grew Klebsiella. Repeat BCx from 7/6 with NGTD. Started on CTX with plan for 2 weeks after last negative BCx (tentative end date: 7/21/20). Held ACEi given JAN and bacteremia. Will defer to PCP for resuming. PT/OT consulted, recommended SNF. Discussed results and plan with patient + daughter. Patient + daughter verbalized understanding and agreed to plan. Patient stable for discharge. Pt's family refused discharge and is currently appealing as they prefer [facility].    Interval History: NAEON. Now on PO lasix. On CTX. Patient has been accepted to SNF. Awaiting family decision regarding SNF facility. Medically ready for discharge.     Review of Systems   Constitution: Negative for chills, diaphoresis, fever, weight gain and weight loss.   HENT: Negative for congestion and sore throat.    Eyes: Negative for visual disturbance.   Cardiovascular: Negative for chest pain, dyspnea on exertion, leg swelling, orthopnea, palpitations, paroxysmal nocturnal dyspnea and syncope.   Respiratory: Negative for cough, shortness of breath and wheezing.    Skin: Negative for rash.   Musculoskeletal: Negative  for joint pain and myalgias.   Gastrointestinal: Negative for abdominal pain, constipation, diarrhea, melena, nausea and vomiting.   Genitourinary: Negative for dysuria, frequency and hematuria.   Neurological: Negative for dizziness, headaches, light-headedness and numbness.   Psychiatric/Behavioral: Negative for altered mental status.     Objective:     Vital Signs (Most Recent):  Temp: 98.1 °F (36.7 °C) (07/11/20 0800)  Pulse: 77 (07/11/20 0800)  Resp: (!) 26 (07/11/20 0800)  BP: 122/76 (07/11/20 0800)  SpO2: 99 % (07/11/20 0800) Vital Signs (24h Range):  Temp:  [97.6 °F (36.4 °C)-99.4 °F (37.4 °C)] 98.1 °F (36.7 °C)  Pulse:  [60-88] 77  Resp:  [16-26] 26  SpO2:  [96 %-99 %] 99 %  BP: (122-141)/(65-78) 122/76     Weight: 76.2 kg (168 lb)  Body mass index is 24.81 kg/m².     SpO2: 99 %  O2 Device (Oxygen Therapy): nasal cannula      Intake/Output Summary (Last 24 hours) at 7/11/2020 0827  Last data filed at 7/11/2020 0600  Gross per 24 hour   Intake --   Output 875 ml   Net -875 ml       Lines/Drains/Airways     Drain            Male External Urinary Catheter 07/05/20 2200 5 days          Peripheral Intravenous Line                 Peripheral IV - Single Lumen 07/04/20 20 G Right Hand 7 days         Midline Catheter Insertion/Assessment  - Single Lumen 07/09/20 1030 Left brachial vein 18g x 10cm 1 day                Physical Exam   Constitutional: He is oriented to person, place, and time. He appears well-developed and well-nourished. No distress.   HENT:   Head: Normocephalic and atraumatic.   Mouth/Throat: Oropharynx is clear and moist. No oropharyngeal exudate.   Eyes: Pupils are equal, round, and reactive to light. Conjunctivae are normal. No scleral icterus.   Neck: Neck supple. No JVD present. No tracheal deviation present.   Cardiovascular: Normal rate, regular rhythm, normal heart sounds and intact distal pulses.   No murmur heard.  Pulmonary/Chest: Effort normal and breath sounds normal. No respiratory  distress. He has no wheezes. He has no rales.   Abdominal: Soft. Bowel sounds are normal. He exhibits no distension. There is no abdominal tenderness. There is no rebound.   Musculoskeletal:         General: No edema.   Neurological: He is alert and oriented to person, place, and time. He exhibits normal muscle tone.   Skin: Skin is warm and dry. No rash noted. He is not diaphoretic.   Psychiatric: He has a normal mood and affect. His behavior is normal.       Significant Labs: All pertinent lab results from the last 24 hours have been reviewed.    Significant Imaging: Reviewed.    Assessment and Plan:     * Bacteremia due to Klebsiella pneumoniae       Chronic hypoxemic respiratory failure       Debility  PT/OT consulted, recommending SNF    UTI due to Klebsiella species  UCx + BCx on admission grew Klebsiella   US renal negative for hydronephrosis  Started on azithro + CTX for CAP coverage  Currently has condom cath in place (whiteside refused per patient and family)  Repeat BCx from 7/6 with NGTD    Plan:  - Continue CTX x 14 days (tentative end date: 7/21/20)  - Continue home tamsulosin    BPH with urinary obstruction       Heart failure with preserved ejection fraction  TTE showed EF 55%, grade II dd  On home O2 (2L nc continuous)  On lasix 40mg daily at home    Plan:  - Increase home dose to 80mg daily  - Daily weights (standing if tolerated)  - Strict I/Os  - Fluid restriction at 1500mL  - Cardiac diet    DNR (do not resuscitate)  Patient himself and family based on prior encounters     Hypertension associated with diabetes  Holding home lisinopril 10mg + amlodipine 5mg in setting of JAN on soft BP    Controlled type 2 diabetes mellitus without complication, without long-term current use of insulin  HbA1c 7.0, diet controlled    Plan:  - LDSS  - POCT glucose ACHS  - Diabetic diet  - Goal -180 while inpatient        VTE Risk Mitigation (From admission, onward)         Ordered     heparin (porcine) injection  5,000 Units  Every 8 hours      07/04/20 2034     IP VTE HIGH RISK PATIENT  Once      07/04/20 2034     Place sequential compression device  Until discontinued      07/04/20 2034                Tylor Marte MD  Cardiology  [facility]

## 2020-07-11 NOTE — SUBJECTIVE & OBJECTIVE
Interval History: NAEON. Now on PO lasix. On CTX. Patient has been accepted to SNF. Awaiting family decision regarding SNF facility. Medically ready for discharge.     Review of Systems   Constitution: Negative for chills, diaphoresis, fever, weight gain and weight loss.   HENT: Negative for congestion and sore throat.    Eyes: Negative for visual disturbance.   Cardiovascular: Negative for chest pain, dyspnea on exertion, leg swelling, orthopnea, palpitations, paroxysmal nocturnal dyspnea and syncope.   Respiratory: Negative for cough, shortness of breath and wheezing.    Skin: Negative for rash.   Musculoskeletal: Negative for joint pain and myalgias.   Gastrointestinal: Negative for abdominal pain, constipation, diarrhea, melena, nausea and vomiting.   Genitourinary: Negative for dysuria, frequency and hematuria.   Neurological: Negative for dizziness, headaches, light-headedness and numbness.   Psychiatric/Behavioral: Negative for altered mental status.     Objective:     Vital Signs (Most Recent):  Temp: 98.1 °F (36.7 °C) (07/11/20 0800)  Pulse: 77 (07/11/20 0800)  Resp: (!) 26 (07/11/20 0800)  BP: 122/76 (07/11/20 0800)  SpO2: 99 % (07/11/20 0800) Vital Signs (24h Range):  Temp:  [97.6 °F (36.4 °C)-99.4 °F (37.4 °C)] 98.1 °F (36.7 °C)  Pulse:  [60-88] 77  Resp:  [16-26] 26  SpO2:  [96 %-99 %] 99 %  BP: (122-141)/(65-78) 122/76     Weight: 76.2 kg (168 lb)  Body mass index is 24.81 kg/m².     SpO2: 99 %  O2 Device (Oxygen Therapy): nasal cannula      Intake/Output Summary (Last 24 hours) at 7/11/2020 0827  Last data filed at 7/11/2020 0600  Gross per 24 hour   Intake --   Output 875 ml   Net -875 ml       Lines/Drains/Airways     Drain            Male External Urinary Catheter 07/05/20 2200 5 days          Peripheral Intravenous Line                 Peripheral IV - Single Lumen 07/04/20 20 G Right Hand 7 days         Midline Catheter Insertion/Assessment  - Single Lumen 07/09/20 1030 Left brachial vein 18g x  10cm 1 day                Physical Exam   Constitutional: He is oriented to person, place, and time. He appears well-developed and well-nourished. No distress.   HENT:   Head: Normocephalic and atraumatic.   Mouth/Throat: Oropharynx is clear and moist. No oropharyngeal exudate.   Eyes: Pupils are equal, round, and reactive to light. Conjunctivae are normal. No scleral icterus.   Neck: Neck supple. No JVD present. No tracheal deviation present.   Cardiovascular: Normal rate, regular rhythm, normal heart sounds and intact distal pulses.   No murmur heard.  Pulmonary/Chest: Effort normal and breath sounds normal. No respiratory distress. He has no wheezes. He has no rales.   Abdominal: Soft. Bowel sounds are normal. He exhibits no distension. There is no abdominal tenderness. There is no rebound.   Musculoskeletal:         General: No edema.   Neurological: He is alert and oriented to person, place, and time. He exhibits normal muscle tone.   Skin: Skin is warm and dry. No rash noted. He is not diaphoretic.   Psychiatric: He has a normal mood and affect. His behavior is normal.       Significant Labs: All pertinent lab results from the last 24 hours have been reviewed.    Significant Imaging: Reviewed.

## 2020-07-12 LAB
ALBUMIN SERPL BCP-MCNC: 2.7 G/DL (ref 3.5–5.2)
ALP SERPL-CCNC: 59 U/L (ref 55–135)
ALT SERPL W/O P-5'-P-CCNC: 9 U/L (ref 10–44)
ANION GAP SERPL CALC-SCNC: 11 MMOL/L (ref 8–16)
AST SERPL-CCNC: 8 U/L (ref 10–40)
BASOPHILS # BLD AUTO: 0.02 K/UL (ref 0–0.2)
BASOPHILS NFR BLD: 0.3 % (ref 0–1.9)
BILIRUB SERPL-MCNC: 0.3 MG/DL (ref 0.1–1)
BUN SERPL-MCNC: 53 MG/DL (ref 10–30)
CALCIUM SERPL-MCNC: 8.9 MG/DL (ref 8.7–10.5)
CHLORIDE SERPL-SCNC: 108 MMOL/L (ref 95–110)
CO2 SERPL-SCNC: 32 MMOL/L (ref 23–29)
CREAT SERPL-MCNC: 1.4 MG/DL (ref 0.5–1.4)
DIFFERENTIAL METHOD: ABNORMAL
EOSINOPHIL # BLD AUTO: 0.1 K/UL (ref 0–0.5)
EOSINOPHIL NFR BLD: 1.8 % (ref 0–8)
ERYTHROCYTE [DISTWIDTH] IN BLOOD BY AUTOMATED COUNT: 13.5 % (ref 11.5–14.5)
EST. GFR  (AFRICAN AMERICAN): 47.3 ML/MIN/1.73 M^2
EST. GFR  (NON AFRICAN AMERICAN): 40.9 ML/MIN/1.73 M^2
GLUCOSE SERPL-MCNC: 221 MG/DL (ref 70–110)
HCT VFR BLD AUTO: 40.6 % (ref 40–54)
HGB BLD-MCNC: 11.9 G/DL (ref 14–18)
IMM GRANULOCYTES # BLD AUTO: 0.06 K/UL (ref 0–0.04)
IMM GRANULOCYTES NFR BLD AUTO: 0.9 % (ref 0–0.5)
LYMPHOCYTES # BLD AUTO: 1.9 K/UL (ref 1–4.8)
LYMPHOCYTES NFR BLD: 28.8 % (ref 18–48)
MAGNESIUM SERPL-MCNC: 2.4 MG/DL (ref 1.6–2.6)
MCH RBC QN AUTO: 27.6 PG (ref 27–31)
MCHC RBC AUTO-ENTMCNC: 29.3 G/DL (ref 32–36)
MCV RBC AUTO: 94 FL (ref 82–98)
MONOCYTES # BLD AUTO: 0.4 K/UL (ref 0.3–1)
MONOCYTES NFR BLD: 5.2 % (ref 4–15)
NEUTROPHILS # BLD AUTO: 4.3 K/UL (ref 1.8–7.7)
NEUTROPHILS NFR BLD: 63 % (ref 38–73)
NRBC BLD-RTO: 0 /100 WBC
PLATELET # BLD AUTO: 311 K/UL (ref 150–350)
PMV BLD AUTO: 10.1 FL (ref 9.2–12.9)
POCT GLUCOSE: 219 MG/DL (ref 70–110)
POCT GLUCOSE: 247 MG/DL (ref 70–110)
POCT GLUCOSE: 315 MG/DL (ref 70–110)
POTASSIUM SERPL-SCNC: 3.5 MMOL/L (ref 3.5–5.1)
PROT SERPL-MCNC: 6.4 G/DL (ref 6–8.4)
RBC # BLD AUTO: 4.31 M/UL (ref 4.6–6.2)
SODIUM SERPL-SCNC: 151 MMOL/L (ref 136–145)
WBC # BLD AUTO: 6.74 K/UL (ref 3.9–12.7)

## 2020-07-12 PROCEDURE — 25000003 PHARM REV CODE 250: Performed by: STUDENT IN AN ORGANIZED HEALTH CARE EDUCATION/TRAINING PROGRAM

## 2020-07-12 PROCEDURE — 99900035 HC TECH TIME PER 15 MIN (STAT)

## 2020-07-12 PROCEDURE — 63600175 PHARM REV CODE 636 W HCPCS: Performed by: STUDENT IN AN ORGANIZED HEALTH CARE EDUCATION/TRAINING PROGRAM

## 2020-07-12 PROCEDURE — 25000242 PHARM REV CODE 250 ALT 637 W/ HCPCS: Performed by: INTERNAL MEDICINE

## 2020-07-12 PROCEDURE — 94640 AIRWAY INHALATION TREATMENT: CPT

## 2020-07-12 PROCEDURE — 20600001 HC STEP DOWN PRIVATE ROOM

## 2020-07-12 PROCEDURE — 27000221 HC OXYGEN, UP TO 24 HOURS

## 2020-07-12 PROCEDURE — 94761 N-INVAS EAR/PLS OXIMETRY MLT: CPT

## 2020-07-12 PROCEDURE — 83735 ASSAY OF MAGNESIUM: CPT

## 2020-07-12 PROCEDURE — 36415 COLL VENOUS BLD VENIPUNCTURE: CPT

## 2020-07-12 PROCEDURE — 80053 COMPREHEN METABOLIC PANEL: CPT

## 2020-07-12 PROCEDURE — 99231 SBSQ HOSP IP/OBS SF/LOW 25: CPT | Mod: GC,,, | Performed by: INTERNAL MEDICINE

## 2020-07-12 PROCEDURE — 85025 COMPLETE CBC W/AUTO DIFF WBC: CPT

## 2020-07-12 PROCEDURE — 99231 PR SUBSEQUENT HOSPITAL CARE,LEVL I: ICD-10-PCS | Mod: GC,,, | Performed by: INTERNAL MEDICINE

## 2020-07-12 RX ADMIN — HEPARIN SODIUM 5000 UNITS: 5000 INJECTION INTRAVENOUS; SUBCUTANEOUS at 09:07

## 2020-07-12 RX ADMIN — INSULIN ASPART 2 UNITS: 100 INJECTION, SOLUTION INTRAVENOUS; SUBCUTANEOUS at 11:07

## 2020-07-12 RX ADMIN — CEFTRIAXONE 2 G: 2 INJECTION, SOLUTION INTRAVENOUS at 08:07

## 2020-07-12 RX ADMIN — INSULIN ASPART 2 UNITS: 100 INJECTION, SOLUTION INTRAVENOUS; SUBCUTANEOUS at 08:07

## 2020-07-12 RX ADMIN — TAMSULOSIN HYDROCHLORIDE 0.4 MG: 0.4 CAPSULE ORAL at 08:07

## 2020-07-12 RX ADMIN — HYPROMELLOSE 2910: 5 SOLUTION OPHTHALMIC at 08:07

## 2020-07-12 RX ADMIN — IPRATROPIUM BROMIDE AND ALBUTEROL SULFATE 3 ML: .5; 3 SOLUTION RESPIRATORY (INHALATION) at 08:07

## 2020-07-12 RX ADMIN — IPRATROPIUM BROMIDE AND ALBUTEROL SULFATE 3 ML: .5; 3 SOLUTION RESPIRATORY (INHALATION) at 07:07

## 2020-07-12 RX ADMIN — INSULIN ASPART 2 UNITS: 100 INJECTION, SOLUTION INTRAVENOUS; SUBCUTANEOUS at 09:07

## 2020-07-12 RX ADMIN — HEPARIN SODIUM 5000 UNITS: 5000 INJECTION INTRAVENOUS; SUBCUTANEOUS at 05:07

## 2020-07-12 RX ADMIN — IPRATROPIUM BROMIDE AND ALBUTEROL SULFATE 3 ML: .5; 3 SOLUTION RESPIRATORY (INHALATION) at 01:07

## 2020-07-12 RX ADMIN — SODIUM CHLORIDE 250 ML: 9 INJECTION, SOLUTION INTRAVENOUS at 02:07

## 2020-07-12 RX ADMIN — ACETAMINOPHEN 650 MG: 325 TABLET ORAL at 02:07

## 2020-07-12 RX ADMIN — HEPARIN SODIUM 5000 UNITS: 5000 INJECTION INTRAVENOUS; SUBCUTANEOUS at 02:07

## 2020-07-12 RX ADMIN — INSULIN DETEMIR 3 UNITS: 100 INJECTION, SOLUTION SUBCUTANEOUS at 08:07

## 2020-07-12 NOTE — PROGRESS NOTES
[facility]  Cardiology  Progress Note    Patient Name: Zbigniew Forman  MRN: [id number]  Admission Date: 7/4/2020  Hospital Length of Stay: 8 days  Code Status: DNR   Attending Physician: Geraldine Becerril MD   Primary Care Physician: Blas Morgan Ii, MD  Expected Discharge Date: 7/10/2020  Principal Problem:Bacteremia due to Klebsiella pneumoniae    Subjective:     Hospital Course:   Admitted to CCU for acute hypoxemic respiratory failure 2/2 ADHF vs CAP. Diuresed with IV lasix with good UOP. Weaned down to home 2L nc. Repeat echo showed EF 55%, grade II dd. Increased home lasix to 80mg daily as patient re-accumulated fluid on home lasix dose. UA suggestive of infection and given suspicion for CAP, started on azithro + CTX. US renal negative for hydronephrosis. US lower extremity negative for DVT. UCx and BCx grew Klebsiella. Repeat BCx from 7/6 with NGTD. Started on CTX with plan for 2 weeks after last negative BCx (tentative end date: 7/21/20). Held ACEi given JAN and bacteremia. Will defer to PCP for resuming. PT/OT consulted, recommended SNF. Discussed results and plan with patient + daughter. Patient + daughter verbalized understanding and agreed to plan. Patient stable for discharge. Pt's family refused discharge and is currently appealing as they prefer [facility].    Interval History: NAEON. On 1L nc. No new complaints. UOP 880cc, net -400cc.    Review of Systems   Constitution: Positive for weight gain. Negative for chills, diaphoresis, fever and weight loss.   HENT: Negative for congestion and sore throat.    Eyes: Negative for visual disturbance.   Cardiovascular: Positive for dyspnea on exertion and leg swelling. Negative for chest pain, orthopnea, palpitations, paroxysmal nocturnal dyspnea and syncope.   Respiratory: Negative for cough, shortness of breath and wheezing.    Skin: Negative for rash.   Musculoskeletal: Negative for joint pain and myalgias.   Gastrointestinal: Negative  for abdominal pain, constipation, diarrhea, melena, nausea and vomiting.   Genitourinary: Negative for dysuria, frequency and hematuria.   Neurological: Negative for dizziness, headaches, light-headedness and numbness.   Psychiatric/Behavioral: Negative for altered mental status.     Objective:     Vital Signs (Most Recent):  Temp: 96.7 °F (35.9 °C) (07/12/20 0732)  Pulse: 78 (07/12/20 1000)  Resp: 14 (07/12/20 0732)  BP: (!) 106/51 (07/12/20 0732)  SpO2: 97 % (07/12/20 0732) Vital Signs (24h Range):  Temp:  [96.7 °F (35.9 °C)-97.9 °F (36.6 °C)] 96.7 °F (35.9 °C)  Pulse:  [70-90] 78  Resp:  [12-19] 14  SpO2:  [94 %-99 %] 97 %  BP: (106-141)/(51-90) 106/51     Weight: 76 kg (167 lb 8.8 oz)  Body mass index is 24.74 kg/m².     SpO2: 97 %  O2 Device (Oxygen Therapy): nasal cannula      Intake/Output Summary (Last 24 hours) at 7/12/2020 1034  Last data filed at 7/12/2020 0500  Gross per 24 hour   Intake 290 ml   Output 885 ml   Net -595 ml       Lines/Drains/Airways     Drain            Male External Urinary Catheter 07/05/20 2200 6 days          Peripheral Intravenous Line                 Midline Catheter Insertion/Assessment  - Single Lumen 07/09/20 1030 Left brachial vein 18g x 10cm 3 days                Physical Exam   Constitutional: He is oriented to person, place, and time. He appears well-developed and well-nourished. No distress.   HENT:   Head: Normocephalic and atraumatic.   Mouth/Throat: Oropharynx is clear and moist. No oropharyngeal exudate.   Eyes: Pupils are equal, round, and reactive to light. Conjunctivae are normal. No scleral icterus.   Neck: Neck supple. No JVD present. No tracheal deviation present.   Cardiovascular: Normal rate, regular rhythm, normal heart sounds and intact distal pulses.   No murmur heard.  Pulmonary/Chest: Effort normal and breath sounds normal. No respiratory distress. He has no wheezes. He has no rales.   Abdominal: Soft. Bowel sounds are normal. He exhibits no distension.  There is no abdominal tenderness. There is no rebound.   Musculoskeletal:         General: No edema.   Neurological: He is alert and oriented to person, place, and time. He exhibits normal muscle tone.   Skin: Skin is warm and dry. No rash noted. He is not diaphoretic.   Psychiatric: He has a normal mood and affect. His behavior is normal.       Significant Labs: All pertinent lab results from the last 24 hours have been reviewed.    Significant Imaging: Reviewed.    Assessment and Plan:     * Bacteremia due to Klebsiella pneumoniae  BPH with urinary obstruction  UTI due to Klebsiella species  UCx + BCx on admission grew Klebsiella   US renal negative for hydronephrosis  Started on azithro + CTX for CAP coverage  Currently has condom cath in place (whiteside refused per patient and family)  Repeat BCx from 7/6 with NGTD    Plan:  - Continue CTX x 14 days (tentative end date: 7/21/20)  - Continue home tamsulosin    Chronic hypoxemic respiratory failure  Heart failure with preserved ejection fraction  TTE showed EF 55%, grade II dd  On home O2 (2L nc continuous)  On lasix 40mg daily at home    Plan:  - Hold lasix today as numbers look dehydrated, start PO lasix 60mg tomorrow  - Daily weights (standing if tolerated)  - Strict I/Os  - Fluid restriction at 1500mL  - Cardiac diet    Debility  PT/OT consulted, recommending SNF    DNR (do not resuscitate)  Patient himself and family based on prior encounters     Essential hypertension  Holding home lisinopril 10mg + amlodipine 5mg in setting of JAN on soft BP    Controlled type 2 diabetes mellitus without complication, without long-term current use of insulin  HbA1c 7.0, diet controlled    Plan:  - Detemir 3U  - LDSS  - POCT glucose ACHS  - Diabetic diet  - Goal -180 while inpatient        VTE Risk Mitigation (From admission, onward)         Ordered     heparin (porcine) injection 5,000 Units  Every 8 hours      07/04/20 2034     IP VTE HIGH RISK PATIENT  Once       07/04/20 2034     Place sequential compression device  Until discontinued      07/04/20 2034                Ceasar Walker MD  Cardiology  [facility]

## 2020-07-12 NOTE — SUBJECTIVE & OBJECTIVE
Interval History: NAEON. On 1L nc. No new complaints. UOP 880cc, net -400cc.    Review of Systems   Constitution: Positive for weight gain. Negative for chills, diaphoresis, fever and weight loss.   HENT: Negative for congestion and sore throat.    Eyes: Negative for visual disturbance.   Cardiovascular: Positive for dyspnea on exertion and leg swelling. Negative for chest pain, orthopnea, palpitations, paroxysmal nocturnal dyspnea and syncope.   Respiratory: Negative for cough, shortness of breath and wheezing.    Skin: Negative for rash.   Musculoskeletal: Negative for joint pain and myalgias.   Gastrointestinal: Negative for abdominal pain, constipation, diarrhea, melena, nausea and vomiting.   Genitourinary: Negative for dysuria, frequency and hematuria.   Neurological: Negative for dizziness, headaches, light-headedness and numbness.   Psychiatric/Behavioral: Negative for altered mental status.     Objective:     Vital Signs (Most Recent):  Temp: 96.7 °F (35.9 °C) (07/12/20 0732)  Pulse: 78 (07/12/20 1000)  Resp: 14 (07/12/20 0732)  BP: (!) 106/51 (07/12/20 0732)  SpO2: 97 % (07/12/20 0732) Vital Signs (24h Range):  Temp:  [96.7 °F (35.9 °C)-97.9 °F (36.6 °C)] 96.7 °F (35.9 °C)  Pulse:  [70-90] 78  Resp:  [12-19] 14  SpO2:  [94 %-99 %] 97 %  BP: (106-141)/(51-90) 106/51     Weight: 76 kg (167 lb 8.8 oz)  Body mass index is 24.74 kg/m².     SpO2: 97 %  O2 Device (Oxygen Therapy): nasal cannula      Intake/Output Summary (Last 24 hours) at 7/12/2020 1034  Last data filed at 7/12/2020 0500  Gross per 24 hour   Intake 290 ml   Output 885 ml   Net -595 ml       Lines/Drains/Airways     Drain            Male External Urinary Catheter 07/05/20 2200 6 days          Peripheral Intravenous Line                 Midline Catheter Insertion/Assessment  - Single Lumen 07/09/20 1030 Left brachial vein 18g x 10cm 3 days                Physical Exam   Constitutional: He is oriented to person, place, and time. He appears  well-developed and well-nourished. No distress.   HENT:   Head: Normocephalic and atraumatic.   Mouth/Throat: Oropharynx is clear and moist. No oropharyngeal exudate.   Eyes: Pupils are equal, round, and reactive to light. Conjunctivae are normal. No scleral icterus.   Neck: Neck supple. No JVD present. No tracheal deviation present.   Cardiovascular: Normal rate, regular rhythm, normal heart sounds and intact distal pulses.   No murmur heard.  Pulmonary/Chest: Effort normal and breath sounds normal. No respiratory distress. He has no wheezes. He has no rales.   Abdominal: Soft. Bowel sounds are normal. He exhibits no distension. There is no abdominal tenderness. There is no rebound.   Musculoskeletal:         General: No edema.   Neurological: He is alert and oriented to person, place, and time. He exhibits normal muscle tone.   Skin: Skin is warm and dry. No rash noted. He is not diaphoretic.   Psychiatric: He has a normal mood and affect. His behavior is normal.       Significant Labs: All pertinent lab results from the last 24 hours have been reviewed.    Significant Imaging: Reviewed.

## 2020-07-12 NOTE — ASSESSMENT & PLAN NOTE
TTE showed EF 55%, grade II dd  On home O2 (2L nc continuous)  On lasix 40mg daily at home    Plan:  - Hold lasix today as numbers look dehydrated, start PO lasix 60mg tomorrow  - Daily weights (standing if tolerated)  - Strict I/Os  - Fluid restriction at 1500mL  - Cardiac diet

## 2020-07-12 NOTE — ASSESSMENT & PLAN NOTE
HbA1c 7.0, diet controlled    Plan:  - Detemir 3U  - LDSS  - POCT glucose ACHS  - Diabetic diet  - Goal -180 while inpatient

## 2020-07-12 NOTE — PLAN OF CARE
Patient and alert and withdrawn, somewhat resistant to care during the shift.  Vital sign WNL. No c/o of pain or discomfort. No cardiopulmonary distress.  No SOB, no chest pain. Midline patent and saline locked.   Medication give per MD order.  Fluids provided and encourage. Safety and comfort measure maintained.     Need met in timely matter. ADL care provided. Will continue to monitor for any further changes in condition.

## 2020-07-12 NOTE — PLAN OF CARE
Plan of care discussed with patient. Patient is free of fall/trauma/injury. Denies CP, SOB, or pain/discomfort. Pt is confused with telesitter at bedside. Rocephin gtts daily for antibiotic therapy. Pt awaiting placement. All questions addressed. Will continue to monitor

## 2020-07-13 LAB
ALBUMIN SERPL BCP-MCNC: 2.8 G/DL (ref 3.5–5.2)
ALP SERPL-CCNC: 59 U/L (ref 55–135)
ALT SERPL W/O P-5'-P-CCNC: 9 U/L (ref 10–44)
ANION GAP SERPL CALC-SCNC: 9 MMOL/L (ref 8–16)
AST SERPL-CCNC: 9 U/L (ref 10–40)
BACTERIA SPEC ANAEROBE CULT: NORMAL
BASOPHILS # BLD AUTO: 0.03 K/UL (ref 0–0.2)
BASOPHILS NFR BLD: 0.4 % (ref 0–1.9)
BILIRUB SERPL-MCNC: 0.3 MG/DL (ref 0.1–1)
BUN SERPL-MCNC: 49 MG/DL (ref 10–30)
CALCIUM SERPL-MCNC: 8.6 MG/DL (ref 8.7–10.5)
CHLORIDE SERPL-SCNC: 107 MMOL/L (ref 95–110)
CO2 SERPL-SCNC: 31 MMOL/L (ref 23–29)
CREAT SERPL-MCNC: 1.2 MG/DL (ref 0.5–1.4)
DIFFERENTIAL METHOD: ABNORMAL
EOSINOPHIL # BLD AUTO: 0.1 K/UL (ref 0–0.5)
EOSINOPHIL NFR BLD: 1.3 % (ref 0–8)
ERYTHROCYTE [DISTWIDTH] IN BLOOD BY AUTOMATED COUNT: 13.6 % (ref 11.5–14.5)
EST. GFR  (AFRICAN AMERICAN): 57 ML/MIN/1.73 M^2
EST. GFR  (NON AFRICAN AMERICAN): 49.3 ML/MIN/1.73 M^2
GLUCOSE SERPL-MCNC: 258 MG/DL (ref 70–110)
HCT VFR BLD AUTO: 39.6 % (ref 40–54)
HGB BLD-MCNC: 11.8 G/DL (ref 14–18)
IMM GRANULOCYTES # BLD AUTO: 0.05 K/UL (ref 0–0.04)
IMM GRANULOCYTES NFR BLD AUTO: 0.6 % (ref 0–0.5)
LYMPHOCYTES # BLD AUTO: 2.2 K/UL (ref 1–4.8)
LYMPHOCYTES NFR BLD: 29.1 % (ref 18–48)
MAGNESIUM SERPL-MCNC: 2.4 MG/DL (ref 1.6–2.6)
MCH RBC QN AUTO: 28.2 PG (ref 27–31)
MCHC RBC AUTO-ENTMCNC: 29.8 G/DL (ref 32–36)
MCV RBC AUTO: 95 FL (ref 82–98)
MONOCYTES # BLD AUTO: 0.4 K/UL (ref 0.3–1)
MONOCYTES NFR BLD: 5.5 % (ref 4–15)
NEUTROPHILS # BLD AUTO: 4.9 K/UL (ref 1.8–7.7)
NEUTROPHILS NFR BLD: 63.1 % (ref 38–73)
NRBC BLD-RTO: 0 /100 WBC
PLATELET # BLD AUTO: 315 K/UL (ref 150–350)
PMV BLD AUTO: 10.1 FL (ref 9.2–12.9)
POCT GLUCOSE: 224 MG/DL (ref 70–110)
POTASSIUM SERPL-SCNC: 3.4 MMOL/L (ref 3.5–5.1)
PROT SERPL-MCNC: 6.4 G/DL (ref 6–8.4)
RBC # BLD AUTO: 4.19 M/UL (ref 4.6–6.2)
SODIUM SERPL-SCNC: 147 MMOL/L (ref 136–145)
WBC # BLD AUTO: 7.7 K/UL (ref 3.9–12.7)

## 2020-07-13 PROCEDURE — 63600175 PHARM REV CODE 636 W HCPCS: Performed by: STUDENT IN AN ORGANIZED HEALTH CARE EDUCATION/TRAINING PROGRAM

## 2020-07-13 PROCEDURE — 85025 COMPLETE CBC W/AUTO DIFF WBC: CPT

## 2020-07-13 PROCEDURE — 80053 COMPREHEN METABOLIC PANEL: CPT

## 2020-07-13 PROCEDURE — 25000242 PHARM REV CODE 250 ALT 637 W/ HCPCS: Performed by: INTERNAL MEDICINE

## 2020-07-13 PROCEDURE — 99231 PR SUBSEQUENT HOSPITAL CARE,LEVL I: ICD-10-PCS | Mod: GC,,, | Performed by: INTERNAL MEDICINE

## 2020-07-13 PROCEDURE — 25000003 PHARM REV CODE 250: Performed by: STUDENT IN AN ORGANIZED HEALTH CARE EDUCATION/TRAINING PROGRAM

## 2020-07-13 PROCEDURE — 94761 N-INVAS EAR/PLS OXIMETRY MLT: CPT

## 2020-07-13 PROCEDURE — 83735 ASSAY OF MAGNESIUM: CPT

## 2020-07-13 PROCEDURE — 97530 THERAPEUTIC ACTIVITIES: CPT

## 2020-07-13 PROCEDURE — 36415 COLL VENOUS BLD VENIPUNCTURE: CPT

## 2020-07-13 PROCEDURE — 27000221 HC OXYGEN, UP TO 24 HOURS

## 2020-07-13 PROCEDURE — 99900035 HC TECH TIME PER 15 MIN (STAT)

## 2020-07-13 PROCEDURE — 94640 AIRWAY INHALATION TREATMENT: CPT

## 2020-07-13 PROCEDURE — 99231 SBSQ HOSP IP/OBS SF/LOW 25: CPT | Mod: GC,,, | Performed by: INTERNAL MEDICINE

## 2020-07-13 PROCEDURE — 20600001 HC STEP DOWN PRIVATE ROOM

## 2020-07-13 RX ORDER — FUROSEMIDE 20 MG/1
60 TABLET ORAL DAILY
Qty: 90 TABLET | Refills: 11 | Status: ON HOLD
Start: 2020-07-14 | End: 2020-07-26 | Stop reason: HOSPADM

## 2020-07-13 RX ADMIN — TAMSULOSIN HYDROCHLORIDE 0.4 MG: 0.4 CAPSULE ORAL at 10:07

## 2020-07-13 RX ADMIN — HEPARIN SODIUM 5000 UNITS: 5000 INJECTION INTRAVENOUS; SUBCUTANEOUS at 04:07

## 2020-07-13 RX ADMIN — IPRATROPIUM BROMIDE AND ALBUTEROL SULFATE 3 ML: .5; 3 SOLUTION RESPIRATORY (INHALATION) at 01:07

## 2020-07-13 RX ADMIN — INSULIN ASPART 2 UNITS: 100 INJECTION, SOLUTION INTRAVENOUS; SUBCUTANEOUS at 09:07

## 2020-07-13 RX ADMIN — HYPROMELLOSE 2910: 5 SOLUTION OPHTHALMIC at 09:07

## 2020-07-13 RX ADMIN — HEPARIN SODIUM 5000 UNITS: 5000 INJECTION INTRAVENOUS; SUBCUTANEOUS at 09:07

## 2020-07-13 RX ADMIN — IPRATROPIUM BROMIDE AND ALBUTEROL SULFATE 3 ML: .5; 3 SOLUTION RESPIRATORY (INHALATION) at 07:07

## 2020-07-13 RX ADMIN — CEFTRIAXONE 2 G: 2 INJECTION, SOLUTION INTRAVENOUS at 09:07

## 2020-07-13 RX ADMIN — FUROSEMIDE 60 MG: 40 TABLET ORAL at 09:07

## 2020-07-13 RX ADMIN — IPRATROPIUM BROMIDE AND ALBUTEROL SULFATE 3 ML: .5; 3 SOLUTION RESPIRATORY (INHALATION) at 09:07

## 2020-07-13 RX ADMIN — INSULIN ASPART 1 UNITS: 100 INJECTION, SOLUTION INTRAVENOUS; SUBCUTANEOUS at 09:07

## 2020-07-13 RX ADMIN — POTASSIUM BICARBONATE 25 MEQ: 978 TABLET, EFFERVESCENT ORAL at 12:07

## 2020-07-13 NOTE — PT/OT/SLP PROGRESS
"Occupational Therapy   Treatment    Name: Zbigniew Forman  MRN: [id number]  Admitting Diagnosis:  Bacteremia due to Klebsiella pneumoniae       Recommendations:     Discharge Recommendations: nursing facility, skilled  Discharge Equipment Recommendations:  none  Barriers to discharge:  None    Assessment:     Zbigniew Forman is a 89 y.o. male with a medical diagnosis of Bacteremia due to Klebsiella pneumoniae.  He presents with the following performance deficits affecting function: weakness, impaired balance, decreased safety awareness, impaired endurance, impaired cognition, impaired self care skills, impaired functional mobilty, impaired cardiopulmonary response to activity. Pt tolerated session fair this date. Pt noted to have increased posterior pushing while seated EOB requiring more physical A for sitting EOB in comparison to previous sessions. Pt displayed difficulty with following commands affecting his safety awareness and ability to complete functional tasks without assistance. No goals met this date. OT POC remains appropriate for patient on this date. Pt will continue to benefit from skilled OT to address the deficits affecting his occupational performance .     Rehab Prognosis:  Good; patient would benefit from acute skilled OT services to address these deficits and reach maximum level of function.       Plan:     Patient to be seen 3 x/week to address the above listed problems via self-care/home management, therapeutic activities, therapeutic exercises, neuromuscular re-education  · Plan of Care Expires: 08/04/20  · Plan of Care Reviewed with: patient, daughter    Subjective     Pain/Comfort: "I'm alright."  · Pain Rating 1: 0/10  · Pain Rating Post-Intervention 1: 0/10    Objective:     Communicated with: RN prior to session.  Patient found HOB elevated with telemetry, Condom Catheter, bed alarm upon OT entry to room.    General Precautions: Standard, fall, vision impaired, hearing impaired "   Orthopedic Precautions:N/A   Braces: N/A     Occupational Performance:     Bed Mobility:    · Patient completed Rolling/Turning to Left with  maximal assistance  · Patient completed Scooting/Bridging with maximal assistance and 2 persons for anterior and posterior positioning in bed   · Patient completed Supine to Sit with maximal assistance for trunk elevation and BLE placement over bed   · Patient completed Sit to Supine with total assistance due to posterior pushing      Functional Mobility/Transfers:  · NT due to pt's inability to follow commands and increased A for static sitting EOB     Activities of Daily Living:  · Feeding:  OT wanted to assess; however, daughter preferred to complete task for pt   · Toileting: dependence with use of condom cath for voiding      AMPA 6 Click ADL: 9    Treatment & Education:  - Role of OT/ OT POC  - Self care safety/ independence  - Bed mobility safety  - Importance of sitting EOB during functional tasks   - Pt sat EOB for ~8 minutes ranging from CGA for trunk support- total A while seated EOB. Due to pt's positioning EOB, PT located anteriorly provided LB support due to pt's tendency to keep both extremities extended. While EOB, verbal cues provided for cervical extension for an upright posture. No functional tasks performed 2/2 pt actively pushing and resisting to sit EOB. Pt became more fearful to functional tasks and sitting EOB when daughter entered the session. Pt was more calm and cooperative initially EOB without any resistance to sitting EOB noted. Pt left positioned in bed with 1 pillow underneath each side. Education provided for changing of bed positioning every 2 hours to prevent occurrence of bed sores. Pt's daughter reluctant to education. Bed alarm set. No needs or concerns voiced.    Patient left HOB elevated with all lines intact, call button in reach, bed alarm on, RN notified and daughter presentEducation:      GOALS:   Multidisciplinary Problems      Occupational Therapy Goals        Problem: Occupational Therapy Goal    Goal Priority Disciplines Outcome Interventions   Occupational Therapy Goal     OT, PT/OT Ongoing, Progressing    Description: Goals to be met by: 7/16/20    Patient will increase functional independence with ADLs by performing:    Grooming while standing at sink with Contact Guard Assistance.  Toileting from toilet with Contact Guard Assistance for hygiene and clothing management.   Supine to sit with Minimal Assistance to increase bed mobility independence.  Stand pivot transfers with Contact Guard Assistance to reach bedside chair/commode  Step transfer with Contact Guard Assistance and AD as needed   Toilet transfer to toilet with Contact Guard Assistance.                      Time Tracking:     OT Date of Treatment: 07/13/20  OT Start Time: 1345  OT Stop Time: 1403  OT Total Time (min): 18 min co tx with PT     Billable Minutes:Therapeutic Activity 10    Trisha Alcantar OT  7/13/2020

## 2020-07-13 NOTE — PLAN OF CARE
Patient oriented to self. Daughter is at the bedside, attentive to patient. Tele-sitter is at the bedside. Bed alarm is set. Potassium 3.4 (replaced). Blood glucose monitoring before breakfast and at bedtime per order. Plan of care discussed with patient. Patient is free of fall/trauma/injury. Denies CP, SOB, or pain/discomfort. All questions addressed. Patient is currently awaiting SNF placement. Will continue to monitor

## 2020-07-13 NOTE — PT/OT/SLP PROGRESS
"Physical Therapy Treatment    Patient Name:  Zbigniew Forman   MRN:  [id number]    Recommendations:     Discharge Recommendations:  nursing facility, skilled   Discharge Equipment Recommendations: none   Barriers to discharge: Decreased caregiver support at current functional level     Assessment:     Zbigniew Forman is a 89 y.o. male admitted with a medical diagnosis of Bacteremia due to Klebsiella pneumoniae.  He presents with the following impairments/functional limitations:  weakness, impaired balance, decreased safety awareness, impaired endurance, impaired cardiopulmonary response to activity, impaired self care skills, impaired functional mobilty, gait instability, impaired cognition, decreased upper extremity function, decreased lower extremity function. Pt requiring increased assist to complete bed mobility this date. Decreased command-following and initiation of functional tasks noted during session, requiring assist to complete. Progressively requiring increased assist to maintain sitting balance following re-positioning attempts with excess post trunk propulsion noted, ultimately requiring return to supine to maintain pt safety. Pt would continue to benefit from skilled acute PT in order to address current deficits and progress functional mobility.     Rehab Prognosis: Fair; patient would benefit from acute skilled PT services to address these deficits and reach maximum level of function.    Recent Surgery: * No surgery found *       Plan:     During this hospitalization, patient to be seen 3 x/week to address the identified rehab impairments via gait training, therapeutic activities, therapeutic exercises, neuromuscular re-education and progress toward the following goals:    · Plan of Care Expires:  08/05/20    Subjective     Chief Complaint: pt daughter at bedside voicing multiple complaints   Patient/Family Comments/goals: Daughter at bedside stating, "I wish y'all hadn't gotten him up. He just got " "comfortable." PT attempted to explain importance of participation in acute PT and OOB activity.   Pain/Comfort:  · Pain Rating 1: 0/10      Objective:     Communicated with RN prior to session.  Patient found supine with telemetry, Condom Catheter, bed alarm upon PT entry to room.     General Precautions: Standard, fall, vision impaired, hearing impaired   Orthopedic Precautions:N/A   Braces: N/A     Functional Mobility:  · Bed Mobility:     · Scooting: total assistance and of 2 persons (seated ant scooting; supine with drawsheet to HOB)  · Supine to Sit: maximal assistance and of 2 persons   · Attempted to provide v/c and t/c for incr pt participation; however, pt demo'in gdecreased command-following and initiation of task  · Sit to Supine: total assistance  · Incr assist needed to maintain pt safety 2* excess posterior trunk propulsion with pt hips sliding anteriorly towards EOB   · Balance: sitting EOB: ranged from Robinson-totalA      AM-PAC 6 CLICK MOBILITY  Turning over in bed (including adjusting bedclothes, sheets and blankets)?: 2  Sitting down on and standing up from a chair with arms (e.g., wheelchair, bedside commode, etc.): 1  Moving from lying on back to sitting on the side of the bed?: 2  Moving to and from a bed to a chair (including a wheelchair)?: 1  Need to walk in hospital room?: 1  Climbing 3-5 steps with a railing?: 1  Basic Mobility Total Score: 8       Therapeutic Activities and Exercises:  Pt educated on role of PT and PT POC. Pt verbalized understanding.   Daily orientation provided.   Sat EOB with balance assist ranging from Robinson-totalA. Required assist for LE positioning in attempt to maintain WB for improved balance; BLE drifting upward, requiring consistent Robinson for positioning to avoid posterior lean. Attempted to provide cues for ant seated scooting in order to facilitate improved sitting balance and positioning. Daughter at bedside stating, "I don't think he can do that. He's very weak." " "PT explained verbal cueing and allowing pt to perform with less assist prior to providing assist for decreased fear of falling and improved pt performance. Pt unable to initiate ant scooting, thus PT providing totalA to weight-shift appropriately and scoot anteriorly. Pt becoming fearful with ant scooting and daughter at bedside stating "That's scaring him." Pt propelling trunk posteriorly and resisting ant weight-shift. Unable to facilitate ant weight-shift despite totalA for trunk management and BLE management. Thus, required return to supine in order to maintain pt safety. Re-positioned in supine with HOB elevated and pt in midline. Incr R lateral lean noted, so pillow placed at R for correction. Pt reporting he is comfortable, though daughter voicing dissatisfaction with positioning attempts.   Edu provided to daughter re: nature of acute PT and pt participation in order to return to PLOF. Pt's daughter v/u though appearing inattentive to edu.     Patient left supine with all lines intact, call button in reach, bed alarm on and pt's daughter present..    GOALS:   Multidisciplinary Problems     Physical Therapy Goals        Problem: Physical Therapy Goal    Goal Priority Disciplines Outcome Goal Variances Interventions   Physical Therapy Goal     PT, PT/OT Ongoing, Progressing     Description: Goals to be met by: 20     Patient will increase functional independence with mobility by performin. Supine to sit with Stand-by Assistance - Not met  2. Sit to supine with Stand-by Assistance - Not met  3. Sit to stand transfer with Stand-by Assistance to RW - Not met  4. Bed to chair transfer with Contact Guard Assistance using Rolling Walker - Not met  5. Gait  x 25 feet with Contact Guard Assistance using Rolling Walker - Not met  6. Lower extremity exercise program x 10 reps per handout, with supervision - Not met                   Time Tracking:     PT Received On: 20  PT Start Time: 1344     PT Stop " Time: 1404  PT Total Time (min): 20 min     Billable Minutes: Therapeutic Activity 10   (time split with OT)    Treatment Type: Treatment  PT/PTA: PT     PTA Visit Number: 0     Hetal Brooke PT, DPT   7/13/2020

## 2020-07-13 NOTE — SUBJECTIVE & OBJECTIVE
Interval History: NAEON. UOP 1.3L, net -400cc. Serum Na improving, Cr downtrending.    Review of Systems   Constitution: Positive for weight gain. Negative for chills, diaphoresis, fever and weight loss.   HENT: Negative for congestion and sore throat.    Eyes: Negative for visual disturbance.   Cardiovascular: Positive for dyspnea on exertion and leg swelling. Negative for chest pain, orthopnea, palpitations, paroxysmal nocturnal dyspnea and syncope.   Respiratory: Negative for cough, shortness of breath and wheezing.    Skin: Negative for rash.   Musculoskeletal: Negative for joint pain and myalgias.   Gastrointestinal: Negative for abdominal pain, constipation, diarrhea, melena, nausea and vomiting.   Genitourinary: Negative for dysuria, frequency and hematuria.   Neurological: Negative for dizziness, headaches, light-headedness and numbness.   Psychiatric/Behavioral: Negative for altered mental status.     Objective:     Vital Signs (Most Recent):  Temp: 98 °F (36.7 °C) (07/13/20 1313)  Pulse: (!) 48 (07/13/20 1323)  Resp: 20 (07/13/20 1323)  BP: (!) 150/74 (07/13/20 1313)  SpO2: 99 % (07/13/20 1323) Vital Signs (24h Range):  Temp:  [97 °F (36.1 °C)-98 °F (36.7 °C)] 98 °F (36.7 °C)  Pulse:  [48-87] 48  Resp:  [14-20] 20  SpO2:  [91 %-99 %] 99 %  BP: ()/(50-85) 150/74     Weight: 67.1 kg (147 lb 14.9 oz)  Body mass index is 21.85 kg/m².     SpO2: 99 %  O2 Device (Oxygen Therapy): nasal cannula      Intake/Output Summary (Last 24 hours) at 7/13/2020 1459  Last data filed at 7/13/2020 1400  Gross per 24 hour   Intake 530 ml   Output 1360 ml   Net -830 ml       Lines/Drains/Airways     Drain            Male External Urinary Catheter 07/05/20 2200 7 days          Peripheral Intravenous Line                 Midline Catheter Insertion/Assessment  - Single Lumen 07/09/20 1030 Left brachial vein 18g x 10cm 4 days                Physical Exam   Constitutional: He is oriented to person, place, and time. He appears  well-developed and well-nourished. No distress.   HENT:   Head: Normocephalic and atraumatic.   Mouth/Throat: Oropharynx is clear and moist. No oropharyngeal exudate.   Eyes: Pupils are equal, round, and reactive to light. Conjunctivae are normal. No scleral icterus.   Neck: Neck supple. No JVD present. No tracheal deviation present.   Cardiovascular: Normal rate, regular rhythm, normal heart sounds and intact distal pulses.   No murmur heard.  Pulmonary/Chest: Effort normal and breath sounds normal. No respiratory distress. He has no wheezes. He has no rales.   Abdominal: Soft. Bowel sounds are normal. He exhibits no distension. There is no abdominal tenderness. There is no rebound.   Musculoskeletal:         General: No edema.   Neurological: He is alert and oriented to person, place, and time. He exhibits normal muscle tone.   Skin: Skin is warm and dry. No rash noted. He is not diaphoretic.   Psychiatric: He has a normal mood and affect. His behavior is normal.       Significant Labs: All pertinent lab results from the last 24 hours have been reviewed.    Significant Imaging: Reviewed.

## 2020-07-13 NOTE — PLAN OF CARE
Patient and alert and withdrawn, very resistive to care during the shift.  Vital sign WNL. No c/o of pain or discomfort. No cardiopulmonary distress.  Midline patent and saline locked.   Medication give per MD order.  Fluids provided and encourage. Void via condom cath, clear yellow urine in drainage bag.  Safety and comfort measures maintained.     Need met in timely matter.  ADL care provided. Turned q 2 hours to prevent pressure ulcer.  HOB elevated. Planned of care reviewed.  Will continue to monitor for any further changes in condition.

## 2020-07-13 NOTE — PLAN OF CARE
CM went to the room to meet with the pt's daughter. Nurse states she has not seen the daughter yet today.

## 2020-07-13 NOTE — ASSESSMENT & PLAN NOTE
TTE showed EF 55%, grade II dd  On home O2 (2L nc continuous)  On lasix 40mg daily at home    Plan:  - Start PO lasix 60mg  - Daily weights (standing if tolerated)  - Strict I/Os  - Fluid restriction at 1500mL  - Cardiac diet

## 2020-07-13 NOTE — PLAN OF CARE
07/13/20 0806   Discharge Reassessment   Assessment Type Discharge Planning Reassessment   Do you have any problems affording any of your prescribed medications? TBD   Discharge Plan A Skilled Nursing Facility   Discharge Plan B Home with family;Home Health

## 2020-07-13 NOTE — ASSESSMENT & PLAN NOTE
HbA1c 7.0, diet controlled    Plan:  - Detemir 5U  - LDSS  - POCT glucose ACHS  - Diabetic diet  - Goal -180 while inpatient

## 2020-07-13 NOTE — PLAN OF CARE
Goals reviewed and remain appropriate. Pt progressing towards goals.    Hetal Brooke, PT, DPT   2020  [phone]    Problem: Physical Therapy Goal  Goal: Physical Therapy Goal  Description: Goals to be met by: 20     Patient will increase functional independence with mobility by performin. Supine to sit with Stand-by Assistance - Not met  2. Sit to supine with Stand-by Assistance - Not met  3. Sit to stand transfer with Stand-by Assistance to RW - Not met  4. Bed to chair transfer with Contact Guard Assistance using Rolling Walker - Not met  5. Gait  x 25 feet with Contact Guard Assistance using Rolling Walker - Not met  6. Lower extremity exercise program x 10 reps per handout, with supervision - Not met  Outcome: Ongoing, Progressing

## 2020-07-13 NOTE — PLAN OF CARE
CM met with the pt's daughter. She is asking that [facility]. CM advised the daughter that [facility] is [facility], not SNF. Advised that PT and OT recs had been SNF and that insurance will not cover the higher level of care without documented recommendations. Pt's daughter then asked about [facility]. CM advised that [facility] closest [facility] facilities [facility]. Daughter thought [place] could [place] facility [place]. CM advised that the new facility was a hospital like [facility]. There is no SNF there. She then asked that we send referral to [facility]. CM advised that he had been accepted there.

## 2020-07-13 NOTE — PROGRESS NOTES
[facility]  Cardiology  Progress Note    Patient Name: Zbigniew Forman  MRN: [id number]  Admission Date: 7/4/2020  Hospital Length of Stay: 9 days  Code Status: DNR   Attending Physician: Geraldine Becerril MD   Primary Care Physician: Blas Morgan Ii, MD  Expected Discharge Date: 7/10/2020  Principal Problem:Bacteremia due to Klebsiella pneumoniae    Subjective:     Hospital Course:   Admitted to CCU for acute hypoxemic respiratory failure 2/2 ADHF vs CAP. Diuresed with IV lasix with good UOP. Weaned down to home 2L nc. Repeat echo showed EF 55%, grade II dd. Increased home lasix to 80mg daily as patient re-accumulated fluid on home lasix dose. UA suggestive of infection and given suspicion for CAP, started on azithro + CTX. US renal negative for hydronephrosis. US lower extremity negative for DVT. UCx and BCx grew Klebsiella. Repeat BCx from 7/6 with NGTD. Started on CTX with plan for 2 weeks after last negative BCx (tentative end date: 7/21/20). Held ACEi given JAN and bacteremia. Will defer to PCP for resuming. PT/OT consulted, recommended SNF. Discussed results and plan with patient + daughter. Patient + daughter verbalized understanding and agreed to plan. Patient stable for discharge as of 7/10/20. Pt's family refused discharge and is currently appealing as they prefer [facility].    Interval History: NAEON. UOP 1.3L, net -400cc. Serum Na improving, Cr downtrending.    Review of Systems   Constitution: Positive for weight gain. Negative for chills, diaphoresis, fever and weight loss.   HENT: Negative for congestion and sore throat.    Eyes: Negative for visual disturbance.   Cardiovascular: Positive for dyspnea on exertion and leg swelling. Negative for chest pain, orthopnea, palpitations, paroxysmal nocturnal dyspnea and syncope.   Respiratory: Negative for cough, shortness of breath and wheezing.    Skin: Negative for rash.   Musculoskeletal: Negative for joint pain and myalgias.    Gastrointestinal: Negative for abdominal pain, constipation, diarrhea, melena, nausea and vomiting.   Genitourinary: Negative for dysuria, frequency and hematuria.   Neurological: Negative for dizziness, headaches, light-headedness and numbness.   Psychiatric/Behavioral: Negative for altered mental status.     Objective:     Vital Signs (Most Recent):  Temp: 98 °F (36.7 °C) (07/13/20 1313)  Pulse: (!) 48 (07/13/20 1323)  Resp: 20 (07/13/20 1323)  BP: (!) 150/74 (07/13/20 1313)  SpO2: 99 % (07/13/20 1323) Vital Signs (24h Range):  Temp:  [97 °F (36.1 °C)-98 °F (36.7 °C)] 98 °F (36.7 °C)  Pulse:  [48-87] 48  Resp:  [14-20] 20  SpO2:  [91 %-99 %] 99 %  BP: ()/(50-85) 150/74     Weight: 67.1 kg (147 lb 14.9 oz)  Body mass index is 21.85 kg/m².     SpO2: 99 %  O2 Device (Oxygen Therapy): nasal cannula      Intake/Output Summary (Last 24 hours) at 7/13/2020 1459  Last data filed at 7/13/2020 1400  Gross per 24 hour   Intake 530 ml   Output 1360 ml   Net -830 ml       Lines/Drains/Airways     Drain            Male External Urinary Catheter 07/05/20 2200 7 days          Peripheral Intravenous Line                 Midline Catheter Insertion/Assessment  - Single Lumen 07/09/20 1030 Left brachial vein 18g x 10cm 4 days                Physical Exam   Constitutional: He is oriented to person, place, and time. He appears well-developed and well-nourished. No distress.   HENT:   Head: Normocephalic and atraumatic.   Mouth/Throat: Oropharynx is clear and moist. No oropharyngeal exudate.   Eyes: Pupils are equal, round, and reactive to light. Conjunctivae are normal. No scleral icterus.   Neck: Neck supple. No JVD present. No tracheal deviation present.   Cardiovascular: Normal rate, regular rhythm, normal heart sounds and intact distal pulses.   No murmur heard.  Pulmonary/Chest: Effort normal and breath sounds normal. No respiratory distress. He has no wheezes. He has no rales.   Abdominal: Soft. Bowel sounds are normal.  He exhibits no distension. There is no abdominal tenderness. There is no rebound.   Musculoskeletal:         General: No edema.   Neurological: He is alert and oriented to person, place, and time. He exhibits normal muscle tone.   Skin: Skin is warm and dry. No rash noted. He is not diaphoretic.   Psychiatric: He has a normal mood and affect. His behavior is normal.       Significant Labs: All pertinent lab results from the last 24 hours have been reviewed.    Significant Imaging: Reviewed.    Assessment and Plan:     * Bacteremia due to Klebsiella pneumoniae  BPH with urinary obstruction  UTI due to Klebsiella species  UCx + BCx on admission grew Klebsiella   US renal negative for hydronephrosis  Started on azithro + CTX for CAP coverage  Currently has condom cath in place (whiteside refused per patient and family)  Repeat BCx from 7/6 with NGTD    Plan:  - Continue CTX x 14 days (tentative end date: 7/21/20)  - Continue home tamsulosin    Chronic hypoxemic respiratory failure  Heart failure with preserved ejection fraction  TTE showed EF 55%, grade II dd  On home O2 (2L nc continuous)  On lasix 40mg daily at home    Plan:  - Start PO lasix 60mg  - Daily weights (standing if tolerated)  - Strict I/Os  - Fluid restriction at 1500mL  - Cardiac diet    Debility  PT/OT consulted, recommending SNF    DNR (do not resuscitate)  Patient himself and family based on prior encounters     Essential hypertension  Holding home lisinopril 10mg + amlodipine 5mg in setting of JAN on soft BP    Controlled type 2 diabetes mellitus without complication, without long-term current use of insulin  HbA1c 7.0, diet controlled    Plan:  - Detemir 5U  - LDSS  - POCT glucose ACHS  - Diabetic diet  - Goal -180 while inpatient        VTE Risk Mitigation (From admission, onward)         Ordered     heparin (porcine) injection 5,000 Units  Every 8 hours      07/04/20 2034     IP VTE HIGH RISK PATIENT  Once      07/04/20 2034     Place  sequential compression device  Until discontinued      07/04/20 2034                Ceasar Walker MD  Cardiology  [facility]

## 2020-07-13 NOTE — PLAN OF CARE
Problem: Occupational Therapy Goal  Goal: Occupational Therapy Goal  Description: Goals to be met by: 7/16/20    Patient will increase functional independence with ADLs by performing:    Grooming while standing at sink with Contact Guard Assistance.  Toileting from toilet with Contact Guard Assistance for hygiene and clothing management.   Supine to sit with Minimal Assistance to increase bed mobility independence.  Stand pivot transfers with Contact Guard Assistance to reach bedside chair/commode  Step transfer with Contact Guard Assistance and AD as needed   Toilet transfer to toilet with Contact Guard Assistance.     Outcome: Ongoing, Progressing     OT POC remains appropriate for patient on this date. Pt will continue to benefit from skilled OT to address the deficits affecting his occupational performance.      Trisha Alcantar OTR/L  7/13/20

## 2020-07-13 NOTE — PLAN OF CARE
07/13/20 1321   Medicare Message   Important Message from Medicare regarding Discharge Appeal Rights Given to patient/caregiver;Explained to patient/caregiver;Signed/date by patient/caregiver   Date IMM was signed 07/13/20   Time IMM was signed 2108

## 2020-07-14 LAB
ALBUMIN SERPL BCP-MCNC: 2.9 G/DL (ref 3.5–5.2)
ALP SERPL-CCNC: 63 U/L (ref 55–135)
ALT SERPL W/O P-5'-P-CCNC: 10 U/L (ref 10–44)
ANION GAP SERPL CALC-SCNC: 11 MMOL/L (ref 8–16)
AST SERPL-CCNC: 10 U/L (ref 10–40)
BASOPHILS # BLD AUTO: 0.05 K/UL (ref 0–0.2)
BASOPHILS NFR BLD: 0.7 % (ref 0–1.9)
BILIRUB SERPL-MCNC: 0.3 MG/DL (ref 0.1–1)
BUN SERPL-MCNC: 42 MG/DL (ref 10–30)
CALCIUM SERPL-MCNC: 8.7 MG/DL (ref 8.7–10.5)
CHLORIDE SERPL-SCNC: 106 MMOL/L (ref 95–110)
CO2 SERPL-SCNC: 31 MMOL/L (ref 23–29)
CREAT SERPL-MCNC: 1.2 MG/DL (ref 0.5–1.4)
DIFFERENTIAL METHOD: ABNORMAL
EOSINOPHIL # BLD AUTO: 0.1 K/UL (ref 0–0.5)
EOSINOPHIL NFR BLD: 1.5 % (ref 0–8)
ERYTHROCYTE [DISTWIDTH] IN BLOOD BY AUTOMATED COUNT: 13.4 % (ref 11.5–14.5)
EST. GFR  (AFRICAN AMERICAN): 57 ML/MIN/1.73 M^2
EST. GFR  (NON AFRICAN AMERICAN): 49.3 ML/MIN/1.73 M^2
GLUCOSE SERPL-MCNC: 232 MG/DL (ref 70–110)
HCT VFR BLD AUTO: 41 % (ref 40–54)
HGB BLD-MCNC: 12.4 G/DL (ref 14–18)
IMM GRANULOCYTES # BLD AUTO: 0.06 K/UL (ref 0–0.04)
IMM GRANULOCYTES NFR BLD AUTO: 0.8 % (ref 0–0.5)
LYMPHOCYTES # BLD AUTO: 2.1 K/UL (ref 1–4.8)
LYMPHOCYTES NFR BLD: 28.8 % (ref 18–48)
MAGNESIUM SERPL-MCNC: 2.3 MG/DL (ref 1.6–2.6)
MCH RBC QN AUTO: 28.1 PG (ref 27–31)
MCHC RBC AUTO-ENTMCNC: 30.2 G/DL (ref 32–36)
MCV RBC AUTO: 93 FL (ref 82–98)
MONOCYTES # BLD AUTO: 0.4 K/UL (ref 0.3–1)
MONOCYTES NFR BLD: 5.7 % (ref 4–15)
NEUTROPHILS # BLD AUTO: 4.6 K/UL (ref 1.8–7.7)
NEUTROPHILS NFR BLD: 62.5 % (ref 38–73)
NRBC BLD-RTO: 0 /100 WBC
PLATELET # BLD AUTO: 311 K/UL (ref 150–350)
PMV BLD AUTO: 10.3 FL (ref 9.2–12.9)
POCT GLUCOSE: 225 MG/DL (ref 70–110)
POCT GLUCOSE: 261 MG/DL (ref 70–110)
POTASSIUM SERPL-SCNC: 3.3 MMOL/L (ref 3.5–5.1)
PROT SERPL-MCNC: 6.5 G/DL (ref 6–8.4)
RBC # BLD AUTO: 4.41 M/UL (ref 4.6–6.2)
SODIUM SERPL-SCNC: 148 MMOL/L (ref 136–145)
WBC # BLD AUTO: 7.4 K/UL (ref 3.9–12.7)

## 2020-07-14 PROCEDURE — 99900035 HC TECH TIME PER 15 MIN (STAT)

## 2020-07-14 PROCEDURE — 94761 N-INVAS EAR/PLS OXIMETRY MLT: CPT

## 2020-07-14 PROCEDURE — 85025 COMPLETE CBC W/AUTO DIFF WBC: CPT

## 2020-07-14 PROCEDURE — 80053 COMPREHEN METABOLIC PANEL: CPT

## 2020-07-14 PROCEDURE — 63600175 PHARM REV CODE 636 W HCPCS: Performed by: STUDENT IN AN ORGANIZED HEALTH CARE EDUCATION/TRAINING PROGRAM

## 2020-07-14 PROCEDURE — 97530 THERAPEUTIC ACTIVITIES: CPT

## 2020-07-14 PROCEDURE — 25000242 PHARM REV CODE 250 ALT 637 W/ HCPCS: Performed by: INTERNAL MEDICINE

## 2020-07-14 PROCEDURE — 97535 SELF CARE MNGMENT TRAINING: CPT

## 2020-07-14 PROCEDURE — 99231 SBSQ HOSP IP/OBS SF/LOW 25: CPT | Mod: GC,,, | Performed by: INTERNAL MEDICINE

## 2020-07-14 PROCEDURE — 36415 COLL VENOUS BLD VENIPUNCTURE: CPT

## 2020-07-14 PROCEDURE — 83735 ASSAY OF MAGNESIUM: CPT

## 2020-07-14 PROCEDURE — 97802 MEDICAL NUTRITION INDIV IN: CPT

## 2020-07-14 PROCEDURE — 25000003 PHARM REV CODE 250: Performed by: STUDENT IN AN ORGANIZED HEALTH CARE EDUCATION/TRAINING PROGRAM

## 2020-07-14 PROCEDURE — 99231 PR SUBSEQUENT HOSPITAL CARE,LEVL I: ICD-10-PCS | Mod: GC,,, | Performed by: INTERNAL MEDICINE

## 2020-07-14 PROCEDURE — 94640 AIRWAY INHALATION TREATMENT: CPT

## 2020-07-14 PROCEDURE — 27000221 HC OXYGEN, UP TO 24 HOURS

## 2020-07-14 PROCEDURE — 20600001 HC STEP DOWN PRIVATE ROOM

## 2020-07-14 RX ORDER — AMLODIPINE BESYLATE 2.5 MG/1
2.5 TABLET ORAL DAILY
Status: DISCONTINUED | OUTPATIENT
Start: 2020-07-14 | End: 2020-07-16 | Stop reason: HOSPADM

## 2020-07-14 RX ADMIN — HYPROMELLOSE 2910: 5 SOLUTION OPHTHALMIC at 08:07

## 2020-07-14 RX ADMIN — TAMSULOSIN HYDROCHLORIDE 0.4 MG: 0.4 CAPSULE ORAL at 08:07

## 2020-07-14 RX ADMIN — AMLODIPINE BESYLATE 2.5 MG: 2.5 TABLET ORAL at 11:07

## 2020-07-14 RX ADMIN — CEFTRIAXONE 2 G: 2 INJECTION, SOLUTION INTRAVENOUS at 08:07

## 2020-07-14 RX ADMIN — IPRATROPIUM BROMIDE AND ALBUTEROL SULFATE 3 ML: .5; 3 SOLUTION RESPIRATORY (INHALATION) at 01:07

## 2020-07-14 RX ADMIN — HEPARIN SODIUM 5000 UNITS: 5000 INJECTION INTRAVENOUS; SUBCUTANEOUS at 05:07

## 2020-07-14 RX ADMIN — INSULIN ASPART 1 UNITS: 100 INJECTION, SOLUTION INTRAVENOUS; SUBCUTANEOUS at 08:07

## 2020-07-14 RX ADMIN — HEPARIN SODIUM 5000 UNITS: 5000 INJECTION INTRAVENOUS; SUBCUTANEOUS at 08:07

## 2020-07-14 RX ADMIN — IPRATROPIUM BROMIDE AND ALBUTEROL SULFATE 3 ML: .5; 3 SOLUTION RESPIRATORY (INHALATION) at 07:07

## 2020-07-14 RX ADMIN — FUROSEMIDE 60 MG: 40 TABLET ORAL at 08:07

## 2020-07-14 RX ADMIN — POTASSIUM BICARBONATE 25 MEQ: 978 TABLET, EFFERVESCENT ORAL at 08:07

## 2020-07-14 RX ADMIN — IPRATROPIUM BROMIDE AND ALBUTEROL SULFATE 3 ML: .5; 3 SOLUTION RESPIRATORY (INHALATION) at 08:07

## 2020-07-14 NOTE — PROGRESS NOTES
[facility]  Cardiology  Progress Note    Patient Name: Zbigniew Forman  MRN: [id number]  Admission Date: 7/4/2020  Hospital Length of Stay: 10 days  Code Status: DNR   Attending Physician: Geraldine Becerril MD   Primary Care Physician: Blas Morgan Ii, MD  Expected Discharge Date: 7/15/2020  Principal Problem:Bacteremia due to Klebsiella pneumoniae    Subjective:     Hospital Course:   Admitted to CCU for acute hypoxemic respiratory failure 2/2 ADHF vs CAP. Diuresed with IV lasix with good UOP. Weaned down to home 2L nc. Repeat echo showed EF 55%, grade II dd. Increased home lasix to 80mg daily as patient re-accumulated fluid on home lasix dose. UA suggestive of infection and given suspicion for CAP, started on azithro + CTX. US renal negative for hydronephrosis. US lower extremity negative for DVT. UCx and BCx grew Klebsiella. Repeat BCx from 7/6 with NGTD. Started on CTX with plan for 2 weeks after last negative BCx (tentative end date: 7/21/20). Held ACEi given JAN and bacteremia. Will defer to PCP for resuming. PT/OT consulted, recommended SNF. Discussed results and plan with patient + daughter. Patient + daughter verbalized understanding and agreed to plan. Patient stable for discharge as of 7/10/20. Pt's family refused discharge and is currently appealing as they prefer O-SNF.    Interval History: NAEON. Mental status at baseline. More alert and interactive today.    Review of Systems   Constitution: Positive for weight gain. Negative for chills, diaphoresis, fever and weight loss.   HENT: Negative for congestion and sore throat.    Eyes: Negative for visual disturbance.   Cardiovascular: Positive for dyspnea on exertion and leg swelling. Negative for chest pain, orthopnea, palpitations, paroxysmal nocturnal dyspnea and syncope.   Respiratory: Negative for cough, shortness of breath and wheezing.    Skin: Negative for rash.   Musculoskeletal: Negative for joint pain and myalgias.    Gastrointestinal: Negative for abdominal pain, constipation, diarrhea, melena, nausea and vomiting.   Genitourinary: Negative for dysuria, frequency and hematuria.   Neurological: Negative for dizziness, headaches, light-headedness and numbness.   Psychiatric/Behavioral: Negative for altered mental status.     Objective:     Vital Signs (Most Recent):  Temp: 98 °F (36.7 °C) (07/14/20 1153)  Pulse: 80 (07/14/20 1153)  Resp: 18 (07/14/20 1153)  BP: 129/60 (07/14/20 1153)  SpO2: 96 % (07/14/20 1153) Vital Signs (24h Range):  Temp:  [97.7 °F (36.5 °C)-98.7 °F (37.1 °C)] 98 °F (36.7 °C)  Pulse:  [48-89] 80  Resp:  [16-22] 18  SpO2:  [93 %-100 %] 96 %  BP: (129-163)/(60-81) 129/60     Weight: 65.6 kg (144 lb 10 oz)  Body mass index is 21.36 kg/m².     SpO2: 96 %  O2 Device (Oxygen Therapy): nasal cannula      Intake/Output Summary (Last 24 hours) at 7/14/2020 1225  Last data filed at 7/14/2020 0839  Gross per 24 hour   Intake 50 ml   Output 1275 ml   Net -1225 ml       Lines/Drains/Airways     Drain            Male External Urinary Catheter 07/05/20 2200 8 days          Peripheral Intravenous Line                 Midline Catheter Insertion/Assessment  - Single Lumen 07/09/20 1030 Left brachial vein 18g x 10cm 5 days                Physical Exam   Constitutional: He is oriented to person, place, and time. He appears well-developed and well-nourished. No distress.   HENT:   Head: Normocephalic and atraumatic.   Mouth/Throat: Oropharynx is clear and moist. No oropharyngeal exudate.   Eyes: Pupils are equal, round, and reactive to light. Conjunctivae are normal. No scleral icterus.   Neck: Neck supple. No JVD present. No tracheal deviation present.   Cardiovascular: Normal rate, regular rhythm, normal heart sounds and intact distal pulses.   No murmur heard.  Pulmonary/Chest: Effort normal and breath sounds normal. No respiratory distress. He has no wheezes. He has no rales.   Abdominal: Soft. Bowel sounds are normal. He  exhibits no distension. There is no abdominal tenderness. There is no rebound.   Musculoskeletal:         General: No edema.   Neurological: He is alert and oriented to person, place, and time. He exhibits normal muscle tone.   Skin: Skin is warm and dry. No rash noted. He is not diaphoretic.   Psychiatric: He has a normal mood and affect. His behavior is normal.       Significant Labs: All pertinent lab results from the last 24 hours have been reviewed.    Significant Imaging: Reviewed.    Assessment and Plan:     * Bacteremia due to Klebsiella pneumoniae  BPH with urinary obstruction  UTI due to Klebsiella species  UCx + BCx on admission grew Klebsiella   US renal negative for hydronephrosis  Started on azithro + CTX for CAP coverage  Currently has condom cath in place (whiteside refused per patient and family)  Repeat BCx from 7/6 with NGTD    Plan:  - Continue CTX x 14 days (tentative end date: 7/21/20)  - Continue home tamsulosin    Chronic hypoxemic respiratory failure  Heart failure with preserved ejection fraction  TTE showed EF 55%, grade II dd  On home O2 (2L nc continuous)  On lasix 40mg daily at home    Plan:  - PO lasix 60mg  - Daily weights (standing if tolerated)  - Strict I/Os  - Fluid restriction at 1500mL  - Cardiac diet    Debility  PT/OT consulted, recommending SNF  Dispo pending result of family appeal to discharge    DNR (do not resuscitate)  Patient himself and family based on prior encounters     Essential hypertension  Holding lisinopril given soft BPs  Resume low dose amlodipine 2.5mg    Controlled type 2 diabetes mellitus without complication, without long-term current use of insulin  HbA1c 7.0, diet controlled    Plan:  - Detemir 5U  - LDSS  - POCT glucose ACHS  - Diabetic diet  - Goal -180 while inpatient        VTE Risk Mitigation (From admission, onward)         Ordered     heparin (porcine) injection 5,000 Units  Every 8 hours      07/04/20 2034     IP VTE HIGH RISK PATIENT  Once       07/04/20 2034     Place sequential compression device  Until discontinued      07/04/20 2034                Ceasar Walker MD  Cardiology  [facility]

## 2020-07-14 NOTE — PROGRESS NOTES
"[facility]  Adult Nutrition  Progress Note    SUMMARY       Recommendations  1. Continue diabetic cardiac diet + Optisource TID, with fluid restriction per MD. Encourage PO intake.  2. RD to monitor.    Goals: PO intake > 50% by RD follow up  Nutrition Goal Status: new  Communication of RD Recs: other (comment)(POC)    Reason for Assessment    Reason For Assessment: length of stay  Diagnosis: infection/sepsis(bacteremia)  Relevant Medical History: DM2, HTN, CKD3  Interdisciplinary Rounds: did not attend  General Information Comments: Pt resting in bed with no family at bedside this AM, somewhat disoriented and unable to obtain nutrition hx. Breakfast tray at bedside mostly untouched. Limited intake data per chart but appears variable %. Wt stable 164-174# x 2 years PTA. Wt loss noted since admission, fluid likely contributing 2/2 diuresing with lasix and pt is -7.5L. Partial NFPE completed today, moderate wasting noted, age-related sarcopenia likely contributing. Unable to clearly determine if pt meets criteria for malnutrition but is at risk 2/2 variable intake.   Nutrition Discharge Planning: d/c on diabetic cardiac diet    Nutrition Risk Screen    Nutrition Risk Screen: no indicators present    Nutrition/Diet History    Spiritual, Cultural Beliefs, Sabianist Practices, Values that Affect Care: no    Anthropometrics    Temp: 97.7 °F (36.5 °C)  Height Method: Stated  Height: 5' 9" (175.3 cm)  Height (inches): 69 in  Weight Method: Bed Scale  Weight: 65.6 kg (144 lb 10 oz)  Weight (lb): 144.62 lb  Ideal Body Weight (IBW), Male: 160 lb  % Ideal Body Weight, Male (lb): 105 %  BMI (Calculated): 21.3    Lab/Procedures/Meds    Pertinent Labs Reviewed: reviewed  Pertinent Labs Comments: Na 148, K 3.3, BUN 42, GFR 49.3, Glu 232, Alb 2.9  Pertinent Medications Reviewed: reviewed  Pertinent Medications Comments: calcium carbonate, lasix, heparin, insulin, senna-docusate    Estimated/Assessed " Needs    Weight Used For Calorie Calculations: 65.6 kg (144 lb 10 oz)  Energy Calorie Requirements (kcal): 2438-6795 kcal/day  Energy Need Method: Kcal/kg(25-30)  Protein Requirements: 72-92 g/day(1.1-1.4 g/kg)  Weight Used For Protein Calculations: 65.6 kg (144 lb 10 oz)  Fluid Requirements (mL): per MD or 1mL/kcal     RDA Method (mL): 1640  CHO Requirement: 205 g/day    Nutrition Prescription Ordered    Current Diet Order: Diabetic, cardiac  Nutrition Order Comments: 1500mL FR  Oral Nutrition Supplement: Optisource TID    Evaluation of Received Nutrient/Fluid Intake    I/O: -6.9L since admit  Comments: LBM 7/10  % Intake of Estimated Energy Needs: 25 - 50 %  % Meal Intake: 25 - 50 %    Nutrition Risk    Level of Risk/Frequency of Follow-up: low     Assessment and Plan    Nutrition Problem  Inadequate energy intake    Related to (etiology):   Decreased appetite    Signs and Symptoms (as evidenced by):   Variable intake < 75% EEN/EPN    Interventions (treatment strategy):  Collaboration with other providers  Commercial beverage    Nutrition Diagnosis Status:   New    Monitor and Evaluation    Food and Nutrient Intake: energy intake, food and beverage intake  Food and Nutrient Adminstration: diet order  Anthropometric Measurements: weight, weight change, body mass index  Biochemical Data, Medical Tests and Procedures: electrolyte and renal panel, gastrointestinal profile, glucose/endocrine profile, inflammatory profile, lipid profile  Nutrition-Focused Physical Findings: overall appearance     Malnutrition Assessment  Orbital Region (Subcutaneous Fat Loss): moderate depletion  Upper Arm Region (Subcutaneous Fat Loss): (SLOAN, pt not moving arms when prompted)   [place] Region (Muscle Loss): moderate depletion  Clavicle Bone Region (Muscle Loss): moderate depletion  Clavicle and Acromion Bone Region (Muscle Loss): mild depletion  Dorsal Hand (Muscle Loss): (SLOAN, pt not moving arms/hands when prompted)  Anterior Thigh  Region (Muscle Loss): moderate depletion  Posterior Calf Region (Muscle Loss): mild depletion     Nutrition Follow-Up    RD Follow-up?: Yes

## 2020-07-14 NOTE — PT/OT/SLP PROGRESS
Occupational Therapy   Treatment    Name: Zbigniew Forman  MRN: [id number]  Admitting Diagnosis:  Bacteremia due to Klebsiella pneumoniae       Recommendations:     Discharge Recommendations: nursing facility, skilled  Discharge Equipment Recommendations:  none  Barriers to discharge:  None    Assessment:     Zbigniew Forman is a 89 y.o. male with a medical diagnosis of Bacteremia due to Klebsiella pneumoniae.  He presents with the following performance deficits affecting function:  weakness, impaired balance, decreased safety awareness, impaired endurance, impaired self care skills, impaired functional mobilty, decreased lower extremity function, decreased upper extremity function, impaired fine motor, decreased ROM, impaired cognition. Pt tolerated session fair this date. Pt more cooperative this date as noted in his ability to tolerate increased time EOB in comparison to previous session. Posterolateral propulsion noted EOB requiring max-total A for static sitting balance. Pt with decreased attention affecting his ability to initiate and carry through functional tasks. OT POC remains appropriate for patient on this date. Pt will continue to benefit from skilled OT to address he deficits affecting his occupational performance.     Rehab Prognosis:  Good; patient would benefit from acute skilled OT services to address these deficits and reach maximum level of function.       Plan:     Patient to be seen 3 x/week to address the above listed problems via self-care/home management, therapeutic activities, therapeutic exercises, neuromuscular re-education  · Plan of Care Expires: 08/04/20  · Plan of Care Reviewed with: patient    Subjective     Pain/Comfort:  · Pain Rating 1: 0/10  · Pain Rating Post-Intervention 1: 0/10    Objective:     Communicated with: RN  prior to session.  Patient found HOB elevated with telemetry, Condom Catheter, peripheral IV, oxygen upon OT entry to room.    General Precautions: Standard,  fall, hearing impaired, vision impaired   Orthopedic Precautions:N/A   Braces: N/A     Occupational Performance:     Bed Mobility:    · Patient completed Rolling/Turning to Right with maximal assistance and 2 persons  · Patient completed Scooting/Bridging with total assistance and 2 persons for anterior and posterior position due to posterior pushing   · Patient completed Supine to Sit with maximal assistance and 2 persons for trunk elevation and LB placement over bed   · Patient completed Sit to Supine with maximal assistance and 2 persons for trunk and LB placement into bed     Functional Mobility/Transfers:  · NT 2/2 pt with increases A for static sitting and impaired command following     Activities of Daily Living:  · Grooming: total assistance to comb hair seated EOB  · Decreased  of comb in RUE; decreased attention noted to task   · Toileting: dependence with use of condom cath for voiding       AMPA 6 Click ADL: 9    Treatment & Education:  - Role of OT/ OT POC  - Self care safety/ independence  - Bed mobility safety  - Importance of sitting EOB during functional tasks  - Daily orientation provided: Frequent re-direction during session  - [place] wrapped washcloth provided to improve  strength in BUEs to increase I in ADLs   - Pt sat EOB for ~10 mins with max-total A 2/2 posterolateral pushing. Physical and verbal cues required for LUE placement into lap to decrease the amount of pushing in that extremity. Blocking of BLEs provided to prevent leg extension and improve balance EOB. PT located posteriorly providing trunk support. Pt inattentive and displayed difficulty with following commands while EOB. Pt left positioned in bed with one pillow underneath both sides to solicit an upright midline position in bed. Bed alarm set.     Additional staff present: PT for EOB sitting  assistance     Patient left HOB elevated with all lines intact, call button in reach, bed alarm on and RN  notifiedEducation:      GOALS:   Multidisciplinary Problems     Occupational Therapy Goals        Problem: Occupational Therapy Goal    Goal Priority Disciplines Outcome Interventions   Occupational Therapy Goal     OT, PT/OT Ongoing, Progressing    Description: Goals to be met by: 7/16/20    Patient will increase functional independence with ADLs by performing:    Grooming while standing at sink with Contact Guard Assistance.  Toileting from toilet with Contact Guard Assistance for hygiene and clothing management.   Supine to sit with Minimal Assistance to increase bed mobility independence.  Stand pivot transfers with Contact Guard Assistance to reach bedside chair/commode  Step transfer with Contact Guard Assistance and AD as needed   Toilet transfer to toilet with Contact Guard Assistance.                      Time Tracking:     OT Date of Treatment: 07/14/20  OT Start Time: 0841  OT Stop Time: 0904  OT Total Time (min): 23 min co tx with PT     Billable Minutes:Self Care/Home Management 13  Therapeutic Activity 10    Trisha Alcantar OT  7/14/2020

## 2020-07-14 NOTE — PLAN OF CARE
Pt remained free of injuries, falls, and trauma. VSS. No c/o pain mentioned. Pt oriented to self only;  reoriented to surrounding/ situation. Blood glucose monitored at bedtime, 260. SSI administered. Frequent weight shift assistance provided q 2hrs. Fall risk bundle in place. Tele-sitter at bedside. D/C to SNF once facility has been found. Plan of care reviewed w/ pt. No evidence of learning noted. Will continue to monitor.

## 2020-07-14 NOTE — PLAN OF CARE
Problem: Occupational Therapy Goal  Goal: Occupational Therapy Goal  Description: Goals to be met by: 7/16/20    Patient will increase functional independence with ADLs by performing:    Grooming while standing at sink with Contact Guard Assistance.  Toileting from toilet with Contact Guard Assistance for hygiene and clothing management.   Supine to sit with Minimal Assistance to increase bed mobility independence.  Stand pivot transfers with Contact Guard Assistance to reach bedside chair/commode  Step transfer with Contact Guard Assistance and AD as needed   Toilet transfer to toilet with Contact Guard Assistance.     Outcome: Ongoing, Progressing     No goals met this date. OT POC remains appropriate for patient on this date. Pt will continue to benefit from skilled OT to address the deficits affecting his occupational performance.    Trisha Alcantar OTR/L  7/14/20

## 2020-07-14 NOTE — PLAN OF CARE
Recommendations  1. Continue diabetic cardiac diet + Optisource TID, with fluid restriction per MD. Encourage PO intake.  2. RD to monitor.     Goals: PO intake > 50% by RD follow up  Nutrition Goal Status: new  Communication of RD Recs: other (comment)(POC)

## 2020-07-14 NOTE — PLAN OF CARE
MACEY sent referral to [facility] via  per family's request.  MACEY also called Rupal at [facility] to follow up on referral sent 7/7/20 but was informed that referral was never received.  MACEY faxed referral to [facility].    MACEY received two separate phone calls from [facility] social workers Isamar and Zarina reporting that pt's daughter reached out to them for assistance with discharge planning.  MACEY explained that family currently has three accepting facilities they can choose from and that sending out additional referrals is going to mean more time the pt spends in the hospital, putting him at continued risk, and more of a bill the family will accrue.  MACEY suggested that [facility] from one of the other SNFs that accepted [facility].  [facility]  Zarina stated that she would relay the above message back to pt's daughter.    UPDATE 2:09 PM  Under review with both [facility] and [facility] (per Rupal in admissions who reported that referrals was received via fax).    Delores Randolph, ADRIAN  [facility]  [id number]

## 2020-07-14 NOTE — PLAN OF CARE
Goals reviewed and remain appropriate. Pt progressing towards goals.    Hetal Brooke, PT, DPT   2020  [phone]    Problem: Physical Therapy Goal  Goal: Physical Therapy Goal  Description: Goals to be met by: 20     Patient will increase functional independence with mobility by performin. Supine to sit with Stand-by Assistance - Not met  2. Sit to supine with Stand-by Assistance - Not met  3. Sit to stand transfer with Stand-by Assistance to RW - Not met  4. Bed to chair transfer with Contact Guard Assistance using Rolling Walker - Not met  5. Gait  x 25 feet with Contact Guard Assistance using Rolling Walker - Not met  6. Lower extremity exercise program x 10 reps per handout, with supervision - Not met  Outcome: Ongoing, Progressing

## 2020-07-14 NOTE — PLAN OF CARE
Patient oriented to self. Daughter is at the bedside, attentive to patient. Tele-sitter is at the bedside. Bed alarm is set. Potassium 3.3 (replaced). Blood glucose monitoring before breakfast and at bedtime per order. Plan of care discussed with patient's daughter. Patient is free of fall/trauma/injury. Denies CP, SOB, or pain/discomfort. Patient is currently awaiting SNF placement. Will continue to monitor

## 2020-07-14 NOTE — PT/OT/SLP PROGRESS
"Physical Therapy Treatment    Patient Name:  Zbigniew Forman   MRN:  [id number]    Recommendations:     Discharge Recommendations:  nursing facility, skilled   Discharge Equipment Recommendations: none   Barriers to discharge: Decreased caregiver support at current functional level    Assessment:     Zbigniew Forman is a 89 y.o. male admitted with a medical diagnosis of Bacteremia due to Klebsiella pneumoniae.  He presents with the following impairments/functional limitations:  weakness, impaired balance, decreased safety awareness, impaired endurance, visual deficits, impaired self care skills, impaired functional mobilty, gait instability, decreased lower extremity function, decreased upper extremity function, decreased coordination, impaired cardiopulmonary response to activity, impaired cognition. Pt requiring increased assist to complete bed mobility and maintain sitting balance at EOB this date. Increased LUE pushing and R posterolateral trunk propulsion noted while sitting EOB, requiring assist of 2 to safely maintain seated postural control. Pt demo'd decreased command-following during session with therapist unable to facilitate engagement in functional tasks while sitting EOB. Pt would continue to benefit from skilled acute PT in order to address current deficits and progress functional mobility.     Rehab Prognosis: Fair; patient would benefit from acute skilled PT services to address these deficits and reach maximum level of function.    Recent Surgery: * No surgery found *      Plan:     During this hospitalization, patient to be seen 3 x/week to address the identified rehab impairments via gait training, therapeutic activities, therapeutic exercises, neuromuscular re-education and progress toward the following goals:    · Plan of Care Expires:  08/05/20    Subjective     Chief Complaint: none noted   Patient/Family Comments/goals: "I need to rest," pt stating while sitting EOB.  Pain/Comfort:  Pain " Rating 1: 0/10      Objective:     Communicated with RN prior to session.  Patient found supine with telemetry, Condom Catheter, peripheral IV, oxygen upon PT entry to room.     General Precautions: Standard, fall, vision impaired, hearing impaired   Orthopedic Precautions:N/A   Braces: N/A     Functional Mobility:  · Bed Mobility:     ? Rolling L: maxA with use of side rail  ? Rolling R: maxA with use of side rial   ? Scooting: total assistance and of 2 persons (seated ant scooting; supine with drawsheet to HOB)  ? Supine to Sit: total assistance using side rail and with HOB elevated  § Attempted to provide v/c and t/c for incr pt participation; however, pt demo'in gdecreased command-following and initiation of task  ? Sit to Supine: total assistance with HOB flat   · Balance:   ? static sitting EOB: maxA-totalA      AM-PAC 6 CLICK MOBILITY  Turning over in bed (including adjusting bedclothes, sheets and blankets)?: 2  Sitting down on and standing up from a chair with arms (e.g., wheelchair, bedside commode, etc.): 1  Moving from lying on back to sitting on the side of the bed?: 2  Moving to and from a bed to a chair (including a wheelchair)?: 1  Need to walk in hospital room?: 1  Climbing 3-5 steps with a railing?: 1  Basic Mobility Total Score: 8       Therapeutic Activities and Exercises:  Sat EOB x~8 min with max-totalA for balance assist throughout. Increased R posterolateral lean/trunk propulsion with pt pushing with LUE. Postural control slightly improved with LUE occupied or placed in pt's lap; however, pt resistant to re-positioning LUE. Required assist of 2 people for maintaining sitting balance with assist of 1 providing trunk management and assist of 2nd person for blocking BLE throughout. Max cues and facilitation for BLE WB and ant weight-shift of trunk; however, pt continued to propel self posteriorly. Attempt to facilitate participation in functional tasks; however, pt inattentive to tasks at hand.  Edu provided on importance of sitting EOB and participation in therapy 2* pt continually requesting return to supine; however, decreased pt understanding noted. Upon return to supine, completed rolling in B directions with maxA in order to re-position pads and drawsheet. Positioned in midline with HOB elevated, B heels floated on pillow, and pillow at R side to maintain midline 2* incr R lateral lean.     Patient left supine with HOB elevated with all lines intact, call button in reach and RN notified. Attempted to set bed alarm; however, broken and continually alarming.    GOALS:   Multidisciplinary Problems     Physical Therapy Goals        Problem: Physical Therapy Goal    Goal Priority Disciplines Outcome Goal Variances Interventions   Physical Therapy Goal     PT, PT/OT Ongoing, Progressing     Description: Goals to be met by: 20     Patient will increase functional independence with mobility by performin. Supine to sit with Stand-by Assistance - Not met  2. Sit to supine with Stand-by Assistance - Not met  3. Sit to stand transfer with Stand-by Assistance to RW - Not met  4. Bed to chair transfer with Contact Guard Assistance using Rolling Walker - Not met  5. Gait  x 25 feet with Contact Guard Assistance using Rolling Walker - Not met  6. Lower extremity exercise program x 10 reps per handout, with supervision - Not met                   Time Tracking:     PT Received On: 20  PT Start Time: 08     PT Stop Time: 904  PT Total Time (min): 18 min     Billable Minutes: Therapeutic Activity 9   (time split with OT)    Treatment Type: Treatment  PT/PTA: PT     PTA Visit Number: 0     Hetal Brooke, PT, DPT   2020  [phone]

## 2020-07-14 NOTE — PROGRESS NOTES
Notified JULIO Marte MD., with [facility], of the patient's potassium level of 3.3. Awaiting orders to be placed. Will continue to monitor.

## 2020-07-14 NOTE — PLAN OF CARE
CM is advised that the discharge is upheld by [facility]. CM had spoken to the daughter earlier and daughter asked for referrals be sent to [facility] and [facility]. CM did check and found referral had been sent to [facility] on 7-7. CM did ask MACEY Estrella to fu on referral send and to send new referral to [facility]. CM asked the daughter if she had spoken to [facility]. She confirmed she had been informed of their decision. CM reminded daughter that they are responsible for hospital charges going forward.

## 2020-07-14 NOTE — ASSESSMENT & PLAN NOTE
TTE showed EF 55%, grade II dd  On home O2 (2L nc continuous)  On lasix 40mg daily at home    Plan:  - PO lasix 60mg  - Daily weights (standing if tolerated)  - Strict I/Os  - Fluid restriction at 1500mL  - Cardiac diet

## 2020-07-14 NOTE — SUBJECTIVE & OBJECTIVE
Interval History: NAEON. Mental status at baseline. More alert and interactive today.    Review of Systems   Constitution: Positive for weight gain. Negative for chills, diaphoresis, fever and weight loss.   HENT: Negative for congestion and sore throat.    Eyes: Negative for visual disturbance.   Cardiovascular: Positive for dyspnea on exertion and leg swelling. Negative for chest pain, orthopnea, palpitations, paroxysmal nocturnal dyspnea and syncope.   Respiratory: Negative for cough, shortness of breath and wheezing.    Skin: Negative for rash.   Musculoskeletal: Negative for joint pain and myalgias.   Gastrointestinal: Negative for abdominal pain, constipation, diarrhea, melena, nausea and vomiting.   Genitourinary: Negative for dysuria, frequency and hematuria.   Neurological: Negative for dizziness, headaches, light-headedness and numbness.   Psychiatric/Behavioral: Negative for altered mental status.     Objective:     Vital Signs (Most Recent):  Temp: 98 °F (36.7 °C) (07/14/20 1153)  Pulse: 80 (07/14/20 1153)  Resp: 18 (07/14/20 1153)  BP: 129/60 (07/14/20 1153)  SpO2: 96 % (07/14/20 1153) Vital Signs (24h Range):  Temp:  [97.7 °F (36.5 °C)-98.7 °F (37.1 °C)] 98 °F (36.7 °C)  Pulse:  [48-89] 80  Resp:  [16-22] 18  SpO2:  [93 %-100 %] 96 %  BP: (129-163)/(60-81) 129/60     Weight: 65.6 kg (144 lb 10 oz)  Body mass index is 21.36 kg/m².     SpO2: 96 %  O2 Device (Oxygen Therapy): nasal cannula      Intake/Output Summary (Last 24 hours) at 7/14/2020 1225  Last data filed at 7/14/2020 0839  Gross per 24 hour   Intake 50 ml   Output 1275 ml   Net -1225 ml       Lines/Drains/Airways     Drain            Male External Urinary Catheter 07/05/20 2200 8 days          Peripheral Intravenous Line                 Midline Catheter Insertion/Assessment  - Single Lumen 07/09/20 1030 Left brachial vein 18g x 10cm 5 days                Physical Exam   Constitutional: He is oriented to person, place, and time. He appears  well-developed and well-nourished. No distress.   HENT:   Head: Normocephalic and atraumatic.   Mouth/Throat: Oropharynx is clear and moist. No oropharyngeal exudate.   Eyes: Pupils are equal, round, and reactive to light. Conjunctivae are normal. No scleral icterus.   Neck: Neck supple. No JVD present. No tracheal deviation present.   Cardiovascular: Normal rate, regular rhythm, normal heart sounds and intact distal pulses.   No murmur heard.  Pulmonary/Chest: Effort normal and breath sounds normal. No respiratory distress. He has no wheezes. He has no rales.   Abdominal: Soft. Bowel sounds are normal. He exhibits no distension. There is no abdominal tenderness. There is no rebound.   Musculoskeletal:         General: No edema.   Neurological: He is alert and oriented to person, place, and time. He exhibits normal muscle tone.   Skin: Skin is warm and dry. No rash noted. He is not diaphoretic.   Psychiatric: He has a normal mood and affect. His behavior is normal.       Significant Labs: All pertinent lab results from the last 24 hours have been reviewed.    Significant Imaging: Reviewed.

## 2020-07-15 LAB
ALBUMIN SERPL BCP-MCNC: 2.9 G/DL (ref 3.5–5.2)
ALP SERPL-CCNC: 61 U/L (ref 55–135)
ALT SERPL W/O P-5'-P-CCNC: 9 U/L (ref 10–44)
ANION GAP SERPL CALC-SCNC: 8 MMOL/L (ref 8–16)
AST SERPL-CCNC: 11 U/L (ref 10–40)
BASOPHILS # BLD AUTO: 0.05 K/UL (ref 0–0.2)
BASOPHILS NFR BLD: 0.7 % (ref 0–1.9)
BILIRUB SERPL-MCNC: 0.4 MG/DL (ref 0.1–1)
BUN SERPL-MCNC: 38 MG/DL (ref 10–30)
CALCIUM SERPL-MCNC: 8.5 MG/DL (ref 8.7–10.5)
CHLORIDE SERPL-SCNC: 108 MMOL/L (ref 95–110)
CO2 SERPL-SCNC: 28 MMOL/L (ref 23–29)
CREAT SERPL-MCNC: 1.1 MG/DL (ref 0.5–1.4)
DIFFERENTIAL METHOD: ABNORMAL
EOSINOPHIL # BLD AUTO: 0.1 K/UL (ref 0–0.5)
EOSINOPHIL NFR BLD: 1.9 % (ref 0–8)
ERYTHROCYTE [DISTWIDTH] IN BLOOD BY AUTOMATED COUNT: 13.6 % (ref 11.5–14.5)
EST. GFR  (AFRICAN AMERICAN): >60 ML/MIN/1.73 M^2
EST. GFR  (NON AFRICAN AMERICAN): 54.8 ML/MIN/1.73 M^2
GLUCOSE SERPL-MCNC: 197 MG/DL (ref 70–110)
HCT VFR BLD AUTO: 40.4 % (ref 40–54)
HGB BLD-MCNC: 11.9 G/DL (ref 14–18)
IMM GRANULOCYTES # BLD AUTO: 0.08 K/UL (ref 0–0.04)
IMM GRANULOCYTES NFR BLD AUTO: 1.1 % (ref 0–0.5)
LYMPHOCYTES # BLD AUTO: 2 K/UL (ref 1–4.8)
LYMPHOCYTES NFR BLD: 26.8 % (ref 18–48)
MAGNESIUM SERPL-MCNC: 2.3 MG/DL (ref 1.6–2.6)
MCH RBC QN AUTO: 27.5 PG (ref 27–31)
MCHC RBC AUTO-ENTMCNC: 29.5 G/DL (ref 32–36)
MCV RBC AUTO: 94 FL (ref 82–98)
MONOCYTES # BLD AUTO: 0.4 K/UL (ref 0.3–1)
MONOCYTES NFR BLD: 4.9 % (ref 4–15)
NEUTROPHILS # BLD AUTO: 4.9 K/UL (ref 1.8–7.7)
NEUTROPHILS NFR BLD: 64.6 % (ref 38–73)
NRBC BLD-RTO: 0 /100 WBC
PLATELET # BLD AUTO: 283 K/UL (ref 150–350)
PMV BLD AUTO: 10 FL (ref 9.2–12.9)
POCT GLUCOSE: 199 MG/DL (ref 70–110)
POTASSIUM SERPL-SCNC: 3.5 MMOL/L (ref 3.5–5.1)
PROT SERPL-MCNC: 6.3 G/DL (ref 6–8.4)
RBC # BLD AUTO: 4.32 M/UL (ref 4.6–6.2)
SARS-COV-2 RDRP RESP QL NAA+PROBE: NEGATIVE
SODIUM SERPL-SCNC: 144 MMOL/L (ref 136–145)
WBC # BLD AUTO: 7.55 K/UL (ref 3.9–12.7)

## 2020-07-15 PROCEDURE — 63600175 PHARM REV CODE 636 W HCPCS: Performed by: STUDENT IN AN ORGANIZED HEALTH CARE EDUCATION/TRAINING PROGRAM

## 2020-07-15 PROCEDURE — 92610 EVALUATE SWALLOWING FUNCTION: CPT

## 2020-07-15 PROCEDURE — 25000003 PHARM REV CODE 250: Performed by: STUDENT IN AN ORGANIZED HEALTH CARE EDUCATION/TRAINING PROGRAM

## 2020-07-15 PROCEDURE — 99231 SBSQ HOSP IP/OBS SF/LOW 25: CPT | Mod: GC,,, | Performed by: INTERNAL MEDICINE

## 2020-07-15 PROCEDURE — 36415 COLL VENOUS BLD VENIPUNCTURE: CPT

## 2020-07-15 PROCEDURE — 20600001 HC STEP DOWN PRIVATE ROOM

## 2020-07-15 PROCEDURE — 85025 COMPLETE CBC W/AUTO DIFF WBC: CPT

## 2020-07-15 PROCEDURE — 99900035 HC TECH TIME PER 15 MIN (STAT)

## 2020-07-15 PROCEDURE — 99231 PR SUBSEQUENT HOSPITAL CARE,LEVL I: ICD-10-PCS | Mod: GC,,, | Performed by: INTERNAL MEDICINE

## 2020-07-15 PROCEDURE — U0002 COVID-19 LAB TEST NON-CDC: HCPCS

## 2020-07-15 PROCEDURE — 83735 ASSAY OF MAGNESIUM: CPT

## 2020-07-15 PROCEDURE — 25000242 PHARM REV CODE 250 ALT 637 W/ HCPCS: Performed by: INTERNAL MEDICINE

## 2020-07-15 PROCEDURE — 27000221 HC OXYGEN, UP TO 24 HOURS

## 2020-07-15 PROCEDURE — 94640 AIRWAY INHALATION TREATMENT: CPT

## 2020-07-15 PROCEDURE — 94761 N-INVAS EAR/PLS OXIMETRY MLT: CPT

## 2020-07-15 PROCEDURE — 80053 COMPREHEN METABOLIC PANEL: CPT

## 2020-07-15 RX ADMIN — HEPARIN SODIUM 5000 UNITS: 5000 INJECTION INTRAVENOUS; SUBCUTANEOUS at 03:07

## 2020-07-15 RX ADMIN — TAMSULOSIN HYDROCHLORIDE 0.4 MG: 0.4 CAPSULE ORAL at 09:07

## 2020-07-15 RX ADMIN — CEFTRIAXONE 2 G: 2 INJECTION, SOLUTION INTRAVENOUS at 09:07

## 2020-07-15 RX ADMIN — AMLODIPINE BESYLATE 2.5 MG: 2.5 TABLET ORAL at 09:07

## 2020-07-15 RX ADMIN — IPRATROPIUM BROMIDE AND ALBUTEROL SULFATE 3 ML: .5; 3 SOLUTION RESPIRATORY (INHALATION) at 08:07

## 2020-07-15 RX ADMIN — HYPROMELLOSE 2910: 5 SOLUTION OPHTHALMIC at 09:07

## 2020-07-15 RX ADMIN — HEPARIN SODIUM 5000 UNITS: 5000 INJECTION INTRAVENOUS; SUBCUTANEOUS at 08:07

## 2020-07-15 RX ADMIN — FUROSEMIDE 60 MG: 40 TABLET ORAL at 09:07

## 2020-07-15 RX ADMIN — HEPARIN SODIUM 5000 UNITS: 5000 INJECTION INTRAVENOUS; SUBCUTANEOUS at 05:07

## 2020-07-15 NOTE — PLAN OF CARE
Patient is accepted to [facility]. Facility is calling patient's daugther to request her to come complete the needed paperwork. Facility and SW are working for 7/15 PM transfer, but due to timing, transfer may not occur until 7/16 AM.     SW will continue to coordinate with patient, family, team and facility to complete patient's discharge plan.    UPDATE 3:56 PM:  NH notified patient's daughter that transfer will occur 7/16/20.        07/15/20 2055   Post-Acute Status   Post-Acute Authorization Placement   Post-Acute Placement Status Authorization Obtained   Discharge Plan   Discharge Plan A Skilled Nursing Facility     Joann Torres LMSW   - Case Management

## 2020-07-15 NOTE — PLAN OF CARE
SW faxed COVID results, 142, PASSR, Chest Xray and PPD to Stephy at [facility] due to them not having access to documents uploaded in .     Updated [facility] Orders also faxed.     Joann Torres LMSW   - Case Management

## 2020-07-15 NOTE — PLAN OF CARE
MACEY called [facility] and spoke with Stephy ([phone]) who states she is in the process of submitting the patient to the [facility] for approval to admit today. She requested a COVID test from today. She also requested that we assist with submitting to [facility] for authorization. MACEY provided this information to .     MACEY will continue to coordinate with patient, family, team and insurance to complete patient's discharge plan.    Joann Torres LMSW   - Case Management

## 2020-07-15 NOTE — PT/OT/SLP EVAL
Speech Language Pathology Evaluation  Bedside Swallow    Patient Name:  Zbigniew Forman   MRN:  [id number]  Admitting Diagnosis: Bacteremia due to Klebsiella pneumoniae    Recommendations:                 General Recommendations:  ongoing swallow assessment   Diet recommendations:  Regular, Nectar Thick   Daughter adamant that pt not be on thickened liquids. If pt given thins, monitor closely & ensure all strict aspiration precautions are followed.   Aspiration Precautions: 1 bite/sip at a time, Assistance with meals and Assistance with thickening liquids, Feed only when awake/alert, Frequent oral care, HOB to 90 degrees, Meds crushed in puree, Monitor for s/s of aspiration, No straws, Small bites/sips and Strict aspiration precautions   General Precautions: Standard, fall, nectar thick, dental soft    History:     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  There is no significant increased lung opacity.  There is no large pleural effusion or pneumothorax.  Continued atherosclerotic aorta.  Clinical correlation and follow-up advised.    Prior diet: regular     Subjective   Awake, lethargic, confused. NSG reports agitation when he wakes up & refusing to take any PO including meds. NSG reports that night nurse reported pt had difficulty swallowing liquids last night. Daughter at  "bedside.    Pain/Comfort:  · Pain Rating 1: 0/10  · Pain Rating Post-Intervention 2: 0/10    Objective:     Oral Musculature Evaluation  · Oral Musculature: unable to assess due to poor participation/comprehension  · Dentition: present and adequate  · Volitional Cough: not elicited, daughter reports "he coughs all the time good & strong"  · Volitional Swallow: not elicited  · Voice Prior to PO Intake: clear    Bedside Swallow Eval:   Consistencies Assessed:  · Thin liquids ice chip x1, via spoon x1, via cup sip x1  · Nectar thick liquids via spoon x3, via small cup sips x3  · Puree via spoon x2   · Pt deferred further liquid trials & solid trial    Oral Phase:   · Slow oral transit time    Pharyngeal Phase:   · Immediate coughing following sip of thin   · No overt s/s aspiration with puree & nectar    SLP educated pt's daughter on recs, precautions, risks & s/s aspiration, etc. She verbalized understanding.     Assessment:     Zbigniew Forman is a 89 y.o. male with an SLP diagnosis of Dysphagia.  He presents with risk of aspiration    Goals:   Multidisciplinary Problems     SLP Goals        Problem: SLP Goal    Goal Priority Disciplines Outcome   SLP Goal     SLP Ongoing, Progressing   Description: Speech Language Pathology Goals  Goals expected to be met by 7/22  1. Ongoing swallow assessment                    Plan:     · Patient to be seen:  3 x/week   · Plan of Care expires:  08/13/20  · Plan of Care reviewed with:  patient, daughter   · SLP Follow-Up:  Yes       Discharge recommendations:  (tbd)     Time Tracking:     SLP Treatment Date:   07/15/20  Speech Start Time:  1240  Speech Stop Time:  1251     Speech Total Time (min):  11 min    Billable Minutes: Eval Swallow and Oral Function 11    Breanna Bacon CCC-SLP  07/15/2020           "

## 2020-07-15 NOTE — SUBJECTIVE & OBJECTIVE
Chief Complaint:    Chief Complaint   Patient presents with    Follow-up       History of Present Illness:    Presents today  Doing okay still smokes could not afford Chantix  His his 10 year risk for heart disease is about 9% with his smoking history could not tolerate pravastatin but willing to try another medication.      ROS:  Review of Systems   Constitutional: Negative for activity change, chills, fatigue, fever and unexpected weight change.   HENT: Negative for congestion, ear discharge, ear pain, hearing loss, postnasal drip and rhinorrhea.    Eyes: Negative for pain and visual disturbance.   Respiratory: Negative for cough, chest tightness and shortness of breath.    Cardiovascular: Negative for chest pain and palpitations.   Gastrointestinal: Negative for abdominal pain, diarrhea and vomiting.   Endocrine: Negative for heat intolerance.   Genitourinary: Negative for dysuria, flank pain, frequency and hematuria.   Musculoskeletal: Negative for back pain, gait problem and neck pain.   Skin: Negative for color change and rash.   Neurological: Negative for dizziness, tremors, seizures, numbness and headaches.   Psychiatric/Behavioral: Negative for agitation, hallucinations, self-injury, sleep disturbance and suicidal ideas. The patient is not nervous/anxious.        History reviewed. No pertinent past medical history.    Social History:  Social History     Socioeconomic History    Marital status:      Spouse name: Not on file    Number of children: Not on file    Years of education: Not on file    Highest education level: Not on file   Occupational History    Not on file   Social Needs    Financial resource strain: Not on file    Food insecurity:     Worry: Not on file     Inability: Not on file    Transportation needs:     Medical: Not on file     Non-medical: Not on file   Tobacco Use    Smoking status: Current Every Day Smoker     Packs/day: 1.00     Years: 40.00     Pack years: 40.00      Interval History: NAEON. Patient refused labs this morning. Discharge pending SNF placement.    Review of Systems   Constitution: Positive for weight gain. Negative for chills, diaphoresis, fever and weight loss.   HENT: Negative for congestion and sore throat.    Eyes: Negative for visual disturbance.   Cardiovascular: Positive for dyspnea on exertion and leg swelling. Negative for chest pain, orthopnea, palpitations, paroxysmal nocturnal dyspnea and syncope.   Respiratory: Negative for cough, shortness of breath and wheezing.    Skin: Negative for rash.   Musculoskeletal: Negative for joint pain and myalgias.   Gastrointestinal: Negative for abdominal pain, constipation, diarrhea, melena, nausea and vomiting.   Genitourinary: Negative for dysuria, frequency and hematuria.   Neurological: Negative for dizziness, headaches, light-headedness and numbness.   Psychiatric/Behavioral: Negative for altered mental status.     Objective:     Vital Signs (Most Recent):  Temp: 98.4 °F (36.9 °C) (07/15/20 1552)  Pulse: 71 (07/15/20 1552)  Resp: 18 (07/15/20 1552)  BP: 114/61 (07/15/20 1552)  SpO2: 99 % (07/15/20 1552) Vital Signs (24h Range):  Temp:  [97.1 °F (36.2 °C)-98.4 °F (36.9 °C)] 98.4 °F (36.9 °C)  Pulse:  [61-84] 71  Resp:  [16-20] 18  SpO2:  [90 %-99 %] 99 %  BP: (114-142)/(55-73) 114/61     Weight: 63.8 kg (140 lb 10.5 oz)  Body mass index is 20.77 kg/m².     SpO2: 99 %  O2 Device (Oxygen Therapy): nasal cannula      Intake/Output Summary (Last 24 hours) at 7/15/2020 1601  Last data filed at 7/15/2020 1100  Gross per 24 hour   Intake 60 ml   Output 100 ml   Net -40 ml       Lines/Drains/Airways     Drain            Male External Urinary Catheter 07/05/20 2200 9 days          Peripheral Intravenous Line                 Midline Catheter Insertion/Assessment  - Single Lumen 07/09/20 1030 Left brachial vein 18g x 10cm 6 days                Physical Exam   Constitutional: He is oriented to person, place, and time. He  "Types: Cigarettes    Smokeless tobacco: Never Used   Substance and Sexual Activity    Alcohol use: Yes     Alcohol/week: 6.0 standard drinks     Types: 6 Cans of beer per week    Drug use: No    Sexual activity: Yes     Partners: Female   Lifestyle    Physical activity:     Days per week: Not on file     Minutes per session: Not on file    Stress: Not on file   Relationships    Social connections:     Talks on phone: Not on file     Gets together: Not on file     Attends Restoration service: Not on file     Active member of club or organization: Not on file     Attends meetings of clubs or organizations: Not on file     Relationship status: Not on file   Other Topics Concern    Not on file   Social History Narrative    Not on file       Family History:   family history includes Cancer in his father; Heart disease in his mother.    Health Maintenance   Topic Date Due    LDCT Lung Screen  09/15/2015    Fecal Occult Blood Test (FOBT)/[employer]  04/04/2020    Lipid Panel  07/05/2024    TETANUS VACCINE  01/25/2028    Hepatitis C Screening  Completed    Pneumococcal Vaccine (Medium Risk)  Completed       Physical Exam:    Vital Signs  Temp: 99 °F (37.2 °C)  Temp src: Temporal  Pulse: 66  SpO2: 99 %  BP: 128/70  BP Location: Left arm  Patient Position: Sitting  Pain Score:   2  Pain Loc: Neck  Height and Weight  Height: 5' 8" (172.7 cm)  Weight: 53.1 kg (117 lb 2.8 oz)  BSA (Calculated - sq m): 1.6 sq meters  BMI (Calculated): 17.8  Weight in (lb) to have BMI = 25: 164.1]    Body mass index is 17.82 kg/m².    Physical Exam   Constitutional: He is oriented to person, place, and time. He appears well-developed.   HENT:   Mouth/Throat: Oropharynx is clear and moist.   Eyes: Pupils are equal, round, and reactive to light. Conjunctivae are normal.   Neck: Normal range of motion. Neck supple.   Cardiovascular: Normal rate, regular rhythm and normal heart sounds.   No murmur heard.  Pulmonary/Chest: Effort normal and " appears well-developed and well-nourished. No distress.   HENT:   Head: Normocephalic and atraumatic.   Mouth/Throat: Oropharynx is clear and moist. No oropharyngeal exudate.   Eyes: Pupils are equal, round, and reactive to light. Conjunctivae are normal. No scleral icterus.   Neck: Neck supple. No JVD present. No tracheal deviation present.   Cardiovascular: Normal rate, regular rhythm, normal heart sounds and intact distal pulses.   No murmur heard.  Pulmonary/Chest: Effort normal and breath sounds normal. No respiratory distress. He has no wheezes. He has no rales.   Abdominal: Soft. Bowel sounds are normal. He exhibits no distension. There is no abdominal tenderness. There is no rebound.   Musculoskeletal:         General: No edema.   Neurological: He is alert and oriented to person, place, and time. He exhibits normal muscle tone.   Skin: Skin is warm and dry. No rash noted. He is not diaphoretic.   Psychiatric: He has a normal mood and affect. His behavior is normal.       Significant Labs: All pertinent lab results from the last 24 hours have been reviewed.    Significant Imaging: Reviewed.   breath sounds normal. No respiratory distress. He has no wheezes. He has no rales. He exhibits no tenderness.   Abdominal: Soft. He exhibits no distension and no mass. There is no tenderness. There is no guarding.   Musculoskeletal: He exhibits no edema or tenderness.   Lymphadenopathy:     He has no cervical adenopathy.   Neurological: He is alert and oriented to person, place, and time. He has normal reflexes.   Skin: Skin is warm and dry.   Psychiatric: He has a normal mood and affect. His behavior is normal. Judgment and thought content normal.         Assessment:      ICD-10-CM ICD-9-CM   1. Current smoker F17.200 305.1   2. Mixed hyperlipidemia E78.2 272.2   3. Statin intolerance Z78.9 995.27   4. Screening PSA (prostate specific antigen) Z12.5 V76.44   5. Need for shingles vaccine Z23 V04.89   6. Encounter for screening for lung cancer Z12.2 V76.0   7. Screening for cancer Z12.9 V76.9         Plan:        Will refer him to smoking cessation class  Start on Crestor 10 mg watch for muscle pain fatigue check lipid panel CMP CP gained 6 weeks  Discuss shingles vaccination  He will undergo lung cancer screening  Follow-up 6 months        Orders Placed This Encounter   Procedures    CT Chest Lung Screening Low Dose    Influenza - Quadrivalent (PF)    Comprehensive metabolic panel    CK    Lipid panel    Hemoglobin A1c    Comprehensive metabolic panel    Lipid panel    PSA, Screening    CBC auto differential    Ambulatory referral to Smoking Cessation Program       Current Outpatient Medications   Medication Sig Dispense Refill    aspirin (ECOTRIN) 81 MG EC tablet Take 1 tablet (81 mg total) by mouth once daily.  0    fluticasone propionate (FLONASE) 50 mcg/actuation nasal spray 2 PUFFS EACH NOSTRIL ONCE DAILY 16 g 1    omega-3 fatty acids 1,000 mg Cap Take 1 capsule (1,000 mg total) by mouth every evening.  0    oxyCODONE-acetaminophen (PERCOCET) 7.5-325 mg per tablet Take 1 tablet by mouth every 6  (six) hours as needed for Pain. 14 tablet 0    rosuvastatin (CRESTOR) 10 MG tablet Take 1 tablet (10 mg total) by mouth once daily. 90 tablet 3     No current facility-administered medications for this visit.        There are no discontinued medications.    Follow up in about 6 months (around 5/14/2020).      Reuben Banuelos MD

## 2020-07-15 NOTE — PLAN OF CARE
Pt remained free of injuries, falls, and trauma. VSS. No c/o pain mentioned. Pt oriented to self only; but more alert today. Blood glucose monitored at bedtime, 225. SSI administered. Frequent weight shift assistance provided q 2hrs. Fall risk bundle in place. Tele-sitter at bedside. D/C to SNF once facility has been found. Plan of care reviewed w/ pt. No evidence of learning noted. Will continue to monitor.

## 2020-07-15 NOTE — PLAN OF CARE
Problem: SLP Goal  Goal: SLP Goal  Description: Speech Language Pathology Goals  Goals expected to be met by 7/22  1. Ongoing swallow assessment   Outcome: Ongoing, Progressing       SLP Clinical Swallow Evaluation completed. See note for details.

## 2020-07-15 NOTE — PLAN OF CARE
CHELSEA did fax to [facility] ([phone]) H&P, PT, OT and progress notes with SNF orders. CHELSEA also asked team to order COVID screening STAT per request of the SNF. CHELSEA called [facility] to request COVID test be done ASAP to expedite the plan. CHELSEA did attempt to call the pt's daughter, Anne ([phone]). Call went to . CM did leave message for the daughter.

## 2020-07-15 NOTE — PLAN OF CARE
"CM received call from pt's daughter, Corby asking for me to send for Medicaid eligibility. CM did send that on Monday 7/10 at 14:23. Their response was "Well screen pt. However, I reviewed his account and he has [employer]. He will need outstanding medical bills for the spend-down program. " "Hey, well do a screening and if hes under the income guidelines we can take the application. However, [employer] sends them a letter of determination. We have nothing to do with it after they apply."  CHELSEA also asked the daughter if she had received my VM. She states she did not. CM updated that [facility] was working him up and had requested COVID test and that I submit to N. All are done. Daughter states she is approval of this plan.   "

## 2020-07-15 NOTE — PROGRESS NOTES
[facility]  Cardiology  Progress Note    Patient Name: Zbigniew Forman  MRN: [id number]  Admission Date: 7/4/2020  Hospital Length of Stay: 11 days  Code Status: DNR   Attending Physician: Geraldine Becerril MD   Primary Care Physician: Blas Morgan Ii, MD  Expected Discharge Date: 7/15/2020  Principal Problem:Bacteremia due to Klebsiella pneumoniae    Subjective:     Hospital Course:   Admitted to CCU for acute hypoxemic respiratory failure 2/2 ADHF vs CAP. Diuresed with IV lasix with good UOP. Weaned down to home 2L nc. Repeat echo showed EF 55%, grade II dd. Increased home lasix to 80mg daily as patient re-accumulated fluid on home lasix dose. UA suggestive of infection and given suspicion for CAP, started on azithro + CTX. US renal negative for hydronephrosis. US lower extremity negative for DVT. UCx and BCx grew Klebsiella. Repeat BCx from 7/6 with NGTD. Started on CTX with plan for 2 weeks after last negative BCx (tentative end date: 7/21/20). Held ACEi given JAN and bacteremia. Will defer to PCP for resuming. PT/OT consulted, recommended SNF. Discussed results and plan with patient + daughter. Patient + daughter verbalized understanding and agreed to plan. Patient stable for discharge as of 7/10/20. Pt's family refused discharge and is currently appealing as they prefer [facility].    Interval History: NAEON. Patient refused labs this morning. Discharge pending SNF placement.    Review of Systems   Constitution: Positive for weight gain. Negative for chills, diaphoresis, fever and weight loss.   HENT: Negative for congestion and sore throat.    Eyes: Negative for visual disturbance.   Cardiovascular: Positive for dyspnea on exertion and leg swelling. Negative for chest pain, orthopnea, palpitations, paroxysmal nocturnal dyspnea and syncope.   Respiratory: Negative for cough, shortness of breath and wheezing.    Skin: Negative for rash.   Musculoskeletal: Negative for joint pain and myalgias.    Gastrointestinal: Negative for abdominal pain, constipation, diarrhea, melena, nausea and vomiting.   Genitourinary: Negative for dysuria, frequency and hematuria.   Neurological: Negative for dizziness, headaches, light-headedness and numbness.   Psychiatric/Behavioral: Negative for altered mental status.     Objective:     Vital Signs (Most Recent):  Temp: 98.4 °F (36.9 °C) (07/15/20 1552)  Pulse: 71 (07/15/20 1552)  Resp: 18 (07/15/20 1552)  BP: 114/61 (07/15/20 1552)  SpO2: 99 % (07/15/20 1552) Vital Signs (24h Range):  Temp:  [97.1 °F (36.2 °C)-98.4 °F (36.9 °C)] 98.4 °F (36.9 °C)  Pulse:  [61-84] 71  Resp:  [16-20] 18  SpO2:  [90 %-99 %] 99 %  BP: (114-142)/(55-73) 114/61     Weight: 63.8 kg (140 lb 10.5 oz)  Body mass index is 20.77 kg/m².     SpO2: 99 %  O2 Device (Oxygen Therapy): nasal cannula      Intake/Output Summary (Last 24 hours) at 7/15/2020 1601  Last data filed at 7/15/2020 1100  Gross per 24 hour   Intake 60 ml   Output 100 ml   Net -40 ml       Lines/Drains/Airways     Drain            Male External Urinary Catheter 07/05/20 2200 9 days          Peripheral Intravenous Line                 Midline Catheter Insertion/Assessment  - Single Lumen 07/09/20 1030 Left brachial vein 18g x 10cm 6 days                Physical Exam   Constitutional: He is oriented to person, place, and time. He appears well-developed and well-nourished. No distress.   HENT:   Head: Normocephalic and atraumatic.   Mouth/Throat: Oropharynx is clear and moist. No oropharyngeal exudate.   Eyes: Pupils are equal, round, and reactive to light. Conjunctivae are normal. No scleral icterus.   Neck: Neck supple. No JVD present. No tracheal deviation present.   Cardiovascular: Normal rate, regular rhythm, normal heart sounds and intact distal pulses.   No murmur heard.  Pulmonary/Chest: Effort normal and breath sounds normal. No respiratory distress. He has no wheezes. He has no rales.   Abdominal: Soft. Bowel sounds are normal. He  exhibits no distension. There is no abdominal tenderness. There is no rebound.   Musculoskeletal:         General: No edema.   Neurological: He is alert and oriented to person, place, and time. He exhibits normal muscle tone.   Skin: Skin is warm and dry. No rash noted. He is not diaphoretic.   Psychiatric: He has a normal mood and affect. His behavior is normal.       Significant Labs: All pertinent lab results from the last 24 hours have been reviewed.    Significant Imaging: Reviewed.    Assessment and Plan:     * Bacteremia due to Klebsiella pneumoniae  BPH with urinary obstruction  UTI due to Klebsiella species  UCx + BCx on admission grew Klebsiella   US renal negative for hydronephrosis  Started on azithro + CTX for CAP coverage  Currently has condom cath in place (whiteside refused per patient and family)  Repeat BCx from 7/6 with NGTD    Plan:  - Continue CTX x 14 days (tentative end date: 7/21/20)  - Continue home tamsulosin    Chronic hypoxemic respiratory failure  Heart failure with preserved ejection fraction  TTE showed EF 55%, grade II dd  On home O2 (2L nc continuous)  On lasix 40mg daily at home    Plan:  - PO lasix 60mg  - Daily weights (standing if tolerated)  - Strict I/Os  - Fluid restriction at 1500mL  - Cardiac diet    Debility  PT/OT consulted, recommending SNF  Dispo pending result of family appeal to discharge    DNR (do not resuscitate)  Patient himself and family based on prior encounters     Essential hypertension  Holding lisinopril given soft BPs  Resume low dose amlodipine 2.5mg    Controlled type 2 diabetes mellitus without complication, without long-term current use of insulin  HbA1c 7.0, diet controlled    Plan:  - Detemir 7U  - LDSS  - POCT glucose ACHS  - Diabetic diet  - Goal -180 while inpatient        VTE Risk Mitigation (From admission, onward)         Ordered     heparin (porcine) injection 5,000 Units  Every 8 hours      07/04/20 2034     IP VTE HIGH RISK PATIENT  Once       07/04/20 2034     Place sequential compression device  Until discontinued      07/04/20 2034                Ceasar Walker MD  Cardiology  [facility]

## 2020-07-15 NOTE — ASSESSMENT & PLAN NOTE
HbA1c 7.0, diet controlled    Plan:  - Detemir 7U  - LDSS  - POCT glucose ACHS  - Diabetic diet  - Goal -180 while inpatient

## 2020-07-15 NOTE — PLAN OF CARE
CHELSEA called Jeanna at [facility]. She is working the pt up. Requested we send orders to [facility]. CHELSEA did call N ([phone] option [phone]) and spoke to [place]. She requested we fax orders to them at [phone]. CHELSEA will fax orders.

## 2020-07-15 NOTE — PLAN OF CARE
SW spoke with Carley at [facility] ([phone]) who stated that patient was approved for [facility]. Auth # [id number]. Auth also faxed to [facility].     Joann Torres LMSW   - Case Management

## 2020-07-16 VITALS
HEIGHT: 69 IN | DIASTOLIC BLOOD PRESSURE: 79 MMHG | TEMPERATURE: 98 F | OXYGEN SATURATION: 98 % | HEART RATE: 80 BPM | WEIGHT: 140.63 LBS | SYSTOLIC BLOOD PRESSURE: 130 MMHG | RESPIRATION RATE: 16 BRPM | BODY MASS INDEX: 20.83 KG/M2

## 2020-07-16 LAB
ALBUMIN SERPL BCP-MCNC: 2.9 G/DL (ref 3.5–5.2)
ALP SERPL-CCNC: 64 U/L (ref 55–135)
ALT SERPL W/O P-5'-P-CCNC: 9 U/L (ref 10–44)
ANION GAP SERPL CALC-SCNC: 9 MMOL/L (ref 8–16)
AST SERPL-CCNC: 12 U/L (ref 10–40)
BASOPHILS # BLD AUTO: 0.05 K/UL (ref 0–0.2)
BASOPHILS NFR BLD: 0.5 % (ref 0–1.9)
BILIRUB SERPL-MCNC: 0.3 MG/DL (ref 0.1–1)
BUN SERPL-MCNC: 40 MG/DL (ref 10–30)
CALCIUM SERPL-MCNC: 8.5 MG/DL (ref 8.7–10.5)
CHLORIDE SERPL-SCNC: 108 MMOL/L (ref 95–110)
CO2 SERPL-SCNC: 29 MMOL/L (ref 23–29)
CREAT SERPL-MCNC: 1.2 MG/DL (ref 0.5–1.4)
DIFFERENTIAL METHOD: ABNORMAL
EOSINOPHIL # BLD AUTO: 0.1 K/UL (ref 0–0.5)
EOSINOPHIL NFR BLD: 1.5 % (ref 0–8)
ERYTHROCYTE [DISTWIDTH] IN BLOOD BY AUTOMATED COUNT: 14 % (ref 11.5–14.5)
EST. GFR  (AFRICAN AMERICAN): 57 ML/MIN/1.73 M^2
EST. GFR  (NON AFRICAN AMERICAN): 49.3 ML/MIN/1.73 M^2
GLUCOSE SERPL-MCNC: 165 MG/DL (ref 70–110)
HCT VFR BLD AUTO: 41.2 % (ref 40–54)
HGB BLD-MCNC: 12.3 G/DL (ref 14–18)
IMM GRANULOCYTES # BLD AUTO: 0.06 K/UL (ref 0–0.04)
IMM GRANULOCYTES NFR BLD AUTO: 0.6 % (ref 0–0.5)
LYMPHOCYTES # BLD AUTO: 3.1 K/UL (ref 1–4.8)
LYMPHOCYTES NFR BLD: 31.9 % (ref 18–48)
MAGNESIUM SERPL-MCNC: 2.3 MG/DL (ref 1.6–2.6)
MCH RBC QN AUTO: 27.6 PG (ref 27–31)
MCHC RBC AUTO-ENTMCNC: 29.9 G/DL (ref 32–36)
MCV RBC AUTO: 93 FL (ref 82–98)
MONOCYTES # BLD AUTO: 0.5 K/UL (ref 0.3–1)
MONOCYTES NFR BLD: 5 % (ref 4–15)
NEUTROPHILS # BLD AUTO: 5.8 K/UL (ref 1.8–7.7)
NEUTROPHILS NFR BLD: 60.5 % (ref 38–73)
NRBC BLD-RTO: 0 /100 WBC
PLATELET # BLD AUTO: 284 K/UL (ref 150–350)
PMV BLD AUTO: 10.3 FL (ref 9.2–12.9)
POTASSIUM SERPL-SCNC: 3.5 MMOL/L (ref 3.5–5.1)
PROT SERPL-MCNC: 6.2 G/DL (ref 6–8.4)
RBC # BLD AUTO: 4.45 M/UL (ref 4.6–6.2)
SODIUM SERPL-SCNC: 146 MMOL/L (ref 136–145)
WBC # BLD AUTO: 9.6 K/UL (ref 3.9–12.7)

## 2020-07-16 PROCEDURE — 94640 AIRWAY INHALATION TREATMENT: CPT

## 2020-07-16 PROCEDURE — 27000221 HC OXYGEN, UP TO 24 HOURS

## 2020-07-16 PROCEDURE — 63600175 PHARM REV CODE 636 W HCPCS: Performed by: STUDENT IN AN ORGANIZED HEALTH CARE EDUCATION/TRAINING PROGRAM

## 2020-07-16 PROCEDURE — 36415 COLL VENOUS BLD VENIPUNCTURE: CPT

## 2020-07-16 PROCEDURE — 25000003 PHARM REV CODE 250: Performed by: STUDENT IN AN ORGANIZED HEALTH CARE EDUCATION/TRAINING PROGRAM

## 2020-07-16 PROCEDURE — 25000242 PHARM REV CODE 250 ALT 637 W/ HCPCS: Performed by: INTERNAL MEDICINE

## 2020-07-16 PROCEDURE — 94761 N-INVAS EAR/PLS OXIMETRY MLT: CPT

## 2020-07-16 PROCEDURE — 94799 UNLISTED PULMONARY SVC/PX: CPT

## 2020-07-16 PROCEDURE — 85025 COMPLETE CBC W/AUTO DIFF WBC: CPT

## 2020-07-16 PROCEDURE — 99239 HOSP IP/OBS DSCHRG MGMT >30: CPT | Mod: GC,,, | Performed by: INTERNAL MEDICINE

## 2020-07-16 PROCEDURE — 83735 ASSAY OF MAGNESIUM: CPT

## 2020-07-16 PROCEDURE — 80053 COMPREHEN METABOLIC PANEL: CPT

## 2020-07-16 PROCEDURE — 99239 PR HOSPITAL DISCHARGE DAY,>30 MIN: ICD-10-PCS | Mod: GC,,, | Performed by: INTERNAL MEDICINE

## 2020-07-16 RX ADMIN — IPRATROPIUM BROMIDE AND ALBUTEROL SULFATE 3 ML: .5; 3 SOLUTION RESPIRATORY (INHALATION) at 07:07

## 2020-07-16 RX ADMIN — HEPARIN SODIUM 5000 UNITS: 5000 INJECTION INTRAVENOUS; SUBCUTANEOUS at 06:07

## 2020-07-16 RX ADMIN — HYPROMELLOSE 2910: 5 SOLUTION OPHTHALMIC at 09:07

## 2020-07-16 RX ADMIN — IPRATROPIUM BROMIDE AND ALBUTEROL SULFATE 3 ML: .5; 3 SOLUTION RESPIRATORY (INHALATION) at 01:07

## 2020-07-16 RX ADMIN — TAMSULOSIN HYDROCHLORIDE 0.4 MG: 0.4 CAPSULE ORAL at 09:07

## 2020-07-16 RX ADMIN — FUROSEMIDE 60 MG: 40 TABLET ORAL at 09:07

## 2020-07-16 RX ADMIN — CEFTRIAXONE 2 G: 2 INJECTION, SOLUTION INTRAVENOUS at 09:07

## 2020-07-16 RX ADMIN — AMLODIPINE BESYLATE 2.5 MG: 2.5 TABLET ORAL at 09:07

## 2020-07-16 NOTE — PLAN OF CARE
COVID test resulted negative 07/15. Patient accepted to SNF [facility] and pending to be discharged tomorrow 07/16. NSR on monitor with HR 60-70s. Patient turned q 2 hours. Ceftriaxone administered as ordered. POC reviewed with pt. VSS, patient alert to self only, no complaints of SOB or pain. Patient on 1 L NC. Pt in bed at lowest position with wheels locked, 2x upper side rails raised, call light within reach. Will continue to monitor.

## 2020-07-16 NOTE — DISCHARGE SUMMARY
[facility]  Cardiology  Discharge Summary      Patient Name: Zbigniew Forman  MRN: [id number]  Admission Date: 7/4/2020  Hospital Length of Stay: 12 days  Discharge Date and Time:  07/16/2020 7:47 AM  Attending Physician: Geraldine Becerril MD    Discharging Provider: Ceasar Walker MD  Primary Care Physician: Blas Morgan Ii, MD    HPI:   Mr. Forman, 89 year old male with prior history of HFpEF (indeterminate D on 11/2018), HTN, DM 2 on diet management, CKD III (sCr ~ 1.3 baseline), Dysequilibrium on wheelchair-bound. He presented to ED with symptoms of SOB NYHA III, fatigue and these symptoms were getting worse over the course of one to two weeks.He states he has been more fatigued the last few weeks, not responding as well to his lasix.  Denies fevers, chills, nausea, chest pain, cough, sore throat, sick contacts, numbness, tingling, weakness. Of note, he presented to his PCP last week with symptoms of weakness, dizziness and weak legs. His BNP was checked and it was 467 and repeated today showed 1150. In ED, his labs showed leukocytosis from 10 > 20 with lymphopenia, hypoxia with PO2 56 mmHg. Admitted for volume overloaded and possible CAP.     Hospital Course:  Admitted to CCU for acute hypoxemic respiratory failure 2/2 ADHF vs CAP. Diuresed with IV lasix with good UOP. Weaned down to home 2L nc. Repeat echo showed EF 55%, grade II dd. Increased home lasix to 80mg daily as patient re-accumulated fluid on home lasix dose. UA suggestive of infection and given suspicion for CAP, started on azithro + CTX. US renal negative for hydronephrosis. US lower extremity negative for DVT. UCx and BCx grew Klebsiella. Repeat BCx from 7/6 with NGTD. Started on CTX with plan for 2 weeks after last negative BCx (tentative end date: 7/21/20). Held ACEi given JAN and bacteremia. Will defer to PCP for resuming. PT/OT consulted, recommended SNF. Discussed results and plan with patient + daughter. Patient +  daughter verbalized understanding and agreed to plan. Patient stable for discharge as of 7/10/20. Pt's family refused discharge and is currently appealing. Patient was accepted by [facility] and was discharged on 7/16.    Vitals:    07/16/20 0717   BP: 132/64   Pulse: 77   Resp: 20   Temp: 97.6F     Physical Exam   Constitutional: He is oriented to person, place, and time. He appears well-developed and well-nourished. No distress.   HENT:   Head: Normocephalic and atraumatic.   Mouth/Throat: Oropharynx is clear and moist. No oropharyngeal exudate.   Eyes: Pupils are equal, round, and reactive to light. Conjunctivae are normal. No scleral icterus.   Neck: Neck supple. No JVD present. No tracheal deviation present.   Cardiovascular: Normal rate, regular rhythm, normal heart sounds and intact distal pulses.   No murmur heard.  Pulmonary/Chest: Effort normal and breath sounds normal. No respiratory distress. He has no wheezes. He has no rales.   Abdominal: Soft. Bowel sounds are normal. He exhibits no distension. There is no abdominal tenderness. There is no rebound.   Musculoskeletal:         General: No edema.   Neurological: He is alert and oriented to person, place, and time. He exhibits normal muscle tone.   Skin: Skin is warm and dry. No rash noted. He is not diaphoretic.   Psychiatric: He has a normal mood and affect. His behavior is normal.     Consults:   Consults (From admission, onward)        Status Ordering Provider     Inpatient consult to Cardiology  Once     Provider:  (Not yet assigned)    Completed LYNNE TORRES     Inpatient consult to Midline team  Once     Provider:  (Not yet assigned)    Completed GEOVANY BAKER     Inpatient consult to Social Work  Once     Provider:  (Not yet assigned)    Acknowledged AUGUSTO TRAVIS          Significant Diagnostic Studies: Labs: All labs within the past 24 hours have been reviewed    Pending Diagnostic Studies:     None          Final Active  Diagnoses:    Diagnosis Date Noted POA    PRINCIPAL PROBLEM:  Bacteremia due to Klebsiella pneumoniae [R78.81] 07/06/2020 Yes    BPH with urinary obstruction [N40.1, N13.8] 11/01/2019 Yes    UTI due to Klebsiella species [N39.0, B96.1] 07/06/2020 Yes    Chronic hypoxemic respiratory failure [J96.11] 07/10/2020 Yes    Heart failure with preserved ejection fraction [I50.30] 11/27/2018 Yes    Debility [R53.81] 07/07/2020 Yes    DNR (do not resuscitate) [Z66] 11/17/2018 Yes    Essential hypertension [I10] 01/12/2016 Yes    Controlled type 2 diabetes mellitus without complication, without long-term current use of insulin [E11.9]  Yes      Problems Resolved During this Admission:    Diagnosis Date Noted Date Resolved POA    Acute on chronic diastolic congestive heart failure [I50.33] 07/04/2020 07/10/2020 Yes    Acute and chronic respiratory failure with hypoxia [J96.21] 11/17/2018 07/10/2020 Yes     No new Assessment & Plan notes have been filed under this hospital service since the last note was generated.  Service: Cardiology      Discharged Condition: stable    Disposition: Skilled Nursing Facility    Follow Up:  Follow-up Information     Blas Morgan Ii, MD In 1 week.    Specialty: Internal Medicine  Contact information:  [facility] [address] [phone]                 Patient Instructions:   No discharge procedures on file.  Medications:  Reconciled Home Medications:      Medication List      START taking these medications    cefTRIAXone 2 g/50 mL Pgbk IVPB  Commonly known as: ROCEPHIN  Inject 50 mLs (2 g total) into the vein once daily. for 12 days     insulin detemir U-100 100 unit/mL (3 mL) Inpn pen  Commonly known as: LEVEMIR FLEXTOUCH  Inject 7 Units into the skin once daily.        CHANGE how you take these medications    furosemide 20 MG tablet  Commonly known as: LASIX  Take 3 tablets (60 mg total) by mouth once daily.  What changed:   · medication strength  · how much to  take     lisinopriL 10 MG tablet  Take 1 tablet (10 mg total) by mouth once daily. HOLD UNTIL SEE PCP  What changed: additional instructions        CONTINUE taking these medications    albuterol-ipratropium 2.5 mg-0.5 mg/3 mL nebulizer solution  Commonly known as: DUO-NEB  Take 3 mLs by nebulization every 6 (six) hours as needed for Wheezing. Rescue     amLODIPine 5 MG tablet  Commonly known as: NORVASC  Take 1 tablet (5 mg total) by mouth once daily.     artificial tears with lanolin Oint 0.5% ophthalmic ointment  Commonly known as: AKWA TEARS  Apply to affected eye(s) as needed for dryness.     external catheter, male Misc  Use condom catheter every night     latanoprost 0.005 % ophthalmic solution  Place 1 drop into both eyes every evening.     senna-docusate 8.6-50 mg 8.6-50 mg per tablet  Commonly known as: PERICOLACE  Take 1 tablet by mouth 2 (two) times daily as needed for Constipation.     tamsulosin 0.4 mg Cap  Commonly known as: FLOMAX  Take 1 capsule (0.4 mg total) by mouth once daily.        STOP taking these medications    albuterol 90 mcg/actuation inhaler  Commonly known as: VENTOLIN HFA            Time spent on the discharge of patient: 35 minutes    Ceasar Walker MD  Cardiology  [facility]

## 2020-07-16 NOTE — PLAN OF CARE
Plan of care discussed with patient. Patient is free of fall/trauma/injury. Denies CP, SOB, or pain/discomfort. All questions addressed. Pt to transfer to skilled nursing today. Will continue to monitor

## 2020-07-16 NOTE — NURSING
[facility] here with stretcher for pt transport. Cardiac monitoring removed. GERBER Midline intact upon D/C. All instructions given to pt. NAD noted upon D/C. Mask placed on pt. Pt wheeled off unit via stretcher.

## 2020-07-16 NOTE — PLAN OF CARE
CM received call from Jeanna at [facility]. They are ready to accept Mr Forman. Can call report to Megan at [phone]. Pt is going to room 201A.

## 2020-07-16 NOTE — NURSING
Report called to [facility], spoke with [employer]. [employer] aware of pt's pickup time of 2:00pm. No questions at this time. Will continue to monitor.

## 2020-07-16 NOTE — PLAN OF CARE
Pt remained free of injuries, falls, and trauma. VSS. Pt refused tonight's blood glucose check. Frequent weight shift assistance provided q 2hrs. Fall risk bundle in place. Tele-sitter at bedside. D/C tomorrow to [facility]. Plan of care reviewed w/ pt. No evidence of learning noted. Will continue to monitor.

## 2020-07-16 NOTE — PLAN OF CARE
CM called DaughterAnne and advised of requested 2:00  time. Daughter asked to be sure he would have a face mask placed at time of DC. CM advised we would. CHELSEA did speak to pt's nurse, Gareth and reminded her to place mask.  CHELSEA received call from Jeanna at [facility] asking for DNR form to be faxed. CM did fax to [phone].

## 2020-07-16 NOTE — PLAN OF CARE
07/16/20 1258   Post-Acute Status   Post-Acute Authorization Placement   Post-Acute Placement Status Set-up Complete     Transportation scheduled via stretcher (PHN auth #[id number] with [employer]) for 2:00pm to transfer to [facility], room 201A.  XIMENA Martin to call report to Megan at [phone].  XIMENA Martin was notified of the above information.  CHELSEA Ibarra to notify pt's daughter of transfer.    Delores Randolph LMSW  [facility]  [phone]

## 2020-07-16 NOTE — CARE UPDATE
Patient needs transport via stretcher/ambulance due to poor trunk control.    Ceasar Walker MD  Medical Resident  [facility]  Pager: [phone]

## 2020-07-16 NOTE — PLAN OF CARE
07/16/20 1519   Final Note   Assessment Type Final Discharge Note   Anticipated Discharge Disposition SNF

## 2020-07-17 ENCOUNTER — TELEPHONE (OUTPATIENT)
Dept: INTERNAL MEDICINE | Facility: CLINIC | Age: 85
End: 2020-07-17

## 2020-07-17 NOTE — TELEPHONE ENCOUNTER
Pt out of the hospital and he is at [facility]. Family reported that pt is doing well. He will be there for 3 weeks.    Ruchi

## 2020-07-17 NOTE — TELEPHONE ENCOUNTER
----- Message from Maru Hardy sent at 7/17/2020 12:56 PM CDT -----  Contact: Pt Daughter [phone]--  Needs Advice    Reason for call:-Speak to nurse--        Communication Preference:--Anne--[phone]--    Additional Information:Pt daughter calling to let  the doctor know that pt got discharge on 07/16/20 and that he went into a rehab center so they can get him back to normal self.  She would like a call back to speak with the nurse regarding to give her some information. Please advise

## 2020-07-20 ENCOUNTER — HOSPITAL ENCOUNTER (INPATIENT)
Facility: HOSPITAL | Age: 85
LOS: 8 days | Discharge: NURSING, REHAB OR LONG-TERM CARE | DRG: 640 | End: 2020-07-28
Attending: EMERGENCY MEDICINE | Admitting: INTERNAL MEDICINE
Payer: MEDICARE

## 2020-07-20 DIAGNOSIS — R41.0 CONFUSION: ICD-10-CM

## 2020-07-20 DIAGNOSIS — I50.32 CHRONIC HEART FAILURE WITH PRESERVED EJECTION FRACTION: ICD-10-CM

## 2020-07-20 DIAGNOSIS — E86.0 DEHYDRATION WITH HYPERNATREMIA: ICD-10-CM

## 2020-07-20 DIAGNOSIS — R65.21 SEPTIC SHOCK: Primary | ICD-10-CM

## 2020-07-20 DIAGNOSIS — Z66 DNR (DO NOT RESUSCITATE): ICD-10-CM

## 2020-07-20 DIAGNOSIS — R53.81 DEBILITY: ICD-10-CM

## 2020-07-20 DIAGNOSIS — R41.82 AMS (ALTERED MENTAL STATUS): ICD-10-CM

## 2020-07-20 DIAGNOSIS — N40.1 BPH WITH URINARY OBSTRUCTION: ICD-10-CM

## 2020-07-20 DIAGNOSIS — E87.0 DEHYDRATION WITH HYPERNATREMIA: ICD-10-CM

## 2020-07-20 DIAGNOSIS — I10 ESSENTIAL HYPERTENSION: ICD-10-CM

## 2020-07-20 DIAGNOSIS — Z79.4 CONTROLLED TYPE 2 DIABETES MELLITUS WITHOUT COMPLICATION, WITH LONG-TERM CURRENT USE OF INSULIN: ICD-10-CM

## 2020-07-20 DIAGNOSIS — N39.0 UTI DUE TO KLEBSIELLA SPECIES: ICD-10-CM

## 2020-07-20 DIAGNOSIS — A41.9 SEPTIC SHOCK: Primary | ICD-10-CM

## 2020-07-20 DIAGNOSIS — N17.9 AKI (ACUTE KIDNEY INJURY): ICD-10-CM

## 2020-07-20 DIAGNOSIS — E87.0 HYPERNATREMIA: ICD-10-CM

## 2020-07-20 DIAGNOSIS — J96.11 CHRONIC HYPOXEMIC RESPIRATORY FAILURE: ICD-10-CM

## 2020-07-20 DIAGNOSIS — G93.41 ACUTE METABOLIC ENCEPHALOPATHY: ICD-10-CM

## 2020-07-20 DIAGNOSIS — N13.8 BPH WITH URINARY OBSTRUCTION: ICD-10-CM

## 2020-07-20 DIAGNOSIS — B96.89 UTI DUE TO KLEBSIELLA SPECIES: ICD-10-CM

## 2020-07-20 DIAGNOSIS — E11.9 CONTROLLED TYPE 2 DIABETES MELLITUS WITHOUT COMPLICATION, WITH LONG-TERM CURRENT USE OF INSULIN: ICD-10-CM

## 2020-07-20 DIAGNOSIS — G93.40 ENCEPHALOPATHY: ICD-10-CM

## 2020-07-20 LAB
ALBUMIN SERPL BCP-MCNC: 3.1 G/DL (ref 3.5–5.2)
ALP SERPL-CCNC: 63 U/L (ref 55–135)
ALT SERPL W/O P-5'-P-CCNC: 13 U/L (ref 10–44)
ANION GAP SERPL CALC-SCNC: 14 MMOL/L (ref 8–16)
AST SERPL-CCNC: 21 U/L (ref 10–40)
BACTERIA #/AREA URNS AUTO: ABNORMAL /HPF
BASOPHILS # BLD AUTO: 0.08 K/UL (ref 0–0.2)
BASOPHILS NFR BLD: 0.6 % (ref 0–1.9)
BILIRUB SERPL-MCNC: 0.3 MG/DL (ref 0.1–1)
BILIRUB UR QL STRIP: NEGATIVE
BNP SERPL-MCNC: 127 PG/ML (ref 0–99)
BUN SERPL-MCNC: 91 MG/DL (ref 10–30)
CALCIUM SERPL-MCNC: 7.9 MG/DL (ref 8.7–10.5)
CHLORIDE SERPL-SCNC: 116 MMOL/L (ref 95–110)
CLARITY UR REFRACT.AUTO: ABNORMAL
CO2 SERPL-SCNC: 24 MMOL/L (ref 23–29)
COLOR UR AUTO: ABNORMAL
CREAT SERPL-MCNC: 2.6 MG/DL (ref 0.5–1.4)
DIFFERENTIAL METHOD: ABNORMAL
EOSINOPHIL # BLD AUTO: 0.1 K/UL (ref 0–0.5)
EOSINOPHIL NFR BLD: 0.5 % (ref 0–8)
ERYTHROCYTE [DISTWIDTH] IN BLOOD BY AUTOMATED COUNT: 15.3 % (ref 11.5–14.5)
EST. GFR  (AFRICAN AMERICAN): 22.4 ML/MIN/1.73 M^2
EST. GFR  (NON AFRICAN AMERICAN): 19.4 ML/MIN/1.73 M^2
GLUCOSE SERPL-MCNC: 196 MG/DL (ref 70–110)
GLUCOSE UR QL STRIP: NEGATIVE
HCT VFR BLD AUTO: 43.8 % (ref 40–54)
HGB BLD-MCNC: 13 G/DL (ref 14–18)
HGB UR QL STRIP: NEGATIVE
HYALINE CASTS UR QL AUTO: 59 /LPF
IMM GRANULOCYTES # BLD AUTO: 0.05 K/UL (ref 0–0.04)
IMM GRANULOCYTES NFR BLD AUTO: 0.4 % (ref 0–0.5)
KETONES UR QL STRIP: ABNORMAL
LACTATE SERPL-SCNC: 2.2 MMOL/L (ref 0.5–2.2)
LEUKOCYTE ESTERASE UR QL STRIP: ABNORMAL
LIPASE SERPL-CCNC: 3 U/L (ref 4–60)
LYMPHOCYTES # BLD AUTO: 4 K/UL (ref 1–4.8)
LYMPHOCYTES NFR BLD: 29.4 % (ref 18–48)
MCH RBC QN AUTO: 28.2 PG (ref 27–31)
MCHC RBC AUTO-ENTMCNC: 29.7 G/DL (ref 32–36)
MCV RBC AUTO: 95 FL (ref 82–98)
MICROSCOPIC COMMENT: ABNORMAL
MONOCYTES # BLD AUTO: 0.8 K/UL (ref 0.3–1)
MONOCYTES NFR BLD: 5.7 % (ref 4–15)
NEUTROPHILS # BLD AUTO: 8.6 K/UL (ref 1.8–7.7)
NEUTROPHILS NFR BLD: 63.4 % (ref 38–73)
NITRITE UR QL STRIP: NEGATIVE
NRBC BLD-RTO: 0 /100 WBC
PH UR STRIP: 5 [PH] (ref 5–8)
PLATELET # BLD AUTO: 359 K/UL (ref 150–350)
PMV BLD AUTO: 11.2 FL (ref 9.2–12.9)
POTASSIUM SERPL-SCNC: 3.8 MMOL/L (ref 3.5–5.1)
PROT SERPL-MCNC: 6.4 G/DL (ref 6–8.4)
PROT UR QL STRIP: NEGATIVE
RBC # BLD AUTO: 4.61 M/UL (ref 4.6–6.2)
RBC #/AREA URNS AUTO: 3 /HPF (ref 0–4)
SARS-COV-2 RDRP RESP QL NAA+PROBE: NEGATIVE
SODIUM SERPL-SCNC: 154 MMOL/L (ref 136–145)
SP GR UR STRIP: 1.02 (ref 1–1.03)
SQUAMOUS #/AREA URNS AUTO: 2 /HPF
T4 FREE SERPL-MCNC: 1.22 NG/DL (ref 0.71–1.51)
TSH SERPL DL<=0.005 MIU/L-ACNC: 0.24 UIU/ML (ref 0.4–4)
URN SPEC COLLECT METH UR: ABNORMAL
WBC # BLD AUTO: 13.6 K/UL (ref 3.9–12.7)
WBC #/AREA URNS AUTO: 30 /HPF (ref 0–5)

## 2020-07-20 PROCEDURE — 63600175 PHARM REV CODE 636 W HCPCS: Performed by: EMERGENCY MEDICINE

## 2020-07-20 PROCEDURE — 96365 THER/PROPH/DIAG IV INF INIT: CPT

## 2020-07-20 PROCEDURE — 99291 CRITICAL CARE FIRST HOUR: CPT | Mod: ,,, | Performed by: EMERGENCY MEDICINE

## 2020-07-20 PROCEDURE — 11000001 HC ACUTE MED/SURG PRIVATE ROOM

## 2020-07-20 PROCEDURE — 84443 ASSAY THYROID STIM HORMONE: CPT

## 2020-07-20 PROCEDURE — U0002 COVID-19 LAB TEST NON-CDC: HCPCS

## 2020-07-20 PROCEDURE — 99291 PR CRITICAL CARE, E/M 30-74 MINUTES: ICD-10-PCS | Mod: ,,, | Performed by: EMERGENCY MEDICINE

## 2020-07-20 PROCEDURE — 81001 URINALYSIS AUTO W/SCOPE: CPT

## 2020-07-20 PROCEDURE — 93005 ELECTROCARDIOGRAM TRACING: CPT

## 2020-07-20 PROCEDURE — 99285 EMERGENCY DEPT VISIT HI MDM: CPT | Mod: 25

## 2020-07-20 PROCEDURE — 80053 COMPREHEN METABOLIC PANEL: CPT

## 2020-07-20 PROCEDURE — 93010 EKG 12-LEAD: ICD-10-PCS | Mod: ,,, | Performed by: INTERNAL MEDICINE

## 2020-07-20 PROCEDURE — 96368 THER/DIAG CONCURRENT INF: CPT

## 2020-07-20 PROCEDURE — 93010 ELECTROCARDIOGRAM REPORT: CPT | Mod: ,,, | Performed by: INTERNAL MEDICINE

## 2020-07-20 PROCEDURE — 83605 ASSAY OF LACTIC ACID: CPT

## 2020-07-20 PROCEDURE — 83880 ASSAY OF NATRIURETIC PEPTIDE: CPT

## 2020-07-20 PROCEDURE — 85025 COMPLETE CBC W/AUTO DIFF WBC: CPT

## 2020-07-20 PROCEDURE — 87040 BLOOD CULTURE FOR BACTERIA: CPT

## 2020-07-20 PROCEDURE — 83690 ASSAY OF LIPASE: CPT

## 2020-07-20 PROCEDURE — 84439 ASSAY OF FREE THYROXINE: CPT

## 2020-07-20 PROCEDURE — 87086 URINE CULTURE/COLONY COUNT: CPT

## 2020-07-20 RX ORDER — ACETAMINOPHEN 325 MG/1
650 TABLET ORAL EVERY 6 HOURS PRN
Status: DISCONTINUED | OUTPATIENT
Start: 2020-07-21 | End: 2020-07-28 | Stop reason: HOSPADM

## 2020-07-20 RX ORDER — LATANOPROST 50 UG/ML
1 SOLUTION/ DROPS OPHTHALMIC NIGHTLY
Status: DISCONTINUED | OUTPATIENT
Start: 2020-07-21 | End: 2020-07-28 | Stop reason: HOSPADM

## 2020-07-20 RX ORDER — GLUCAGON 1 MG
1 KIT INJECTION
Status: DISCONTINUED | OUTPATIENT
Start: 2020-07-21 | End: 2020-07-28 | Stop reason: HOSPADM

## 2020-07-20 RX ORDER — VANCOMYCIN HCL IN 5 % DEXTROSE 1G/250ML
15 PLASTIC BAG, INJECTION (ML) INTRAVENOUS ONCE
Status: CANCELLED | OUTPATIENT
Start: 2020-07-20 | End: 2020-07-20

## 2020-07-20 RX ORDER — ALBUTEROL SULFATE 2.5 MG/.5ML
2.5 SOLUTION RESPIRATORY (INHALATION) EVERY 4 HOURS PRN
Status: DISCONTINUED | OUTPATIENT
Start: 2020-07-21 | End: 2020-07-28 | Stop reason: HOSPADM

## 2020-07-20 RX ORDER — INSULIN ASPART 100 [IU]/ML
0-5 INJECTION, SOLUTION INTRAVENOUS; SUBCUTANEOUS
Status: DISCONTINUED | OUTPATIENT
Start: 2020-07-21 | End: 2020-07-28 | Stop reason: HOSPADM

## 2020-07-20 RX ORDER — AMOXICILLIN 250 MG
1 CAPSULE ORAL 2 TIMES DAILY PRN
Status: DISCONTINUED | OUTPATIENT
Start: 2020-07-21 | End: 2020-07-28 | Stop reason: HOSPADM

## 2020-07-20 RX ORDER — TALC
6 POWDER (GRAM) TOPICAL NIGHTLY PRN
Status: DISCONTINUED | OUTPATIENT
Start: 2020-07-21 | End: 2020-07-28 | Stop reason: HOSPADM

## 2020-07-20 RX ORDER — IBUPROFEN 200 MG
16 TABLET ORAL
Status: DISCONTINUED | OUTPATIENT
Start: 2020-07-21 | End: 2020-07-28 | Stop reason: HOSPADM

## 2020-07-20 RX ORDER — VANCOMYCIN HCL IN 5 % DEXTROSE 1G/250ML
15 PLASTIC BAG, INJECTION (ML) INTRAVENOUS
Status: COMPLETED | OUTPATIENT
Start: 2020-07-20 | End: 2020-07-20

## 2020-07-20 RX ORDER — IBUPROFEN 200 MG
24 TABLET ORAL
Status: DISCONTINUED | OUTPATIENT
Start: 2020-07-21 | End: 2020-07-28 | Stop reason: HOSPADM

## 2020-07-20 RX ORDER — TAMSULOSIN HYDROCHLORIDE 0.4 MG/1
0.4 CAPSULE ORAL DAILY
Status: DISCONTINUED | OUTPATIENT
Start: 2020-07-21 | End: 2020-07-28 | Stop reason: HOSPADM

## 2020-07-20 RX ORDER — ENOXAPARIN SODIUM 100 MG/ML
30 INJECTION SUBCUTANEOUS EVERY 24 HOURS
Status: DISCONTINUED | OUTPATIENT
Start: 2020-07-21 | End: 2020-07-27

## 2020-07-20 RX ORDER — SODIUM CHLORIDE 0.9 % (FLUSH) 0.9 %
10 SYRINGE (ML) INJECTION
Status: DISCONTINUED | OUTPATIENT
Start: 2020-07-20 | End: 2020-07-28 | Stop reason: HOSPADM

## 2020-07-20 RX ORDER — ONDANSETRON 2 MG/ML
4 INJECTION INTRAMUSCULAR; INTRAVENOUS EVERY 8 HOURS PRN
Status: DISCONTINUED | OUTPATIENT
Start: 2020-07-21 | End: 2020-07-28 | Stop reason: HOSPADM

## 2020-07-20 RX ORDER — SODIUM CHLORIDE 450 MG/100ML
INJECTION, SOLUTION INTRAVENOUS CONTINUOUS
Status: DISCONTINUED | OUTPATIENT
Start: 2020-07-21 | End: 2020-07-21

## 2020-07-20 RX ADMIN — VANCOMYCIN HYDROCHLORIDE 1000 MG: 1 INJECTION, POWDER, LYOPHILIZED, FOR SOLUTION INTRAVENOUS at 02:07

## 2020-07-20 RX ADMIN — PIPERACILLIN AND TAZOBACTAM 4.5 G: 4; .5 INJECTION, POWDER, LYOPHILIZED, FOR SOLUTION INTRAVENOUS; PARENTERAL at 02:07

## 2020-07-20 NOTE — ED NOTES
Pt placed on a condom cath to collect urine sample.  Pt had a soiled brief prior to applying the condom cath.

## 2020-07-20 NOTE — ED NOTES
RN notified that there was push back from the family about I/o cath pt 2/2 enlarged prostate.  Resident to bedside to discuss.  Waiting on orders.

## 2020-07-21 ENCOUNTER — TELEPHONE (OUTPATIENT)
Dept: INTERNAL MEDICINE | Facility: CLINIC | Age: 85
End: 2020-07-21

## 2020-07-21 PROBLEM — B96.1 BACTEREMIA DUE TO KLEBSIELLA PNEUMONIAE: Status: RESOLVED | Noted: 2020-07-06 | Resolved: 2020-07-21

## 2020-07-21 PROBLEM — E11.3299: Status: RESOLVED | Noted: 2019-06-02 | Resolved: 2020-07-21

## 2020-07-21 PROBLEM — E87.70 FLUID OVERLOAD: Status: RESOLVED | Noted: 2020-01-17 | Resolved: 2020-07-21

## 2020-07-21 PROBLEM — R78.81 BACTEREMIA DUE TO KLEBSIELLA PNEUMONIAE: Status: RESOLVED | Noted: 2020-07-06 | Resolved: 2020-07-21

## 2020-07-21 PROBLEM — H11.31 SUBCONJUNCTIVAL HEMATOMA, RIGHT: Status: RESOLVED | Noted: 2019-06-02 | Resolved: 2020-07-21

## 2020-07-21 PROBLEM — R35.0 URINARY FREQUENCY: Status: RESOLVED | Noted: 2018-10-19 | Resolved: 2020-07-21

## 2020-07-21 PROBLEM — E86.0 DEHYDRATION WITH HYPERNATREMIA: Status: ACTIVE | Noted: 2020-07-20

## 2020-07-21 LAB
ANION GAP SERPL CALC-SCNC: 16 MMOL/L (ref 8–16)
BACTERIA UR CULT: NO GROWTH
BASOPHILS # BLD AUTO: 0.07 K/UL (ref 0–0.2)
BASOPHILS NFR BLD: 0.6 % (ref 0–1.9)
BUN SERPL-MCNC: 90 MG/DL (ref 10–30)
CALCIUM SERPL-MCNC: 8.1 MG/DL (ref 8.7–10.5)
CHLORIDE SERPL-SCNC: 115 MMOL/L (ref 95–110)
CO2 SERPL-SCNC: 25 MMOL/L (ref 23–29)
CREAT SERPL-MCNC: 2.6 MG/DL (ref 0.5–1.4)
DIFFERENTIAL METHOD: ABNORMAL
EOSINOPHIL # BLD AUTO: 0.1 K/UL (ref 0–0.5)
EOSINOPHIL NFR BLD: 1.3 % (ref 0–8)
ERYTHROCYTE [DISTWIDTH] IN BLOOD BY AUTOMATED COUNT: 15.2 % (ref 11.5–14.5)
EST. GFR  (AFRICAN AMERICAN): 22.4 ML/MIN/1.73 M^2
EST. GFR  (NON AFRICAN AMERICAN): 19.4 ML/MIN/1.73 M^2
GLUCOSE SERPL-MCNC: 190 MG/DL (ref 70–110)
HCT VFR BLD AUTO: 42.3 % (ref 40–54)
HGB BLD-MCNC: 12.7 G/DL (ref 14–18)
IMM GRANULOCYTES # BLD AUTO: 0.05 K/UL (ref 0–0.04)
IMM GRANULOCYTES NFR BLD AUTO: 0.5 % (ref 0–0.5)
LYMPHOCYTES # BLD AUTO: 3.1 K/UL (ref 1–4.8)
LYMPHOCYTES NFR BLD: 28.2 % (ref 18–48)
MAGNESIUM SERPL-MCNC: 2.7 MG/DL (ref 1.6–2.6)
MCH RBC QN AUTO: 28.2 PG (ref 27–31)
MCHC RBC AUTO-ENTMCNC: 30 G/DL (ref 32–36)
MCV RBC AUTO: 94 FL (ref 82–98)
MONOCYTES # BLD AUTO: 0.6 K/UL (ref 0.3–1)
MONOCYTES NFR BLD: 5.8 % (ref 4–15)
NEUTROPHILS # BLD AUTO: 6.9 K/UL (ref 1.8–7.7)
NEUTROPHILS NFR BLD: 63.6 % (ref 38–73)
NRBC BLD-RTO: 0 /100 WBC
PHOSPHATE SERPL-MCNC: 5 MG/DL (ref 2.7–4.5)
PLATELET # BLD AUTO: 358 K/UL (ref 150–350)
PMV BLD AUTO: 10.9 FL (ref 9.2–12.9)
POTASSIUM SERPL-SCNC: 3.8 MMOL/L (ref 3.5–5.1)
RBC # BLD AUTO: 4.51 M/UL (ref 4.6–6.2)
SODIUM SERPL-SCNC: 156 MMOL/L (ref 136–145)
WBC # BLD AUTO: 10.87 K/UL (ref 3.9–12.7)

## 2020-07-21 PROCEDURE — 94761 N-INVAS EAR/PLS OXIMETRY MLT: CPT

## 2020-07-21 PROCEDURE — 36415 COLL VENOUS BLD VENIPUNCTURE: CPT

## 2020-07-21 PROCEDURE — 63600175 PHARM REV CODE 636 W HCPCS: Performed by: INTERNAL MEDICINE

## 2020-07-21 PROCEDURE — 25000003 PHARM REV CODE 250: Performed by: INTERNAL MEDICINE

## 2020-07-21 PROCEDURE — 84100 ASSAY OF PHOSPHORUS: CPT

## 2020-07-21 PROCEDURE — 83735 ASSAY OF MAGNESIUM: CPT

## 2020-07-21 PROCEDURE — 27000221 HC OXYGEN, UP TO 24 HOURS

## 2020-07-21 PROCEDURE — 99223 PR INITIAL HOSPITAL CARE,LEVL III: ICD-10-PCS | Mod: ,,, | Performed by: HOSPITALIST

## 2020-07-21 PROCEDURE — 99223 1ST HOSP IP/OBS HIGH 75: CPT | Mod: ,,, | Performed by: HOSPITALIST

## 2020-07-21 PROCEDURE — 80048 BASIC METABOLIC PNL TOTAL CA: CPT

## 2020-07-21 PROCEDURE — 63600175 PHARM REV CODE 636 W HCPCS: Performed by: HOSPITALIST

## 2020-07-21 PROCEDURE — 25000003 PHARM REV CODE 250: Performed by: HOSPITALIST

## 2020-07-21 PROCEDURE — 85025 COMPLETE CBC W/AUTO DIFF WBC: CPT

## 2020-07-21 PROCEDURE — 11000001 HC ACUTE MED/SURG PRIVATE ROOM

## 2020-07-21 RX ADMIN — LATANOPROST 1 DROP: 50 SOLUTION OPHTHALMIC at 11:07

## 2020-07-21 RX ADMIN — POTASSIUM CHLORIDE: 2 INJECTION, SOLUTION, CONCENTRATE INTRAVENOUS at 09:07

## 2020-07-21 RX ADMIN — SODIUM CHLORIDE: 0.45 INJECTION, SOLUTION INTRAVENOUS at 02:07

## 2020-07-21 RX ADMIN — DEXTROSE 1000 ML: 5 SOLUTION INTRAVENOUS at 08:07

## 2020-07-21 RX ADMIN — POTASSIUM CHLORIDE: 2 INJECTION, SOLUTION, CONCENTRATE INTRAVENOUS at 10:07

## 2020-07-21 RX ADMIN — ENOXAPARIN SODIUM 30 MG: 30 INJECTION SUBCUTANEOUS at 06:07

## 2020-07-21 RX ADMIN — TAMSULOSIN HYDROCHLORIDE 0.4 MG: 0.4 CAPSULE ORAL at 10:07

## 2020-07-21 RX ADMIN — POTASSIUM CHLORIDE: 2 INJECTION, SOLUTION, CONCENTRATE INTRAVENOUS at 04:07

## 2020-07-21 RX ADMIN — SODIUM CHLORIDE 250 ML: 0.9 INJECTION, SOLUTION INTRAVENOUS at 12:07

## 2020-07-21 NOTE — SUBJECTIVE & OBJECTIVE
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      Current Outpatient Medications on File Prior to Encounter   Medication Sig    albuterol-ipratropium (DUO-NEB) 2.5 mg-0.5 mg/3 mL nebulizer solution Take 3 mLs by nebulization every 6 (six) hours as needed for Wheezing. Rescue    amLODIPine (NORVASC) 5 MG tablet Take 1 tablet (5 mg total) by mouth once daily.    artificial tears w/ lanolin (AKWA TEARS) 0.5% ophthalmic ointment Apply to affected eye(s) as needed for dryness.    cefTRIAXone (ROCEPHIN) 2 g/50 mL PgBk IVPB Inject 50 mLs (2 g total) into the vein once daily. for 12 days    external catheter, male Misc Use condom catheter every night    furosemide (LASIX) 20 MG tablet Take 3 tablets (60 mg total) by mouth once daily.    insulin detemir U-100 (LEVEMIR FLEXTOUCH) 100 unit/mL (3 mL) SubQ InPn pen Inject 7 Units into the skin once daily.    latanoprost 0.005 % ophthalmic solution Place 1 drop into both eyes every evening.    lisinopriL 10 MG tablet Take 1 tablet (10 mg total) by mouth once daily. HOLD UNTIL SEE PCP    senna-docusate 8.6-50 mg (PERICOLACE) 8.6-50 mg per tablet Take 1 tablet by mouth 2 (two) times daily as needed for  Constipation.    tamsulosin (FLOMAX) 0.4 mg Cap Take 1 capsule (0.4 mg total) by mouth once daily.     Family History     Problem Relation (Age of Onset)    Cerebral aneurysm Mother    Kidney failure Sister (96)    Other Father        Tobacco Use    Smoking status: Never Smoker    Smokeless tobacco: Never Used   Substance and Sexual Activity    Alcohol use: No    Drug use: Never    Sexual activity: Not Currently     Partners: Female     Review of Systems   Unable to perform ROS: Mental status change     Objective:     Vital Signs (Most Recent):  Temp: 97.6 °F (36.4 °C) (07/21/20 0038)  Pulse: 79 (07/21/20 0038)  Resp: 16 (07/21/20 0038)  BP: (!) 95/50 (07/21/20 0038)  SpO2: 100 % (07/21/20 0038) Vital Signs (24h Range):  Temp:  [97.6 °F (36.4 °C)-98 °F (36.7 °C)] 97.6 °F (36.4 °C)  Pulse:  [72-86] 79  Resp:  [13-21] 16  SpO2:  [95 %-100 %] 100 %  BP: ()/(50-62) 95/50     Weight: 62 kg (136 lb 11 oz)  Body mass index is 20.18 kg/m².    Physical Exam  Vitals signs and nursing note reviewed.   Constitutional:       General: He is sleeping. He is not in acute distress.     Interventions: Nasal cannula in place.   HENT:      Mouth/Throat:      Mouth: Mucous membranes are dry.   Neck:      Musculoskeletal: No neck rigidity.   Cardiovascular:      Rate and Rhythm: Normal rate and regular rhythm.      Pulses: Normal pulses.      Heart sounds: Normal heart sounds. No murmur. No gallop.    Pulmonary:      Effort: Pulmonary effort is normal.      Breath sounds: Examination of the right-lower field reveals rales. Examination of the left-lower field reveals rales. Rales present.   Abdominal:      General: Abdomen is flat. Bowel sounds are normal. There is no distension.      Palpations: Abdomen is soft.      Tenderness: There is no abdominal tenderness. There is no guarding.   Musculoskeletal: Normal range of motion.         General: No tenderness or deformity.      Comments: Trace BLE ankle edema   Lymphadenopathy:       Cervical: No cervical adenopathy.   Skin:     General: Skin is warm and dry.      Findings: Bruising present.      Comments: Pronounced tenting of skin   Neurological:      Mental Status: He is lethargic.      GCS: GCS eye subscore is 2. GCS verbal subscore is 3. GCS motor subscore is 4.      Comments: Occasional myoclonic jerking   Psychiatric:         Behavior: Behavior is uncooperative.             Significant Labs:   CBC:   Recent Labs   Lab 07/20/20  1252   WBC 13.60*   HGB 13.0*   HCT 43.8   *     CMP:   Recent Labs   Lab 07/20/20  1537   *   K 3.8   *   CO2 24   *   BUN 91*   CREATININE 2.6*   CALCIUM 7.9*   PROT 6.4   ALBUMIN 3.1*   BILITOT 0.3   ALKPHOS 63   AST 21   ALT 13   ANIONGAP 14   EGFRNONAA 19.4*     Cardiac Markers:   Recent Labs   Lab 07/20/20  1252   *     Urine Studies:   Recent Labs   Lab 07/20/20  1735   COLORU Jacqueline   APPEARANCEUA Hazy*   PHUR 5.0   SPECGRAV 1.020   PROTEINUA Negative   GLUCUA Negative   KETONESU Trace*   BILIRUBINUA Negative   OCCULTUA Negative   NITRITE Negative   LEUKOCYTESUR Trace*   RBCUA 3   WBCUA 30*   BACTERIA Few*   SQUAMEPITHEL 2   HYALINECASTS 59*       Significant Imaging: I have reviewed all pertinent imaging results/findings within the past 24 hours.

## 2020-07-21 NOTE — PLAN OF CARE
Patient admitted this am from [facility] where he was discharged on 7/16 from [facility] from Cardiology service after treatment for heart failure exacerbation and Klebsiella bacteremia related to Klebsiella UTI. Patient discharged on Lasix 60 mg po daily and IV Rocephin to treat UTI and bacteremia. Patient returned last night with poor oral intake and AMS since hospital discharge and found to have signifciant dehydration and hypernatremia with sodium 154. Repeat U/A was unremarkable but new urine and blood cultures sent and patient given a dose of IV Zosyn in ED this am. Repeat sodium higher at 156 after given 250 cc of NS and placed on 0.45 NS infusion. 0.45 NS infusion stopped and patient given 1 liter of D5W this am and IVF's switched to D5W with 20 mEq CL at 100 cc/hr. Patient remains confused but awake. Spoke with patient daughter Johanny on phine and daughter Anne at bedside. Daughter states patient ate a few bites of food for lunch and drank most of his Powerade. I told daughter to encourage patient to drink fluids. Family is adamant they do not want any type of feeding for the patient and that patient is a DNR. I advised both daughter will see how he responds to treatment but given age and other recent hospitalizations prognosis is not good. Patient in soft wrist restraints for agitation as trying to pull out his IV lines.       JESÚS LE MD  Attending Staff Physician   Department of Hospital Medicine, [facility]  Pager: [phone]  Spectralink: [phone]

## 2020-07-21 NOTE — PLAN OF CARE
"Information received from Anne Forman (sister). Pt is currently placed in bilateral wrist restraints.     07/21/20 1522   Readmission Questionnaire   At the time of your discharge, did someone talk to you about what your health problems were? Yes   At the time of discharge, did someone talk to you about what to watch out for regarding worsening of your health problem? Yes   At the time of discharge, did someone talk to you about what to do if you experienced worsening of your health problem? Yes   At the time of discharge, did someone talk to you about which medication to take when you left the hospital and which ones to stop taking? Yes   At the time of discharge, did someone talk to you about when and where to follow up with a doctor after you left the hospital? Yes   What do you believe caused you to be sick enough to be re-admitted? 'too much fluid"   How often do you need to have someone help you when you read instructions, pamphlets, or other written material from your doctor or pharmacy? Always   Do you have problems taking your medications as prescribed? No   Do you have any problems affording any of  your prescribed medications? No   Do you have problems obtaining/receiving your medications? No   Does the patient have transportation to healthcare appointments? Yes   Lives With child(susana), adult   Living Arrangements house   Does the patient have family/friends to help with healtcare needs after discharge? yes   Who are your caregiver(s) and their phone number(s)? Anne Forman ([phone])   Does your caregiver provide all the help you need? Yes   Are you currently feeling confused? Yes   Are you currently having problems thinking? Yes   Are you currently having memory problems? Yes   Have you felt down, depressed, or hopeless? 0   Have you felt little interest or pleasure in doing things? 0   In the last 7 days, my sleep quality was: ambar Nguyễn, MSN  Case Management  Ext [phone]   "

## 2020-07-21 NOTE — ASSESSMENT & PLAN NOTE
Secondary to dehydration.  Holding ACE-inhibitor and diuretic, and cautiously rehydrating as mentioned above.  Rechecking BMP in morning.

## 2020-07-21 NOTE — NURSING
Notified [facility] K r/t pt restraint order   Med K returned call will continue restraint order at this time

## 2020-07-21 NOTE — ASSESSMENT & PLAN NOTE
Patient DNR/DNI.  In the event of profound clinical deterioration, efforts will be directed to focus on comfort.

## 2020-07-21 NOTE — ASSESSMENT & PLAN NOTE
Continue Flomax.  Avoid urinary catheterization if at all possible, as irritation or trauma to the prostate could result in further swelling which could make weaning off of catheter difficult.

## 2020-07-21 NOTE — ED PROVIDER NOTES
Encounter Date: 7/20/2020       History     Chief Complaint   Patient presents with    Failure To Thrive     Pt has not eaten/ or had anything to drink in the last 48 hr. Per daughter pt is more confused than normal. Pt spb was 90. given 250 NS by ems now 98/58. Pt is not answersing question correctly.      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 Hx limited 2/2 confusion.         Review of patient's allergies indicates:   Allergen Reactions    Metformin Diarrhea    Sulfa (sulfonamide antibiotics) Other (See Comments)     unknown reactions    Tradjenta [linagliptin] Diarrhea            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    HERNIA REPAIR       Family History   Problem Relation Age of Onset    Other Father         peritonitis    Cerebral aneurysm Mother     Kidney failure Sister 96    Amblyopia Neg Hx     Blindness Neg Hx     Cancer Neg Hx     Cataracts Neg Hx     Diabetes Neg Hx     Glaucoma Neg Hx     Hypertension Neg Hx     Macular degeneration Neg Hx     Retinal detachment Neg Hx     Strabismus Neg Hx     Stroke Neg Hx     Thyroid disease Neg Hx      Social History     Tobacco Use    Smoking status: Never Smoker    Smokeless tobacco: Never Used   Substance Use Topics    Alcohol use: No    Drug  use: Never     Review of Systems   Unable to perform ROS: Mental status change       Physical Exam     Initial Vitals   BP Pulse Resp Temp SpO2   07/20/20 1144 07/20/20 1144 07/20/20 1255 07/20/20 1144 07/20/20 1144   (!) 98/51 84 13 98 °F (36.7 °C) 98 %      MAP       --                Physical Exam    Nursing note and vitals reviewed.  Constitutional: No distress.   Elderly appearing   HENT:   Head: Normocephalic and atraumatic.   Nose: Nose normal.   Eyes: EOM are normal.   Neck: Neck supple. No tracheal deviation present. No JVD present.   Cardiovascular: Normal rate, regular rhythm, normal heart sounds and intact distal pulses. Exam reveals no gallop and no friction rub.    No murmur heard.  Pulmonary/Chest: No respiratory distress. He has decreased breath sounds. He has no wheezes. He has no rhonchi. He has no rales.   Abdominal: Soft. Bowel sounds are normal. There is no abdominal tenderness.   Musculoskeletal: Normal range of motion.   Neurological: He is alert. No cranial nerve deficit.   Sleepy but arousable   Skin: Skin is warm and dry. Capillary refill takes less than 2 seconds. No rash noted.   Psychiatric: He has a normal mood and affect.         ED Course   Critical Care    Date/Time: 7/21/2020 12:19 AM  Performed by: Bert Bennett MD  Authorized by: Claudia Snyder MD   Total critical care time (exclusive of procedural time) : 50 minutes  Critical care time was exclusive of teaching time and separately billable procedures and treating other patients.  Critical care was necessary to treat or prevent imminent or life-threatening deterioration of the following conditions: metabolic crisis, sepsis and CNS failure or compromise.  Critical care was time spent personally by me on the following activities: examination of patient, evaluation of patient's response to treatment, obtaining history from patient or surrogate, ordering and performing treatments and interventions, ordering and review of  laboratory studies, ordering and review of radiographic studies, pulse oximetry, re-evaluation of patient's condition and review of old charts.        Labs Reviewed   CBC W/ AUTO DIFFERENTIAL - Abnormal; Notable for the following components:       Result Value    WBC 13.60 (*)     Hemoglobin 13.0 (*)     Mean Corpuscular Hemoglobin Conc 29.7 (*)     RDW 15.3 (*)     Platelets 359 (*)     Gran # (ANC) 8.6 (*)     Immature Grans (Abs) 0.05 (*)     All other components within normal limits   LIPASE - Abnormal; Notable for the following components:    Lipase 3 (*)     All other components within normal limits   URINALYSIS, REFLEX TO URINE CULTURE - Abnormal; Notable for the following components:    Appearance, UA Hazy (*)     Ketones, UA Trace (*)     Leukocytes, UA Trace (*)     All other components within normal limits    Narrative:     Specimen Source->Urine   B-TYPE NATRIURETIC PEPTIDE - Abnormal; Notable for the following components:     (*)     All other components within normal limits   TSH - Abnormal; Notable for the following components:    TSH 0.240 (*)     All other components within normal limits   COMPREHENSIVE METABOLIC PANEL - Abnormal; Notable for the following components:    Sodium 154 (*)     Chloride 116 (*)     Glucose 196 (*)     BUN, Bld 91 (*)     Creatinine 2.6 (*)     Calcium 7.9 (*)     Albumin 3.1 (*)     eGFR if  22.4 (*)     eGFR if non  19.4 (*)     All other components within normal limits   URINALYSIS MICROSCOPIC - Abnormal; Notable for the following components:    WBC, UA 30 (*)     Bacteria Few (*)     Hyaline Casts, UA 59 (*)     All other components within normal limits    Narrative:     Specimen Source->Urine   CULTURE, BLOOD   CULTURE, BLOOD   CULTURE, URINE   LACTIC ACID, PLASMA   SARS-COV-2 RNA AMPLIFICATION, QUAL   T4, FREE        ECG Results          EKG 12-lead (In process)  Result time 07/20/20 13:35:37    In process by Interface, Lab In  [facility] (07/20/20 13:35:37)                 Narrative:    Test Reason : R41.0,    Vent. Rate : 082 BPM     Atrial Rate : 082 BPM     P-R Int : 208 ms          QRS Dur : 068 ms      QT Int : 394 ms       P-R-T Axes : 114 007 088 degrees     QTc Int : 460 ms    Age and gender specific analysis  Normal sinus rhythm  Septal infarct (cited on or before 02-JUN-2019)  Abnormal ECG  When compared with ECG of 04-JUL-2020 17:54,  The axis Shifted left  Questionable change in initial forces of Anterior leads  ST now depressed in Lateral leads  Nonspecific T wave abnormality now evident in Lateral leads    Referred By:             Confirmed By:                             Imaging Results          X-Ray Chest AP Portable (Final result)  Result time 07/20/20 14:00:44    Final result by Amber Gutiérrez MD (07/20/20 14:00:44)                 Impression:      No acute intrathoracic disease identified in this elderly man with altered mental status.      Electronically signed by: Amber Gutiérrez MD  Date:    07/20/2020  Time:    14:00             Narrative:    EXAMINATION:  XR CHEST AP PORTABLE    CLINICAL HISTORY:  Altered mental status, unspecified    TECHNIQUE:  Single frontal view of the chest was performed.    COMPARISON:  07/11/2020.  07/08/2020.  07/04/2020.    FINDINGS:  Allowing for left posterior oblique rotation, mediastinal structures are midline. Cardiac silhouette and pulmonary vascular distribution are normal.  Calcific plaque noted at the aortic arch, not unusual in light of the patient's advanced age.    Lung volumes are normal and symmetric. I detect no pulmonary disease, pleural fluid, lymph node enlargement, cardiac decompensation, pneumothorax, pneumomediastinum, pneumoperitoneum or significant osseous abnormality.                               CT Head Without Contrast (Final result)  Result time 07/20/20 13:53:41    Final result by Dipak Ennis DO (07/20/20 13:53:41)                 Impression:      Severe  motion limited examination as detailed above.  Clinical correlation and further evaluation with repeat attempts for imaging when patient better able to tolerate scanning as warranted.      Electronically signed by: Dipak Ennis DO  Date:    07/20/2020  Time:    13:53             Narrative:    EXAMINATION:  CT HEAD WITHOUT CONTRAST    CLINICAL HISTORY:  Altered mental status;    TECHNIQUE:  Multiple sequential 5 mm axial images of the head without contrast.  Coronal and sagittal reformatted imaging from the axial acquisition.    COMPARISON:  07/05/2020    FINDINGS:  Severely distorted examination secondary to patient motion artifact.  There is remote operative change with left frontal and parietal candy holes again seen.    The ventricles are relatively stable without hydrocephalus.  Otherwise severe motion limited examination..                                 Medical Decision Making:   ED Management:  Started on broad spectrum abx given recent urosepsis 2/2 klebsiella in setting of encephalopathy and hypotension. UA concerning for UTI. Given prior cx Zosyn is appropriate abx. CTH poor study but no obvious acute pathology. CXR neg. Labs show worsening JAN and hypernatremia. Pt given gentle hydration due to prior admissions for CHF and respiratory failure with improvement of BP. Spoke to both daughters who both agree patient was clear with advanced directives and wishes to be DNR. Will admit to medicine for further workup and management of UTI, JAN, encephalopathy and hypernatremia.                    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    [BD]   1419 Impression:     Severe motion limited examination as detailed above.  Clinical  correlation and further evaluation with repeat attempts for imaging when patient better able to tolerate scanning as warranted.        Electronically signed by: Dipak Ennis DO  Date:                                            07/20/2020  Time:                                           13:53    [BD]   1420 Impression:     No acute intrathoracic disease identified in this elderly man with altered mental status.        Electronically signed by: Amber Gutiérrez MD  Date:                                            07/20/2020  Time:                                           14:00    [BD]      ED Course User Index  [BD] Bert Bennett MD                Clinical Impression:       ICD-10-CM ICD-9-CM   1. Septic shock  A41.9 038.9    R65.21 785.52     995.92   2. AMS (altered mental status)  R41.82 780.97   3. Confusion  R41.0 298.9   4. Hypernatremia  E87.0 276.0   5. Encephalopathy  G93.40 348.30   6. JAN (acute kidney injury)  N17.9 584.9             ED Disposition Condition    Admit                           Bert Bennett MD  07/21/20 0022

## 2020-07-21 NOTE — ED NOTES
Zbigniew Forman, a 89 y.o. male presents to the ED w/ complaint of AMS and decreased PO intake.     Triage note:  Chief Complaint   Patient presents with    Failure To Thrive     Pt has not eaten/ or had anything to drink in the last 48 hr. Per daughter pt is more confused than normal. Pt spb was 90. given 250 NS by ems now 98/58. Pt is not answersing question correctly.      Review of patient's allergies indicates:   Allergen Reactions    Metformin Diarrhea    Sulfa (sulfonamide antibiotics) Other (See Comments)     unknown reactions    Tradjenta [linagliptin] Diarrhea            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    Chronic kidney disease, stage III (moderate) 9/29/2015    Diabetes mellitus type II, uncontrolled     Glaucoma suspect, high risk 11/19/2013    Hypertension associated with diabetes     S/P evacuation of subdural hematoma 3/17/2015    Senile cataract, unspecified 11/19/2013         Patient identifiers verified and correct for Zbigniew Forman.    LOC: The patient is awake, alert and aware of environment with an appropriate affect, the patient is oriented to self only.  APPEARANCE: Patient resting comfortably and in no acute distress, patient is clean and well groomed, patient's clothing is properly fastened.  SKIN: The skin is warm and dry, color consistent with ethnicity, patient has normal skin turgor and moist mucus membranes, skin intact, no breakdown or bruising noted.  MUSCULOSKELETAL: Patient moving all extremities spontaneously, no obvious swelling or deformities noted.  +generalized weakness  RESPIRATORY: Airway is open and patent, respirations are spontaneous, patient has a normal effort and rate, no accessory muscle use noted, bilateral breath sounds clear.  CARDIAC: Patient has a normal rate and regular rhythm, no periphreal edema noted, capillary refill < 3 seconds.  ABDOMEN: Soft and non tender to palpation, no distention  noted, normoactive bowel sounds present in all four quadrants.  Incontinent of urine and stool; pt has a condom cath on  NEUROLOGIC:Eyes open spontaneously, behavior appropriate to situation, follows commands, facial expression symmetrical, bilateral hand grasp equal and even, purposeful motor response noted.    Pt states decreased PO intake and lethargic.

## 2020-07-21 NOTE — PLAN OF CARE
Problem: Fall Injury Risk  Goal: Absence of Fall and Fall-Related Injury  Outcome: Ongoing, Progressing     Problem: Restraint, Behavioral  Goal: Discontinuation Criteria Achieved  Outcome: Ongoing, Progressing  Goal: Personal Dignity and Safety Maintained  Outcome: Ongoing, Progressing     Problem: Adult Inpatient Plan of Care  Goal: Plan of Care Review  Outcome: Ongoing, Progressing  Goal: Patient-Specific Goal (Individualization)  Outcome: Ongoing, Progressing  Goal: Absence of Hospital-Acquired Illness or Injury  Outcome: Ongoing, Progressing  Goal: Optimal Comfort and Wellbeing  Outcome: Ongoing, Progressing  Goal: Readiness for Transition of Care  Outcome: Ongoing, Progressing  Goal: Rounds/Family Conference  Outcome: Ongoing, Progressing     Pt arouses to stimuli. No signs of distress. Family at bedside. Iv infiltrated. Midline Team consulted. MD aware. Restraints continued. Will continue to monitor.

## 2020-07-21 NOTE — H&P
[facility] Hospital Medicine  History & Physical    Patient Name: Zbigniew Forman  MRN: [id number]  Admission Date: 7/20/2020  Attending Physician: Claudia Snyder MD   Primary Care Provider: Blas Morgan Ii, MD    [facility] Medicine Team: [facility] MED  Juan Barrett MD     Patient information was obtained from relative(s), past medical records and ER records.     Subjective:     Principal Problem:Hypernatremia    Chief Complaint:   Chief Complaint   Patient presents with    Failure To Thrive     Pt has not eaten/ or had anything to drink in the last 48 hr. Per daughter pt is more confused than normal. Pt spb was 90. given 250 NS by ems now 98/58. Pt is not answersing question correctly.         HPI: [age] year old male recently admitted here for heart failure exacerbation from 07/04 to 7/16 brought back to the ED from the skilled nursing facility where he was staying for decreased level of consciousness and not eating.  He is not able to provide any history so history is provided by his daughter at the bedside.  She has noticed that over the past couple of days he has mostly been sleeping and not doing anything.  During that admission he was treated for a Klebsiella urinary tract infection and bacteremia and was discharged to the skilled nursing facility with ceftriaxone with planned end date of 07/21.  He is DNR/DNI.    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    HERNIA REPAIR         Review of patient's  allergies indicates:   Allergen Reactions    Metformin Diarrhea    Sulfa (sulfonamide antibiotics) Other (See Comments)     unknown reactions    Tradjenta [linagliptin] Diarrhea              No current facility-administered medications on file prior to encounter.      Current Outpatient Medications on File Prior to Encounter   Medication Sig    albuterol-ipratropium (DUO-NEB) 2.5 mg-0.5 mg/3 mL nebulizer solution Take 3 mLs by nebulization every 6 (six) hours as needed for Wheezing. Rescue    amLODIPine (NORVASC) 5 MG tablet Take 1 tablet (5 mg total) by mouth once daily.    artificial tears w/ lanolin (AKWA TEARS) 0.5% ophthalmic ointment Apply to affected eye(s) as needed for dryness.    cefTRIAXone (ROCEPHIN) 2 g/50 mL PgBk IVPB Inject 50 mLs (2 g total) into the vein once daily. for 12 days    external catheter, male Misc Use condom catheter every night    furosemide (LASIX) 20 MG tablet Take 3 tablets (60 mg total) by mouth once daily.    insulin detemir U-100 (LEVEMIR FLEXTOUCH) 100 unit/mL (3 mL) SubQ InPn pen Inject 7 Units into the skin once daily.    latanoprost 0.005 % ophthalmic solution Place 1 drop into both eyes every evening.    lisinopriL 10 MG tablet Take 1 tablet (10 mg total) by mouth once daily. HOLD UNTIL SEE PCP    senna-docusate 8.6-50 mg (PERICOLACE) 8.6-50 mg per tablet Take 1 tablet by mouth 2 (two) times daily as needed for Constipation.    tamsulosin (FLOMAX) 0.4 mg Cap Take 1 capsule (0.4 mg total) by mouth once daily.     Family History     Problem Relation (Age of Onset)    Cerebral aneurysm Mother    Kidney failure Sister (96)    Other Father        Tobacco Use    Smoking status: Never Smoker    Smokeless tobacco: Never Used   Substance and Sexual Activity    Alcohol use: No    Drug use: Never    Sexual activity: Not Currently     Partners: Female     Review of Systems   Unable to perform ROS: Mental status change     Objective:     Vital Signs (Most  Recent):  Temp: 97.6 °F (36.4 °C) (07/21/20 0038)  Pulse: 79 (07/21/20 0038)  Resp: 16 (07/21/20 0038)  BP: (!) 95/50 (07/21/20 0038)  SpO2: 100 % (07/21/20 0038) Vital Signs (24h Range):  Temp:  [97.6 °F (36.4 °C)-98 °F (36.7 °C)] 97.6 °F (36.4 °C)  Pulse:  [72-86] 79  Resp:  [13-21] 16  SpO2:  [95 %-100 %] 100 %  BP: ()/(50-62) 95/50     Weight: 62 kg (136 lb 11 oz)  Body mass index is 20.18 kg/m².    Physical Exam  Vitals signs and nursing note reviewed.   Constitutional:       General: He is sleeping. He is not in acute distress.     Interventions: Nasal cannula in place.   HENT:      Mouth/Throat:      Mouth: Mucous membranes are dry.   Neck:      Musculoskeletal: No neck rigidity.   Cardiovascular:      Rate and Rhythm: Normal rate and regular rhythm.      Pulses: Normal pulses.      Heart sounds: Normal heart sounds. No murmur. No gallop.    Pulmonary:      Effort: Pulmonary effort is normal.      Breath sounds: Examination of the right-lower field reveals rales. Examination of the left-lower field reveals rales. Rales present.   Abdominal:      General: Abdomen is flat. Bowel sounds are normal. There is no distension.      Palpations: Abdomen is soft.      Tenderness: There is no abdominal tenderness. There is no guarding.   Musculoskeletal: Normal range of motion.         General: No tenderness or deformity.      Comments: Trace BLE ankle edema   Lymphadenopathy:      Cervical: No cervical adenopathy.   Skin:     General: Skin is warm and dry.      Findings: Bruising present.      Comments: Pronounced tenting of skin   Neurological:      Mental Status: He is lethargic.      GCS: GCS eye subscore is 2. GCS verbal subscore is 3. GCS motor subscore is 4.      Comments: Occasional myoclonic jerking   Psychiatric:         Behavior: Behavior is uncooperative.             Significant Labs:   CBC:   Recent Labs   Lab 07/20/20  1252   WBC 13.60*   HGB 13.0*   HCT 43.8   *     CMP:   Recent Labs   Lab  07/20/20  1537   *   K 3.8   *   CO2 24   *   BUN 91*   CREATININE 2.6*   CALCIUM 7.9*   PROT 6.4   ALBUMIN 3.1*   BILITOT 0.3   ALKPHOS 63   AST 21   ALT 13   ANIONGAP 14   EGFRNONAA 19.4*     Cardiac Markers:   Recent Labs   Lab 07/20/20  1252   *     Urine Studies:   Recent Labs   Lab 07/20/20  1735   COLORU Jacqueline   APPEARANCEUA Hazy*   PHUR 5.0   SPECGRAV 1.020   PROTEINUA Negative   GLUCUA Negative   KETONESU Trace*   BILIRUBINUA Negative   OCCULTUA Negative   NITRITE Negative   LEUKOCYTESUR Trace*   RBCUA 3   WBCUA 30*   BACTERIA Few*   SQUAMEPITHEL 2   HYALINECASTS 59*       Significant Imaging: I have reviewed all pertinent imaging results/findings within the past 24 hours.    Assessment/Plan:     * Hypernatremia  Likely secondary to over diuresis during recent CHF hospitalization.  Will cautiously rehydrate with small amount of IV fluids and reassess in a.m. with another BMP.  As his rehydration will likely be managed conservatively given his heart failure I doubt labs will need to be checked as frequently as they normally would when managing this issue.  BMP ordered daily for now, but if further fluids ordered in the morning, additional labs can be ordered for later in the day if so desired.      JAN (acute kidney injury)  Secondary to dehydration.  Holding ACE-inhibitor and diuretic, and cautiously rehydrating as mentioned above.  Rechecking BMP in morning.      Acute metabolic encephalopathy  Likely secondary to hypernatremia.  Counseled patient's daughter that he should return more to baseline once this is corrected.  Delirium precautions ordered.      Chronic hypoxemic respiratory failure  Will continue home oxygen at 2 L, titrating if needed.      UTI due to Klebsiella species  Likely cleared by now.  Urinalysis done tonight unimpressive, and unlikely to be related to acute presentation.  Will hold off on further antibiotics for now.      BPH with urinary  obstruction  Continue Flomax.  Avoid urinary catheterization if at all possible, as irritation or trauma to the prostate could result in further swelling which could make weaning off of catheter difficult.      Heart failure with preserved ejection fraction  Likely over diuresed during recent admission.  Holding ACE-inhibitor and context of JAN, and holding diuretic in setting of dehydration.  Will rehydrate cautiously.      DNR (do not resuscitate)  Patient DNR/DNI.  In the event of profound clinical deterioration, efforts will be directed to focus on comfort.      Essential hypertension  With low-normal blood pressures in context of profound dehydration will hold amlodipine for now.      Controlled type 2 diabetes mellitus without complication, without long-term current use of insulin  In context of JAN will monitor on low-dose sliding scale insulin for now.        VTE Risk Mitigation (From admission, onward)         Ordered     enoxaparin injection 30 mg  Every 24 hours      07/20/20 2333     IP VTE HIGH RISK PATIENT  Once      07/20/20 2333     Place sequential compression device  Until discontinued      07/20/20 2333                   Juan Barrett MD  Department of Hospital Medicine   [facility]

## 2020-07-21 NOTE — NURSING
Pt resting in bed little agitated restraint  in place to both wrist check no redness noted 2L via NC no SOB  Head to toe assessment completed   family at bedside call light within reach no acute distress noted

## 2020-07-21 NOTE — ASSESSMENT & PLAN NOTE
Likely over diuresed during recent admission.  Holding ACE-inhibitor and context of JAN, and holding diuretic in setting of dehydration.  Will rehydrate cautiously.

## 2020-07-21 NOTE — TELEPHONE ENCOUNTER
Spoke with Archana pt daughter and informed her that Dr Morgan is wolf of the pt being in-patient and is following the progress of the pt     Ruchi

## 2020-07-21 NOTE — ASSESSMENT & PLAN NOTE
With low-normal blood pressures in context of profound dehydration will hold amlodipine for now.

## 2020-07-21 NOTE — HPI
89 y/o male recently admitted here for heart failure exacerbation from 07/04 to 7/16 brought back to the ED from the skilled nursing facility where he was staying for decreased level of consciousness and not eating.  He is not able to provide any history so history is provided by his daughter at the bedside.  She has noticed that over the past couple of days he has mostly been sleeping and not doing anything.  During that admission he was treated for a Klebsiella urinary tract infection and bacteremia and was discharged to the skilled nursing facility with ceftriaxone with planned end date of 07/21.  He is DNR/DNI.

## 2020-07-21 NOTE — TELEPHONE ENCOUNTER
----- Message from Yeny Farris sent at 7/21/2020 11:45 AM CDT -----  Contact: Anne(daughter)[phone] or   Pt daughter states her father was admitted to the hospital for a UTI. Pt daughter states the pt is in room 610. Please call and advise.

## 2020-07-21 NOTE — ASSESSMENT & PLAN NOTE
Likely secondary to hypernatremia.  Counseled patient's daughter that he should return more to baseline once this is corrected.  Delirium precautions ordered.

## 2020-07-21 NOTE — ASSESSMENT & PLAN NOTE
Likely secondary to over diuresis during recent CHF hospitalization.  Will cautiously rehydrate with small amount of IV fluids and reassess in a.m. with another BMP.  As his rehydration will likely be managed conservatively given his heart failure I doubt labs will need to be checked as frequently as they normally would when managing this issue.  BMP ordered daily for now, but if further fluids ordered in the morning, additional labs can be ordered for later in the day if so desired.

## 2020-07-21 NOTE — PLAN OF CARE
CM conducted interview at bedside with patient's (daughter)Anne Forman . Pt is confused and restrained with bilaterally restraints. Pt is a [employer]  with very good family support.  Daughter stated he ws serviced by [facility] in the past.       07/21/20 1526   Discharge Assessment   Assessment Type Discharge Planning Assessment   Confirmed/corrected address and phone number on facesheet? Yes   Assessment information obtained from? Patient   Expected Length of Stay (days) 5   Communicated expected length of stay with patient/caregiver yes   Prior to hospitilization cognitive status: Not Oriented to Place;Not Oriented to Time   Prior to hospitalization functional status: Assistive Equipment   Current cognitive status: Not Oriented to Place;Not Oriented to Time   Current Functional Status: Assistive Equipment   Facility Arrived From: home   Lives With child(susana), adult   Able to Return to Prior Arrangements yes   Is patient able to care for self after discharge? No   Who are your caregiver(s) and their phone number(s)? Anne Forman (daughter) [phone]   Patient's perception of discharge disposition home or selfcare;home health   Readmission Within the Last 30 Days current reason for admission unrelated to previous admission   If yes, most recent facility name: [facility]   Patient currently receives any other outside agency services? Yes   How many hours a day does the patient receive services? 2   Name and contact number of agency or person providing outside services [facility]   Is it the patient/care giver preference to resume care with the current outside agency? Yes   Equipment Currently Used at Home walker, rolling;oxygen;wheelchair   Do you have any problems affording any of your prescribed medications? No   Is the patient taking medications as prescribed? yes   Does the patient have transportation home? Yes   Transportation Anticipated family or friend will provide   Does the  patient receive services at the [facility]? No   Discharge Plan A Home;Home Health   Discharge Plan B Home;Home Health   DME Needed Upon Discharge  none   Patient/Family in Agreement with Plan yes   Lyn Nguyễn, MSN  Case Management  Ext [phone]

## 2020-07-22 LAB
ANION GAP SERPL CALC-SCNC: 10 MMOL/L (ref 8–16)
BASOPHILS # BLD AUTO: 0.07 K/UL (ref 0–0.2)
BASOPHILS NFR BLD: 0.7 % (ref 0–1.9)
BUN SERPL-MCNC: 72 MG/DL (ref 10–30)
CALCIUM SERPL-MCNC: 8.1 MG/DL (ref 8.7–10.5)
CHLORIDE SERPL-SCNC: 111 MMOL/L (ref 95–110)
CO2 SERPL-SCNC: 25 MMOL/L (ref 23–29)
CREAT SERPL-MCNC: 1.7 MG/DL (ref 0.5–1.4)
DIFFERENTIAL METHOD: ABNORMAL
EOSINOPHIL # BLD AUTO: 0.2 K/UL (ref 0–0.5)
EOSINOPHIL NFR BLD: 1.4 % (ref 0–8)
ERYTHROCYTE [DISTWIDTH] IN BLOOD BY AUTOMATED COUNT: 14.8 % (ref 11.5–14.5)
EST. GFR  (AFRICAN AMERICAN): 37.4 ML/MIN/1.73 M^2
EST. GFR  (NON AFRICAN AMERICAN): 32.4 ML/MIN/1.73 M^2
GLUCOSE SERPL-MCNC: 308 MG/DL (ref 70–110)
HCT VFR BLD AUTO: 42.1 % (ref 40–54)
HGB BLD-MCNC: 12.4 G/DL (ref 14–18)
IMM GRANULOCYTES # BLD AUTO: 0.04 K/UL (ref 0–0.04)
IMM GRANULOCYTES NFR BLD AUTO: 0.4 % (ref 0–0.5)
LYMPHOCYTES # BLD AUTO: 3.5 K/UL (ref 1–4.8)
LYMPHOCYTES NFR BLD: 32.7 % (ref 18–48)
MAGNESIUM SERPL-MCNC: 2.5 MG/DL (ref 1.6–2.6)
MCH RBC QN AUTO: 27.6 PG (ref 27–31)
MCHC RBC AUTO-ENTMCNC: 29.5 G/DL (ref 32–36)
MCV RBC AUTO: 94 FL (ref 82–98)
MONOCYTES # BLD AUTO: 0.5 K/UL (ref 0.3–1)
MONOCYTES NFR BLD: 4.9 % (ref 4–15)
NEUTROPHILS # BLD AUTO: 6.5 K/UL (ref 1.8–7.7)
NEUTROPHILS NFR BLD: 59.9 % (ref 38–73)
NRBC BLD-RTO: 0 /100 WBC
PHOSPHATE SERPL-MCNC: 2.9 MG/DL (ref 2.7–4.5)
PLATELET # BLD AUTO: 309 K/UL (ref 150–350)
PMV BLD AUTO: 11.1 FL (ref 9.2–12.9)
POTASSIUM SERPL-SCNC: 3.9 MMOL/L (ref 3.5–5.1)
RBC # BLD AUTO: 4.49 M/UL (ref 4.6–6.2)
SODIUM SERPL-SCNC: 146 MMOL/L (ref 136–145)
WBC # BLD AUTO: 10.76 K/UL (ref 3.9–12.7)

## 2020-07-22 PROCEDURE — 11000001 HC ACUTE MED/SURG PRIVATE ROOM

## 2020-07-22 PROCEDURE — 80048 BASIC METABOLIC PNL TOTAL CA: CPT

## 2020-07-22 PROCEDURE — 83735 ASSAY OF MAGNESIUM: CPT

## 2020-07-22 PROCEDURE — 94761 N-INVAS EAR/PLS OXIMETRY MLT: CPT

## 2020-07-22 PROCEDURE — 85025 COMPLETE CBC W/AUTO DIFF WBC: CPT

## 2020-07-22 PROCEDURE — 84100 ASSAY OF PHOSPHORUS: CPT

## 2020-07-22 PROCEDURE — 25000003 PHARM REV CODE 250: Performed by: HOSPITALIST

## 2020-07-22 PROCEDURE — 36415 COLL VENOUS BLD VENIPUNCTURE: CPT

## 2020-07-22 PROCEDURE — 99233 PR SUBSEQUENT HOSPITAL CARE,LEVL III: ICD-10-PCS | Mod: ,,, | Performed by: INTERNAL MEDICINE

## 2020-07-22 PROCEDURE — 63600175 PHARM REV CODE 636 W HCPCS: Performed by: HOSPITALIST

## 2020-07-22 PROCEDURE — 63600175 PHARM REV CODE 636 W HCPCS: Performed by: INTERNAL MEDICINE

## 2020-07-22 PROCEDURE — 99233 SBSQ HOSP IP/OBS HIGH 50: CPT | Mod: ,,, | Performed by: INTERNAL MEDICINE

## 2020-07-22 PROCEDURE — 27000221 HC OXYGEN, UP TO 24 HOURS

## 2020-07-22 RX ORDER — AMLODIPINE BESYLATE 5 MG/1
5 TABLET ORAL DAILY
Status: DISCONTINUED | OUTPATIENT
Start: 2020-07-23 | End: 2020-07-22

## 2020-07-22 RX ORDER — SODIUM CHLORIDE AND POTASSIUM CHLORIDE 150; 450 MG/100ML; MG/100ML
INJECTION, SOLUTION INTRAVENOUS CONTINUOUS
Status: DISPENSED | OUTPATIENT
Start: 2020-07-22 | End: 2020-07-23

## 2020-07-22 RX ADMIN — ENOXAPARIN SODIUM 30 MG: 30 INJECTION SUBCUTANEOUS at 06:07

## 2020-07-22 RX ADMIN — TAMSULOSIN HYDROCHLORIDE 0.4 MG: 0.4 CAPSULE ORAL at 11:07

## 2020-07-22 RX ADMIN — POTASSIUM CHLORIDE AND SODIUM CHLORIDE: 450; 150 INJECTION, SOLUTION INTRAVENOUS at 04:07

## 2020-07-22 RX ADMIN — LATANOPROST 1 DROP: 50 SOLUTION OPHTHALMIC at 09:07

## 2020-07-22 NOTE — ASSESSMENT & PLAN NOTE
· Patient's blood pressure improved inpast 24 hours with hydration and no longer hypotensive.  Patient on admit was hypotensive due to dehydration so home meds of Lisinopril and Norvasc held.  · Goal for blood pressure is SBP < 140 and DBP < 90 as patient > or = 60 years of age and patient is diabetic based on JNC 8 guidelines.   · Plan to resume Norvasc 5 mg po daily on 7/23 now that BP improved. Continue to hold Lisinopril due to JAN.   · Plan is to monitor patient's blood pressure routinely while patient is hospitalized.

## 2020-07-22 NOTE — PLAN OF CARE
Pt resting in bed  VS stable tele monitoring Restraints continued. No acute distress noted   Problem: Fall Injury Risk  Goal: Absence of Fall and Fall-Related Injury  Outcome: Ongoing, Progressing     Problem: Restraint, Behavioral  Goal: Discontinuation Criteria Achieved  Outcome: Ongoing, Progressing  Goal: Personal Dignity and Safety Maintained  Outcome: Ongoing, Progressing     Problem: Adult Inpatient Plan of Care  Goal: Plan of Care Review  Outcome: Ongoing, Progressing  Goal: Patient-Specific Goal (Individualization)  Outcome: Ongoing, Progressing  Goal: Absence of Hospital-Acquired Illness or Injury  Outcome: Ongoing, Progressing  Goal: Optimal Comfort and Wellbeing  Outcome: Ongoing, Progressing  Goal: Readiness for Transition of Care  Outcome: Ongoing, Progressing  Goal: Rounds/Family Conference  Outcome: Ongoing, Progressing

## 2020-07-22 NOTE — ASSESSMENT & PLAN NOTE
Present on previous admit and completed treatment course of IV Rocephin on 7/21. Repeat blood and urine cultures on this admit both no growth so appears infection has cleared and no further antibiotics needed. Patient has no clinical signs of sepsis and WBC normal and patient afebrile.

## 2020-07-22 NOTE — SUBJECTIVE & OBJECTIVE
Interval History: Patient is much less confused today. Daughter, Anne at bedside and reports she was able to have a normal conversation with her dad today and she is very grateful that he seems to be doing better. I updated her on patient's care and labs results. Discussed with daughter that sodium improved to 146 as well as renal function now down to 72/1.7 from 90/2.6 yesterday. D5W infusion stopped plan to switch to 0.45 NS infusion as patient having issues with hyperglycemia and restarted patient back on his home Levemir dose of 7 units nightly to treat diabetes. Discussed with daughter that plan is IVF's for next 16 hours then stop 16 hours and then stop and will see if patient can maintain his own hydration after stopping. Need to make sure patient can adequately maintain hydration prior to consider any plans for discharge. Family definitely does not want any type of feeding tube so told daughter to try and encourage him to drink and eat as much as he can. Patient taken out of restraints as much more cooperative and lucid. Plan to continue to hold Lasix and Lisinopril. Patient is eating and drinking better today and eating about 25-40% of meals.    Review of Systems   Unable to perform ROS: Mental status change     Objective:     Vital Signs (Most Recent):  Temp: 98 °F (36.7 °C) (07/22/20 1125)  Pulse: 73 (07/22/20 1125)  Resp: 16 (07/22/20 1125)  BP: (!) 159/77 (07/22/20 1125)  SpO2: (!) 93 % (07/22/20 1125) on 2 liters Vital Signs (24h Range):  Temp:  [96.4 °F (35.8 °C)-98 °F (36.7 °C)] 98 °F (36.7 °C)  Pulse:  [70-80] 73  Resp:  [16-20] 16  SpO2:  [93 %-100 %] 93 %  BP: (120-159)/(56-82) 159/77     Weight: 61 kg (134 lb 7.7 oz)  Body mass index is 19.86 kg/m².    Intake/Output Summary (Last 24 hours) at 7/22/2020 1317  Last data filed at 7/22/2020 0600  Gross per 24 hour   Intake 593.3 ml   Output 800 ml   Net -206.7 ml      Physical Exam  Vitals signs and nursing note reviewed.   Constitutional:        General: He is not in acute distress.     Appearance: Normal appearance. He is well-developed and normal weight.   Neck:      Musculoskeletal: Neck supple.      Vascular: No JVD.   Cardiovascular:      Rate and Rhythm: Normal rate and regular rhythm.      Heart sounds: Normal heart sounds. No murmur. No friction rub. No gallop.    Pulmonary:      Effort: Pulmonary effort is normal. No respiratory distress.      Breath sounds: Normal breath sounds. No wheezing or rales.   Abdominal:      General: Abdomen is flat. Bowel sounds are normal. There is no distension.      Palpations: Abdomen is soft.      Tenderness: There is no abdominal tenderness. There is no guarding.   Musculoskeletal:      Right lower leg: No edema.      Left lower leg: No edema.   Skin:     Capillary Refill: Capillary refill takes less than 2 seconds.      Findings: No erythema or rash.   Neurological:      Mental Status: He is alert.      Comments: Oriented to person but not place or time. Patient did recognize his daughter by name at bedside.    Psychiatric:         Mood and Affect: Mood normal.         Behavior: Behavior normal.         Significant Labs:   CBC:   Recent Labs   Lab 07/21/20  0407 07/22/20  0616   WBC 10.87 10.76   HGB 12.7* 12.4*   HCT 42.3 42.1   * 309     CMP:   Recent Labs   Lab 07/20/20  1537 07/21/20  0407 07/22/20  0616   * 156* 146*   K 3.8 3.8 3.9   * 115* 111*   CO2 24 25 25   * 190* 308*   BUN 91* 90* 72*   CREATININE 2.6* 2.6* 1.7*   CALCIUM 7.9* 8.1* 8.1*   PROT 6.4  --   --    ALBUMIN 3.1*  --   --    BILITOT 0.3  --   --    ALKPHOS 63  --   --    AST 21  --   --    ALT 13  --   --    ANIONGAP 14 16 10   EGFRNONAA 19.4* 19.4* 32.4*     Magnesium:   Recent Labs   Lab 07/21/20  0407 07/22/20  0616   MG 2.7* 2.5     Blood Culture, Routine   Date Value Ref Range Status   07/20/2020 No Growth to date  Preliminary   07/20/2020 No Growth to date  Preliminary     Urine Culture, Routine   Date Value  Ref Range Status   07/20/2020 No growth  Final        Significant Imaging: I have reviewed all pertinent imaging results/findings within the past 24 hours.

## 2020-07-22 NOTE — PLAN OF CARE
07/22/20 0838   Post-Acute Status   Post-Acute Authorization Home Health   Home Health Status Awaiting Internal Medical Clearance

## 2020-07-22 NOTE — ASSESSMENT & PLAN NOTE
· Patient with hyperglycemia in hospital this am and likely related to D5W infusion that will be changed to 0.45 NS infusion today. Patient on Levemir 7 units nightly at home and will restart today, 7/21.   · Plan is to continue to monitor POCT glucose 4 times a day with each meal and at bedtime and cover with Novolog low dose sliding scale insulin.   · Target pre-meal glucose goal is <140 with all random glucoses <180 in non-critically ill patient.

## 2020-07-22 NOTE — PROGRESS NOTES
[facility] Hospital Medicine  Progress Note    Patient Name: Zbigniew Forman  MRN: [id number]  Patient Class: IP- Inpatient   Admission Date: 7/20/2020  Length of Stay: 2 days  Attending Physician: Claudia Snyder MD  Primary Care Provider: Blas Morgan Ii, MD    [facility] Medicine Team: [facility] MED K Claudia Snyder MD    Subjective:     Principal Problem:Dehydration with hypernatremia        HPI:  89 y/o male recently admitted here for heart failure exacerbation from 07/04 to 7/16 brought back to the ED from the skilled nursing facility where he was staying for decreased level of consciousness and not eating.  He is not able to provide any history so history is provided by his daughter at the bedside.  She has noticed that over the past couple of days he has mostly been sleeping and not doing anything.  During that admission he was treated for a Klebsiella urinary tract infection and bacteremia and was discharged to the skilled nursing facility with ceftriaxone with planned end date of 07/21.  He is DNR/DNI.    Overview/Hospital Course:  7/21: Patient bolused with 1 liter of D5W and started on D5W infusion at 100 cc/hr on admit to treat significant dehydration with sodium 156 on admit and JAN with BUN/Cr 90/2.6. Lasix discontinued and Lisinopril held for JAN. Patient very confused and disoriented and agitated and placed in soft wrist restraints.   7/22: Sodium improved to 146 and mental status much improved. D5W infusion stopped and switched to 0.45 NS infusion as patient having issues with hyperglycemia and restarted patient back on his home levemir dose of 7 units nightly. Plan infusion for 16 hours and then stop and will see if patient can maintain his own hydration after stopping. Patient taken out of restraints as much more cooperative and lucid. Bun/Cr improved to 72/1.7. Continue to hold Lasix and Lisinopril.     Interval History: Patient is much less confused today. Daughter,  Anne at bedside and reports she was able to have a normal conversation with her dad today and she is very grateful that he seems to be doing better. I updated her on patient's care and labs results. Discussed with daughter that sodium improved to 146 as well as renal function now down to 72/1.7 from 90/2.6 yesterday. D5W infusion stopped plan to switch to 0.45 NS infusion as patient having issues with hyperglycemia and restarted patient back on his home Levemir dose of 7 units nightly to treat diabetes. Discussed with daughter that plan is IVF's for next 16 hours then stop 16 hours and then stop and will see if patient can maintain his own hydration after stopping. Need to make sure patient can adequately maintain hydration prior to consider any plans for discharge. Family definitely does not want any type of feeding tube so told daughter to try and encourage him to drink and eat as much as he can. Patient taken out of restraints as much more cooperative and lucid. Plan to continue to hold Lasix and Lisinopril. Patient is eating and drinking better today and eating about 25-40% of meals.    Review of Systems   Unable to perform ROS: Mental status change     Objective:     Vital Signs (Most Recent):  Temp: 98 °F (36.7 °C) (07/22/20 1125)  Pulse: 73 (07/22/20 1125)  Resp: 16 (07/22/20 1125)  BP: (!) 159/77 (07/22/20 1125)  SpO2: (!) 93 % (07/22/20 1125) on 2 liters Vital Signs (24h Range):  Temp:  [96.4 °F (35.8 °C)-98 °F (36.7 °C)] 98 °F (36.7 °C)  Pulse:  [70-80] 73  Resp:  [16-20] 16  SpO2:  [93 %-100 %] 93 %  BP: (120-159)/(56-82) 159/77     Weight: 61 kg (134 lb 7.7 oz)  Body mass index is 19.86 kg/m².    Intake/Output Summary (Last 24 hours) at 7/22/2020 1317  Last data filed at 7/22/2020 0600  Gross per 24 hour   Intake 593.3 ml   Output 800 ml   Net -206.7 ml      Physical Exam  Vitals signs and nursing note reviewed.   Constitutional:       General: He is not in acute distress.     Appearance: Normal  appearance. He is well-developed and normal weight.   Neck:      Musculoskeletal: Neck supple.      Vascular: No JVD.   Cardiovascular:      Rate and Rhythm: Normal rate and regular rhythm.      Heart sounds: Normal heart sounds. No murmur. No friction rub. No gallop.    Pulmonary:      Effort: Pulmonary effort is normal. No respiratory distress.      Breath sounds: Normal breath sounds. No wheezing or rales.   Abdominal:      General: Abdomen is flat. Bowel sounds are normal. There is no distension.      Palpations: Abdomen is soft.      Tenderness: There is no abdominal tenderness. There is no guarding.   Musculoskeletal:      Right lower leg: No edema.      Left lower leg: No edema.   Skin:     Capillary Refill: Capillary refill takes less than 2 seconds.      Findings: No erythema or rash.   Neurological:      Mental Status: He is alert.      Comments: Oriented to person but not place or time. Patient did recognize his daughter by name at bedside.    Psychiatric:         Mood and Affect: Mood normal.         Behavior: Behavior normal.         Significant Labs:   CBC:   Recent Labs   Lab 07/21/20  0407 07/22/20  0616   WBC 10.87 10.76   HGB 12.7* 12.4*   HCT 42.3 42.1   * 309     CMP:   Recent Labs   Lab 07/20/20  1537 07/21/20  0407 07/22/20  0616   * 156* 146*   K 3.8 3.8 3.9   * 115* 111*   CO2 24 25 25   * 190* 308*   BUN 91* 90* 72*   CREATININE 2.6* 2.6* 1.7*   CALCIUM 7.9* 8.1* 8.1*   PROT 6.4  --   --    ALBUMIN 3.1*  --   --    BILITOT 0.3  --   --    ALKPHOS 63  --   --    AST 21  --   --    ALT 13  --   --    ANIONGAP 14 16 10   EGFRNONAA 19.4* 19.4* 32.4*     Magnesium:   Recent Labs   Lab 07/21/20  0407 07/22/20  0616   MG 2.7* 2.5     Blood Culture, Routine   Date Value Ref Range Status   07/20/2020 No Growth to date  Preliminary   07/20/2020 No Growth to date  Preliminary     Urine Culture, Routine   Date Value Ref Range Status   07/20/2020 No growth  Final         Significant Imaging: I have reviewed all pertinent imaging results/findings within the past 24 hours.      Assessment/Plan:      * Dehydration with hypernatremia  · 7/22: Much improved and down to 146 on 7/22. Stop D5W infusion as patient with issues with hyperglycemia  and switch to 0.45 NS infusion at 75 cc/hr for another 16 hours then stop and see if patient can then maintain his own oral hydration. Need to be careful as he has known heart failure with IVF's so ideally would like to try and keep to oral hydration alone.   · Sodium 156 on admit on 7/21. Likely secondary to over diuresis during recent CHF hospitalization. Home dose of Lasix and Lisinopril on hold.   · Patient given 1 liter of D5W bolus on 7/21 and started on D5W infusion at 100 cc/hr. Condom cath in place to monitor urine output.     Acute metabolic encephalopathy  · Related to hypernatremia. Much improved on 7/22 now that sodium down to 146 from 156 on admit. Patient more cooperative and daughter reports he is more like himself and she was able to have a normal conversation with her father.  · Plan to discontinue wrist restraints as patient doing much better.  · Continue delirium precautions.       JAN (acute kidney injury)  · 7/22: Much improved and down to 72/1.7 with IV hydration.  · 7/21:Secondary to dehydration and prerenal azotemia. BUN/CR 90/2.6 on admit. Lisinopril and Lasix held. Condom cath placed and patient with good urine output. Patient started on IVF's to treat dehydration. Monitor with daily BMP.   · Avoid nephrotoxic agents.      UTI due to Klebsiella species  Present on previous admit and completed treatment course of IV Rocephin on 7/21. Repeat blood and urine cultures on this admit both no growth so appears infection has cleared and no further antibiotics needed. Patient has no clinical signs of sepsis and WBC normal and patient afebrile.       Chronic hypoxemic respiratory failure  Present on admit and controlled. Continue  oxygen at 2 liters as needed as patient on oxygen at home. Titrate to keep sats > 90%.       Controlled type 2 diabetes mellitus without complication, with long-term current use of insulin  · Patient with hyperglycemia in hospital this am and likely related to D5W infusion that will be changed to 0.45 NS infusion today. Patient on Levemir 7 units nightly at home and will restart today, 7/21.   · Plan is to continue to monitor POCT glucose 4 times a day with each meal and at bedtime and cover with Novolog low dose sliding scale insulin.   · Target pre-meal glucose goal is <140 with all random glucoses <180 in non-critically ill patient.    Essential hypertension  · Patient's blood pressure improved inpast 24 hours with hydration and no longer hypotensive.  Patient on admit was hypotensive due to dehydration so home meds of Lisinopril and Norvasc held.  · Goal for blood pressure is SBP < 140 and DBP < 90 as patient > or = 60 years of age and patient is diabetic based on JNC 8 guidelines.   · Continue to hold BP meds due to relative hypotension and dehydration.   · Plan is to monitor patient's blood pressure routinely while patient is hospitalized.    Heart failure with preserved ejection fraction  Likely over diuresed during recent admission. Patient euvolemic on exam today but plan to stop IVF's tomorrow morning on 7/23 so do not overhydrate patient. Continue to hold Lasix due to dehydration and Lisinopril due to JAN to treat HFpEF and will not to see when and if can safely resume diuretics.     BPH with urinary obstruction  Chronic and controlled and continue Flomax daily to treat.  Avoid urinary catheterization if at all possible, as irritation or trauma to the prostate could result in further swelling which could make weaning off of catheter difficult. Patient with condo cath in place and patient with good urine output.       Debility  Consult PT/OT to evaluate for discharge planning.       DNR (do not  resuscitate)  Patient DNR/DNI.  In the event of profound clinical deterioration, efforts will be directed to focus on comfort. Discussed with patient's daughter, Anne on 7/21. She also expressed did not desire any type of feeding tube for patient.      VTE Risk Mitigation (From admission, onward)         Ordered     enoxaparin injection 30 mg  Every 24 hours      07/20/20 2333     IP VTE HIGH RISK PATIENT  Once      07/20/20 2333     Place sequential compression device  Until discontinued      07/20/20 2333              Discharge planning: PT/OT consulted to evaluate now that patient more cooperative and less confused. Continue delirium precautions. Continue to hydrate patient. Restart home insulin dosing today with Levemir 7 units nightly to treat hyperglycemia. Will need to evaluate daily to determine when will be medically ready for discharge bit not medically ready at this time.       Claudia Snyder MD  Department of Hospital Medicine   [facility]

## 2020-07-22 NOTE — ASSESSMENT & PLAN NOTE
Chronic and controlled and continue Flomax daily to treat.  Avoid urinary catheterization if at all possible, as irritation or trauma to the prostate could result in further swelling which could make weaning off of catheter difficult. Patient with condo cath in place and patient with good urine output.

## 2020-07-22 NOTE — PROGRESS NOTES
Progress Note   Hospital Medicine         Patient Name: Zbigniew Forman  MRN:  [id number]  [facility] Medicine Team: [facility] MED K Prachi Mallory MD  Date of Admission:  7/20/2020     Length of Stay:  LOS: 2 days   Expected Discharge Date: 7/27/2020  Principal Problem:  Dehydration with hypernatremia       Subjective:     Interval History/Overnight Events:  Nursing reports he is much more alert, talkative and with it than yesterday when he slept most of day. Per daughter even yesterday was a better day than day prior, he was able to tell us both his daughters names and which one visited yesterday and hold a cnoversation which he could not do on previous days. He was able to ask for water throughout day yesterday, he reports he is thirsty now, has multiple powerades at bedside, nursing assisting eating breakfast today, he reports good appetite today. Na still 147 similar to yesterday (146) but clinically hydrated it seems better, BUN/Cr still improving at 60/1.4 from previous, Cr nearly at baseline of 1.2, overnight had some mild agitation so levemir was held, glucose has improved in mid 100s as the highs yesterday were from D5W, watch trends and plan to resume this PM, cover with SSI in interim. PT/OT ordered to reassess how he does with chronic deconditioning as family prefers HH given recent NH stay and prefer this to it, will ensure that not wildly unsteady or unsafe with the sessions today now and that he can continue to hydrate himself and express his needs to nursing as his encephalopathy seemed to follow his hydration/electrolytes as all seem on the right track. Will call daughter or see if is at bedside this afternoon to update.      Review of Systems   Constitutional: Negative for chills, fatigue, fever.   HENT: Negative for sore throat, trouble swallowing.    Eyes: Negative for photophobia, visual disturbance.   Respiratory: Negative for cough, shortness of breath.    Cardiovascular: Negative for chest  pain, palpitations, leg swelling.   Gastrointestinal: Negative for abdominal pain, constipation, diarrhea, nausea, vomiting.   Endocrine: Negative for cold intolerance, heat intolerance.   Genitourinary: Negative for dysuria, frequency.   Musculoskeletal: Negative for arthralgias, myalgias.   Skin: Negative for rash, wound, erythema   Neurological: Negative for dizziness, syncope, weakness, light-headedness.   Psychiatric/Behavioral: + for confusion, hallucinations, anxiety  All other systems reviewed and are negative.    Objective:     Temp:  [97 °F (36.1 °C)-98 °F (36.7 °C)]   Pulse:  [68-80]   Resp:  [15-18]   BP: ()/(53-82)   SpO2:  [93 %-100 %]       Physical Exam:  Constitutional: Appears well-developed and well-nourished. confusion at times, improving from previous. Pleasant. Eating breakfast. Able to recall details about previous day, family.  Head: Normocephalic and atraumatic.   Mouth/Throat: Oropharynx is clear and moist.   Eyes: EOM are normal. Pupils are equal, round, and reactive to light. No scleral icterus.   Neck: Normal range of motion. Neck supple.   Cardiovascular: Normal rate and regular rhythm.  No murmur heard.  Pulmonary/Chest: Effort normal and breath sounds normal. No respiratory distress. No wheezes, rales, or rhonchi  Abdominal: Soft. Bowel sounds are normal.  No distension or tenderness  Musculoskeletal: Normal range of motion. No edema.   Neurological: Alert and oriented to person, place, but not time. knows his daughter when present in room. Can recall events from previous days.  Skin: Skin is warm and dry.   Psychiatric: Normal mood and affect. Behavior is normal.     Recent Labs   Lab 07/20/20  1252 07/21/20  0407 07/22/20  0616 07/23/20  0311   WBC 13.60* 10.87 10.76 9.24   HGB 13.0* 12.7* 12.4* 12.8*   HCT 43.8 42.3 42.1 43.2   * 358* 309 266     Recent Labs   Lab 07/21/20  0407 07/22/20  0616 07/23/20  0311   * 146* 147*   K 3.8 3.9 3.8   * 111* 114*   CO2  25 25 24   BUN 90* 72* 60*   CREATININE 2.6* 1.7* 1.4   * 308* 159*   CALCIUM 8.1* 8.1* 7.7*   MG 2.7* 2.5 2.4   PHOS 5.0* 2.9 2.5*     Recent Labs   Lab 07/16/20  0532 07/20/20  1537   ALKPHOS 64 63   ALT 9* 13   AST 12 21   ALBUMIN 2.9* 3.1*   PROT 6.2 6.4   BILITOT 0.3 0.3     No results for input(s): POCTGLUCOSE in the last 168 hours.     enoxaparin  30 mg Subcutaneous Q24H    insulin detemir U-100  7 Units Subcutaneous QHS    latanoprost  1 drop Both Eyes QHS    tamsulosin  0.4 mg Oral Daily       Assessment and Plan     Mr. Zbigniew Forman is a 89 y.o. male who presented to [facility] on 7/20/2020 with     Hospital Course:    Mr. Zbigniew Forman was admitted to Hospital Medicine for management of dehydration/acute metabolic encephalopathy.    Dehydration with hypernatremia  · Sodium 156 on admit on 7/21. Likely secondary to over diuresis during recent CHF, holding lasix and lisinopril on admission, D5W started and condom cath to watch output (do NOT place whiteside for output due to BPH at baseline). Na improving with hydration as is mental status, down to 146-147, BUN from 90 to 72 and Cr also improving with hydration as well  · Converted to oral hydration only 7/23 and doing well, multiple powerades on exam, nursing reports he can expresss his needs for water, Na the same still, so will reassess what that does with oral hydration today. Appetite good on exam and mental status improved     Acute metabolic encephalopathy  · Secondary to hypernatremia as much closer to baseline as Na has continued to improve with hydration, required restraints on admit but removed 7/22 as now resolving.  · 7/23: mild agitation overnight but redirected. Able to have conversation today, recall events, discuss 2 daughters. Seems much closer to baseline now nursing states today much better than yesterday  · Continue delirium precautions, redirect PRN         JAN (acute kidney injury)  · Secondary to dehydration, BUN 90, Cr  2.6 on admission (baseline Cr 1.2)  · Improving, 72/1.7-->60/1.4. Continue to trend in condom cath, IVF and will watch to ensure can also orally hydrate now more alert.          Hstory UTI due to Klebsiella species  -on prior admit, completed rocephin 7/21  -Cx from blood and urine now NG, given dose x 1 in ER of vanc, zosyn, no signs sepsis now, no further needs at this time   .         Chronic hypoxemic respiratory failure  -on 2L at home, continue now. CXR without acute abnormalities on admit         Controlled type 2 diabetes mellitus without complication, with long-term current use of insulin  · On levemir 7U at home, glucose controlled initially until required D5W for dehydation, glucose higher in 200s with that, converted off on 7/22, resumed levemir  · 7/23: agitated so levemir not given overnight, glucose 140s this AM, would cover SSI and eating now so will plan to give home regimen tonight if toleates  · -trend SSI, accuchecks.     Essential hypertension  · Lower BP on admit from dehydration, home lisinopril/norvasc held since admit  · -restart norvasc 7/23, cont to hold lisinopril as JAN still improving. BPs 100s-115 systolics mostly       Chronic diastolic Heart failure with preserved ejection fraction  - over diuresed last week on admission with cardiology as lytes off/JAN on admit here.   -cautious IVF on admit but no signs of overload and needed the fluids, change to oral hydration for 7/23. -Continue to hold Lasix due to dehydration and Lisinopril due to JAN      BPH with urinary obstruction  -continue flomax  -Avoid urinary catheterization unless SIGNIFICANT urinary retention as would be difficult to remove with his history, condom cath is working fine now         Debility  PT/OT recs pending, was at SNF before, fam prefers HH now, will se how much hes able to do safely to ensure this is safe alternative now         DNR (do not resuscitate)  Patient DNR/DNI.  In the event of profound clinical  deterioration, efforts will be directed to focus on comfort. Discussed with patient's daughter, Anne on 7/21. She also expressed did not desire any type of feeding tube for patient.      Diet:  Low sodium      DVT PPx:  lovenox  Goals of Care:  DNR/DNI      Disposition: daughter prefers HH when medically stable pending PT recs to ensure is safe option for him now  Ensure orally hydating continues on track today, this aM was doing well with it with nursing assist  Mental status improving  Will update daughter later this afternoon  Plan for HH when med stable, earliest this weekend I suspect

## 2020-07-22 NOTE — ASSESSMENT & PLAN NOTE
· 7/22: Much improved and down to 146 on 7/22. Stop D5W infusion as patient with issues with hyperglycemia  and switch to 0.45 NS infusion at 75 cc/hr for another 16 hours then stop and see if patient can then maintain his own oral hydration. Need to be careful as he has known heart failure with IVF's so ideally would like to try and keep to oral hydration alone.   · Sodium 156 on admit on 7/21. Likely secondary to over diuresis during recent CHF hospitalization. Home dose of Lasix and Lisinopril on hold.   · Patient given 1 liter of D5W bolus on 7/21 and started on D5W infusion at 100 cc/hr. Condom cath in place to monitor urine output.

## 2020-07-22 NOTE — ASSESSMENT & PLAN NOTE
· 7/22: Much improved and down to 72/1.7 with IV hydration.  · 7/21:Secondary to dehydration and prerenal azotemia. BUN/CR 90/2.6 on admit. Lisinopril and Lasix held. Condom cath placed and patient with good urine output. Patient started on IVF's to treat dehydration. Monitor with daily BMP.   · Avoid nephrotoxic agents.

## 2020-07-22 NOTE — ASSESSMENT & PLAN NOTE
· Related to hypernatremia. Much improved on 7/22 now that sodium down to 146 from 156 on admit. Patient more cooperative and daughter reports he is more like himself and she was able to have a normal conversation with her father.  · Plan to discontinue wrist restraints as patient doing much better.  · Continue delirium precautions.

## 2020-07-22 NOTE — ASSESSMENT & PLAN NOTE
· Patient's blood pressure improved inpast 24 hours with hydration and no longer hypotensive.  Patient on admit was hypotensive due to dehydration so home meds of Lisinopril and Norvasc held.  · Goal for blood pressure is SBP < 140 and DBP < 90 as patient > or = 60 years of age and patient is diabetic based on JNC 8 guidelines.   · Continue to hold BP meds due to relative hypotension and dehydration.   · Plan is to monitor patient's blood pressure routinely while patient is hospitalized.

## 2020-07-22 NOTE — ASSESSMENT & PLAN NOTE
Present on admit and controlled. Continue oxygen at 2 liters as needed as patient on oxygen at home. Titrate to keep sats > 90%.

## 2020-07-22 NOTE — ASSESSMENT & PLAN NOTE
Likely over diuresed during recent admission. Patient euvolemic on exam today but plan to stop IVF's tomorrow morning on 7/23 so do not overhydrate patient. Continue to hold Lasix due to dehydration and Lisinopril due to JAN to treat HFpEF and will not to see when and if can safely resume diuretics.

## 2020-07-22 NOTE — ASSESSMENT & PLAN NOTE
Patient DNR/DNI.  In the event of profound clinical deterioration, efforts will be directed to focus on comfort. Discussed with patient's daughter, Anne on 7/21. She also expressed did not desire any type of feeding tube for patient.

## 2020-07-23 LAB
ANION GAP SERPL CALC-SCNC: 9 MMOL/L (ref 8–16)
BASOPHILS # BLD AUTO: 0.1 K/UL (ref 0–0.2)
BASOPHILS NFR BLD: 1.1 % (ref 0–1.9)
BUN SERPL-MCNC: 60 MG/DL (ref 10–30)
CALCIUM SERPL-MCNC: 7.7 MG/DL (ref 8.7–10.5)
CHLORIDE SERPL-SCNC: 114 MMOL/L (ref 95–110)
CO2 SERPL-SCNC: 24 MMOL/L (ref 23–29)
CREAT SERPL-MCNC: 1.4 MG/DL (ref 0.5–1.4)
DIFFERENTIAL METHOD: ABNORMAL
EOSINOPHIL # BLD AUTO: 0.2 K/UL (ref 0–0.5)
EOSINOPHIL NFR BLD: 1.8 % (ref 0–8)
ERYTHROCYTE [DISTWIDTH] IN BLOOD BY AUTOMATED COUNT: 14.9 % (ref 11.5–14.5)
EST. GFR  (AFRICAN AMERICAN): 47.3 ML/MIN/1.73 M^2
EST. GFR  (NON AFRICAN AMERICAN): 40.9 ML/MIN/1.73 M^2
GLUCOSE SERPL-MCNC: 159 MG/DL (ref 70–110)
HCT VFR BLD AUTO: 43.2 % (ref 40–54)
HGB BLD-MCNC: 12.8 G/DL (ref 14–18)
IMM GRANULOCYTES # BLD AUTO: 0.14 K/UL (ref 0–0.04)
IMM GRANULOCYTES NFR BLD AUTO: 1.5 % (ref 0–0.5)
LYMPHOCYTES # BLD AUTO: 3.5 K/UL (ref 1–4.8)
LYMPHOCYTES NFR BLD: 38.3 % (ref 18–48)
MAGNESIUM SERPL-MCNC: 2.4 MG/DL (ref 1.6–2.6)
MCH RBC QN AUTO: 29 PG (ref 27–31)
MCHC RBC AUTO-ENTMCNC: 29.6 G/DL (ref 32–36)
MCV RBC AUTO: 98 FL (ref 82–98)
MONOCYTES # BLD AUTO: 0.6 K/UL (ref 0.3–1)
MONOCYTES NFR BLD: 6.4 % (ref 4–15)
NEUTROPHILS # BLD AUTO: 4.7 K/UL (ref 1.8–7.7)
NEUTROPHILS NFR BLD: 50.9 % (ref 38–73)
NRBC BLD-RTO: 0 /100 WBC
PHOSPHATE SERPL-MCNC: 2.5 MG/DL (ref 2.7–4.5)
PLATELET # BLD AUTO: 266 K/UL (ref 150–350)
PMV BLD AUTO: 11 FL (ref 9.2–12.9)
POCT GLUCOSE: 163 MG/DL (ref 70–110)
POCT GLUCOSE: 235 MG/DL (ref 70–110)
POTASSIUM SERPL-SCNC: 3.8 MMOL/L (ref 3.5–5.1)
RBC # BLD AUTO: 4.42 M/UL (ref 4.6–6.2)
SODIUM SERPL-SCNC: 147 MMOL/L (ref 136–145)
WBC # BLD AUTO: 9.24 K/UL (ref 3.9–12.7)

## 2020-07-23 PROCEDURE — 97530 THERAPEUTIC ACTIVITIES: CPT

## 2020-07-23 PROCEDURE — 27000221 HC OXYGEN, UP TO 24 HOURS

## 2020-07-23 PROCEDURE — 83735 ASSAY OF MAGNESIUM: CPT

## 2020-07-23 PROCEDURE — 25000003 PHARM REV CODE 250: Performed by: HOSPITALIST

## 2020-07-23 PROCEDURE — 99900035 HC TECH TIME PER 15 MIN (STAT)

## 2020-07-23 PROCEDURE — 11000001 HC ACUTE MED/SURG PRIVATE ROOM

## 2020-07-23 PROCEDURE — C9399 UNCLASSIFIED DRUGS OR BIOLOG: HCPCS | Performed by: INTERNAL MEDICINE

## 2020-07-23 PROCEDURE — 99233 PR SUBSEQUENT HOSPITAL CARE,LEVL III: ICD-10-PCS | Mod: ,,, | Performed by: HOSPITALIST

## 2020-07-23 PROCEDURE — 85025 COMPLETE CBC W/AUTO DIFF WBC: CPT

## 2020-07-23 PROCEDURE — 84100 ASSAY OF PHOSPHORUS: CPT

## 2020-07-23 PROCEDURE — 36415 COLL VENOUS BLD VENIPUNCTURE: CPT

## 2020-07-23 PROCEDURE — 25000003 PHARM REV CODE 250: Performed by: INTERNAL MEDICINE

## 2020-07-23 PROCEDURE — 99233 SBSQ HOSP IP/OBS HIGH 50: CPT | Mod: ,,, | Performed by: HOSPITALIST

## 2020-07-23 PROCEDURE — 94761 N-INVAS EAR/PLS OXIMETRY MLT: CPT

## 2020-07-23 PROCEDURE — 80048 BASIC METABOLIC PNL TOTAL CA: CPT

## 2020-07-23 PROCEDURE — 97162 PT EVAL MOD COMPLEX 30 MIN: CPT

## 2020-07-23 PROCEDURE — 97165 OT EVAL LOW COMPLEX 30 MIN: CPT

## 2020-07-23 PROCEDURE — 63600175 PHARM REV CODE 636 W HCPCS: Performed by: HOSPITALIST

## 2020-07-23 RX ADMIN — ENOXAPARIN SODIUM 30 MG: 30 INJECTION SUBCUTANEOUS at 04:07

## 2020-07-23 RX ADMIN — INSULIN DETEMIR 7 UNITS: 100 INJECTION, SOLUTION SUBCUTANEOUS at 08:07

## 2020-07-23 RX ADMIN — TAMSULOSIN HYDROCHLORIDE 0.4 MG: 0.4 CAPSULE ORAL at 09:07

## 2020-07-23 RX ADMIN — INSULIN ASPART 1 UNITS: 100 INJECTION, SOLUTION INTRAVENOUS; SUBCUTANEOUS at 08:07

## 2020-07-23 NOTE — NURSING
NO VISI MONITOR NOTED ON PATIENT AT ASSUMPTION OF CARE. WILL ASSESS VITAL SIGNS PER UNIT'S POLICY.     PATIENT REFUSED TO ALLOW TELEMETRY MONITOR TO BE REPLACED. WILL NOTIFY MD FOR DISCONTINUATION ORDERS.

## 2020-07-23 NOTE — PLAN OF CARE
WILL CONTINUE WITH PLAN OF CARE'S INTERVENTIONS TOWARDS GOALS FOR PATIENT'S OPTIMAL HEALTH:    NO FALLS  MORE ORIENTED  IV FLUIDS DISCONTINUED  FREQUENT REPOSITIONING ENCOURAGED

## 2020-07-23 NOTE — PLAN OF CARE
CM to review chart, pt will d/c home with family and HH. Great family support will cont to follow      07/23/20 1326   Discharge Reassessment   Assessment Type Discharge Planning Reassessment   Provided patient/caregiver education on the expected discharge date and the discharge plan Yes   Do you have any problems affording any of your prescribed medications? No   Discharge Plan A Home;Home Health   Discharge Plan B Home;Home with family;Home Health   DME Needed Upon Discharge  none   Anticipated Discharge Disposition Home-Health   Can the patient/caregiver answer the patient profile reliably? No, cognitively impaired   How does the patient rate their overall health at the present time? Fair   Describe the patient's ability to walk at the present time. Walks with the help of equipment   How often would a person be available to care for the patient? Whenever needed   Number of comorbid conditions (as recorded on the chart) Three   Post-Acute Status   Post-Acute Authorization HME;Home Health   HME Status Awaiting Internal medical Clearance   Home Health Status Referrals Sent   Discharge Delays None known at this time   Lyn Nguyễn, MSN  Case Management  Ext [phone]

## 2020-07-23 NOTE — PLAN OF CARE
Pt resting in bed  VS stable tele monitoring incontinent of bowel and bladder noted pt made several request for  water pt tolerated well call button within reach no acute distress noted  Problem: Fall Injury Risk  Goal: Absence of Fall and Fall-Related Injury  Outcome: Ongoing, Progressing     Problem: Restraint, Behavioral  Goal: Discontinuation Criteria Achieved  Outcome: Ongoing, Progressing  Goal: Personal Dignity and Safety Maintained  Outcome: Ongoing, Progressing     Problem: Adult Inpatient Plan of Care  Goal: Plan of Care Review  Outcome: Ongoing, Progressing  Goal: Patient-Specific Goal (Individualization)  Outcome: Ongoing, Progressing  Goal: Absence of Hospital-Acquired Illness or Injury  Outcome: Ongoing, Progressing  Goal: Optimal Comfort and Wellbeing  Outcome: Ongoing, Progressing  Goal: Readiness for Transition of Care  Outcome: Ongoing, Progressing  Goal: Rounds/Family Conference  Outcome: Ongoing, Progressing   continued. No acute distress noted

## 2020-07-23 NOTE — PLAN OF CARE
07/23/20 0842   Post-Acute Status   Post-Acute Authorization Home Health   Home Health Status Awaiting Internal Medical Clearance

## 2020-07-23 NOTE — NURSING
"NOTIFIED BY PCT THAT PATIENT'S DAUGHTER REFUSED BLOOD GLUCOSE ASSESSMENT STATING THAT PATIENT'S "FINGERS ARE REALLY SORE."   "

## 2020-07-23 NOTE — PT/OT/SLP EVAL
Occupational Therapy   Evaluation    Name: Zbigniew Forman  MRN: [id number]  Admitting Diagnosis:  Dehydration with hypernatremia      Recommendations:     Discharge Recommendations: home health OT(with 24hr family support vs NH ( pending family wishes))  Discharge Equipment Recommendations:  none  Barriers to discharge:  None    Assessment:     Zbigniew Forman is a 89 y.o. male with a medical diagnosis of Dehydration with hypernatremia.  He presents alert and willing to participate in therapy session. Upon arrival, pt found supine with no complaints. Pt visually impaired and requiring increased assistance with ADL and functional mobility. Performance deficits affecting function: weakness, impaired endurance, impaired self care skills, gait instability, impaired functional mobilty, impaired balance, decreased safety awareness, impaired cognition.   * Attempted to call family to update on pts currently functional level and discuss d/c recommendation however daughter did not answer     Rehab Prognosis: Good; patient would benefit from acute skilled OT services to address these deficits and reach maximum level of function.       Plan:     Patient to be seen   to address the above listed problems via self-care/home management, therapeutic activities, therapeutic exercises, neuromuscular re-education  · Plan of Care Expires: 08/21/20  · Plan of Care Reviewed with: patient    Subjective     Chief Complaint: No complaints   Patient/Family Comments/goals: Return to PLOF    Occupational Profile: Per chart review--  Pt lives w/DTR in 2 story home w/chair lift to access 2nd floor bed/bathroom.   Prior to admission, patients level of function was requiring assist w/dressing, bathing, and Sit-to-Stands. Notes indicate pt was ambulatory household distances prior to July 2020 w/Rollator using w/c for community distances.  Equipment used at home: walker, rolling, rollator, shower chair, wheelchair, grab bar.  DME owned (not  currently used): none.  Upon discharge, patient will have assistance from family.    Pain/Comfort:  · Pain Rating 1: 0/10    Patients cultural, spiritual, Restorationist conflicts given the current situation: no    Objective:     Communicated with: RN prior to session.  Patient found supine with bed alarm upon OT entry to room.    General Precautions: Standard, fall   Orthopedic Precautions:N/A   Braces: N/A     Occupational Performance:    Bed Mobility:    · Patient completed Rolling/Turning to Right with maximal assistance  · Patient completed Supine to Sit with maximal assistance  · Patient completed Sit to Supine with maximal assistance    Functional Mobility/Transfers:  · Patient completed Sit <> Stand Transfer with maximal assistance  with  no assistive device  ( pt with significant posterior lean)     · Functional Mobility: SLOAN, 2/2 poor standing balance    Activities of Daily Living:  · Feeding:  maximal assistance to feed self while supine with HOB raised using RUE; pt required hand over hand assist; pt with visual deficits -- unable to see items on plate  · Upper Body Dressing: maximal assistance to akbar gown like jacket while seated EOB  · Lower Body Dressing: maximal assistance to doff brief while standing    Cognitive/Visual Perceptual:  Cognitive/Psychosocial Skills:     -       Oriented to: Person   -       Follows Commands/attention:Inattentive, Easily distracted and Follows one-step commands  -       Communication: clear/fluent  -       Safety awareness/insight to disability: impaired   -       Mood/Affect/Coping skills/emotional control: Pleasant/ confused  Visual/Perceptual:      -Impaired  acuity     Physical Exam:  Postural examination/scapula alignment:    -       Rounded shoulders  Skin integrity: Visible skin intact  Edema:  None noted  Dominant hand:    -       RUE  Upper Extremity Range of Motion:     -       Right Upper Extremity: WFL  -       Left Upper Extremity: WFL  Upper Extremity  Strength:    -       Right Upper Extremity: WFL 4+/5  -       Left Upper Extremity: WFL  4+/5   Strength:    -       Right Upper Extremity: WFL  -       Left Upper Extremity: WFL    AMPAC 6 Click ADL:  AMPAC Total Score: 9    Treatment & Education:  -Pt edu on OT role/POC  -Importance of OOB activity with staff assistance  -Safety during functional t/f and mobility  -White board updated  - Multiple self care tasks completed--as noted above  - All questions/concerns answered within OT scope of practice  Education:    Patient left supine with all lines intact, call button in reach and RN notified    GOALS:   Multidisciplinary Problems     Occupational Therapy Goals        Problem: Occupational Therapy Goal    Goal Priority Disciplines Outcome Interventions   Occupational Therapy Goal     OT, PT/OT Ongoing, Progressing    Description: Goals to be met by: 8/6/20    Patient will increase functional independence with ADLs by performing:    UE Dressing with Moderate Assistance.  Grooming while seated with Moderate Assistance.  Toileting from bedside commode with Moderate Assistance for hygiene and clothing management.   Sitting at edge of bed x10 minutes with Stand-by Assistance.  Supine to sit with Minimal Assistance.  Stand pivot transfers with Moderate Assistance.                     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

## 2020-07-23 NOTE — PLAN OF CARE
Eval completed and POC established     Juan M Galindo, PT, DPT  2020     Problem: Physical Therapy Goal  Goal: Physical Therapy Goal  Description: Goals to be met by: 2020    Patient will increase functional independence with mobility by performin. Supine to sit with MInimal Assistance  2. Sit to supine with MInimal Assistance  3. Sit to stand transfer with Minimal Assistance  4. Bed to chair transfer with Min A   5. Lower extremity exercise program x15 reps     Outcome: Ongoing, Progressing

## 2020-07-23 NOTE — NURSING
Pt very combative with attempted to do accucheck Notified [facility]OS[facility] MED K Md returned call made  aware ok to hold insulin detemir and accucheck tonight.

## 2020-07-23 NOTE — PLAN OF CARE
Problem: Occupational Therapy Goal  Goal: Occupational Therapy Goal  Description: Goals to be met by: 8/6/20    Patient will increase functional independence with ADLs by performing:    UE Dressing with Moderate Assistance.  Grooming while seated with Moderate Assistance.  Toileting from bedside commode with Moderate Assistance for hygiene and clothing management.   Sitting at edge of bed x10 minutes with Stand-by Assistance.  Supine to sit with Minimal Assistance.  Stand pivot transfers with Moderate Assistance.    Outcome: Ongoing, Progressing   Evaluation completed. Initiate POC.   Macey Sibley OT  7/23/2020

## 2020-07-23 NOTE — PT/OT/SLP EVAL
Physical Therapy Evaluation    Patient Name:  Zbigniew Forman   MRN:  [id number]    Recommendations:     Discharge Recommendations:  (Home w/HH and 24/7 family support)   Discharge Equipment Recommendations: none   Barriers to discharge: Decreased caregiver support    Assessment:     Zbigniew Forman is a 89 y.o. male admitted with a medical diagnosis of Dehydration with hypernatremia.  He presents with the following impairments/functional limitations:  gait instability, impaired balance, impaired coordination, decreased safety awareness, impaired cognition, impaired self care skills, impaired functional mobilty, decreased coordination. Pt evaluated on this date confused oriented to person only. Pt unable to provide any PLOF, DME owned, or living environment requiring all hx to be obtained from previous notes and CM. Pt requires Max re-direction as pt tangential and demonstrated difficulty following structured conversation. Pt has WFL strength evidenced by forceful resistance during functional mobility d/t poor command following and requires Max A for all mobility. Per CM pt has good family support and would benefit from HH therapy with 24/7 support. Acute therapy will continue following x2 day/wk to further assess therapy needs during this admission.     Rehab Prognosis: Poor; patient would benefit from acute skilled PT services to address these deficits and reach maximum level of function.    Recent Surgery: * No surgery found *      Plan:     During this hospitalization, patient to be seen 2 x/week to address the identified rehab impairments via gait training, therapeutic activities, therapeutic exercises, neuromuscular re-education, wheelchair management/training and progress toward the following goals:    · Plan of Care Expires:  08/22/20    Subjective     Chief Complaint: none noted   Patient/Family Comments/goals: tangential speech throughout this eval   Pain/Comfort:  ·      Patients cultural, spiritual,  Hoahaoism conflicts given the current situation: no    Living Environment:  Per prior notes pt lives w/DTR in 2 story home w/chair lift to access 2nd floor bed/bathroom.   Prior to admission, patients level of function was requiring assist w/dressing, bathing, and Sit-to-Stands. Notes indicate pt was ambulatory household distances prior to July 2020 w/Rollator using w/c for community distances.  Equipment used at home: walker, rolling, rollator, shower chair, wheelchair, grab bar.  DME owned (not currently used): none.  Upon discharge, patient will have assistance from family.    Objective:     Communicated with NSG prior to session.  Patient found HOB elevated with bed alarm  upon PT entry to room.    General Precautions: Standard, fall   Orthopedic Precautions:N/A   Braces: N/A     Exams:  · Cognitive Exam:  Patient is oriented to Person  · RLE ROM: WFL  · RLE Strength: WFL  · LLE ROM: WFL  · LLE Strength: WFL    Functional Mobility: Max A required d/t poor command following   · Bed Mobility:     · Rolling Right: maximal assistance  · Scooting: maximal assistance  · Supine to Sit: maximal assistance  · Sit to Supine: maximal assistance  · Transfers:     · Sit to Stand: Max A w/very forceful posterior lean throughout   · Gait: unsafe to attempt   · Balance: Sitting: CGA-Mod A     Therapeutic Activities and Exercises:   - Pt educated on: (no evidence of learning noted)   -PT roles, expectations, and POC    -Safety with mobility   -Discharge recommendations     AM-PAC 6 CLICK MOBILITY  Total Score:8     Patient left HOB elevated with all lines intact, call button in reach and bed alarm on.    GOALS:   Multidisciplinary Problems     Physical Therapy Goals        Problem: Physical Therapy Goal    Goal Priority Disciplines Outcome Goal Variances Interventions   Physical Therapy Goal     PT, PT/OT Ongoing, Progressing     Description: Goals to be met by: 8/22/2020    Patient will increase functional independence with  mobility by performin. Supine to sit with MInimal Assistance  2. Sit to supine with MInimal Assistance  3. Sit to stand transfer with Minimal Assistance  4. Bed to chair transfer with Min A   5. 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

## 2020-07-24 LAB
ANION GAP SERPL CALC-SCNC: 9 MMOL/L (ref 8–16)
BASOPHILS # BLD AUTO: 0.04 K/UL (ref 0–0.2)
BASOPHILS NFR BLD: 0.5 % (ref 0–1.9)
BUN SERPL-MCNC: 51 MG/DL (ref 10–30)
CALCIUM SERPL-MCNC: 8.1 MG/DL (ref 8.7–10.5)
CHLORIDE SERPL-SCNC: 114 MMOL/L (ref 95–110)
CO2 SERPL-SCNC: 24 MMOL/L (ref 23–29)
CREAT SERPL-MCNC: 1.4 MG/DL (ref 0.5–1.4)
DIFFERENTIAL METHOD: ABNORMAL
EOSINOPHIL # BLD AUTO: 0.2 K/UL (ref 0–0.5)
EOSINOPHIL NFR BLD: 1.7 % (ref 0–8)
ERYTHROCYTE [DISTWIDTH] IN BLOOD BY AUTOMATED COUNT: 14.8 % (ref 11.5–14.5)
EST. GFR  (AFRICAN AMERICAN): 47.3 ML/MIN/1.73 M^2
EST. GFR  (NON AFRICAN AMERICAN): 40.9 ML/MIN/1.73 M^2
GLUCOSE SERPL-MCNC: 145 MG/DL (ref 70–110)
HCT VFR BLD AUTO: 41.7 % (ref 40–54)
HGB BLD-MCNC: 12.6 G/DL (ref 14–18)
IMM GRANULOCYTES # BLD AUTO: 0.04 K/UL (ref 0–0.04)
IMM GRANULOCYTES NFR BLD AUTO: 0.5 % (ref 0–0.5)
LYMPHOCYTES # BLD AUTO: 3.1 K/UL (ref 1–4.8)
LYMPHOCYTES NFR BLD: 36.4 % (ref 18–48)
MAGNESIUM SERPL-MCNC: 2.5 MG/DL (ref 1.6–2.6)
MCH RBC QN AUTO: 28 PG (ref 27–31)
MCHC RBC AUTO-ENTMCNC: 30.2 G/DL (ref 32–36)
MCV RBC AUTO: 93 FL (ref 82–98)
MONOCYTES # BLD AUTO: 0.5 K/UL (ref 0.3–1)
MONOCYTES NFR BLD: 5.4 % (ref 4–15)
NEUTROPHILS # BLD AUTO: 4.8 K/UL (ref 1.8–7.7)
NEUTROPHILS NFR BLD: 55.5 % (ref 38–73)
NRBC BLD-RTO: 0 /100 WBC
PHOSPHATE SERPL-MCNC: 2.3 MG/DL (ref 2.7–4.5)
PLATELET # BLD AUTO: 258 K/UL (ref 150–350)
PMV BLD AUTO: 11.4 FL (ref 9.2–12.9)
POTASSIUM SERPL-SCNC: 4.3 MMOL/L (ref 3.5–5.1)
RBC # BLD AUTO: 4.5 M/UL (ref 4.6–6.2)
SODIUM SERPL-SCNC: 147 MMOL/L (ref 136–145)
WBC # BLD AUTO: 8.58 K/UL (ref 3.9–12.7)

## 2020-07-24 PROCEDURE — U0003 INFECTIOUS AGENT DETECTION BY NUCLEIC ACID (DNA OR RNA); SEVERE ACUTE RESPIRATORY SYNDROME CORONAVIRUS 2 (SARS-COV-2) (CORONAVIRUS DISEASE [COVID-19]), AMPLIFIED PROBE TECHNIQUE, MAKING USE OF HIGH THROUGHPUT TECHNOLOGIES AS DESCRIBED BY CMS-2020-01-R: HCPCS

## 2020-07-24 PROCEDURE — 36415 COLL VENOUS BLD VENIPUNCTURE: CPT

## 2020-07-24 PROCEDURE — 84100 ASSAY OF PHOSPHORUS: CPT

## 2020-07-24 PROCEDURE — 83735 ASSAY OF MAGNESIUM: CPT

## 2020-07-24 PROCEDURE — 80048 BASIC METABOLIC PNL TOTAL CA: CPT

## 2020-07-24 PROCEDURE — 99233 SBSQ HOSP IP/OBS HIGH 50: CPT | Mod: ,,, | Performed by: HOSPITALIST

## 2020-07-24 PROCEDURE — 63600175 PHARM REV CODE 636 W HCPCS: Performed by: HOSPITALIST

## 2020-07-24 PROCEDURE — 30200315 PPD INTRADERMAL TEST REV CODE 302: Performed by: HOSPITALIST

## 2020-07-24 PROCEDURE — 25000003 PHARM REV CODE 250: Performed by: HOSPITALIST

## 2020-07-24 PROCEDURE — 11000001 HC ACUTE MED/SURG PRIVATE ROOM

## 2020-07-24 PROCEDURE — 99233 PR SUBSEQUENT HOSPITAL CARE,LEVL III: ICD-10-PCS | Mod: ,,, | Performed by: HOSPITALIST

## 2020-07-24 PROCEDURE — 86580 TB INTRADERMAL TEST: CPT | Performed by: HOSPITALIST

## 2020-07-24 PROCEDURE — 85025 COMPLETE CBC W/AUTO DIFF WBC: CPT

## 2020-07-24 RX ORDER — POLYETHYLENE GLYCOL 3350 17 G/17G
17 POWDER, FOR SOLUTION ORAL DAILY
Status: DISCONTINUED | OUTPATIENT
Start: 2020-07-24 | End: 2020-07-25

## 2020-07-24 RX ORDER — AMOXICILLIN 250 MG
1 CAPSULE ORAL 2 TIMES DAILY
Status: DISCONTINUED | OUTPATIENT
Start: 2020-07-24 | End: 2020-07-28 | Stop reason: HOSPADM

## 2020-07-24 RX ADMIN — POLYETHYLENE GLYCOL 3350 17 G: 17 POWDER, FOR SOLUTION ORAL at 09:07

## 2020-07-24 RX ADMIN — ENOXAPARIN SODIUM 30 MG: 30 INJECTION SUBCUTANEOUS at 04:07

## 2020-07-24 RX ADMIN — TAMSULOSIN HYDROCHLORIDE 0.4 MG: 0.4 CAPSULE ORAL at 09:07

## 2020-07-24 RX ADMIN — TUBERCULIN PURIFIED PROTEIN DERIVATIVE 5 UNITS: 5 INJECTION, SOLUTION INTRADERMAL at 02:07

## 2020-07-24 RX ADMIN — DOCUSATE SODIUM 50 MG AND SENNOSIDES 8.6 MG 1 TABLET: 8.6; 5 TABLET, FILM COATED ORAL at 09:07

## 2020-07-24 RX ADMIN — INSULIN DETEMIR 7 UNITS: 100 INJECTION, SOLUTION SUBCUTANEOUS at 09:07

## 2020-07-24 NOTE — PLAN OF CARE
07/24/20 0835   Post-Acute Status   Post-Acute Authorization Home Health   Home Health Status Awaiting Internal Medical Clearance

## 2020-07-24 NOTE — PROGRESS NOTES
Patient refusing accuchecks. Dr. Mallory notified. No new orders at this time. Will continue to monitor.

## 2020-07-24 NOTE — PLAN OF CARE
Patient is oriented to self and place. Vital signs stable. No falls throughout shift. Bed alarm on and patient repositioned. Patient refuses glucose monitoring. Patient irritable and does not like to be touched.  Problem: Fall Injury Risk  Goal: Absence of Fall and Fall-Related Injury  Outcome: Ongoing, Progressing     Problem: Restraint, Behavioral  Goal: Discontinuation Criteria Achieved  Outcome: Ongoing, Progressing  Goal: Personal Dignity and Safety Maintained  Outcome: Ongoing, Progressing     Problem: Adult Inpatient Plan of Care  Goal: Plan of Care Review  Outcome: Ongoing, Progressing  Goal: Patient-Specific Goal (Individualization)  Outcome: Ongoing, Progressing  Goal: Absence of Hospital-Acquired Illness or Injury  Outcome: Ongoing, Progressing  Goal: Optimal Comfort and Wellbeing  Outcome: Ongoing, Progressing  Goal: Readiness for Transition of Care  Outcome: Ongoing, Progressing  Goal: Rounds/Family Conference  Outcome: Ongoing, Progressing     Problem: Diabetes Comorbidity  Goal: Blood Glucose Level Within Desired Range  Outcome: Ongoing, Progressing     Problem: Electrolyte Imbalance (Acute Kidney Injury/Impairment)  Goal: Serum Electrolyte Balance  Outcome: Ongoing, Progressing     Problem: Fluid Imbalance (Acute Kidney Injury/Impairment)  Goal: Optimal Fluid Balance  Outcome: Ongoing, Progressing     Problem: Hematologic Alteration (Acute Kidney Injury/Impairment)  Goal: Hemoglobin, Hematocrit and Platelets Within Normal Range  Outcome: Ongoing, Progressing     Problem: Oral Intake Inadequate (Acute Kidney Injury/Impairment)  Goal: Optimal Nutrition Intake  Outcome: Ongoing, Progressing     Problem: Renal Function Impairment (Acute Kidney Injury/Impairment)  Goal: Effective Renal Function  Outcome: Ongoing, Progressing     Problem: Infection  Goal: Infection Symptom Resolution  Outcome: Ongoing, Progressing     Problem: Skin Injury Risk Increased  Goal: Skin Health and Integrity  Outcome: Ongoing,  Progressing     Problem: Restraint, Nonbehavioral (Nonviolent)  Goal: Discontinuation Criteria Achieved  Outcome: Ongoing, Progressing  Goal: Personal Dignity and Safety Maintained  Outcome: Ongoing, Progressing

## 2020-07-24 NOTE — PLAN OF CARE
07/24/20 1217   Post-Acute Status   Post-Acute Authorization Placement   Home Health Status Referrals Sent     Family is requesting Pt be able to d/c to [facility] at d.c, Sw to follow, referral sent. Covid test ordered, PPD ordered, Sw to follow.

## 2020-07-24 NOTE — PLAN OF CARE
Therapy recs: Home with HH . Pt has had [facility] in the past, referral placed in , will cont to follow.  Cm called daughter with update POC, unable to reach her or leave [facility]. Will try again later.     07/24/20 1041   Post-Acute Status   Post-Acute Authorization Home Health   HME Status Awaiting Internal medical Clearance   Home Health Status Awaiting Orders for HH   Discharge Delays None known at this time   Discharge Plan   Discharge Plan A Home;Home Health   Discharge Plan B Home;Home with family   Lyn Nguyễn, MSN  Case Management  Ext [phone]

## 2020-07-24 NOTE — PROGRESS NOTES
"Progress Note   [facility]         Patient Name: Zbigniew Forman  MRN:  [id number]  [facility] Team: [facility]OSP MED K Prachi Mallory MD  Date of Admission:  7/20/2020     Length of Stay:  LOS: 4 days   Expected Discharge Date: 7/27/2020  Principal Problem:  Dehydration with hypernatremia       Subjective:     Interval History/Overnight Events:      He is doing well today, seen this AM sleeping and returned to visit with him and his daughter Anne as well now. Reports that hes doing "as good as he can expect". Still some soreness in back from being in bed. Nursing Bonnie reports he did eat all of breakfast this AM and appetite was very good. Waiting for lunch now, aids assisting in cleaning him up in bed. Bowel regimen added today to work on promoting BM as none reported yet. Na still 147 but bUN/Cr improving at 51/1.4. he has not wanted accuchecks due to his fingers being very sore which is okay as glucose is better in 100s on CMP. BP stable, continue oral hydration, would like to see Na get to more the normal ranges but very close now and BUN/Cr nearly at baseline as well now.   Family prefers SNF again for some reconditioning, PT/OT was unclear if HH vs SNF would be best option for him, previously I thought family wanted him with HH instaed of SNF but they prefer to rehab more at SNF as he didt complete his days there now as readmit for dehdyration mid way through, CM seeing if feasible  Plan to watch over weekend either way, repeat session with PT/OT may be helpful to delineate further        Review of Systems   Constitutional: Negative for chills, fatigue, fever.   HENT: Negative for sore throat, trouble swallowing.    Eyes: Negative for photophobia, visual disturbance.   Respiratory: Negative for cough, shortness of breath.    Cardiovascular: Negative for chest pain, palpitations, leg swelling.   Gastrointestinal: Negative for abdominal pain, constipation, diarrhea, nausea, vomiting.   Endocrine: " Negative for cold intolerance, heat intolerance.   Genitourinary: Negative for dysuria, frequency.   Musculoskeletal: Negative for arthralgias, myalgias.   Skin: Negative for rash, wound, erythema   Neurological: Negative for dizziness, syncope, weakness, light-headedness.   Psychiatric/Behavioral: + for confusion, hallucinations, anxiety  All other systems reviewed and are negative.    Objective:     Temp:  [96.3 °F (35.7 °C)-98.7 °F (37.1 °C)]   Pulse:  [71-89]   Resp:  [16-18]   BP: (133-164)/(55-70)   SpO2:  [97 %-100 %]       Physical Exam:  Constitutional: Appears well-developed and well-nourished. confusion resolved, at baseline. improving from previous. Pleasant.   Head: Normocephalic and atraumatic.   Mouth/Throat: Oropharynx is clear and moist.   Eyes: EOM are normal. Pupils are equal, round, and reactive to light. No scleral icterus.   Neck: Normal range of motion. Neck supple.   Cardiovascular: Normal rate and regular rhythm.  No murmur heard.  Pulmonary/Chest: Effort normal and breath sounds normal. No respiratory distress. No wheezes, rales, or rhonchi  Abdominal: Soft. Bowel sounds are normal.  No distension or tenderness  Musculoskeletal: Normal range of motion. No edema.   Neurological: Alert and oriented to person, place, situation. Confusion improved. Baseline with only mild irritation at times, easily redirectable.  Skin: Skin is warm and dry.   Psychiatric: Normal mood and affect. Behavior is normal.     Recent Labs   Lab 07/20/20  1252 07/21/20  0407 07/22/20  0616 07/23/20  0311 07/24/20  0348   WBC 13.60* 10.87 10.76 9.24 8.58   HGB 13.0* 12.7* 12.4* 12.8* 12.6*   HCT 43.8 42.3 42.1 43.2 41.7   * 358* 309 266 258     Recent Labs   Lab 07/22/20  0616 07/23/20  0311 07/24/20  0348   * 147* 147*   K 3.9 3.8 4.3   * 114* 114*   CO2 25 24 24   BUN 72* 60* 51*   CREATININE 1.7* 1.4 1.4   * 159* 145*   CALCIUM 8.1* 7.7* 8.1*   MG 2.5 2.4 2.5   PHOS 2.9 2.5* 2.3*     Recent  Labs   Lab 07/20/20  1537   ALKPHOS 63   ALT 13   AST 21   ALBUMIN 3.1*   PROT 6.4   BILITOT 0.3     Recent Labs   Lab 07/23/20  0748 07/23/20 2002   POCTGLUCOSE 163* 235*        enoxaparin  30 mg Subcutaneous Q24H    insulin detemir U-100  7 Units Subcutaneous QHS    latanoprost  1 drop Both Eyes QHS    polyethylene glycol  17 g Oral Daily    senna-docusate 8.6-50 mg  1 tablet Oral BID    tamsulosin  0.4 mg Oral Daily    tuberculin  5 Units Intradermal Once       Assessment and Plan     Mr. Zbigniew Forman is a 89 y.o. male who presented to [facility] on 7/20/2020 with     Hospital Course:    Mr. Zbigniew Foramn was admitted to Hospital Medicine for management of dehydration/acute metabolic encephalopathy.    Dehydration with hypernatremia  · Sodium 156 on admit on 7/21. Likely secondary to over diuresis during recent CHF, holding lasix and lisinopril on admission, D5W started and condom cath to watch output (do NOT place whiteside for output due to BPH at baseline). Na improving with hydration as is mental status, down to 146-147, BUN from 90 to 72 to 51 and Cr also improving with hydration as well to 1.4  · Converted to oral hydration only 7/23 and doing well, multiple powerades on exam, nursing reports he can expresss his needs for water, appetite good and mental status improved  · Continue hydration to equilibrate, would like to get Na within normal limits <!45 if we can this weekend     Acute metabolic encephalopathy  · Secondary to hypernatremia as much closer to baseline as Na has continued to improve with hydration, required restraints on admit but removed 7/22 as now resolving.  · 7/23: mild agitation overnight but redirected. Able to have conversation today, recall events, discuss 2 daughters. Seems much closer to baseline now nursing states today much better than yesterday  · 7/24: at baseline now  · Continue delirium precautions, redirect PRN         JAN (acute kidney injury)  · Secondary to  dehydration, BUN 90, Cr 2.6 on admission (baseline Cr 1.2)  · Improving, 72/1.7-->51/1.4. Continue to trend in condom cath, IVF and will watch to ensure can also orally hydrate now more alert.          Hstory UTI due to Klebsiella species  -on prior admit, completed rocephin 7/21  -Cx from blood and urine now NG, given dose x 1 in ER of vanc, zosyn, no signs sepsis now, no further needs at this time   .         Chronic hypoxemic respiratory failure  -on 2L at home, continue now. CXR without acute abnormalities on admit         Controlled type 2 diabetes mellitus without complication, with long-term current use of insulin  · On levemir 7U at home, glucose controlled initially until required D5W for dehydation, glucose higher in 200s with that, converted off on 7/22, resumed levemir  · 7/23: agitated so levemir not given overnight, glucose 140s this AM, would cover SSI and eating now so will plan to give home regimen tonight if toleates  · 7/24: intermittently refusing accuchecks as it huts his fingers, is okay as glucose at goal on his CMPs 100s, if mental status changed would obvious check to ensure not out of range  · -trend SSI, accuchecks.     Essential hypertension  · Lower BP on admit from dehydration, home lisinopril/norvasc held since admit  · -restart norvasc 7/23, cont to hold lisinopril as JAN still improving. BPs 100s-115 systolics mostly       Chronic diastolic Heart failure with preserved ejection fraction  - over diuresed last week on admission with cardiology as lytes off/JAN on admit here.   -cautious IVF on admit but no signs of overload and needed the fluids, change to oral hydration for 7/23. -Continue to hold Lasix due to dehydration and Lisinopril due to JAN   -will have to decide when will resume but will likely be in future out of hospital once euvolemic consistently and lytes better     BPH with urinary obstruction  -continue flomax  -Avoid urinary catheterization unless SIGNIFICANT urinary  retention as would be difficult to remove with his history, condom cath is working fine now         Debility  PT/OT recs pending, was at SNF before fam actually does prefer SNF to finish out days then HH, working with CM to see plan, may need another PT/OT session to further delineate as recs a little unclear        DNR (do not resuscitate)  Patient DNR/DNI.  In the event of profound clinical deterioration, efforts will be directed to focus on comfort. Discussed with patient's daughter, Anne on 7/21. She also expressed did not desire any type of feeding tube for patient.      Diet:  Low sodium      DVT PPx:  lovenox  Goals of Care:  DNR/DNI      Disposition: family prefers SNF, recs say SNF or HH, will see if we can do another session, CM working to see if can get SNF again, unsure    Watch lytes, oral intake this weekend to ensure med stable for a possible Monday dc  Updated daughter anne in room today

## 2020-07-24 NOTE — PLAN OF CARE
Problem: Restraint, Behavioral  Goal: Personal Dignity and Safety Maintained  Outcome: Ongoing, Progressing     Problem: Restraint, Behavioral  Goal: Discontinuation Criteria Achieved  Outcome: Ongoing, Progressing     Problem: Fall Injury Risk  Goal: Absence of Fall and Fall-Related Injury  Outcome: Ongoing, Progressing     Problem: Adult Inpatient Plan of Care  Goal: Absence of Hospital-Acquired Illness or Injury  Outcome: Ongoing, Progressing     Problem: Oral Intake Inadequate (Acute Kidney Injury/Impairment)  Goal: Optimal Nutrition Intake  Outcome: Ongoing, Progressing

## 2020-07-25 LAB
BACTERIA BLD CULT: NORMAL
BACTERIA BLD CULT: NORMAL
POCT GLUCOSE: 145 MG/DL (ref 70–110)
POCT GLUCOSE: 61 MG/DL (ref 70–110)
POCT GLUCOSE: 72 MG/DL (ref 70–110)

## 2020-07-25 PROCEDURE — 25000003 PHARM REV CODE 250: Performed by: HOSPITALIST

## 2020-07-25 PROCEDURE — 99233 PR SUBSEQUENT HOSPITAL CARE,LEVL III: ICD-10-PCS | Mod: ,,, | Performed by: HOSPITALIST

## 2020-07-25 PROCEDURE — 11000001 HC ACUTE MED/SURG PRIVATE ROOM

## 2020-07-25 PROCEDURE — 51798 US URINE CAPACITY MEASURE: CPT

## 2020-07-25 PROCEDURE — 63600175 PHARM REV CODE 636 W HCPCS: Performed by: HOSPITALIST

## 2020-07-25 PROCEDURE — 51701 INSERT BLADDER CATHETER: CPT

## 2020-07-25 PROCEDURE — 99233 SBSQ HOSP IP/OBS HIGH 50: CPT | Mod: ,,, | Performed by: HOSPITALIST

## 2020-07-25 RX ORDER — POLYETHYLENE GLYCOL 3350 17 G/17G
17 POWDER, FOR SOLUTION ORAL 2 TIMES DAILY
Status: DISCONTINUED | OUTPATIENT
Start: 2020-07-25 | End: 2020-07-28 | Stop reason: HOSPADM

## 2020-07-25 RX ORDER — BISACODYL 10 MG
10 SUPPOSITORY, RECTAL RECTAL ONCE
Status: COMPLETED | OUTPATIENT
Start: 2020-07-25 | End: 2020-07-25

## 2020-07-25 RX ADMIN — BISACODYL 10 MG: 10 SUPPOSITORY RECTAL at 09:07

## 2020-07-25 RX ADMIN — TAMSULOSIN HYDROCHLORIDE 0.4 MG: 0.4 CAPSULE ORAL at 09:07

## 2020-07-25 RX ADMIN — POLYETHYLENE GLYCOL 3350 17 G: 17 POWDER, FOR SOLUTION ORAL at 09:07

## 2020-07-25 RX ADMIN — ENOXAPARIN SODIUM 30 MG: 30 INJECTION SUBCUTANEOUS at 05:07

## 2020-07-25 RX ADMIN — DOCUSATE SODIUM 50 MG AND SENNOSIDES 8.6 MG 1 TABLET: 8.6; 5 TABLET, FILM COATED ORAL at 09:07

## 2020-07-25 NOTE — PLAN OF CARE
"  Problem: Adult Inpatient Plan of Care  Goal: Plan of Care Review  Outcome: Ongoing, Progressing  Goal: Patient-Specific Goal (Individualization)  Outcome: Ongoing, Progressing  Goal: Absence of Hospital-Acquired Illness or Injury  Outcome: Ongoing, Progressing  Goal: Optimal Comfort and Wellbeing  Outcome: Ongoing, Progressing  Goal: Readiness for Transition of Care  Outcome: Ongoing, Progressing  Goal: Rounds/Family Conference  Outcome: Ongoing, Progressing   Today patient refused all lab draws. Daughter asked staff nurse not to take patient mid-day blood glucose, states "patient fingers are already sore he don't need to be checked like that". Informed Dr. Mallory of daughter concerns. Patient had a large BM today. Incontinent care given, turned and repositioned. No reports of pain. Safety measures maintained.   "

## 2020-07-25 NOTE — PROGRESS NOTES
Progress Note   Hospital Medicine         Patient Name: Zbigniew Forman  MRN:  [id number]  [facility] Medicine Team: [facility] MED K Prachi Mallory MD  Date of Admission:  7/20/2020     Length of Stay:  LOS: 5 days   Expected Discharge Date: 7/27/2020  Principal Problem:  Dehydration with hypernatremia       Subjective:     Interval History/Overnight Events:      He didn't eat much of his dinner last night per daughter's report from his son, so his glucose was lower at 61-->80 this AM. Hold levemir for now, was not on prior to his last admit per daughter at home after his last admit in July, glucose was higher earlier in stay when on D5 but in 89 year old id rather he run low 200s than 60 nicholas if appetite still spotty. They both have declined accuchecks further as he had a full lunch, discussed with nursing, if he is feeling okay and not altered or lethargic, can watch closely but at any signs he is off would check accucheck at that time. Did not have IV access this AM but reobtained now in wrist. Had urinary retention overnight, had to have a straight cath and had issues as we have known BPH and need to avoid this as rseistance felt, he has urinated okay today per nursing 1 wet pad, did suppository without bM yet. Will increase glycolax and plan a soap suds enema later today as he is a little agitated from attempts to get an IV so will delay that until this afternoon. He has declined labs again and phlebotomy has come 4 times, daughter and I discussed wanting to reassess Na today but if he is still declining, I dont want to force him. Nursing reports he has been drinking water well and daughter had him eat all of his breakfast and lunch and hydrating well. So its not ideal or my prefernce but I will defer to the AM, his CBC, Mg, Phos have been okay so to try to make easier I will just get CMP for him tomorrow to consolidate. She requested just daily glucose, I want to do at least bID so I put AM and PM for now. And  have held levemir. Again, a glucose of lower 200s in a 89 year old is less dangerous than a glucose of 60-80 here and overal picture, if hes looking okay, I will let some things ride for him unless he looks clinically worse today which he doesn't.  Will see if PT can reasess, I think he does need more conditioning at SNF an daughter adamently agrees before HH. Will submit to insurance on Monday also.   Discussed lasix and when to resume, he has no edema In legs, on home oxyge, breathing comfortable, not convinced hes completely euvolemic yet from hypovolemia on admit and dont have labs today so definitely not today but probably a lower dose (20 mg was usual dose before incrreased to 40 before last admit by PCP). Id probably consider 20 mg to avoid deydration again but only once hes definitely euvolemic or starting to show volume signs on exam, which I dont believe he is yet.         Review of Systems   Constitutional: Negative for chills, fatigue, fever.   HENT: Negative for sore throat, trouble swallowing.    Eyes: Negative for photophobia, visual disturbance.   Respiratory: Negative for cough, shortness of breath.    Cardiovascular: Negative for chest pain, palpitations, leg swelling.   Gastrointestinal: Negative for abdominal pain, constipation, diarrhea, nausea, vomiting.   Endocrine: Negative for cold intolerance, heat intolerance.   Genitourinary: Negative for dysuria, frequency.   Musculoskeletal: Negative for arthralgias, myalgias.   Skin: Negative for rash, wound, erythema   Neurological: Negative for dizziness, syncope, weakness, light-headedness.   Psychiatric/Behavioral: + for confusion, hallucinations, anxiety  All other systems reviewed and are negative.    Objective:     Temp:  [96.3 °F (35.7 °C)-98.7 °F (37.1 °C)]   Pulse:  [65-82]   Resp:  [16-19]   BP: (105-164)/(55-70)   SpO2:  [98 %-100 %]       Physical Exam:  Constitutional: Appears well-developed and well-nourished. confusion resolved, at  baseline. improving from previous. Pleasant.   Head: Normocephalic and atraumatic.   Mouth/Throat: Oropharynx is clear and moist.   Eyes: EOM are normal. Pupils are equal, round, and reactive to light. No scleral icterus.   Neck: Normal range of motion. Neck supple.   Cardiovascular: Normal rate and regular rhythm.  No murmur heard.  Pulmonary/Chest: Effort normal and breath sounds normal. No respiratory distress. No wheezes, rales, or rhonchi  Abdominal: Soft. Bowel sounds are normal.  No distension or tenderness  Musculoskeletal: Normal range of motion. No edema.   Neurological: Alert and oriented to person, place, situation. Confusion improved. Baseline with only mild irritation at times, easily redirectable.  Skin: Skin is warm and dry. bruising at sites of IV sticks, arms, hands.  Psychiatric: Normal mood and affect. Behavior is normal.     Recent Labs   Lab 07/20/20  1252 07/21/20  0407 07/22/20  0616 07/23/20  0311 07/24/20  0348   WBC 13.60* 10.87 10.76 9.24 8.58   HGB 13.0* 12.7* 12.4* 12.8* 12.6*   HCT 43.8 42.3 42.1 43.2 41.7   * 358* 309 266 258     Recent Labs   Lab 07/22/20  0616 07/23/20  0311 07/24/20  0348   * 147* 147*   K 3.9 3.8 4.3   * 114* 114*   CO2 25 24 24   BUN 72* 60* 51*   CREATININE 1.7* 1.4 1.4   * 159* 145*   CALCIUM 8.1* 7.7* 8.1*   MG 2.5 2.4 2.5   PHOS 2.9 2.5* 2.3*     Recent Labs   Lab 07/20/20  1537   ALKPHOS 63   ALT 13   AST 21   ALBUMIN 3.1*   PROT 6.4   BILITOT 0.3     Recent Labs   Lab 07/23/20  0748 07/23/20 2002   POCTGLUCOSE 163* 235*        bisacodyL  10 mg Rectal Once    enoxaparin  30 mg Subcutaneous Q24H    insulin detemir U-100  7 Units Subcutaneous QHS    latanoprost  1 drop Both Eyes QHS    polyethylene glycol  17 g Oral Daily    senna-docusate 8.6-50 mg  1 tablet Oral BID    tamsulosin  0.4 mg Oral Daily       Assessment and Plan     Mr. Zbigniew Forman is a 89 y.o. male who presented to [facility] on 7/20/2020 with     Hospital  Course:    Mr. Zbigniew Forman was admitted to Hospital Medicine for management of dehydration/acute metabolic encephalopathy.    Dehydration with hypernatremia  · Sodium 156 on admit on 7/21. Likely secondary to over diuresis during recent CHF, holding lasix and lisinopril on admission, D5W started and condom cath to watch output (do NOT place whiteside for output due to BPH at baseline). Na improving with hydration as is mental status, down to 146-147, BUN from 90 to 72 to 51 and Cr also improving with hydration as well to 1.4  · Converted to oral hydration only 7/23 and doing well, multiple powerades on exam, nursing reports he can expresss his needs for water, appetite good and mental status improved  · Continue hydration to equilibrate, would like to get Na within normal limits <!45 if we can this weekend  · 7/25: no labs today, refused, daughter aware. Orally hydrating, will reattempt tomorrow     Acute metabolic encephalopathy  · Secondary to hypernatremia as much closer to baseline as Na has continued to improve with hydration, required restraints on admit but removed 7/22 as now resolving.  · 7/23: mild agitation overnight but redirected. Able to have conversation today, recall events, discuss 2 daughters. Seems much closer to baseline now nursing states today much better than yesterday  · 7/24: at baseline now  · Continue delirium precautions, redirect PRN         JAN (acute kidney injury)  · Secondary to dehydration, BUN 90, Cr 2.6 on admission (baseline Cr 1.2)  · Improving, 72/1.7-->51/1.4. Continue to trend in condom cath, IVF and will watch to ensure can also orally hydrate now more alert.          Hstory UTI due to Klebsiella species  -on prior admit, completed rocephin 7/21  -Cx from blood and urine now NG, given dose x 1 in ER of vanc, zosyn, no signs sepsis now, no further needs at this time   .         Chronic hypoxemic respiratory failure  -on 2L at home, continue now. CXR without acute  abnormalities on admit         Controlled type 2 diabetes mellitus without complication, with long-term current use of insulin  · On levemir 7U at home, glucose controlled initially until required D5W for dehydation, glucose higher in 200s with that, converted off on 7/22, resumed levemir  · 7/23: agitated so levemir not given overnight, glucose 140s this AM, would cover SSI and eating now so will plan to give home regimen tonight if toleates  · 7/24: intermittently refusing accuchecks as it huts his fingers, is okay as glucose at goal on his CMPs 100s, if mental status changed would obvious check to ensure not out of range  · 7/25: glucose 61 and 80 today but didn't eat much last night per family. Hold insulin now, would rather a glucose of lower 200s than 60-80, and family only wants BID accuchecks now. If any signs of being altered recheck accucheck now, nursing aware of plans. Was not on levemir before 1st admit early July, may consider lower dose vs no insulin pending trends  · -trend SSI, accuchecks.     Essential hypertension  · Lower BP on admit from dehydration, home lisinopril/norvasc held since admit  · -restart norvasc 7/23, cont to hold lisinopril as JAN still improving. BPs 100s-115 systolics mostly  · 725: cont to hold, systolics 110-120s       Chronic diastolic Heart failure with preserved ejection fraction  - over diuresed last week on admission with cardiology as lytes off/JAN on admit here.   -cautious IVF on admit but no signs of overload and needed the fluids, change to oral hydration for 7/23. -Continue to hold Lasix due to dehydration and Lisinopril due to JAN   -will have to decide when will resume but will likely be in future out of hospital once euvolemic consistently and lytes better  7/25: discussed with daughter plans to resume with euvolemic, or volume seems to appears, has none yet, no edema in legs at all, on home oxygen dose, no dyspnea     BPH with urinary obstruction  -continue  flomax  -Avoid urinary catheterization unless SIGNIFICANT urinary retention as would be difficult to remove with his history, condom cath is working fine now   7/25: some retention overnight with 600 seen on bladder scan with only 200 removed, may consider trying 2nd BPH med vs invasive options, will discuss with daughter. If has retention further today may have to ask uro to assist given resistance also on straight cath  7/26:required straight cath for some retention, did okay today with output, had wet pads. Again follow above protocols, can consider finasteride as next line if we need as another oral if reoccurs         Debility  PT/OT recs pending, was at [facility] before [facility] actually does prefer SNF to finish out days then HH, working with CM to see plan, may need another PT/OT session to further delineate as recs a little unclear  - I think he does need more SNF before  for safety and for debility on my exams, daughter does as well, will need to see if PT can reassess if there are any issues with insurance, SNF willing to acept Monday per daughter        DNR (do not resuscitate)  Patient DNR/DNI.  In the event of profound clinical deterioration, efforts will be directed to focus on comfort. Discussed with patient's daughter, Anne on 7/21. She also expressed did not desire any type of feeding tube for patient.      Diet:  DM      DVT PPx:  lovenox  Goals of Care:  DNR/DNI      Disposition: SNF preferred, I believe he needs this again for safety and rehab before HH ready, PT said SNF vs  in their notes    Watch for any changes, if so check accucheck and try to reattempt CMP as he refused both in afternoon reattempt

## 2020-07-25 NOTE — PLAN OF CARE
Patient was bladder scanned and had 641ml. Straight cathed with a 15fr with a lot of resistance and a history of BPH. Only was able to [id number] that had a little blood. Physican notified, will continue to monitor.

## 2020-07-26 LAB
ALBUMIN SERPL BCP-MCNC: 2.5 G/DL (ref 3.5–5.2)
ALP SERPL-CCNC: 60 U/L (ref 55–135)
ALT SERPL W/O P-5'-P-CCNC: 16 U/L (ref 10–44)
ANION GAP SERPL CALC-SCNC: 8 MMOL/L (ref 8–16)
AST SERPL-CCNC: 18 U/L (ref 10–40)
BILIRUB SERPL-MCNC: 0.5 MG/DL (ref 0.1–1)
BUN SERPL-MCNC: 34 MG/DL (ref 10–30)
CALCIUM SERPL-MCNC: 7.9 MG/DL (ref 8.7–10.5)
CHLORIDE SERPL-SCNC: 112 MMOL/L (ref 95–110)
CO2 SERPL-SCNC: 26 MMOL/L (ref 23–29)
CREAT SERPL-MCNC: 1.1 MG/DL (ref 0.5–1.4)
EST. GFR  (AFRICAN AMERICAN): >60 ML/MIN/1.73 M^2
EST. GFR  (NON AFRICAN AMERICAN): 54.8 ML/MIN/1.73 M^2
GLUCOSE SERPL-MCNC: 119 MG/DL (ref 70–110)
POCT GLUCOSE: 149 MG/DL (ref 70–110)
POTASSIUM SERPL-SCNC: 4.4 MMOL/L (ref 3.5–5.1)
PROT SERPL-MCNC: 5.3 G/DL (ref 6–8.4)
SARS-COV-2 RNA RESP QL NAA+PROBE: NOT DETECTED
SODIUM SERPL-SCNC: 146 MMOL/L (ref 136–145)
TB INDURATION 48 - 72 HR READ: 0 MM

## 2020-07-26 PROCEDURE — 36415 COLL VENOUS BLD VENIPUNCTURE: CPT

## 2020-07-26 PROCEDURE — 80053 COMPREHEN METABOLIC PANEL: CPT

## 2020-07-26 PROCEDURE — 99233 PR SUBSEQUENT HOSPITAL CARE,LEVL III: ICD-10-PCS | Mod: ,,, | Performed by: HOSPITALIST

## 2020-07-26 PROCEDURE — 97110 THERAPEUTIC EXERCISES: CPT

## 2020-07-26 PROCEDURE — 99233 SBSQ HOSP IP/OBS HIGH 50: CPT | Mod: ,,, | Performed by: HOSPITALIST

## 2020-07-26 PROCEDURE — 25000003 PHARM REV CODE 250: Performed by: HOSPITALIST

## 2020-07-26 PROCEDURE — 63600175 PHARM REV CODE 636 W HCPCS: Performed by: HOSPITALIST

## 2020-07-26 PROCEDURE — 11000001 HC ACUTE MED/SURG PRIVATE ROOM

## 2020-07-26 RX ORDER — INSULIN ASPART 100 [IU]/ML
0-5 INJECTION, SOLUTION INTRAVENOUS; SUBCUTANEOUS
Refills: 0
Start: 2020-07-26 | End: 2021-07-26

## 2020-07-26 RX ORDER — ACETAMINOPHEN 325 MG/1
650 TABLET ORAL EVERY 6 HOURS PRN
Refills: 0
Start: 2020-07-26

## 2020-07-26 RX ORDER — AMOXICILLIN 250 MG
1 CAPSULE ORAL 2 TIMES DAILY
Start: 2020-07-26

## 2020-07-26 RX ORDER — TALC
6 POWDER (GRAM) TOPICAL NIGHTLY PRN
Refills: 0 | COMMUNITY
Start: 2020-07-26

## 2020-07-26 RX ORDER — POLYETHYLENE GLYCOL 3350 17 G/17G
17 POWDER, FOR SOLUTION ORAL 2 TIMES DAILY
Refills: 0
Start: 2020-07-26

## 2020-07-26 RX ORDER — FUROSEMIDE 20 MG/1
20 TABLET ORAL DAILY
Qty: 30 TABLET | Refills: 0
Start: 2020-07-26 | End: 2021-07-26

## 2020-07-26 RX ADMIN — POLYETHYLENE GLYCOL 3350 17 G: 17 POWDER, FOR SOLUTION ORAL at 09:07

## 2020-07-26 RX ADMIN — TAMSULOSIN HYDROCHLORIDE 0.4 MG: 0.4 CAPSULE ORAL at 09:07

## 2020-07-26 RX ADMIN — ENOXAPARIN SODIUM 30 MG: 30 INJECTION SUBCUTANEOUS at 05:07

## 2020-07-26 RX ADMIN — DOCUSATE SODIUM 50 MG AND SENNOSIDES 8.6 MG 1 TABLET: 8.6; 5 TABLET, FILM COATED ORAL at 09:07

## 2020-07-26 NOTE — PLAN OF CARE
Pt remains free from falls and injury. Pt makes no statement of pain. No vitals overnight per order. Pt refused to take HS medications. Bed low and locked, Call light within reach.

## 2020-07-26 NOTE — PLAN OF CARE
[facility]     Department of Hospital Medicine     [address] [place] [address]     [phone] after hours  [phone] fax       NURSING HOME ORDERS    07/27/2020    Admit to Nursing Home:      Skilled Bed                                                  Diagnoses:  Active Hospital Problems    Diagnosis  POA    *Dehydration with hypernatremia [E87.0]  Yes    Acute metabolic encephalopathy [G93.41]  Yes    JAN (acute kidney injury) [N17.9]  Yes    Chronic hypoxemic respiratory failure [J96.11]  Yes    Debility [R53.81]  Yes    UTI due to Klebsiella species [N39.0, B96.1]  Yes    BPH with urinary obstruction [N40.1, N13.8]  Yes    Heart failure with preserved ejection fraction [I50.30]  Yes    DNR (do not resuscitate) [Z66]  Yes    Essential hypertension [I10]  Yes    Controlled type 2 diabetes mellitus without complication, with long-term current use of insulin [E11.9, Z79.4]  Not Applicable      Resolved Hospital Problems   No resolved problems to display.       Patient is homebound due to:  Dehydration with hypernatremia    Allergies:  Review of patient's allergies indicates:   Allergen Reactions    Metformin Diarrhea    Sulfa (sulfonamide antibiotics) Other (See Comments)     unknown reactions    Tradjenta [linagliptin] Diarrhea              Vitals:       Every shift (Skilled Nursing patients)    Diet: diabetic            Acitivities:     - Up in a chair each morning as tolerated   - Ambulate with assistance to bathroom   - Scheduled walks once each shift (every 8 hours)   - May ambulate independently   - May use walker, cane, or self-propelled wheelchair   - Weight bearing: as tolerated     LABS:  Per facility protocol       Nursing Precautions:     - Aspiration precautions:             - Total assistance with meals            -  Upright 90 degrees befor during and after meals             -  Suction at bedside          - Fall precautions per  nursing home protocol   - Seizure precaution per care home protocol   - Decubitus precautions:        -  for positioning   - Pressure reducing foam mattress   - Turn patient every two hours. Use wedge pillows to anchor patient      CONSULTS:      Physical Therapy to evaluate and treat     Occupational Therapy to evaluate and treat         MISCELLANEOUS CARE:                -continue home 2L oxygen for sats of 90%  and above to maintain     Routine Skin for Bedridden Patients:  Apply moisture barrier cream to all    skin folds and wet areas in perineal area daily and after baths and                           all bowel movements.    -daily weights. If weight gain of over 3 pounds in 2 days or edema in lower extremities or increase of oxygen from home 2L flow, would increase lasix to 40 mg daily for 3 days total or back at normal weight, on home 2L oxygen.                               DIABETES CARE:        Check blood sugar:      Fingerstick blood sugar a.m and p.m.          Report CBG < 60 or > 400 to physician.                                          Insulin Sliding Scale          Glucose  Novolog Insulin Subcutaneous        0 - 60   Orange juice or glucose tablet, hold insulin      No insulin   201-250  2 units   251-300  4 units   301-350  6 units   351-400  8 units   >400   10 units then call physician      Medications: Discontinue all previous medication orders, if any. See new list below.     Zbigniew Forman   Home Medication Instructions SEVERO:[id number]    Printed on:07/26/20 1627   Medication Information                      acetaminophen (TYLENOL) 325 MG tablet  Take 2 tablets (650 mg total) by mouth every 6 (six) hours as needed for pain              albuterol-ipratropium (DUO-NEB) 2.5 mg-0.5 mg/3 mL nebulizer solution  Take 3 mLs by nebulization every 6 (six) hours as needed for Wheezing             furosemide (LASIX) 20 MG tablet  Take 1 tablet (20 mg total) by mouth once  daily.  -resume taking on 7/29/20           insulin aspart U-100 (NOVOLOG) 100 unit/mL (3 mL) InPn pen  Inject 0-5 Units into the skin before meals and at bedtime as needed (Hyperglycemia).    -see sliding scale protocol above               latanoprost 0.005 % ophthalmic solution  Place 1 drop into both eyes every evening.             melatonin (MELATIN) 3 mg tablet  Take 2 tablets (6 mg total) by mouth nightly as needed for Insomnia.             polyethylene glycol (GLYCOLAX) 17 gram PwPk  Take 17 g by mouth 2 (two) times daily.             senna-docusate 8.6-50 mg (PERICOLACE) 8.6-50 mg per tablet  Take 1 tablet by mouth 2 (two) times daily.             tamsulosin (FLOMAX) 0.4 mg Cap  Take 1 capsule (0.4 mg total) by mouth once daily.               Once Blood Pressure remains consistently 140s/90s systolic would resume home Norvasc 10 and lisinopril 10 mg as tolerates, held on discharge for normal blood pressures.          _________________________________  Prachi Mallory MD  07/27/2020

## 2020-07-26 NOTE — PLAN OF CARE
Problem: Occupational Therapy Goal  Goal: Occupational Therapy Goal  Description: Goals to be met by: 8/6/20    Patient will increase functional independence with ADLs by performing:    UE Dressing with Moderate Assistance.  Grooming while seated with Moderate Assistance.  Toileting from bedside commode with Moderate Assistance for hygiene and clothing management.   Sitting at edge of bed x10 minutes with Stand-by Assistance.  Supine to sit with Minimal Assistance.  Stand pivot transfers with Moderate Assistance.    Outcome: Ongoing, Progressing   Patient's goals are appropriate.   SPENCER Yanez  7/26/2020

## 2020-07-26 NOTE — PT/OT/SLP PROGRESS
Occupational Therapy   Treatment    Name: Zbigniew Forman  MRN: [id number]  Admitting Diagnosis:  Dehydration with hypernatremia       Recommendations:     Discharge Recommendations: nursing facility, skilled  Discharge Equipment Recommendations:  none  Barriers to discharge:  None    Assessment:     Zbigniew Forman is a 89 y.o. male with a medical diagnosis of Dehydration with hypernatremia. Performance deficits affecting function are weakness, impaired endurance, impaired self care skills, impaired functional mobilty, gait instability, impaired balance, decreased safety awareness, impaired cognition. Patient declined ADLs and EOB/OOB activity on this date. However, patient agreed to exercises in bed only.  Patient would benefit from continued skilled acute OT 3x/wk to improve functional mobility, increase independence with ADLs, and address established goals. Recommending SNF once medically appropriate for discharge to increase maximal independence, reduce burden of care, and ensure safety.     Rehab Prognosis:  Fair; patient would benefit from acute skilled OT services to address these deficits and reach maximum level of function.       Plan:     Patient to be seen 3 x/week to address the above listed problems via self-care/home management, therapeutic activities, therapeutic exercises  · Plan of Care Expires: 08/21/20  · Plan of Care Reviewed with: patient    Subjective     Pain/Comfort:  · Pain Rating 1: 0/10  · Pain Rating Post-Intervention 1: 0/10    Objective:     Communicated with: NSJANELL prior to session.  Patient found HOB elevated with bed alarm upon OT entry to room.    General Precautions: Standard, fall   Orthopedic Precautions:N/A   Braces: N/A     Occupational Performance:     Bed Mobility:    · Attempted to assist patient to move LEs towards EOB, but patient declined and did not want to sit EOB on this date requesting to remain in bed    Activities of Daily Living:  · Not performed as patient declined  ADLs    [facility] 6 Click ADL: 9    Treatment & Education:  Role of OT and POC    Patient performed R and L UE AAROM exercises in all available planes and joints focusing to improve strength and endurance to increase independence with ADLs 2 x 10.    Patient left HOB elevated with call button in reach, daughter present and all needs met. Education:      GOALS:   Multidisciplinary Problems     Occupational Therapy Goals        Problem: Occupational Therapy Goal    Goal Priority Disciplines Outcome Interventions   Occupational Therapy Goal     OT, PT/OT Ongoing, Progressing    Description: Goals to be met by: 8/6/20    Patient will increase functional independence with ADLs by performing:    UE Dressing with Moderate Assistance.  Grooming while seated with Moderate Assistance.  Toileting from bedside commode with Moderate Assistance for hygiene and clothing management.   Sitting at edge of bed x10 minutes with Stand-by Assistance.  Supine to sit with Minimal Assistance.  Stand pivot transfers with Moderate Assistance.                     Time Tracking:     OT Date of Treatment: 07/26/20  OT Start Time: 1119  OT Stop Time: 1138  OT Total Time (min): 19 min    Billable Minutes:Therapeutic Exercise 19    SPENCER Yanez  7/26/2020

## 2020-07-26 NOTE — PLAN OF CARE
Problem: Fall Injury Risk  Goal: Absence of Fall and Fall-Related Injury  Outcome: Ongoing, Progressing     Problem: Adult Inpatient Plan of Care  Goal: Plan of Care Review  Outcome: Ongoing, Progressing  Goal: Patient-Specific Goal (Individualization)  Outcome: Ongoing, Progressing  Goal: Absence of Hospital-Acquired Illness or Injury  Outcome: Ongoing, Progressing  Goal: Optimal Comfort and Wellbeing  Outcome: Ongoing, Progressing  Goal: Readiness for Transition of Care  Outcome: Ongoing, Progressing  Goal: Rounds/Family Conference  Outcome: Ongoing, Progressing   Patient arouses easily to name and touch. Dependent for ADL's. No reports of pain. V/S stable. Daughter at bedside. No concerns voiced. Safety measures maintained.

## 2020-07-26 NOTE — PROGRESS NOTES
Progress Note   Hospital Medicine         Patient Name: Zbigniew Forman  MRN:  [id number]  [facility] Medicine Team: [facility] K Prachi Mallory MD  Date of Admission:  7/20/2020     Length of Stay:  LOS: 6 days   Expected Discharge Date: 7/27/2020  Principal Problem:  Dehydration with hypernatremia       Subjective:     Interval History/Overnight Events:    Looks great today, most talkative julio cesar seen him without daughter at bedside this morning. Denies complaints. Eating breakfast well with patient tech helping him. Reported good intake yesterday. Na 146 from 147 in recent days. He said he got some water this morning and mouth no longer dry. He wishes he had grits but is making do with eggs and Ukrainian toast. Very pleasant today. Glucose at goal low 100s without insulin. Okay to do BID accucheck or daily if they prefer as at goal and not low anymrore. Cr 1.1. medicallly stable.     Large BM yesterday, no reported further issues with urinary retention. Incontinent of urine.    Will come by around lunch to talk to daughter when she usually comes by.  Will see if PT/OT can cmoe tomorrow for another session, I think he needs more SNF to safely rehab more before HH. Their recs were a little unclear last week. Family also prefers to finish out SNF days first he had not completed.         Review of Systems   Constitutional: Negative for chills, fatigue, fever.   HENT: Negative for sore throat, trouble swallowing.    Eyes: Negative for photophobia, visual disturbance.   Respiratory: Negative for cough, shortness of breath.    Cardiovascular: Negative for chest pain, palpitations, leg swelling.   Gastrointestinal: Negative for abdominal pain, constipation, diarrhea, nausea, vomiting.   Endocrine: Negative for cold intolerance, heat intolerance.   Genitourinary: Negative for dysuria, frequency.   Musculoskeletal: Negative for arthralgias, myalgias.   Skin: Negative for rash, wound, erythema   Neurological: Negative for  dizziness, syncope, weakness, light-headedness.   Psychiatric/Behavioral: + for confusion, hallucinations, anxiety  All other systems reviewed and are negative.    Objective:     Temp:  [96.1 °F (35.6 °C)-98.4 °F (36.9 °C)]   Pulse:  [69-95]   Resp:  [17-19]   BP: ()/(51-67)   SpO2:  [94 %-99 %]       Physical Exam:  Constitutional: Appears well-developed and well-nourished. confusion resolved, at baseline. improving from previous. Pleasant.   Head: Normocephalic and atraumatic.   Mouth/Throat: Oropharynx is clear and moist.   Eyes: EOM are normal. Pupils are equal, round, and reactive to light. No scleral icterus.   Neck: Normal range of motion. Neck supple.   Cardiovascular: Normal rate and regular rhythm.  No murmur heard.  Pulmonary/Chest: Effort normal and breath sounds normal. No respiratory distress. No wheezes, rales, or rhonchi  Abdominal: Soft. Bowel sounds are normal.  No distension or tenderness  Musculoskeletal: Normal range of motion. No edema.   Neurological: Alert and oriented to person, place, situation. Confusion improved. Baseline with only mild irritation at times, easily redirectable.  Skin: Skin is warm and dry. bruising at sites of IV sticks, arms, hands.  Psychiatric: Normal mood and affect. Behavior is normal.     Recent Labs   Lab 07/20/20  1252 07/21/20  0407 07/22/20  0616 07/23/20  0311 07/24/20  0348   WBC 13.60* 10.87 10.76 9.24 8.58   HGB 13.0* 12.7* 12.4* 12.8* 12.6*   HCT 43.8 42.3 42.1 43.2 41.7   * 358* 309 266 258     Recent Labs   Lab 07/22/20  0616 07/23/20  0311 07/24/20  0348   * 147* 147*   K 3.9 3.8 4.3   * 114* 114*   CO2 25 24 24   BUN 72* 60* 51*   CREATININE 1.7* 1.4 1.4   * 159* 145*   CALCIUM 8.1* 7.7* 8.1*   MG 2.5 2.4 2.5   PHOS 2.9 2.5* 2.3*     Recent Labs   Lab 07/20/20  1537   ALKPHOS 63   ALT 13   AST 21   ALBUMIN 3.1*   PROT 6.4   BILITOT 0.3     Recent Labs   Lab 07/23/20  0748 07/23/20 2002 07/25/20  0820 07/25/20  0921  07/25/20  1653   POCTGLUCOSE 163* 235* 61* 72 145*        enoxaparin  30 mg Subcutaneous Q24H    latanoprost  1 drop Both Eyes QHS    polyethylene glycol  17 g Oral BID    senna-docusate 8.6-50 mg  1 tablet Oral BID    tamsulosin  0.4 mg Oral Daily       Assessment and Plan     Mr. Zbigniew Forman is a 89 y.o. male who presented to [facility] on 7/20/2020 with     Hospital Course:    Mr. Zbigniew Forman was admitted to Hospital Medicine for management of dehydration/acute metabolic encephalopathy.    Dehydration with hypernatremia  · Sodium 156 on admit on 7/21. Likely secondary to over diuresis during recent CHF, holding lasix and lisinopril on admission, D5W started and condom cath to watch output (do NOT place whiteside for output due to BPH at baseline). Na improving with hydration as is mental status, down to 146-147, BUN from 90 to 72 to 51 and Cr also improving with hydration as well to 1.4  · Converted to oral hydration only 7/23 and doing well, multiple powerades on exam, nursing reports he can expresss his needs for water, appetite good and mental status improved  · Continue hydration to equilibrate, would like to get Na within normal limits <!45 if we can this weekend  · 7/25: no labs today, refused, daughter aware. Orally hydrating, will reattempt tomorrow  · 7/26: 146. Hydrating well with assist with all meals     Acute metabolic encephalopathy  · Secondary to hypernatremia as much closer to baseline as Na has continued to improve with hydration, required restraints on admit but removed 7/22 as now resolving.  · 7/23: mild agitation overnight but redirected. Able to have conversation today, recall events, discuss 2 daughters. Seems much closer to baseline now nursing states today much better than yesterday  · 7/24: at baseline now  · 7/26: no issues  · Continue delirium precautions, redirect PRN         JAN (acute kidney injury)  · Secondary to dehydration, BUN 90, Cr 2.6 on admission (baseline Cr  1.2)  · Improving, 72/1.7-->51/1.4. Continue to trend in condom cath, IVF and will watch to ensure can also orally hydrate now more alert.  · 7/26: Cr 1.1          Hstory UTI due to Klebsiella species  -on prior admit, completed rocephin 7/21  -Cx from blood and urine now NG, given dose x 1 in ER of vanc, zosyn, no signs sepsis now, no further needs at this time   .         Chronic hypoxemic respiratory failure  -on 2L at home, continue now. CXR without acute abnormalities on admit         Controlled type 2 diabetes mellitus without complication, with long-term current use of insulin  · On levemir 7U at home, glucose controlled initially until required D5W for dehydation, glucose higher in 200s with that, converted off on 7/22, resumed levemir  · 7/23: agitated so levemir not given overnight, glucose 140s this AM, would cover SSI and eating now so will plan to give home regimen tonight if toleates  · 7/24: intermittently refusing accuchecks as it huts his fingers, is okay as glucose at goal on his CMPs 100s, if mental status changed would obvious check to ensure not out of range  · 7/25: glucose 61 and 80 today but didn't eat much last night per family. Hold insulin now, would rather a glucose of lower 200s than 60-80, and family only wants BID accuchecks now. If any signs of being altered recheck accucheck now, nursing aware of plans. Was not on levemir before 1st admit early July, may consider lower dose vs no insulin pending trends  · 7/26: at goal without levemir  · -trend SSI, accuchecks.     Essential hypertension  · Lower BP on admit from dehydration, home lisinopril/norvasc held since admit  · -restart norvasc 7/23, cont to hold lisinopril as JAN still improving. BPs 100s-115 systolics mostly  · 7/25-7/26: cont to hold, systolics 110-120s       Chronic diastolic Heart failure with preserved ejection fraction  - over diuresed last week on admission with cardiology as lytes off/JAN on admit here.   -cautious  IVF on admit but no signs of overload and needed the fluids, change to oral hydration for 7/23. -Continue to hold Lasix due to dehydration and Lisinopril due to JAN   -will have to decide when will resume but will likely be in future out of hospital once euvolemic consistently and lytes better  7/25: discussed with daughter plans to resume with euvolemic, or volume seems to appears, has none yet, no edema in legs at all, on home oxygen dose, no dyspnea     BPH with urinary obstruction  -continue flomax  -Avoid urinary catheterization unless SIGNIFICANT urinary retention as would be difficult to remove with his history, condom cath is working fine now   7/25: some retention overnight with 600 seen on bladder scan with only 200 removed, may consider trying 2nd BPH med vs invasive options, will discuss with daughter. If has retention further today may have to ask uro to assist given resistance also on straight cath  7/26:required straight cath for some retention, did okay today with output, had wet pads. Again follow above protocols, can consider finasteride as next line if we need as another oral if reoccurs         Debility  PT/OT recs pending, was at [facility] before fam actually does prefer SNF to finish out days then , working with CM to see plan, may need another PT/OT session to further delineate as recs a little unclear  - I think he does need more SNF before  for safety and for debility on my exams, daughter does as well, will need to see if PT can reassess if there are any issues with insurance, SNF willing to acept Monday per daughter        DNR (do not resuscitate)  Patient DNR/DNI.  In the event of profound clinical deterioration, efforts will be directed to focus on comfort. Discussed with patient's daughter, Anne on 7/21. She also expressed did not desire any type of feeding tube for patient.      Diet:  DM      DVT PPx:  lovenox  Goals of Care:  DNR/DNI      Disposition: SNF preferred, I believe he needs  this again for safety and rehab before HH ready, PT said SNF vs HH in their notes, will see if we can clarify more tomorrow if insurance needs more notes but I believe needs SNF to rehab more from my medical opinion based on my daily exams and he is much more active to participate in daily meeaningful thearpy as his lytes have improved and his mental status better.

## 2020-07-27 VITALS
RESPIRATION RATE: 16 BRPM | TEMPERATURE: 97 F | DIASTOLIC BLOOD PRESSURE: 56 MMHG | BODY MASS INDEX: 20.83 KG/M2 | SYSTOLIC BLOOD PRESSURE: 116 MMHG | WEIGHT: 140.63 LBS | HEIGHT: 69 IN | HEART RATE: 72 BPM | OXYGEN SATURATION: 98 %

## 2020-07-27 LAB
ALBUMIN SERPL BCP-MCNC: 2.7 G/DL (ref 3.5–5.2)
ALP SERPL-CCNC: 64 U/L (ref 55–135)
ALT SERPL W/O P-5'-P-CCNC: 15 U/L (ref 10–44)
ANION GAP SERPL CALC-SCNC: 7 MMOL/L (ref 8–16)
AST SERPL-CCNC: 15 U/L (ref 10–40)
BILIRUB SERPL-MCNC: 0.6 MG/DL (ref 0.1–1)
BUN SERPL-MCNC: 30 MG/DL (ref 10–30)
CALCIUM SERPL-MCNC: 8.2 MG/DL (ref 8.7–10.5)
CHLORIDE SERPL-SCNC: 112 MMOL/L (ref 95–110)
CO2 SERPL-SCNC: 25 MMOL/L (ref 23–29)
CREAT SERPL-MCNC: 1.1 MG/DL (ref 0.5–1.4)
EST. GFR  (AFRICAN AMERICAN): >60 ML/MIN/1.73 M^2
EST. GFR  (NON AFRICAN AMERICAN): 54.8 ML/MIN/1.73 M^2
GLUCOSE SERPL-MCNC: 148 MG/DL (ref 70–110)
POCT GLUCOSE: 143 MG/DL (ref 70–110)
POTASSIUM SERPL-SCNC: 4.7 MMOL/L (ref 3.5–5.1)
PROT SERPL-MCNC: 5.7 G/DL (ref 6–8.4)
SODIUM SERPL-SCNC: 144 MMOL/L (ref 136–145)

## 2020-07-27 PROCEDURE — 80053 COMPREHEN METABOLIC PANEL: CPT

## 2020-07-27 PROCEDURE — 97116 GAIT TRAINING THERAPY: CPT

## 2020-07-27 PROCEDURE — 99239 HOSP IP/OBS DSCHRG MGMT >30: CPT | Mod: ,,, | Performed by: HOSPITALIST

## 2020-07-27 PROCEDURE — 99239 PR HOSPITAL DISCHARGE DAY,>30 MIN: ICD-10-PCS | Mod: ,,, | Performed by: HOSPITALIST

## 2020-07-27 PROCEDURE — 97530 THERAPEUTIC ACTIVITIES: CPT

## 2020-07-27 PROCEDURE — 94761 N-INVAS EAR/PLS OXIMETRY MLT: CPT

## 2020-07-27 PROCEDURE — 25000003 PHARM REV CODE 250: Performed by: HOSPITALIST

## 2020-07-27 PROCEDURE — 63600175 PHARM REV CODE 636 W HCPCS: Performed by: HOSPITALIST

## 2020-07-27 PROCEDURE — 36415 COLL VENOUS BLD VENIPUNCTURE: CPT

## 2020-07-27 PROCEDURE — 11000001 HC ACUTE MED/SURG PRIVATE ROOM

## 2020-07-27 RX ORDER — ENOXAPARIN SODIUM 100 MG/ML
40 INJECTION SUBCUTANEOUS EVERY 24 HOURS
Status: DISCONTINUED | OUTPATIENT
Start: 2020-07-27 | End: 2020-07-28 | Stop reason: HOSPADM

## 2020-07-27 RX ADMIN — TAMSULOSIN HYDROCHLORIDE 0.4 MG: 0.4 CAPSULE ORAL at 08:07

## 2020-07-27 RX ADMIN — POLYETHYLENE GLYCOL 3350 17 G: 17 POWDER, FOR SOLUTION ORAL at 08:07

## 2020-07-27 RX ADMIN — LATANOPROST 1 DROP: 50 SOLUTION OPHTHALMIC at 09:07

## 2020-07-27 RX ADMIN — DOCUSATE SODIUM 50 MG AND SENNOSIDES 8.6 MG 1 TABLET: 8.6; 5 TABLET, FILM COATED ORAL at 08:07

## 2020-07-27 RX ADMIN — ENOXAPARIN SODIUM 40 MG: 100 INJECTION SUBCUTANEOUS at 05:07

## 2020-07-27 NOTE — PT/OT/SLP PROGRESS
Physical Therapy Treatment    Patient Name:  Zbigniew Forman   MRN:  [id number]    Recommendations:     Discharge Recommendations:  nursing facility, skilled   Discharge Equipment Recommendations: (TBD)   Barriers to discharge: decreased functional mobility requiring increased assist      Assessment:     Zbigniew Forman is a 89 y.o. male admitted with a medical diagnosis of Dehydration with hypernatremia.  He presents with the following impairments/functional limitations:  weakness, impaired balance, decreased safety awareness, impaired endurance, impaired cognition, impaired cardiopulmonary response to activity, impaired self care skills, decreased coordination, impaired functional mobilty, gait instability, decreased lower extremity function, impaired coordination. Pt treated on this date demonstrating increased command following though still confused oriented to self and year only. Pt found with NC removed and SPo2 86%, following re-application of O2 SPo2 increased to %. Pt demonstrated increased functional mobility participating in EOB sitting, standing, and gait training w/RW. Pt would benefit from continued skilled acute PT 3x/wk to improve functional mobility.  Recommending pt receive PT services in SNF setting following discharge from hospital once medically cleared.     Rehab Prognosis: Fair; patient would benefit from acute skilled PT services to address these deficits and reach maximum level of function.    Recent Surgery: * No surgery found *      Plan:     During this hospitalization, patient to be seen 3 x/week to address the identified rehab impairments via gait training, therapeutic activities, therapeutic exercises, neuromuscular re-education and progress toward the following goals:    · Plan of Care Expires:  08/22/20    Subjective     Chief Complaint: cold   Patient/Family Comments/goals: Pt pleasant and willing to participate with therapy on this date.    Pain/Comfort:  · Pain Rating 1:  010      Objective:     Communicated with NSG prior to session.  Patient found supine with bed alarm upon PT entry to room.     General Precautions: Standard, fall   Orthopedic Precautions:N/A   Braces: N/A     Functional Mobility:  · Bed Mobility:     · Rolling Right: moderate assistance  · Scooting: minimum assistance  · Supine to Sit: maximal assistance  · Sit to Supine: moderate assistance  · Transfers:     · Sit to Stand:  moderate assistance with rolling walker  · Gait: 8 small uncoordinated side steps to HOB w/RW Min A and cues for motor planning  · Balance: Sitting: CGA progressing to SBA     Standing: Min A progressing to CGA w/RW      AM-PAC 6 CLICK MOBILITY  Turning over in bed (including adjusting bedclothes, sheets and blankets)?: 2  Sitting down on and standing up from a chair with arms (e.g., wheelchair, bedside commode, etc.): 2  Moving from lying on back to sitting on the side of the bed?: 2  Moving to and from a bed to a chair (including a wheelchair)?: 2  Need to walk in hospital room?: 2  Climbing 3-5 steps with a railing?: 1  Basic Mobility Total Score: 11       Therapeutic Activities and Exercises:   - EOB Sittinmins CGA-SBA performing functional reaching tasks   - Static Standinmin in RW w/Min-CGA  - Pt educated on:   -PT roles, expectations, and POC    -Safety with mobility   -Discharge recommendations   - Re-positioning for comfort and pressure relief     Patient left HOB elevated with call button in reach and NSG present..    GOALS:   Multidisciplinary Problems     Physical Therapy Goals        Problem: Physical Therapy Goal    Goal Priority Disciplines Outcome Goal Variances Interventions   Physical Therapy Goal     PT, PT/OT Ongoing, Progressing     Description: Goals to be met by: 2020    Patient will increase functional independence with mobility by performin. Supine to sit with MInimal Assistance  2. Sit to supine with MInimal Assistance  3. Sit to stand transfer  with Minimal Assistance  4. Bed to chair transfer with Min A   5. Lower extremity exercise program x15 reps                      Time Tracking:     PT Received On: 07/27/20  PT Start Time: 0833     PT Stop Time: 0857  PT Total Time (min): 24 min     Billable Minutes: Gait Training 8 and Therapeutic Activity 16    Treatment Type: Treatment  PT/PTA: PT           Juan Daniel Galindo, PT  07/27/2020

## 2020-07-27 NOTE — PROGRESS NOTES
Pharmacist Renal Dose Adjustment Note    Zbigniew Forman is a 89 y.o. male being treated with the medication enoxaparin.    Patient Data:    Vital Signs (Most Recent):  Temp: 97.8 °F (36.6 °C) (07/27/20 1143)  Pulse: 75 (07/27/20 1143)  Resp: 16 (07/27/20 1143)  BP: (!) 148/67 (07/27/20 1143)  SpO2: 100 % (07/27/20 1143)   Vital Signs (72h Range):  Temp:  [96.1 °F (35.6 °C)-98.6 °F (37 °C)]   Pulse:  [65-95]   Resp:  [16-21]   BP: ()/(51-72)   SpO2:  [92 %-100 %]      Recent Labs   Lab 07/24/20  0348 07/26/20  0548 07/27/20  0808   CREATININE 1.4 1.1 1.1     Serum creatinine: 1.1 mg/dL 07/27/20 0808  Estimated creatinine clearance: 32.2 mL/min    Medication: Enoxaparin 30 mg SC daily will be changed to enoxaparin 40 mg SC daily.    Pharmacist's Name: Mario Frausto  Pharmacist's Extension: [phone]

## 2020-07-27 NOTE — PLAN OF CARE
Pt ok to return to [facility], nurse to call report to nurse Nielson, number for report is [facility]. Nurse aware, Marcos did update Anne by  at  and other [facility] Joann informed of d/c at 4pm back to NH facility. [facility] Anne was in the room and agreeable to d.c time and return.

## 2020-07-27 NOTE — PLAN OF CARE
Pt will transfer to [facility] today via ambulance transportation. Family made aware by MACEY. [facility] will assume care once d/c.     07/27/20 1442   Final Note   Assessment Type Final Discharge Note   Anticipated Discharge Disposition SNF   Post-Acute Status   Post-Acute Authorization Placement   Post-Acute Placement Status Set-up Complete   Discharge Delays None known at this time   Lyn Nguyễn, MSN  Case Management  Ext [phone]

## 2020-07-27 NOTE — PLAN OF CARE
"   07/27/20 0827   Post-Acute Status   Post-Acute Authorization Placement   Home Health Status Referrals Sent     Sw sent updated OT note to Marcos Sandhu to call to be sure facility is accepting Pt's. Jeanna informed Marcos "that Pt was with them prior to admission," Marcos asked to fax all clinical to , Sw to follow. Marcos faxed PHN auth request to .   "

## 2020-07-27 NOTE — PLAN OF CARE
Pt would benefit from continued skilled acute PT services to improve functional mobility. POC is appropriate and pt should continue towards current goals set to progress to PLOF.     Problem: Physical Therapy Goal  Goal: Physical Therapy Goal  Description: Goals to be met by: 2020    Patient will increase functional independence with mobility by performin. Supine to sit with MInimal Assistance  2. Sit to supine with MInimal Assistance  3. Sit to stand transfer with Minimal Assistance  4. Bed to chair transfer with Min A   5. Lower extremity exercise program x15 reps     Outcome: Ongoing, Progressing

## 2020-07-27 NOTE — PLAN OF CARE
SW canceled wheelchair van and arranged for stretcher transport instead, per daughter's request. [facility] auth is [id number]. Stretcher transport running behind schedule therefore will take an hour or longer before arriving to  patient.     Natacha Madrigal, ADRIAN  [facility]

## 2020-07-27 NOTE — NURSING
Pt discharged. Discharged explained, pt  and daughter verbalized understanding. IV removed . Cath intact. Pt waiting on transport. Report given to Snehal BUENO. Will continue to monitor.

## 2020-07-27 NOTE — PLAN OF CARE
Pt remains free from falls and injury. Pt refused HS medications. Provided with frequent repositioning assistance. Bed low and locked, Call light within reach.

## 2020-07-27 NOTE — PLAN OF CARE
Pt will d/c to skilled at [facility] per family choice. Awaiting facility review  and authorization, daughter Miladis has been update of possible d/c today.     07/27/20 1310   Discharge Reassessment   Assessment Type Discharge Planning Reassessment   Provided patient/caregiver education on the expected discharge date and the discharge plan Yes   Do you have any problems affording any of your prescribed medications? No   Discharge Plan A Skilled Nursing Facility   Discharge Plan B Home Health   DME Needed Upon Discharge  none   Anticipated Discharge Disposition SNF   Can the patient/caregiver answer the patient profile reliably? No, cognitively impaired   How does the patient rate their overall health at the present time? Fair   Describe the patient's ability to walk at the present time. Walks with the help of equipment   How often would a person be available to care for the patient? Whenever needed   Number of comorbid conditions (as recorded on the chart) Three   Post-Acute Status   Post-Acute Authorization Placement   Post-Acute Placement Status Set-up Complete   Discharge Delays None known at this time   Lyn Nguyễn, MSN  Case Management  Ext [phone]

## 2020-07-27 NOTE — PLAN OF CARE
Problem: Fall Injury Risk  Goal: Absence of Fall and Fall-Related Injury  Outcome: Met     Problem: Restraint, Behavioral  Goal: Discontinuation Criteria Achieved  Outcome: Met  Goal: Personal Dignity and Safety Maintained  Outcome: Met     Problem: Adult Inpatient Plan of Care  Goal: Plan of Care Review  Outcome: Met  Goal: Patient-Specific Goal (Individualization)  Outcome: Met  Goal: Absence of Hospital-Acquired Illness or Injury  Outcome: Met  Goal: Optimal Comfort and Wellbeing  Outcome: Met  Goal: Readiness for Transition of Care  Outcome: Met  Goal: Rounds/Family Conference  Outcome: Met     Problem: Diabetes Comorbidity  Goal: Blood Glucose Level Within Desired Range  Outcome: Met     Problem: Electrolyte Imbalance (Acute Kidney Injury/Impairment)  Goal: Serum Electrolyte Balance  Outcome: Met     Problem: Fluid Imbalance (Acute Kidney Injury/Impairment)  Goal: Optimal Fluid Balance  Outcome: Met     Problem: Hematologic Alteration (Acute Kidney Injury/Impairment)  Goal: Hemoglobin, Hematocrit and Platelets Within Normal Range  Outcome: Met     Problem: Oral Intake Inadequate (Acute Kidney Injury/Impairment)  Goal: Optimal Nutrition Intake  Outcome: Met     Problem: Renal Function Impairment (Acute Kidney Injury/Impairment)  Goal: Effective Renal Function  Outcome: Met     Problem: Infection  Goal: Infection Symptom Resolution  Outcome: Met     Problem: Skin Injury Risk Increased  Goal: Skin Health and Integrity  Outcome: Met     Problem: Restraint, Nonbehavioral (Nonviolent)  Goal: Discontinuation Criteria Achieved  Outcome: Met  Goal: Personal Dignity and Safety Maintained  Outcome: Met

## 2020-07-27 NOTE — PROGRESS NOTES
Progress Note   Hospital Medicine         Patient Name: Zbigniew Forman  MRN:  [id number]  [facility]: [facility] [place] Prachi Mallory MD  Date of Admission:  7/20/2020     Length of Stay:  LOS: 7 days   Expected Discharge Date: 7/27/2020  Principal Problem:  Dehydration with hypernatremia       Subjective:     Interval History/Overnight Events:    Doing well today, eating breakfast well and hydrating, returned this afternon and discussed plans, MAR planning with daughter Anne. BM this afternoon. Urinating without issue. Plan for SNF and did well with PT stood up and did more than OT session yesterday on exam today. Discussed holding BP meds until it rebounds and resuming lasix Wednesday at 20 mg which was home dose prior to the last hospital stay as he is now euvolemic on exams and labs. She agrees with plans. Waiting for auth for [facility] nursing home.        Review of Systems   Constitutional: Negative for chills, fatigue, fever.   HENT: Negative for sore throat, trouble swallowing.    Eyes: Negative for photophobia, visual disturbance.   Respiratory: Negative for cough, shortness of breath.    Cardiovascular: Negative for chest pain, palpitations, leg swelling.   Gastrointestinal: Negative for abdominal pain, constipation, diarrhea, nausea, vomiting.   Endocrine: Negative for cold intolerance, heat intolerance.   Genitourinary: Negative for dysuria, frequency.   Musculoskeletal: Negative for arthralgias, myalgias.   Skin: Negative for rash, wound, erythema   Neurological: Negative for dizziness, syncope, weakness, light-headedness.   Psychiatric/Behavioral: + for confusion, hallucinations, anxiety  All other systems reviewed and are negative.    Objective:     Temp:  [97.2 °F (36.2 °C)-98.2 °F (36.8 °C)]   Pulse:  [69-79]   Resp:  [16-21]   BP: (127-148)/(63-72)   SpO2:  [93 %-100 %]       Physical Exam:  Constitutional: Appears well-developed and well-nourished. confusion resolved, at baseline.  improving from previous. Pleasant.   Head: Normocephalic and atraumatic.   Mouth/Throat: Oropharynx is clear and moist.   Eyes: EOM are normal. Pupils are equal, round, and reactive to light. No scleral icterus.   Neck: Normal range of motion. Neck supple.   Cardiovascular: Normal rate and regular rhythm.  No murmur heard.  Pulmonary/Chest: Effort normal and breath sounds normal. No respiratory distress. No wheezes, rales, or rhonchi  Abdominal: Soft. Bowel sounds are normal.  No distension or tenderness  Musculoskeletal: Normal range of motion. No edema.   Neurological: Alert and oriented to person, place, situation. Confusion improved. Baseline with only mild irritation at times, easily redirectable.  Skin: Skin is warm and dry. bruising at sites of IV sticks, arms, hands.  Psychiatric: Normal mood and affect. Behavior is normal.     Recent Labs   Lab 07/21/20  0407 07/22/20  0616 07/23/20  0311 07/24/20  0348   WBC 10.87 10.76 9.24 8.58   HGB 12.7* 12.4* 12.8* 12.6*   HCT 42.3 42.1 43.2 41.7   * 309 266 258     Recent Labs   Lab 07/22/20  0616 07/23/20  0311 07/24/20  0348 07/26/20  0548 07/27/20  0808   * 147* 147* 146* 144   K 3.9 3.8 4.3 4.4 4.7   * 114* 114* 112* 112*   CO2 25 24 24 26 25   BUN 72* 60* 51* 34* 30   CREATININE 1.7* 1.4 1.4 1.1 1.1   * 159* 145* 119* 148*   CALCIUM 8.1* 7.7* 8.1* 7.9* 8.2*   MG 2.5 2.4 2.5  --   --    PHOS 2.9 2.5* 2.3*  --   --      Recent Labs   Lab 07/20/20  1537 07/26/20  0548 07/27/20  0808   ALKPHOS 63 60 64   ALT 13 16 15   AST 21 18 15   ALBUMIN 3.1* 2.5* 2.7*   PROT 6.4 5.3* 5.7*   BILITOT 0.3 0.5 0.6     Recent Labs   Lab 07/23/20 2002 07/25/20  0820 07/25/20  0921 07/25/20  1653 07/26/20  1008 07/27/20  0732   POCTGLUCOSE 235* 61* 72 145* 149* 143*        enoxaparin  40 mg Subcutaneous Q24H    latanoprost  1 drop Both Eyes QHS    polyethylene glycol  17 g Oral BID    senna-docusate 8.6-50 mg  1 tablet Oral BID    tamsulosin  0.4 mg  Oral Daily       Assessment and Plan     Mr. Zbiginew Forman is a 89 y.o. male who presented to [facility] on 7/20/2020 with     Hospital Course:    Mr. Zbigniew Forman was admitted to Hospital Medicine for management of dehydration/acute metabolic encephalopathy.    Dehydration with hypernatremia  · Sodium 156 on admit on 7/21. Likely secondary to over diuresis during recent CHF, holding lasix and lisinopril on admission, D5W started and condom cath to watch output (do NOT place whiteside for output due to BPH at baseline). Na improving with hydration as is mental status, down to 146-147, BUN from 90 to 72 to 51 and Cr also improving with hydration as well to 1.4  · Converted to oral hydration only 7/23 and doing well, multiple powerades on exam, nursing reports he can expresss his needs for water, appetite good and mental status improved  · Continue hydration to equilibrate, would like to get Na within normal limits <!45 if we can this weekend  · 7/25: no labs today, refused, daughter aware. Orally hydrating, will reattempt tomorrow  · 7/26: 146. Hydrating well with assist with all meals  · 7/27; 144. Euvolemic. Would start lasix Wednesday to avoid going volume with hx     Acute metabolic encephalopathy  · Secondary to hypernatremia as much closer to baseline as Na has continued to improve with hydration, required restraints on admit but removed 7/22 as now resolving.  · 7/23: mild agitation overnight but redirected. Able to have conversation today, recall events, discuss 2 daughters. Seems much closer to baseline now nursing states today much better than yesterday  · 7/24: at baseline now  · 7/26: no issues  · Continue delirium precautions, redirect PRN         JAN (acute kidney injury)  · Secondary to dehydration, BUN 90, Cr 2.6 on admission (baseline Cr 1.2)  · Improving, 72/1.7-->51/1.4. Continue to trend in condom cath, IVF and will watch to ensure can also orally hydrate now more alert.  · 7/26: Cr  1.1          Hstory UTI due to Klebsiella species  -on prior admit, completed rocephin 7/21  -Cx from blood and urine now NG, given dose x 1 in ER of vanc, zosyn, no signs sepsis now, no further needs at this time   .         Chronic hypoxemic respiratory failure  -on 2L at home, continue now. CXR without acute abnormalities on admit         Controlled type 2 diabetes mellitus without complication, with long-term current use of insulin  · On levemir 7U at home, glucose controlled initially until required D5W for dehydation, glucose higher in 200s with that, converted off on 7/22, resumed levemir  · 7/23: agitated so levemir not given overnight, glucose 140s this AM, would cover SSI and eating now so will plan to give home regimen tonight if toleates  · 7/24: intermittently refusing accuchecks as it huts his fingers, is okay as glucose at goal on his CMPs 100s, if mental status changed would obvious check to ensure not out of range  · 7/25: glucose 61 and 80 today but didn't eat much last night per family. Hold insulin now, would rather a glucose of lower 200s than 60-80, and family only wants BID accuchecks now. If any signs of being altered recheck accucheck now, nursing aware of plans. Was not on levemir before 1st admit early July, may consider lower dose vs no insulin pending trends  · 7/26: at goal without levemir  · -trend SSI, accuchecks.     Essential hypertension  · Lower BP on admit from dehydration, home lisinopril/norvasc held since admit  · -restart norvasc 7/23, cont to hold lisinopril as JAN still improving. BPs 100s-115 systolics mostly  · 7/25-7/26: cont to hold, systolics 110-120s       Chronic diastolic Heart failure with preserved ejection fraction  - over diuresed last week on admission with cardiology as lytes off/JAN on admit here.   -cautious IVF on admit but no signs of overload and needed the fluids, change to oral hydration for 7/23. -Continue to hold Lasix due to dehydration and  Lisinopril due to JAN   -will have to decide when will resume but will likely be in future out of hospital once euvolemic consistently and lytes better  7/25: discussed with daughter plans to resume with euvolemic, or volume seems to appears, has none yet, no edema in legs at all, on home oxygen dose, no dyspnea  7/27: would start lasix Wednesday as Na now at goal and euvolemic.     BPH with urinary obstruction  -continue flomax  -Avoid urinary catheterization unless SIGNIFICANT urinary retention as would be difficult to remove with his history, condom cath is working fine now   7/25: some retention overnight with 600 seen on bladder scan with only 200 removed, may consider trying 2nd BPH med vs invasive options, will discuss with daughter. If has retention further today may have to ask uro to assist given resistance also on straight cath  7/26:required straight cath for some retention, did okay today with output, had wet pads. Again follow above protocols, can consider finasteride as next line if we need as another oral if reoccurs         Debility  PT/OT recs pending, was at SNF before fam actually does prefer SNF to finish out days then , working with CM to see plan, may need another PT/OT session to further delineate as recs a little unclear  - I think he does need more SNF before  for safety and for debility on my exams, daughter does as well, will need to see if PT can reassess if there are any issues with insurance, SNF willing to acept Monday per daughter  7/27: did better with PT, stood, now they feel would and could participate in some therapies as mental status better.       DNR (do not resuscitate)  Patient DNR/DNI.  In the event of profound clinical deterioration, efforts will be directed to focus on comfort. Discussed with patient's daughter, Anne on 7/21. She also expressed did not desire any type of feeding tube for patient.      Diet:  DM      DVT PPx:  lovenox  Goals of Care:   DNR/DNI      Disposition: pending auth for [facility]  Med ready

## 2020-07-27 NOTE — DISCHARGE SUMMARY
DISCHARGE SUMMARY  Hospital Medicine    Team: [facility] K    Patient Name: Zbigniew Forman  YOB: 1919    Admit Date: 7/20/2020    Discharge Date: 07/27/2020    Discharge Attending Physician: Prachi Mallory MD     Principal Diagnoses:  Active Hospital Problems    Diagnosis  POA    *Dehydration with hypernatremia [E87.0]  Yes    Acute metabolic encephalopathy [G93.41]  Yes    JNA (acute kidney injury) [N17.9]  Yes    Chronic hypoxemic respiratory failure [J96.11]  Yes    Debility [R53.81]  Yes    UTI due to Klebsiella species [N39.0, B96.1]  Yes    BPH with urinary obstruction [N40.1, N13.8]  Yes    Heart failure with preserved ejection fraction [I50.30]  Yes    DNR (do not resuscitate) [Z66]  Yes    Essential hypertension [I10]  Yes    Controlled type 2 diabetes mellitus without complication, with long-term current use of insulin [E11.9, Z79.4]  Not Applicable      Resolved Hospital Problems   No resolved problems to display.       Discharged Condition: improved    HOSPITAL COURSE:      Initial Presentation:    [age] year old male recently admitted here for heart failure exacerbation from 07/04 to 7/16 brought back to the ED from the skilled nursing facility where he was staying for decreased level of consciousness and not eating.  He is not able to provide any history so history is provided by his daughter at the bedside.  She has noticed that over the past couple of days he has mostly been sleeping and not doing anything.  During that admission he was treated for a Klebsiella urinary tract infection and bacteremia and was discharged to the skilled nursing facility with ceftriaxone with planned end date of 07/21.  He is DNR/DNI.    Course of Principle Problem for Admission:    Dehydration with hypernatremia  · Sodium 156 on admit on 7/21. Likely secondary to over diuresis during recent CHF, lasix and lisinopril held during admission  · - D5W started and condom cath to watch  output (do NOT place whiteside for output due to BPH at baseline)  · -Na improved with hydration as did mental status slowly but surely every day 1-2 points   · Converted to oral hydration only 7/23 and doing well,  Drinks powerade, orange juice, needsnursing and family assist for this but hydrated well without mintaining Na without the fluids needing to be put back on  · Na 144 on discharge and euvolemic  · Want to avoid hypervolemia again like last admit so will go back to home lasix 20 (on dc recently was on 60 mg) on Wednesday to allow another day to equilibrate further on dc. Daughter aware of plan, given copy of SNF orders on dc so she knows all that plan.    Other Medical Problems Addressed in the Hospital:    Acute metabolic encephalopathy  · Secondary to hypernatremia as much closer to baseline as Na has continued to improve with hydration, required restraints on admit but removed 7/22 and never required again  · Easily redirected if needed, was from electrolyte abnormalities, mild baseline cognitive dysfunction but expected given 89 years old. Pleasant, avoid any meds for sundowning, is mild, and redirects easily        JAN (acute kidney injury)  · Secondary to dehydration, BUN 90, Cr 2.6 on admission (baseline Cr 1.2)  · 7/26: Cr 1.1 on discharge. Okay to resume lisinopril in future when BP allows but BP was good without BP meds on dc. Lasix will resume on 7/29 and is safe from renal standpoint           Hstory UTI due to Klebsiella species  -on prior admit, completed rocephin 7/21  -Cx from blood and urine now NG, given dose x 1 in ER of vanc, zosyn, no signs sepsis now, no further needs at this time   .         Chronic hypoxemic respiratory failure  -on 2L at home, continue now. CXR without acute abnormalities on admit   -orders to watch at SNF if o2 needs increase to increase lasix to 40 mg as he was increased to 40 by PCP prior to his recent CHF exacerbation but given how dehydrated he was on admit on 60  mg I will be cautious about adding it back to maintain euvolemic with just 20 mg for him but orders on SNF orders to watch o2 for signs of change.        Controlled type 2 diabetes mellitus without complication, with long-term current use of insulin  · On levemir 7U at home, glucose controlled initially until required D5W for dehydation, glucose higher in 200s with that, converted off on 7/22, resumed levemir  · 7/23: agitated so levemir not given overnight, glucose 140s this AM, would cover SSI and eating now so will plan to give home regimen tonight if toleates  · 7/24: intermittently refusing accuchecks as it huts his fingers, is okay as glucose at goal on his CMPs 100s, if mental status changed would obvious check to ensure not out of range  · 7/25: glucose 61 and 80 today but didn't eat much last night per family. Hold insulin now, would rather a glucose of lower 200s than 60-80, and family only wants BID accuchecks now. If any signs of being altered recheck accucheck now, nursing aware of plans. Was not on levemir before 1st admit early July, may consider lower dose vs no insulin pending trends  · 7/26: at goal without levemir. Dc with just SSI as was not on levemir prior to his last hospital admit, likely will do find without it again, id rather glucose closer to 200 than 60 chronically. accucheck daily is fine, finger sticks hurt him       Essential hypertension  · Lower BP on admit from dehydration, home lisinopril/norvasc held since admit  · Normotensive on days prior to dc and on day of dc  · Hold on discharge, and orders on SNF orders to resume with home dosing information once BP is consistent 140 systolic or 100 diastolic        Chronic diastolic Heart failure with preserved ejection fraction  - over diuresed last week on admission with cardiology as lytes off/JAN on admit here.   -lasix held all admit here, and he needed IVF for volume down  -on dc is euvolemic, was on 20 lasix prior to last admit,  went to 40 lasix before that HF admit, then went to SNF on 60 and got very very dry  -will tentatively go to SNF on 20 mg again on 7/29 to start, no hypervolemia now but needs a little lasix to maintain euvolemia at home. Will go back to low salt diet, discussed with daughter, and orders to watch oxygen- if goes up on this, if gains 3 pounds in 2 days, or if has LE edema at SNF would go to 40 mg for him daily, dont want to dry out again so cautious with this, and weigh and watch these signs to avoid [place] or drying out at SNF          BPH with urinary obstruction  -continue flomax  -Avoid urinary catheterization unless SIGNIFICANT urinary retention as would be difficult to remove with his history, condom cath is working fine now   -had mild retention only once here req straight cath  -can always do finasteride long term as a 2nd med if needs, but had no further issue, incontinence of urine.        Debility  Improved PT sessions as mental status improved, back to SNF to finish out sNF days then convert to  after that     DNR (do not resuscitate)  Patient DNR/DNI.  In the event of profound clinical deterioration, efforts will be directed to focus on comfort. Discussed with patient's daughter, Anne on 7/21. She also expressed did not desire any type of feeding tube for patient.      Consults: none    Last CBC/BMP:    CBC/Anemia Labs: Coags:    Recent Labs   Lab 07/22/20  0616 07/23/20  0311 07/24/20  0348   WBC 10.76 9.24 8.58   HGB 12.4* 12.8* 12.6*   HCT 42.1 43.2 41.7    266 258   MCV 94 98 93   RDW 14.8* 14.9* 14.8*    No results for input(s): PT, INR, APTT in the last 168 hours.     Chemistries:   Recent Labs   Lab 07/20/20  1537  07/22/20  0616 07/23/20  0311 07/24/20  0348 07/26/20  0548 07/27/20  0808   *   < > 146* 147* 147* 146* 144   K 3.8   < > 3.9 3.8 4.3 4.4 4.7   *   < > 111* 114* 114* 112* 112*   CO2 24   < > 25 24 24 26 25   BUN 91*   < > 72* 60* 51* 34* 30   CREATININE 2.6*    < > 1.7* 1.4 1.4 1.1 1.1   CALCIUM 7.9*   < > 8.1* 7.7* 8.1* 7.9* 8.2*   PROT 6.4  --   --   --   --  5.3* 5.7*   BILITOT 0.3  --   --   --   --  0.5 0.6   ALKPHOS 63  --   --   --   --  60 64   ALT 13  --   --   --   --  16 15   AST 21  --   --   --   --  18 15   MG  --    < > 2.5 2.4 2.5  --   --    PHOS  --    < > 2.9 2.5* 2.3*  --   --     < > = values in this interval not displayed.              Special Treatments/Procedures:   * No surgery found *     Disposition: Skilled Nursing Facility      Future Scheduled Appointments:  No future appointments.        Discharge Medication List:       Zbigniew Forman   Home Medication Instructions SEVERO:[id number]    Printed on:07/27/20 4214   Medication Information                      acetaminophen (TYLENOL) 325 MG tablet  Take 2 tablets (650 mg total) by mouth every 6 (six) hours as needed.             albuterol-ipratropium (DUO-NEB) 2.5 mg-0.5 mg/3 mL nebulizer solution  Take 3 mLs by nebulization every 6 (six) hours as needed for Wheezing. Rescue             furosemide (LASIX) 20 MG tablet  Take 1 tablet (20 mg total) by mouth once daily.             insulin aspart U-100 (NOVOLOG) 100 unit/mL (3 mL) InPn pen  Inject 0-5 Units into the skin before meals and at bedtime as needed (Hyperglycemia).             latanoprost 0.005 % ophthalmic solution  Place 1 drop into both eyes every evening.             melatonin (MELATIN) 3 mg tablet  Take 2 tablets (6 mg total) by mouth nightly as needed for Insomnia.             polyethylene glycol (GLYCOLAX) 17 gram PwPk  Take 17 g by mouth 2 (two) times daily.             senna-docusate 8.6-50 mg (PERICOLACE) 8.6-50 mg per tablet  Take 1 tablet by mouth 2 (two) times daily.             tamsulosin (FLOMAX) 0.4 mg Cap  Take 1 capsule (0.4 mg total) by mouth once daily.                 Patient Instructions:  Discharge Procedure Orders   Activity as tolerated       At the time of discharge patient was told to take all medications as  prescribed, to keep all followup appointments, and to call their primary care physician or return to the emergency room if they have any worsening or concerning symptoms.    Signing Physician:  Prachi Mallory MD

## 2020-07-28 ENCOUNTER — TELEPHONE (OUTPATIENT)
Dept: INTERNAL MEDICINE | Facility: CLINIC | Age: 85
End: 2020-07-28

## 2020-07-28 NOTE — TELEPHONE ENCOUNTER
----- Message from Adriana Ellison sent at 7/28/2020  2:14 PM CDT -----  Contact: Anne(daughter)[phone]  Daughter was calling to let dr know pt just got out of hospital and is currently at rehab for a couple of weeks.

## 2020-08-17 ENCOUNTER — TELEPHONE (OUTPATIENT)
Dept: INTERNAL MEDICINE | Facility: CLINIC | Age: 85
End: 2020-08-17

## 2020-08-17 NOTE — TELEPHONE ENCOUNTER
According to my review of his medications, he should take furosemide every am and tamsulosin every evening.  Those are the only two medications on his list.  Those can be crushed and put into food.    The furosemide is the most importaht.    All other medications are optional.      If she says there are more, please get them set up for a telephone or video visit later this week to talk through it.

## 2020-08-17 NOTE — TELEPHONE ENCOUNTER
Spoke with pt daughter Anne and she stated that pt is eating small meals throughtout the day and also hydrating. He is not taking his medications. She wants to know if she can mix meds with apple sauce or yogurt in order to make it easy to take meds .      giles

## 2020-08-17 NOTE — TELEPHONE ENCOUNTER
Please call the family. Tell them we're happy to hear that he's back at home. Ask if they're able to get him to take his medications as prescribed.      Thanks.

## 2020-08-17 NOTE — TELEPHONE ENCOUNTER
----- Message from Nanci Boss sent at 8/14/2020  4:59 PM CDT -----  Type:  Needs Medical Advice    Who Called:   Reason:Daughter states he isnt taking any of his medication since he came home from the hospital . Doesn't want to swallow them   Would the patient rather a call back or a response via [facility]? call  Best Call Back Number:[phone]  Additional Information:none

## 2020-08-17 NOTE — TELEPHONE ENCOUNTER
----- Message from Yeny Farris sent at 8/17/2020  9:16 AM CDT -----  Contact: Anne(daughter) [phone]  Pt daughter states her father came home from the rehab on 8/13. Pt daughter states the pt is at home.

## 2020-09-01 ENCOUNTER — TELEPHONE (OUTPATIENT)
Dept: INTERNAL MEDICINE | Facility: CLINIC | Age: 85
End: 2020-09-01

## 2020-09-01 DIAGNOSIS — R53.81 DEBILITY: Primary | ICD-10-CM

## 2020-09-01 NOTE — TELEPHONE ENCOUNTER
----- Message from Petra Stephen sent at 9/1/2020 11:05 AM CDT -----  Regarding: Orders  Contact: Vivien @ [phone] # [address] [facility]  Nurse is calling asking for orders for a nurse to come once a week for 3 weeks to patients home    Please call and advise

## 2020-09-03 ENCOUNTER — DOCUMENT SCAN (OUTPATIENT)
Dept: HOME HEALTH SERVICES | Facility: HOSPITAL | Age: 85
End: 2020-09-03
Payer: MEDICARE

## 2020-10-05 ENCOUNTER — TELEPHONE (OUTPATIENT)
Dept: INTERNAL MEDICINE | Facility: CLINIC | Age: 85
End: 2020-10-05

## 2020-10-05 NOTE — TELEPHONE ENCOUNTER
----- Message from Aurelio Bond sent at 10/5/2020  9:38 AM CDT -----  Contact: Daughter- [phone]  Daughter has questions about flu shot.   She also wants to give update on her father after he has gotten out of the hospital.   She has other questions but she said those were personal; per daughter she only wants to Dr. Morgan

## 2020-10-05 NOTE — TELEPHONE ENCOUNTER
Spoke with pt daughter Anne,    She is requesting for a nurse to come into the pt resident to give a flu shot. They don't want to come to the clinic for it.      Is that possible?      Ruchi

## 2020-10-05 NOTE — TELEPHONE ENCOUNTER
Just tried to call.  Phone just rang with no answer.  Please call to see what the question might be.  Yes, he should take the flu shot.

## 2020-10-05 NOTE — TELEPHONE ENCOUNTER
----- Message from Adriana Ellison sent at 10/5/2020 11:58 AM CDT -----  Contact: Anne (Daughter)- [phone]  Patient is returning a phone call.  Who left a message for the patient: Blas Morgan II, MD  Does patient know what this is regarding:    Comments:

## 2020-10-06 NOTE — TELEPHONE ENCOUNTER
Called and spoke with pt daughter Anne, and explained to her that it is not possible to go to pt resident and give the flu shot. She can take her dad to any drug store or bring him here to get it.  Anne verbalized understanding.    Ruchi

## 2020-10-06 NOTE — TELEPHONE ENCOUNTER
No, that's not possible.  He can come to the clinic or go to [facility] for the shot.  D

## 2020-10-16 ENCOUNTER — PES CALL (OUTPATIENT)
Dept: ADMINISTRATIVE | Facility: CLINIC | Age: 85
End: 2020-10-16

## 2021-01-05 DIAGNOSIS — J44.9 CHRONIC OBSTRUCTIVE PULMONARY DISEASE, UNSPECIFIED COPD TYPE: Primary | ICD-10-CM

## 2021-01-05 RX ORDER — IPRATROPIUM BROMIDE AND ALBUTEROL SULFATE 2.5; .5 MG/3ML; MG/3ML
3 SOLUTION RESPIRATORY (INHALATION) EVERY 6 HOURS PRN
Qty: 1 BOX | Refills: 6 | Status: SHIPPED | OUTPATIENT
Start: 2021-01-05 | End: 2022-01-05

## 2021-01-08 ENCOUNTER — TELEPHONE (OUTPATIENT)
Dept: INTERNAL MEDICINE | Facility: CLINIC | Age: 86
End: 2021-01-08

## 2021-06-05 NOTE — PLAN OF CARE
Problem: Physical Therapy Goal  Goal: Physical Therapy Goal  Goals to be met by: 21 days    Patient will increase functional independence with mobility by performin. Supine to sit with Modified [place]  2. Sit to supine with SBA  3. Sit to stand transfer with SBA  4. Bed to chair transfer with SBA using Rolling Walker  5. Gait  x 150 feet with SBA using Rolling Walker.= revise 2018     REVISED 2018 :5b: gait x75 feet w SBA w RW    5c: gait x150 feet w RW and CGA  6. Wheelchair propulsion x50 feet with SBA using bilateral uppper extremities  7. Ascend/descend 4 stairs with bilateral Handrails Stand-by Assistance.   8. Stand for 3 minutes with SBA using Rolling Walker while performing UE activity         Outcome: Ongoing (interventions implemented as appropriate)  Goals remain appropriate       No

## 2021-09-16 ENCOUNTER — TELEPHONE (OUTPATIENT)
Dept: INTERNAL MEDICINE | Facility: CLINIC | Age: 86
End: 2021-09-16

## 2021-09-21 ENCOUNTER — TELEPHONE (OUTPATIENT)
Dept: INTERNAL MEDICINE | Facility: CLINIC | Age: 86
End: 2021-09-21

## 2021-10-09 NOTE — PLAN OF CARE
Problem: Patient Care Overview  Goal: Plan of Care Review  Outcome: Ongoing (interventions implemented as appropriate)   11/22/18 1834   Coping/Psychosocial   Plan Of Care Reviewed With patient       Problem: Pain, Acute (Adult)  Intervention: Monitor/Manage Analgesia   11/22/18 1834   Safety Interventions   Medication Review/Management medications reviewed   Manage Acute Burn Pain   Bowel Intervention adequate fluid intake promoted;commode/bedpan at bedside     Intervention: Mutually Develop/Implement Acute Pain Management Plan   11/22/18 1834   Pain/Comfort/Sleep Interventions   Pain Management Interventions pillow support provided;position adjusted;medication     Intervention: Support/Optimize Psychosocial Response to Acute Pain   11/22/18 1834   Coping/Psychosocial Interventions   Supportive Measures positive reinforcement provided   Psychosocial Support   Family/Support System Care presence promoted   Trust Relationship/Rapport emotional support provided       Goal: Identify Related Risk Factors and Signs and Symptoms  Related risk factors and signs and symptoms are identified upon initiation of Human Response Clinical Practice Guideline (CPG)  Outcome: Ongoing (interventions implemented as appropriate)   11/22/18 1834   Pain, Acute   Related Risk Factors (Acute Pain) infection   Signs and Symptoms (Acute Pain) fatigue/weakness          2 seconds or less

## 2022-05-15 NOTE — ASSESSMENT & PLAN NOTE
11/27/18  Lab Results   Component Value Date    HGBA1C 6.5 (H) 11/17/2018     No need to do accuchecks as fasting glucose is stable and patient and family are requesting no finger sticks.  Monitor fasting glucose and treat if indicated.   bilateral UE Active ROM was WFL  (within functional limits)/bilateral UE Passive ROM was WFL  (within functional limits)

## 2022-10-24 NOTE — NURSING
NOTIFIED BY PCT THAT PATIENT REFUSED VITAL SIGNS ASSESSMENT.    SUBJECTIVE: HPI:  84 year old male with PMH of HTN, HLD, Pre-diabetes, BPH with complaint of neck pain. Patient endorses increasing neck pain, gait instability and clumsiness of the hands, He ambulates with a cane. He denies fever, chills, trauma or injury. He presents to [facility] prior to scheduled Posterior C1-6 Decompression, C4-5 Posterior Column Osteotomy, C1-C7 Fusion, Complex Plastics Closure on 10/18/2022.    preop covid swab 10/15 @ [facility] (27 Sep 2022 09:06)      OVERNIGHT EVENTS: Patient had an unwitnessed fall yesterday afternoon - CT Brain negative for hemorrhage, Xray of Cervical spine - no hardware fracture. Patient seen and evaluated at bedside, states some incisional pain - will adjust pain medications.     Vital Signs Last 24 Hrs  T(C): 36.8 (24 Oct 2022 08:40), Max: 37.3 (23 Oct 2022 19:57)  T(F): 98.2 (24 Oct 2022 08:40), Max: 99.1 (23 Oct 2022 19:57)  HR: 100 (24 Oct 2022 08:40) (99 - 119)  BP: 132/69 (24 Oct 2022 08:40) (128/71 - 162/85)  BP(mean): --  RR: 18 (24 Oct 2022 08:40) (18 - 18)  SpO2: 96% (24 Oct 2022 08:40) (91% - 100%)    Parameters below as of 24 Oct 2022 08:40  Patient On (Oxygen Delivery Method): room air        DRAINS: 1 superficial diamond drain 60cc/24hrs    PHYSICAL EXAM:    Constitutional: No Acute Distress     Neurological: AOx3, Following Commands, Moving all Extremities     Motor exam:          Upper extremity                         Delt     Bicep     Tricep    HG                                                 R         5/5        5/5        5/5       5/5                                               L          5/5        5/5        5/5       5/5          Lower extremity                        HF         KF        KE       DF         PF                                                  R        5/5        5/5        5/5       5/5         5/5                                               L         5/5        5/5       5/5       5/5          5/5                                                 Sensation: [x] intact to light touch  [] decreased:     Pulmonary: Clear to Auscultation, No rales, No rhonchi, No wheezes     Cardiovascular: S1, S2, Regular rate and rhythm     Gastrointestinal: Soft, Non-tender, Non-distended     Extremities: No calf tenderness     Incision: Cervical incision, aquacel AG in place - took down dressing, C/D/I, replaced dressing with regular aquacel and tegaderm    LABS:                        12.0   9.76  )-----------( 241      ( 24 Oct 2022 05:16 )             34.2    10-24    129<L>  |  92<L>  |  23  ----------------------------<  153<H>  3.7   |  24  |  1.18    Ca    8.6      24 Oct 2022 05:17        MEDICATIONS:  Antibiotics:    Neuro:  acetaminophen     Tablet .. 1000 milliGRAM(s) Oral every 6 hours PRN Temp greater or equal to 38C (100.4F), Mild Pain (1 - 3)  cyclobenzaprine 5 milliGRAM(s) Oral three times a day  methocarbamol 500 milliGRAM(s) Oral three times a day PRN Muscle Spasm  ondansetron Injectable 4 milliGRAM(s) IV Push every 6 hours PRN Nausea and/or Vomiting  oxyCODONE    IR 5 milliGRAM(s) Oral every 6 hours PRN Moderate Pain (4 - 6)  oxyCODONE    IR 10 milliGRAM(s) Oral every 6 hours PRN Severe Pain (7 - 10)    Cardiac:  amLODIPine   Tablet 10 milliGRAM(s) Oral at bedtime  losartan 50 milliGRAM(s) Oral daily    Pulm:    GI/:  bisacodyl 5 milliGRAM(s) Oral every 12 hours  bisacodyl Suppository 10 milliGRAM(s) Rectal daily PRN Constipation  magnesium hydroxide Suspension 30 milliLiter(s) Oral every 12 hours  polyethylene glycol 3350 17 Gram(s) Oral two times a day  senna 2 Tablet(s) Oral at bedtime    Other:   atorvastatin 40 milliGRAM(s) Oral at bedtime  dextrose 5%. 1000 milliLiter(s) IV Continuous <Continuous>  dextrose 5%. 1000 milliLiter(s) IV Continuous <Continuous>  dextrose 50% Injectable 25 Gram(s) IV Push once  dextrose 50% Injectable 12.5 Gram(s) IV Push once  dextrose 50% Injectable 25 Gram(s) IV Push once  dextrose Oral Gel 15 Gram(s) Oral once PRN Blood Glucose LESS THAN 70 milliGRAM(s)/deciliter  enoxaparin Injectable 40 milliGRAM(s) SubCutaneous <User Schedule>  glucagon  Injectable 1 milliGRAM(s) IntraMuscular once  insulin lispro (ADMELOG) corrective regimen sliding scale   SubCutaneous three times a day before meals  insulin lispro (ADMELOG) corrective regimen sliding scale   SubCutaneous at bedtime    DIET: [] Regular [x] CCD [] Renal [] Puree [] Dysphagia [] Tube Feeds:     IMAGING:   < from: CT Head No Cont (10.23.22 @ 19:02) >  IMPRESSION:    No acute intracranial hemorrhage, hydrocephalus or extra-axial fluid   collection.  Mild parenchymal volume loss and mild chronic microvascular ischemic   changes.  No CT evidence for acute transcortical infarction. Please note that MR   imaging is a more sensitive imaging modality for detection of acute   infarction and may be obtained as clinically warranted.    If clinicians have any questions/need for clarification regarding this   report, Dr. Nolan Hirsch can be best reached via the patient secure   hospital email system    --- End of Report ---      NOLAN WATSON MD; Attending Radiologist  This document has been electronically signed. Oct 23 2022  7:53PM    < end of copied text >    < from: Xray Cervical Spine AP and Lateral Only (10.23.22 @ 17:28) >  IMPRESSION: C2-C7 posterior spinal fusion hardware consisting of pedicle   screws with interconnecting rods which is better visualized on CT   cervical spine. No hardware fracture.    --- End of Report ---            EKTA SOSA DO; Attending Radiologist  This document has been electronically signed. Oct 24 2022  9:59AM    < end of copied text >   SUBJECTIVE: HPI:  84 year old male with PMH of HTN, HLD, Pre-diabetes, BPH with complaint of neck pain. Patient endorses increasing neck pain, gait instability and clumsiness of the hands, He ambulates with a cane. He denies fever, chills, trauma or injury. He presents to [facility] prior to scheduled Posterior C1-6 Decompression, C4-5 Posterior Column Osteotomy, C1-C7 Fusion, Complex Plastics Closure on 10/18/2022.    preop covid swab 10/15 @ [facility] (27 Sep 2022 09:06)      OVERNIGHT EVENTS: Patient had an unwitnessed fall yesterday afternoon - CT Brain negative for hemorrhage, Xray of Cervical spine - no hardware fracture. Patient seen and evaluated at bedside, states some incisional pain - will adjust pain medications.     Vital Signs Last 24 Hrs  T(C): 36.8 (24 Oct 2022 08:40), Max: 37.3 (23 Oct 2022 19:57)  T(F): 98.2 (24 Oct 2022 08:40), Max: 99.1 (23 Oct 2022 19:57)  HR: 100 (24 Oct 2022 08:40) (99 - 119)  BP: 132/69 (24 Oct 2022 08:40) (128/71 - 162/85)  BP(mean): --  RR: 18 (24 Oct 2022 08:40) (18 - 18)  SpO2: 96% (24 Oct 2022 08:40) (91% - 100%)    Parameters below as of 24 Oct 2022 08:40  Patient On (Oxygen Delivery Method): room air        DRAINS: 1 superficial diamond drain 60cc/24hrs    PHYSICAL EXAM:    Constitutional: No Acute Distress     Neurological: AOx3, Following Commands, Moving all Extremities     Motor exam:          Upper extremity                         Delt     Bicep     Tricep    HG                                                 R         5/5        5/5        5/5       5/5                                               L          5/5        5/5        5/5       5/5          Lower extremity                        HF         KF        KE       DF         PF                                                  R        5/5        5/5        5/5       5/5         5/5                                               L         5/5        5/5       5/5       5/5          5/5                                                 Sensation: [x] intact to light touch  [] decreased:     Pulmonary: Clear to Auscultation, No rales, No rhonchi, No wheezes     Cardiovascular: S1, S2, Regular rate and rhythm     Gastrointestinal: Soft, Non-tender, Non-distended     Extremities: No calf tenderness     Incision: Cervical incision, aquacel AG in place - took down dressing, C/D/I, replaced dressing with regular aquacel and tegaderm    LABS:                        12.0   9.76  )-----------( 241      ( 24 Oct 2022 05:16 )             34.2    10-24    129<L>  |  92<L>  |  23  ----------------------------<  153<H>  3.7   |  24  |  1.18    Ca    8.6      24 Oct 2022 05:17        MEDICATIONS:  Antibiotics:    Neuro:  acetaminophen     Tablet .. 1000 milliGRAM(s) Oral every 6 hours PRN Temp greater or equal to 38C (100.4F), Mild Pain (1 - 3)  cyclobenzaprine 5 milliGRAM(s) Oral three times a day  methocarbamol 500 milliGRAM(s) Oral three times a day PRN Muscle Spasm  ondansetron Injectable 4 milliGRAM(s) IV Push every 6 hours PRN Nausea and/or Vomiting  oxyCODONE    IR 5 milliGRAM(s) Oral every 6 hours PRN Moderate Pain (4 - 6)  oxyCODONE    IR 10 milliGRAM(s) Oral every 6 hours PRN Severe Pain (7 - 10)    Cardiac:  amLODIPine   Tablet 10 milliGRAM(s) Oral at bedtime  losartan 50 milliGRAM(s) Oral daily    Pulm:    GI/:  bisacodyl 5 milliGRAM(s) Oral every 12 hours  bisacodyl Suppository 10 milliGRAM(s) Rectal daily PRN Constipation  magnesium hydroxide Suspension 30 milliLiter(s) Oral every 12 hours  polyethylene glycol 3350 17 Gram(s) Oral two times a day  senna 2 Tablet(s) Oral at bedtime    Other:   atorvastatin 40 milliGRAM(s) Oral at bedtime  dextrose 5%. 1000 milliLiter(s) IV Continuous <Continuous>  dextrose 5%. 1000 milliLiter(s) IV Continuous <Continuous>  dextrose 50% Injectable 25 Gram(s) IV Push once  dextrose 50% Injectable 12.5 Gram(s) IV Push once  dextrose 50% Injectable 25 Gram(s) IV Push once  dextrose Oral Gel 15 Gram(s) Oral once PRN Blood Glucose LESS THAN 70 milliGRAM(s)/deciliter  enoxaparin Injectable 40 milliGRAM(s) SubCutaneous <User Schedule>  glucagon  Injectable 1 milliGRAM(s) IntraMuscular once  insulin lispro (ADMELOG) corrective regimen sliding scale   SubCutaneous three times a day before meals  insulin lispro (ADMELOG) corrective regimen sliding scale   SubCutaneous at bedtime    DIET: [] Regular [x] CCD [] Renal [] Puree [] Dysphagia [] Tube Feeds:     IMAGING:   < from: CT Head No Cont (10.23.22 @ 19:02) >  IMPRESSION:    No acute intracranial hemorrhage, hydrocephalus or extra-axial fluid   collection.  Mild parenchymal volume loss and mild chronic microvascular ischemic   changes.  No CT evidence for acute transcortical infarction. Please note that MR   imaging is a more sensitive imaging modality for detection of acute   infarction and may be obtained as clinically warranted.    If clinicians have any questions/need for clarification regarding this   report, Dr. Nolan Hirsch can be best reached via the patient secure   hospital email system    --- End of Report ---      NOLAN WATSON MD; Attending Radiologist  This document has been electronically signed. Oct 23 2022  7:53PM    < end of copied text >    < from: Xray Cervical Spine AP and Lateral Only (10.23.22 @ 17:28) >  IMPRESSION: C2-C7 posterior spinal fusion hardware consisting of pedicle   screws with interconnecting rods which is better visualized on CT   cervical spine. No hardware fracture.    --- End of Report ---      EKTA SOSA DO; Attending Radiologist  This document has been electronically signed. Oct 24 2022  9:59AM    < end of copied text >

## 2022-12-02 NOTE — ASSESSMENT & PLAN NOTE
11/27/18  Will resume home Xalatan gtts.  Patient to follow up with PCP.    12/3/18  No acute issues.   Continue Latanoprost gtts as ordered.    Discharge  Continue latanoprost   [FreeTextEntry1] : Esophagram on 05/27/2021\par 1. Interval type 4 hiatal hernia repair. A small type 1 hiatal hernia is present. \par 2. Two epiphrenic esophageal diverticuli.

## 2024-02-10 NOTE — PT/OT/SLP EVAL
Physical Therapy  Evaluation and Treatment    Zbigniew Forman   [id number]    Time Tracking:     PT Received On: 07/06/20   PT Start Time: 0954   PT Stop Time: 1018   PT Total Time (min): 24 min    Billable Minutes: Evaluation 1 procedure and Therapeutic Activity 10 minutes      Recommendations:     Discharge recommendations: Skilled Nursing Facility     Equipment recommendations: None    Barriers to Discharge: Not ready to discharge home from a mobility standpoint, needs further therapy.    Patient Information:     Recent Surgery: * No surgery found *      Diagnosis: Acute on chronic diastolic congestive heart failure    Length of Stay: 2 days    General Precautions: Standard, fall, hearing impaired  Orthopedic Precautions: None    Assessment:     Zbigniew Forman is a 89 y.o. male admitted to [facility] on 7/4/2020 for Acute on chronic diastolic congestive heart failure. Zbigniew Forman tolerated evaluation fair today. He is minimally confused during evaluation but overall he is cooperative and willing to participate. Requires max-total A for all bed mobility. Sitting posture is very rounded and/or kyphotic, on supplemental o2 during session satting mid 90's while sitting up. He's able to stand from side of bed to RW with minimal assist but maintains kyphotic posture with flexed hips and knees, tactile cueing for upright standing. Satting 92% while standing for ~45 seconds, takes 2-3 sidesteps to the R with min A of therapist before sitting back down. Per patient (and per daughter via RN) he is typically ambulatory using his rollator at home and can assist with ADL's so he does seem far off his baseline levels of mobility, thus recommendation for SNF upon d/c. Discussed PT role, POC, goals and recommendations (Skilled Nursing Facility) with patient; limited understanding noted. Zbigniew Forman would benefit from acute PT services to promote mobility during this admission and improve return to PLOF.    Problem List:  weakness, decreased endurance, impaired self-care skills, impaired mobility, decreased sitting or standing balance, gait instability, decreased cognition, impaired cardiopulmonary response to activity and pain    Rehab Prognosis: Fair; patient would benefit from acute skilled PT services to address these deficits and reach maximum level of function.    Plan:     Patient to be seen 3 x/week to address the above listed problems via gait training, therapeutic activities, therapeutic exercises, neuromuscular re-education    Plan of Care Expires: 08/05/20  Plan of Care reviewed with: patient    Subjective:     Communicated with RN prior to evaluation, appropriate to see for evaluation.    Pt found supine in bed (HOB elevated) upon PT entry to room, agreeable to evaluation.    Does this patient have any cultural, spiritual, Cheondoism conflicts given the current situation? Patient has no barriers to learning. Patient verbalizes understanding of his/her program and goals and demonstrates them correctly. No cultural, spiritual, or educational needs identified.    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 His bed/bath is upstairs but he has a stair chair lift that brings him up/down the stairs. He uses a walk-in shower with grab bars and shower chair built-in.    PLOF:  Prior to admission, patient was ambulatory using his rollator but using wheelchair for  "community distances. Seems like he is mostly requiring A for ADL's such as dressing, states he needs help to stand up from his toilet at home. He has a shower chair for bathing, dtr assists with getting set up into sitting on chair at home but then he states he can bathe himself once in the shower. Does not drive or work.    DME:  Patient owns or has access to the following DME: Rolling Walker, Shower Chair, Grab Bars (in shower), Wheelchair and Rollator    Upon discharge, patient will have assistance from daughter (per patient report, daughter does not work but she is 74 yo).    Objective:     Patient found with: Condom Catheter, pulse ox (continuous), oxygen    Pain:  Pain Rating 1: states pain but unable to rate or state where; he had been pre-medicated by nsg for pain prior to session    Cognitive Exam:  Patient is oriented to Person and Place ([place] Unaware of month or situation.  Patient follows 90% of single-step commands.    Sensation:   Difficult to formally assess 2* cognition but appears intact    Lower Extremity Range of Motion:  Right Lower Extremity: WFL passively  Left Lower Extremity: WFL passively    Lower Extremity Strength:  Right Lower Extremity: grossly 3/5 via MMT  Left Lower Extremity: grossly 3/5 via MMT    Functional Mobility:    · Bed Mobility:  · Supine to Sitting: max A  · Sitting to Supine: max A     · Rolling (L) in bed: mod A via drawsheet    · Transfers:  · Sit to Stand: min A from EOB with RW x 1 trial(s)    · Gait:  · 2-3 sidesteps along EOB to the R side with RW and min A; flexed fwd posture, max cueing to perform    · Assist level: Min Assist  · Device: Rolling walker    · Balance:  · Static Sit: Stand-By Assist at EOB for 15 minutes    · Static Stand: Min Assist with Rolling walker for ~45 seconds today, posterior lean noted with kyphotic posture and flexed hips/knees    Additional Therapeutic Activity/Exercises:     1. He is minimally " confused during evaluation but overall he is cooperative and willing to participate.    2. Requires max-total A for all bed mobility. Sitting posture is very rounded and/or kyphotic, on supplemental o2 during session satting mid 90's while sitting up.    3. He's able to stand from side of bed to RW with minimal assist but maintains kyphotic posture with flexed hips and knees, tactile cueing for upright standing. Satting 92% while standing for ~45 seconds, takes 2-3 sidesteps to the R with min A of therapist before sitting back down.    4. Per patient (and per daughter via RN) he is typically ambulatory using his rollator at home and can assist with ADL's so he does seem far off his baseline levels of mobility, thus recommendation for SNF upon d/c.    5. Discussed PT role, POC, goals and recommendations (Skilled Nursing Facility) with patient; limited understanding noted.    6. Whiteboard was updated.    AM-PAC 6 CLICK MOBILITY  Turning over in bed (including adjusting bedclothes, sheets and blankets)?: 3  Sitting down on and standing up from a chair with arms (e.g., wheelchair, bedside commode, etc.): 3  Moving from lying on back to sitting on the side of the bed?: 2  Moving to and from a bed to a chair (including a wheelchair)?: 2  Need to walk in hospital room?: 2  Climbing 3-5 steps with a railing?: 1  Basic Mobility Total Score: 13    Patient was left supine in bed (HOB elevated) with all lines intact, call button in reach and RN x 2 present.    Clinical Decision Making for Evaluation Complexity:  1. Body System(s) Examination: 1-2  2. Clinical Presentation: Evolving  3. Evaluation Complexity: Low    GOALS:   Multidisciplinary Problems     Physical Therapy Goals        Problem: Physical Therapy Goal    Goal Priority Disciplines Outcome Goal Variances Interventions   Physical Therapy Goal     PT, PT/OT      Description: Goals to be met by: 7/20/20     Patient will increase functional independence with mobility by  performin. Supine to sit with Stand-by Assistance - Not met  2. Sit to supine with Stand-by Assistance - Not met  3. Sit to stand transfer with Stand-by Assistance to RW - Not met  4. Bed to chair transfer with Contact Guard Assistance using Rolling Walker - Not met  5. Gait  x 25 feet with Contact Guard Assistance using Rolling Walker - Not met  6. Lower extremity exercise program x 10 reps per handout, with supervision - Not met                 Anuel Julio, PT  2020   No